# Patient Record
Sex: MALE | Race: WHITE | NOT HISPANIC OR LATINO | Employment: UNEMPLOYED | URBAN - NONMETROPOLITAN AREA
[De-identification: names, ages, dates, MRNs, and addresses within clinical notes are randomized per-mention and may not be internally consistent; named-entity substitution may affect disease eponyms.]

---

## 2021-11-17 ENCOUNTER — HOSPITAL ENCOUNTER (EMERGENCY)
Facility: HOSPITAL | Age: 26
Discharge: HOME | End: 2021-11-17
Attending: EMERGENCY MEDICINE | Admitting: EMERGENCY MEDICINE

## 2021-11-17 VITALS
HEART RATE: 101 BPM | WEIGHT: 200 LBS | BODY MASS INDEX: 34.15 KG/M2 | OXYGEN SATURATION: 96 % | TEMPERATURE: 98.2 F | DIASTOLIC BLOOD PRESSURE: 82 MMHG | RESPIRATION RATE: 18 BRPM | HEIGHT: 64 IN | SYSTOLIC BLOOD PRESSURE: 140 MMHG

## 2021-11-17 DIAGNOSIS — S00.06XA INSECT BITE OF SCALP, INITIAL ENCOUNTER: Primary | ICD-10-CM
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
## 2021-11-17 PROCEDURE — 96372 THER/PROPH/DIAG INJ SC/IM: CPT

## 2021-11-17 PROCEDURE — 99283 EMERGENCY DEPT VISIT LOW MDM: CPT

## 2021-11-17 PROCEDURE — 25010000002 CEFTRIAXONE PER 250 MG: Performed by: PHYSICIAN ASSISTANT

## 2021-11-17 RX ORDER — MUPIROCIN CALCIUM 20 MG/G
1 CREAM TOPICAL 3 TIMES DAILY
Qty: 30 G | Refills: 0 | Status: SHIPPED | OUTPATIENT
Start: 2021-11-17 | End: 2021-11-24

## 2021-11-17 RX ORDER — CEPHALEXIN 500 MG/1
500 CAPSULE ORAL 3 TIMES DAILY
Qty: 21 CAPSULE | Refills: 0 | Status: SHIPPED | OUTPATIENT
Start: 2021-11-17 | End: 2021-11-24

## 2021-11-17 RX ADMIN — MUPIROCIN 1 APPLICATION: 20 OINTMENT TOPICAL at 14:39

## 2021-11-17 RX ADMIN — LIDOCAINE HYDROCHLORIDE 1 G: 10 INJECTION, SOLUTION EPIDURAL; INFILTRATION; INTRACAUDAL; PERINEURAL at 14:20

## 2021-11-17 NOTE — DISCHARGE INSTRUCTIONS
Please utilize your topical and oral antibiotics. Please follow up with your PCP in 2 days or return to ER if symptoms worsen.

## 2022-01-23 ENCOUNTER — HOSPITAL ENCOUNTER (EMERGENCY)
Facility: HOSPITAL | Age: 27
Discharge: HOME | End: 2022-01-23
Attending: EMERGENCY MEDICINE | Admitting: EMERGENCY MEDICINE

## 2022-01-23 ENCOUNTER — APPOINTMENT (OUTPATIENT)
Dept: CT IMAGING | Facility: HOSPITAL | Age: 27
End: 2022-01-23

## 2022-01-23 VITALS
WEIGHT: 200 LBS | BODY MASS INDEX: 33.32 KG/M2 | OXYGEN SATURATION: 99 % | HEART RATE: 89 BPM | HEIGHT: 65 IN | DIASTOLIC BLOOD PRESSURE: 96 MMHG | TEMPERATURE: 98.2 F | SYSTOLIC BLOOD PRESSURE: 152 MMHG | RESPIRATION RATE: 16 BRPM

## 2022-01-23 DIAGNOSIS — E11.65 HYPERGLYCEMIA DUE TO DIABETES MELLITUS: Primary | ICD-10-CM

## 2022-01-23 DIAGNOSIS — L02.01 ABSCESS OF FACE: ICD-10-CM

## 2022-01-23 LAB
ALBUMIN SERPL-MCNC: 3.95 G/DL (ref 3.5–5.2)
ALBUMIN/GLOB SERPL: 1 G/DL
ALP SERPL-CCNC: 104 U/L (ref 39–117)
ALT SERPL W P-5'-P-CCNC: 10 U/L (ref 1–41)
ANION GAP SERPL CALCULATED.3IONS-SCNC: 12 MMOL/L (ref 5–15)
AST SERPL-CCNC: 12 U/L (ref 1–40)
BASOPHILS # BLD AUTO: 0.07 10*3/MM3 (ref 0–0.2)
BASOPHILS NFR BLD AUTO: 0.4 % (ref 0–1.5)
BILIRUB SERPL-MCNC: 0.6 MG/DL (ref 0–1.2)
BUN SERPL-MCNC: 13 MG/DL (ref 6–20)
BUN/CREAT SERPL: 12.3 (ref 7–25)
CALCIUM SPEC-SCNC: 9.4 MG/DL (ref 8.6–10.5)
CHLORIDE SERPL-SCNC: 96 MMOL/L (ref 98–107)
CO2 SERPL-SCNC: 24 MMOL/L (ref 22–29)
CREAT SERPL-MCNC: 1.06 MG/DL (ref 0.76–1.27)
CRP SERPL-MCNC: 8.62 MG/DL (ref 0–0.5)
D-LACTATE SERPL-SCNC: 1.1 MMOL/L (ref 0.5–2)
DEPRECATED RDW RBC AUTO: 36.3 FL (ref 37–54)
EOSINOPHIL # BLD AUTO: 0.56 10*3/MM3 (ref 0–0.4)
EOSINOPHIL NFR BLD AUTO: 3.6 % (ref 0.3–6.2)
ERYTHROCYTE [DISTWIDTH] IN BLOOD BY AUTOMATED COUNT: 12 % (ref 12.3–15.4)
GFR SERPL CREATININE-BSD FRML MDRD: 83 ML/MIN/1.73
GLOBULIN UR ELPH-MCNC: 4.1 GM/DL
GLUCOSE BLDC GLUCOMTR-MCNC: 331 MG/DL (ref 70–130)
GLUCOSE SERPL-MCNC: 342 MG/DL (ref 65–99)
HBA1C MFR BLD: 15 % (ref 4.8–5.6)
HCT VFR BLD AUTO: 46.2 % (ref 37.5–51)
HGB BLD-MCNC: 15.7 G/DL (ref 13–17.7)
HOLD SPECIMEN: NORMAL
HOLD SPECIMEN: NORMAL
IMM GRANULOCYTES # BLD AUTO: 0.05 10*3/MM3 (ref 0–0.05)
IMM GRANULOCYTES NFR BLD AUTO: 0.3 % (ref 0–0.5)
LYMPHOCYTES # BLD AUTO: 2.79 10*3/MM3 (ref 0.7–3.1)
LYMPHOCYTES NFR BLD AUTO: 17.9 % (ref 19.6–45.3)
MCH RBC QN AUTO: 28.6 PG (ref 26.6–33)
MCHC RBC AUTO-ENTMCNC: 34 G/DL (ref 31.5–35.7)
MCV RBC AUTO: 84.2 FL (ref 79–97)
MONOCYTES # BLD AUTO: 0.93 10*3/MM3 (ref 0.1–0.9)
MONOCYTES NFR BLD AUTO: 6 % (ref 5–12)
NEUTROPHILS NFR BLD AUTO: 11.21 10*3/MM3 (ref 1.7–7)
NEUTROPHILS NFR BLD AUTO: 71.8 % (ref 42.7–76)
NRBC BLD AUTO-RTO: 0 /100 WBC (ref 0–0.2)
PLATELET # BLD AUTO: 382 10*3/MM3 (ref 140–450)
PMV BLD AUTO: 9.4 FL (ref 6–12)
POTASSIUM SERPL-SCNC: 4.5 MMOL/L (ref 3.5–5.2)
PROT SERPL-MCNC: 8 G/DL (ref 6–8.5)
RBC # BLD AUTO: 5.49 10*6/MM3 (ref 4.14–5.8)
SODIUM SERPL-SCNC: 132 MMOL/L (ref 136–145)
WBC NRBC COR # BLD: 15.61 10*3/MM3 (ref 3.4–10.8)
WHOLE BLOOD HOLD SPECIMEN: NORMAL
WHOLE BLOOD HOLD SPECIMEN: NORMAL

## 2022-01-23 PROCEDURE — 96375 TX/PRO/DX INJ NEW DRUG ADDON: CPT

## 2022-01-23 PROCEDURE — 96361 HYDRATE IV INFUSION ADD-ON: CPT

## 2022-01-23 PROCEDURE — 86140 C-REACTIVE PROTEIN: CPT | Performed by: NURSE PRACTITIONER

## 2022-01-23 PROCEDURE — 25010000002 VANCOMYCIN 5 G RECONSTITUTED SOLUTION: Performed by: PHYSICIAN ASSISTANT

## 2022-01-23 PROCEDURE — 87040 BLOOD CULTURE FOR BACTERIA: CPT | Performed by: NURSE PRACTITIONER

## 2022-01-23 PROCEDURE — 70487 CT MAXILLOFACIAL W/DYE: CPT

## 2022-01-23 PROCEDURE — 83605 ASSAY OF LACTIC ACID: CPT | Performed by: NURSE PRACTITIONER

## 2022-01-23 PROCEDURE — 82962 GLUCOSE BLOOD TEST: CPT

## 2022-01-23 PROCEDURE — 96366 THER/PROPH/DIAG IV INF ADDON: CPT

## 2022-01-23 PROCEDURE — 85025 COMPLETE CBC W/AUTO DIFF WBC: CPT | Performed by: NURSE PRACTITIONER

## 2022-01-23 PROCEDURE — 80053 COMPREHEN METABOLIC PANEL: CPT | Performed by: NURSE PRACTITIONER

## 2022-01-23 PROCEDURE — 99284 EMERGENCY DEPT VISIT MOD MDM: CPT

## 2022-01-23 PROCEDURE — 96365 THER/PROPH/DIAG IV INF INIT: CPT

## 2022-01-23 PROCEDURE — 36415 COLL VENOUS BLD VENIPUNCTURE: CPT

## 2022-01-23 PROCEDURE — 83036 HEMOGLOBIN GLYCOSYLATED A1C: CPT | Performed by: NURSE PRACTITIONER

## 2022-01-23 PROCEDURE — 25010000002 IOPAMIDOL 61 % SOLUTION: Performed by: EMERGENCY MEDICINE

## 2022-01-23 RX ORDER — HYDROCODONE BITARTRATE AND ACETAMINOPHEN 5; 325 MG/1; MG/1
1 TABLET ORAL EVERY 8 HOURS PRN
Qty: 9 TABLET | Refills: 0 | Status: SHIPPED | OUTPATIENT
Start: 2022-01-23 | End: 2022-01-26

## 2022-01-23 RX ORDER — LIDOCAINE HYDROCHLORIDE 10 MG/ML
10 INJECTION, SOLUTION EPIDURAL; INFILTRATION; INTRACAUDAL; PERINEURAL ONCE
Status: COMPLETED | OUTPATIENT
Start: 2022-01-23 | End: 2022-01-23

## 2022-01-23 RX ORDER — HYDROCODONE BITARTRATE AND ACETAMINOPHEN 5; 325 MG/1; MG/1
1 TABLET ORAL EVERY 6 HOURS PRN
Status: DISCONTINUED | OUTPATIENT
Start: 2022-01-23 | End: 2022-01-24 | Stop reason: HOSPADM

## 2022-01-23 RX ORDER — SULFAMETHOXAZOLE AND TRIMETHOPRIM 800; 160 MG/1; MG/1
TABLET ORAL
Qty: 40 TABLET | Refills: 0 | Status: SHIPPED | OUTPATIENT
Start: 2022-01-23 | End: 2022-04-28

## 2022-01-23 RX ADMIN — SODIUM CHLORIDE 1000 ML: 9 INJECTION, SOLUTION INTRAVENOUS at 19:56

## 2022-01-23 RX ADMIN — LIDOCAINE HYDROCHLORIDE 10 ML: 10 INJECTION, SOLUTION EPIDURAL; INFILTRATION; INTRACAUDAL; PERINEURAL at 22:55

## 2022-01-23 RX ADMIN — SODIUM CHLORIDE 1000 ML: 9 INJECTION, SOLUTION INTRAVENOUS at 20:57

## 2022-01-23 RX ADMIN — HYDROCODONE BITARTRATE AND ACETAMINOPHEN 1 TABLET: 5; 325 TABLET ORAL at 23:44

## 2022-01-23 RX ADMIN — IOPAMIDOL 86 ML: 612 INJECTION, SOLUTION INTRAVENOUS at 20:32

## 2022-01-23 RX ADMIN — VANCOMYCIN HYDROCHLORIDE 1750 MG: 5 INJECTION, POWDER, LYOPHILIZED, FOR SOLUTION INTRAVENOUS at 20:56

## 2022-01-28 LAB
BACTERIA SPEC AEROBE CULT: NORMAL
BACTERIA SPEC AEROBE CULT: NORMAL

## 2022-04-28 ENCOUNTER — OFFICE VISIT (OUTPATIENT)
Dept: FAMILY MEDICINE CLINIC | Facility: CLINIC | Age: 27
End: 2022-04-28

## 2022-04-28 VITALS
OXYGEN SATURATION: 99 % | SYSTOLIC BLOOD PRESSURE: 110 MMHG | HEART RATE: 77 BPM | BODY MASS INDEX: 31.82 KG/M2 | WEIGHT: 198 LBS | HEIGHT: 66 IN | DIASTOLIC BLOOD PRESSURE: 82 MMHG | TEMPERATURE: 97.3 F

## 2022-04-28 DIAGNOSIS — E11.65 TYPE 2 DIABETES MELLITUS WITH HYPERGLYCEMIA, WITHOUT LONG-TERM CURRENT USE OF INSULIN: ICD-10-CM

## 2022-04-28 DIAGNOSIS — Z87.2 HISTORY OF CELLULITIS: ICD-10-CM

## 2022-04-28 DIAGNOSIS — R59.0 CERVICAL LYMPHADENOPATHY: Primary | ICD-10-CM

## 2022-04-28 PROCEDURE — 99204 OFFICE O/P NEW MOD 45 MIN: CPT | Performed by: FAMILY MEDICINE

## 2022-04-29 LAB
ALBUMIN SERPL-MCNC: 4.1 G/DL (ref 3.5–5.2)
ALBUMIN/GLOB SERPL: 1.3 G/DL
ALP SERPL-CCNC: 73 U/L (ref 39–117)
ALT SERPL-CCNC: 9 U/L (ref 1–41)
AST SERPL-CCNC: 7 U/L (ref 1–40)
BASOPHILS # BLD AUTO: 0.19 10*3/MM3 (ref 0–0.2)
BASOPHILS NFR BLD AUTO: 1.5 % (ref 0–1.5)
BILIRUB SERPL-MCNC: 0.3 MG/DL (ref 0–1.2)
BUN SERPL-MCNC: 18 MG/DL (ref 6–20)
BUN/CREAT SERPL: 15.1 (ref 7–25)
CALCIUM SERPL-MCNC: 10 MG/DL (ref 8.6–10.5)
CHLORIDE SERPL-SCNC: 101 MMOL/L (ref 98–107)
CHOLEST SERPL-MCNC: 254 MG/DL (ref 0–200)
CO2 SERPL-SCNC: 23.9 MMOL/L (ref 22–29)
CREAT SERPL-MCNC: 1.19 MG/DL (ref 0.76–1.27)
EGFRCR SERPLBLD CKD-EPI 2021: 84.8 ML/MIN/1.73
EOSINOPHIL # BLD AUTO: 1.99 10*3/MM3 (ref 0–0.4)
EOSINOPHIL NFR BLD AUTO: 15.4 % (ref 0.3–6.2)
ERYTHROCYTE [DISTWIDTH] IN BLOOD BY AUTOMATED COUNT: 13.2 % (ref 12.3–15.4)
GLOBULIN SER CALC-MCNC: 3.1 GM/DL
GLUCOSE SERPL-MCNC: 241 MG/DL (ref 65–99)
HBA1C MFR BLD: 11.3 % (ref 4.8–5.6)
HCT VFR BLD AUTO: 49.9 % (ref 37.5–51)
HDLC SERPL-MCNC: 31 MG/DL (ref 40–60)
HGB BLD-MCNC: 16.7 G/DL (ref 13–17.7)
IMM GRANULOCYTES # BLD AUTO: 0.04 10*3/MM3 (ref 0–0.05)
IMM GRANULOCYTES NFR BLD AUTO: 0.3 % (ref 0–0.5)
IMP & REVIEW OF LAB RESULTS: NORMAL
LDLC SERPL CALC-MCNC: 106 MG/DL (ref 0–100)
LYMPHOCYTES # BLD AUTO: 3.12 10*3/MM3 (ref 0.7–3.1)
LYMPHOCYTES NFR BLD AUTO: 24.1 % (ref 19.6–45.3)
MCH RBC QN AUTO: 28.6 PG (ref 26.6–33)
MCHC RBC AUTO-ENTMCNC: 33.5 G/DL (ref 31.5–35.7)
MCV RBC AUTO: 85.6 FL (ref 79–97)
MICROALBUMIN UR-MCNC: 1651.7 UG/ML
MONOCYTES # BLD AUTO: 0.7 10*3/MM3 (ref 0.1–0.9)
MONOCYTES NFR BLD AUTO: 5.4 % (ref 5–12)
NEUTROPHILS # BLD AUTO: 6.9 10*3/MM3 (ref 1.7–7)
NEUTROPHILS NFR BLD AUTO: 53.3 % (ref 42.7–76)
NRBC BLD AUTO-RTO: 0 /100 WBC (ref 0–0.2)
PLATELET # BLD AUTO: 394 10*3/MM3 (ref 140–450)
POTASSIUM SERPL-SCNC: 4.6 MMOL/L (ref 3.5–5.2)
PROT SERPL-MCNC: 7.2 G/DL (ref 6–8.5)
RBC # BLD AUTO: 5.83 10*6/MM3 (ref 4.14–5.8)
SODIUM SERPL-SCNC: 137 MMOL/L (ref 136–145)
TRIGL SERPL-MCNC: 671 MG/DL (ref 0–150)
TSH SERPL DL<=0.005 MIU/L-ACNC: 1.76 UIU/ML (ref 0.27–4.2)
VLDLC SERPL CALC-MCNC: 117 MG/DL (ref 5–40)
WBC # BLD AUTO: 12.94 10*3/MM3 (ref 3.4–10.8)

## 2022-05-19 ENCOUNTER — APPOINTMENT (OUTPATIENT)
Dept: CT IMAGING | Facility: HOSPITAL | Age: 27
End: 2022-05-19

## 2022-05-30 ENCOUNTER — HOSPITAL ENCOUNTER (OUTPATIENT)
Facility: HOSPITAL | Age: 27
Discharge: HOME | End: 2022-06-03
Attending: EMERGENCY MEDICINE | Admitting: SURGERY

## 2022-05-30 ENCOUNTER — APPOINTMENT (OUTPATIENT)
Dept: CT IMAGING | Facility: HOSPITAL | Age: 27
End: 2022-05-30

## 2022-05-30 DIAGNOSIS — E86.0 DEHYDRATION: ICD-10-CM

## 2022-05-30 DIAGNOSIS — R11.2 INTRACTABLE NAUSEA AND VOMITING: Primary | ICD-10-CM

## 2022-05-30 DIAGNOSIS — N17.9 AKI (ACUTE KIDNEY INJURY): ICD-10-CM

## 2022-05-30 DIAGNOSIS — R79.89 ELEVATED LACTIC ACID LEVEL: ICD-10-CM

## 2022-05-30 DIAGNOSIS — D72.829 LEUKOCYTOSIS, UNSPECIFIED TYPE: ICD-10-CM

## 2022-05-30 LAB
A-A DO2: 13.9 MMHG (ref 0–300)
ALBUMIN SERPL-MCNC: 4.32 G/DL (ref 3.5–5.2)
ALBUMIN/GLOB SERPL: 1.2 G/DL
ALP SERPL-CCNC: 68 U/L (ref 39–117)
ALT SERPL W P-5'-P-CCNC: 11 U/L (ref 1–41)
AMPHET+METHAMPHET UR QL: NEGATIVE
AMPHETAMINES UR QL: NEGATIVE
ANION GAP SERPL CALCULATED.3IONS-SCNC: 16.7 MMOL/L (ref 5–15)
ARTERIAL PATENCY WRIST A: POSITIVE
AST SERPL-CCNC: 11 U/L (ref 1–40)
ATMOSPHERIC PRESS: 728 MMHG
BACTERIA UR QL AUTO: ABNORMAL /HPF
BARBITURATES UR QL SCN: NEGATIVE
BASE EXCESS BLDA CALC-SCNC: -1.5 MMOL/L (ref 0–2)
BASOPHILS # BLD AUTO: 0.08 10*3/MM3 (ref 0–0.2)
BASOPHILS NFR BLD AUTO: 0.5 % (ref 0–1.5)
BDY SITE: ABNORMAL
BENZODIAZ UR QL SCN: NEGATIVE
BILIRUB SERPL-MCNC: 0.4 MG/DL (ref 0–1.2)
BILIRUB UR QL STRIP: NEGATIVE
BODY TEMPERATURE: 0 C
BUN SERPL-MCNC: 23 MG/DL (ref 6–20)
BUN/CREAT SERPL: 15.8 (ref 7–25)
BUPRENORPHINE SERPL-MCNC: NEGATIVE NG/ML
CALCIUM SPEC-SCNC: 10.1 MG/DL (ref 8.6–10.5)
CANNABINOIDS SERPL QL: POSITIVE
CHLORIDE SERPL-SCNC: 98 MMOL/L (ref 98–107)
CLARITY UR: CLEAR
CO2 BLDA-SCNC: 17.5 MMOL/L (ref 22–33)
CO2 SERPL-SCNC: 20.3 MMOL/L (ref 22–29)
COCAINE UR QL: NEGATIVE
COHGB MFR BLD: 1.8 % (ref 0–5)
COLOR UR: ABNORMAL
CREAT SERPL-MCNC: 1.46 MG/DL (ref 0.76–1.27)
CRP SERPL-MCNC: 0.5 MG/DL (ref 0–0.5)
D-LACTATE SERPL-SCNC: 2.3 MMOL/L (ref 0.5–2)
DEPRECATED RDW RBC AUTO: 37.4 FL (ref 37–54)
EGFRCR SERPLBLD CKD-EPI 2021: 66.3 ML/MIN/1.73
EOSINOPHIL # BLD AUTO: 0.52 10*3/MM3 (ref 0–0.4)
EOSINOPHIL NFR BLD AUTO: 3.3 % (ref 0.3–6.2)
ERYTHROCYTE [DISTWIDTH] IN BLOOD BY AUTOMATED COUNT: 12.6 % (ref 12.3–15.4)
ERYTHROCYTE [SEDIMENTATION RATE] IN BLOOD: 17 MM/HR (ref 0–15)
FLUAV RNA RESP QL NAA+PROBE: NOT DETECTED
FLUBV RNA RESP QL NAA+PROBE: NOT DETECTED
GLOBULIN UR ELPH-MCNC: 3.6 GM/DL
GLUCOSE BLDC GLUCOMTR-MCNC: 231 MG/DL (ref 70–130)
GLUCOSE SERPL-MCNC: 248 MG/DL (ref 65–99)
GLUCOSE UR STRIP-MCNC: ABNORMAL MG/DL
HBA1C MFR BLD: 9.9 % (ref 4.8–5.6)
HCO3 BLDA-SCNC: 17 MMOL/L (ref 20–26)
HCT VFR BLD AUTO: 46.4 % (ref 37.5–51)
HCT VFR BLD CALC: 49.3 % (ref 38–51)
HGB BLD-MCNC: 16.3 G/DL (ref 13–17.7)
HGB BLDA-MCNC: 16.1 G/DL (ref 14–18)
HGB UR QL STRIP.AUTO: ABNORMAL
HOLD SPECIMEN: NORMAL
HOLD SPECIMEN: NORMAL
HYALINE CASTS UR QL AUTO: ABNORMAL /LPF
IMM GRANULOCYTES # BLD AUTO: 0.05 10*3/MM3 (ref 0–0.05)
IMM GRANULOCYTES NFR BLD AUTO: 0.3 % (ref 0–0.5)
INHALED O2 CONCENTRATION: 21 %
KETONES UR QL STRIP: ABNORMAL
LEUKOCYTE ESTERASE UR QL STRIP.AUTO: NEGATIVE
LIPASE SERPL-CCNC: 35 U/L (ref 13–60)
LYMPHOCYTES # BLD AUTO: 2.22 10*3/MM3 (ref 0.7–3.1)
LYMPHOCYTES NFR BLD AUTO: 14.2 % (ref 19.6–45.3)
Lab: ABNORMAL
Lab: ABNORMAL
MAGNESIUM SERPL-MCNC: 1.6 MG/DL (ref 1.6–2.6)
MCH RBC QN AUTO: 28.6 PG (ref 26.6–33)
MCHC RBC AUTO-ENTMCNC: 35.1 G/DL (ref 31.5–35.7)
MCV RBC AUTO: 81.5 FL (ref 79–97)
METHADONE UR QL SCN: NEGATIVE
METHGB BLD QL: 0.3 % (ref 0–3)
MODALITY: ABNORMAL
MONOCYTES # BLD AUTO: 0.84 10*3/MM3 (ref 0.1–0.9)
MONOCYTES NFR BLD AUTO: 5.4 % (ref 5–12)
NEUTROPHILS NFR BLD AUTO: 11.91 10*3/MM3 (ref 1.7–7)
NEUTROPHILS NFR BLD AUTO: 76.3 % (ref 42.7–76)
NITRITE UR QL STRIP: NEGATIVE
NOTE: ABNORMAL
NOTIFIED BY: ABNORMAL
NOTIFIED WHO: ABNORMAL
NRBC BLD AUTO-RTO: 0 /100 WBC (ref 0–0.2)
OPIATES UR QL: NEGATIVE
OXYCODONE UR QL SCN: NEGATIVE
OXYHGB MFR BLDV: 97.2 % (ref 94–99)
PCO2 BLDA: 17.6 MM HG (ref 35–45)
PCO2 TEMP ADJ BLD: ABNORMAL MM[HG]
PCP UR QL SCN: NEGATIVE
PH BLDA: 7.59 PH UNITS (ref 7.35–7.45)
PH UR STRIP.AUTO: 6 [PH] (ref 5–8)
PH, TEMP CORRECTED: ABNORMAL
PLATELET # BLD AUTO: 424 10*3/MM3 (ref 140–450)
PMV BLD AUTO: 9.6 FL (ref 6–12)
PO2 BLDA: 109 MM HG (ref 83–108)
PO2 TEMP ADJ BLD: ABNORMAL MM[HG]
POTASSIUM SERPL-SCNC: 4.8 MMOL/L (ref 3.5–5.2)
PROPOXYPH UR QL: NEGATIVE
PROT SERPL-MCNC: 7.9 G/DL (ref 6–8.5)
PROT UR QL STRIP: ABNORMAL
RBC # BLD AUTO: 5.69 10*6/MM3 (ref 4.14–5.8)
RBC # UR STRIP: ABNORMAL /HPF
REF LAB TEST METHOD: ABNORMAL
SAO2 % BLDCOA: >99.2 % (ref 94–99)
SARS-COV-2 RNA RESP QL NAA+PROBE: NOT DETECTED
SODIUM SERPL-SCNC: 135 MMOL/L (ref 136–145)
SP GR UR STRIP: 1.03 (ref 1–1.03)
SQUAMOUS #/AREA URNS HPF: ABNORMAL /HPF
TRICYCLICS UR QL SCN: NEGATIVE
TROPONIN T SERPL-MCNC: <0.01 NG/ML (ref 0–0.03)
TSH SERPL DL<=0.05 MIU/L-ACNC: 2.85 UIU/ML (ref 0.27–4.2)
UROBILINOGEN UR QL STRIP: ABNORMAL
VENTILATOR MODE: ABNORMAL
WBC # UR STRIP: ABNORMAL /HPF
WBC NRBC COR # BLD: 15.62 10*3/MM3 (ref 3.4–10.8)
WHOLE BLOOD HOLD COAG: NORMAL
WHOLE BLOOD HOLD SPECIMEN: NORMAL

## 2022-05-30 PROCEDURE — 96374 THER/PROPH/DIAG INJ IV PUSH: CPT

## 2022-05-30 PROCEDURE — 83605 ASSAY OF LACTIC ACID: CPT | Performed by: EMERGENCY MEDICINE

## 2022-05-30 PROCEDURE — 96375 TX/PRO/DX INJ NEW DRUG ADDON: CPT

## 2022-05-30 PROCEDURE — 87040 BLOOD CULTURE FOR BACTERIA: CPT | Performed by: EMERGENCY MEDICINE

## 2022-05-30 PROCEDURE — 93005 ELECTROCARDIOGRAM TRACING: CPT | Performed by: EMERGENCY MEDICINE

## 2022-05-30 PROCEDURE — 93010 ELECTROCARDIOGRAM REPORT: CPT | Performed by: INTERNAL MEDICINE

## 2022-05-30 PROCEDURE — 87636 SARSCOV2 & INF A&B AMP PRB: CPT | Performed by: EMERGENCY MEDICINE

## 2022-05-30 PROCEDURE — 84443 ASSAY THYROID STIM HORMONE: CPT | Performed by: EMERGENCY MEDICINE

## 2022-05-30 PROCEDURE — 99284 EMERGENCY DEPT VISIT MOD MDM: CPT

## 2022-05-30 PROCEDURE — 87086 URINE CULTURE/COLONY COUNT: CPT | Performed by: EMERGENCY MEDICINE

## 2022-05-30 PROCEDURE — 84484 ASSAY OF TROPONIN QUANT: CPT | Performed by: EMERGENCY MEDICINE

## 2022-05-30 PROCEDURE — 82962 GLUCOSE BLOOD TEST: CPT

## 2022-05-30 PROCEDURE — G0378 HOSPITAL OBSERVATION PER HR: HCPCS

## 2022-05-30 PROCEDURE — 85025 COMPLETE CBC W/AUTO DIFF WBC: CPT | Performed by: EMERGENCY MEDICINE

## 2022-05-30 PROCEDURE — 82805 BLOOD GASES W/O2 SATURATION: CPT

## 2022-05-30 PROCEDURE — 83690 ASSAY OF LIPASE: CPT | Performed by: EMERGENCY MEDICINE

## 2022-05-30 PROCEDURE — 25010000002 ONDANSETRON PER 1 MG: Performed by: EMERGENCY MEDICINE

## 2022-05-30 PROCEDURE — 25010000002 IOPAMIDOL 61 % SOLUTION: Performed by: EMERGENCY MEDICINE

## 2022-05-30 PROCEDURE — 86140 C-REACTIVE PROTEIN: CPT | Performed by: EMERGENCY MEDICINE

## 2022-05-30 PROCEDURE — 81001 URINALYSIS AUTO W/SCOPE: CPT | Performed by: EMERGENCY MEDICINE

## 2022-05-30 PROCEDURE — 85652 RBC SED RATE AUTOMATED: CPT | Performed by: EMERGENCY MEDICINE

## 2022-05-30 PROCEDURE — 36600 WITHDRAWAL OF ARTERIAL BLOOD: CPT

## 2022-05-30 PROCEDURE — 74177 CT ABD & PELVIS W/CONTRAST: CPT

## 2022-05-30 PROCEDURE — 83036 HEMOGLOBIN GLYCOSYLATED A1C: CPT | Performed by: EMERGENCY MEDICINE

## 2022-05-30 PROCEDURE — 80053 COMPREHEN METABOLIC PANEL: CPT | Performed by: EMERGENCY MEDICINE

## 2022-05-30 PROCEDURE — 99283 EMERGENCY DEPT VISIT LOW MDM: CPT

## 2022-05-30 PROCEDURE — 80306 DRUG TEST PRSMV INSTRMNT: CPT | Performed by: EMERGENCY MEDICINE

## 2022-05-30 PROCEDURE — 83735 ASSAY OF MAGNESIUM: CPT | Performed by: EMERGENCY MEDICINE

## 2022-05-30 PROCEDURE — C9803 HOPD COVID-19 SPEC COLLECT: HCPCS

## 2022-05-30 PROCEDURE — 96376 TX/PRO/DX INJ SAME DRUG ADON: CPT

## 2022-05-30 PROCEDURE — 25010000002 MORPHINE PER 10 MG: Performed by: EMERGENCY MEDICINE

## 2022-05-30 PROCEDURE — 83050 HGB METHEMOGLOBIN QUAN: CPT

## 2022-05-30 PROCEDURE — 82375 ASSAY CARBOXYHB QUANT: CPT

## 2022-05-30 RX ORDER — KETOROLAC TROMETHAMINE 30 MG/ML
30 INJECTION, SOLUTION INTRAMUSCULAR; INTRAVENOUS EVERY 6 HOURS PRN
Status: DISCONTINUED | OUTPATIENT
Start: 2022-05-30 | End: 2022-05-30

## 2022-05-30 RX ORDER — SODIUM CHLORIDE 0.9 % (FLUSH) 0.9 %
10 SYRINGE (ML) INJECTION AS NEEDED
Status: DISCONTINUED | OUTPATIENT
Start: 2022-05-30 | End: 2022-06-03 | Stop reason: HOSPADM

## 2022-05-30 RX ORDER — ONDANSETRON 2 MG/ML
4 INJECTION INTRAMUSCULAR; INTRAVENOUS ONCE
Status: COMPLETED | OUTPATIENT
Start: 2022-05-30 | End: 2022-05-30

## 2022-05-30 RX ADMIN — ONDANSETRON 4 MG: 2 INJECTION INTRAMUSCULAR; INTRAVENOUS at 22:29

## 2022-05-30 RX ADMIN — MORPHINE SULFATE 4 MG: 4 INJECTION, SOLUTION INTRAMUSCULAR; INTRAVENOUS at 22:52

## 2022-05-30 RX ADMIN — SODIUM CHLORIDE 1000 ML: 9 INJECTION, SOLUTION INTRAVENOUS at 21:26

## 2022-05-30 RX ADMIN — SODIUM CHLORIDE 1000 ML: 9 INJECTION, SOLUTION INTRAVENOUS at 22:29

## 2022-05-30 RX ADMIN — ONDANSETRON 4 MG: 2 INJECTION INTRAMUSCULAR; INTRAVENOUS at 21:26

## 2022-05-30 RX ADMIN — IOPAMIDOL 89 ML: 612 INJECTION, SOLUTION INTRAVENOUS at 23:26

## 2022-05-31 ENCOUNTER — APPOINTMENT (OUTPATIENT)
Dept: ULTRASOUND IMAGING | Facility: HOSPITAL | Age: 27
End: 2022-05-31

## 2022-05-31 LAB
A-A DO2: ABNORMAL
ALBUMIN SERPL-MCNC: 3.67 G/DL (ref 3.5–5.2)
ALBUMIN/GLOB SERPL: 1.3 G/DL
ALP SERPL-CCNC: 53 U/L (ref 39–117)
ALT SERPL W P-5'-P-CCNC: 9 U/L (ref 1–41)
ANION GAP SERPL CALCULATED.3IONS-SCNC: 13.1 MMOL/L (ref 5–15)
ARTERIAL PATENCY WRIST A: ABNORMAL
AST SERPL-CCNC: 7 U/L (ref 1–40)
ATMOSPHERIC PRESS: 729 MMHG
BACTERIA SPEC AEROBE CULT: NO GROWTH
BASE EXCESS BLDA CALC-SCNC: -0.2 MMOL/L (ref 0–2)
BASOPHILS # BLD AUTO: 0.07 10*3/MM3 (ref 0–0.2)
BASOPHILS NFR BLD AUTO: 0.6 % (ref 0–1.5)
BDY SITE: ABNORMAL
BILIRUB SERPL-MCNC: 0.3 MG/DL (ref 0–1.2)
BODY TEMPERATURE: 0 C
BUN SERPL-MCNC: 20 MG/DL (ref 6–20)
BUN/CREAT SERPL: 17.2 (ref 7–25)
CALCIUM SPEC-SCNC: 8.7 MG/DL (ref 8.6–10.5)
CHLORIDE SERPL-SCNC: 101 MMOL/L (ref 98–107)
CO2 BLDA-SCNC: 23.8 MMOL/L (ref 22–33)
CO2 SERPL-SCNC: 20.9 MMOL/L (ref 22–29)
COHGB MFR BLD: 1.2 % (ref 0–5)
CREAT SERPL-MCNC: 1.16 MG/DL (ref 0.76–1.27)
CRP SERPL-MCNC: 0.38 MG/DL (ref 0–0.5)
D-LACTATE SERPL-SCNC: 1.4 MMOL/L (ref 0.5–2)
DEPRECATED RDW RBC AUTO: 38.1 FL (ref 37–54)
EGFRCR SERPLBLD CKD-EPI 2021: 87.4 ML/MIN/1.73
EOSINOPHIL # BLD AUTO: 0.09 10*3/MM3 (ref 0–0.4)
EOSINOPHIL NFR BLD AUTO: 0.8 % (ref 0.3–6.2)
ERYTHROCYTE [DISTWIDTH] IN BLOOD BY AUTOMATED COUNT: 12.6 % (ref 12.3–15.4)
GLOBULIN UR ELPH-MCNC: 2.8 GM/DL
GLUCOSE BLDC GLUCOMTR-MCNC: 169 MG/DL (ref 70–130)
GLUCOSE BLDC GLUCOMTR-MCNC: 191 MG/DL (ref 70–130)
GLUCOSE BLDC GLUCOMTR-MCNC: 211 MG/DL (ref 70–130)
GLUCOSE BLDC GLUCOMTR-MCNC: 248 MG/DL (ref 70–130)
GLUCOSE SERPL-MCNC: 189 MG/DL (ref 65–99)
HAV IGM SERPL QL IA: NORMAL
HBV CORE IGM SERPL QL IA: NORMAL
HBV SURFACE AG SERPL QL IA: NORMAL
HCO3 BLDA-SCNC: 22.8 MMOL/L (ref 20–26)
HCT VFR BLD AUTO: 42.1 % (ref 37.5–51)
HCT VFR BLD CALC: 45.2 % (ref 38–51)
HCV AB SER DONR QL: NORMAL
HGB BLD-MCNC: 14.5 G/DL (ref 13–17.7)
HGB BLDA-MCNC: 14.8 G/DL (ref 14–18)
IMM GRANULOCYTES # BLD AUTO: 0.03 10*3/MM3 (ref 0–0.05)
IMM GRANULOCYTES NFR BLD AUTO: 0.3 % (ref 0–0.5)
INHALED O2 CONCENTRATION: 21 %
LYMPHOCYTES # BLD AUTO: 2.77 10*3/MM3 (ref 0.7–3.1)
LYMPHOCYTES NFR BLD AUTO: 25.1 % (ref 19.6–45.3)
Lab: ABNORMAL
MCH RBC QN AUTO: 28.4 PG (ref 26.6–33)
MCHC RBC AUTO-ENTMCNC: 34.4 G/DL (ref 31.5–35.7)
MCV RBC AUTO: 82.5 FL (ref 79–97)
METHGB BLD QL: 0.3 % (ref 0–3)
MODALITY: ABNORMAL
MONOCYTES # BLD AUTO: 0.77 10*3/MM3 (ref 0.1–0.9)
MONOCYTES NFR BLD AUTO: 7 % (ref 5–12)
NEUTROPHILS NFR BLD AUTO: 66.2 % (ref 42.7–76)
NEUTROPHILS NFR BLD AUTO: 7.3 10*3/MM3 (ref 1.7–7)
NOTE: ABNORMAL
NRBC BLD AUTO-RTO: 0 /100 WBC (ref 0–0.2)
OXYHGB MFR BLDV: 97.3 % (ref 94–99)
PCO2 BLDA: 31.9 MM HG (ref 35–45)
PCO2 TEMP ADJ BLD: ABNORMAL MM[HG]
PH BLDA: 7.46 PH UNITS (ref 7.35–7.45)
PH, TEMP CORRECTED: ABNORMAL
PLATELET # BLD AUTO: 377 10*3/MM3 (ref 140–450)
PMV BLD AUTO: 9.3 FL (ref 6–12)
PO2 BLDA: 112 MM HG (ref 83–108)
PO2 TEMP ADJ BLD: ABNORMAL MM[HG]
POTASSIUM SERPL-SCNC: 4 MMOL/L (ref 3.5–5.2)
PROCALCITONIN SERPL-MCNC: 0.08 NG/ML (ref 0–0.25)
PROT SERPL-MCNC: 6.5 G/DL (ref 6–8.5)
QT INTERVAL: 380 MS
QTC INTERVAL: 412 MS
RBC # BLD AUTO: 5.1 10*6/MM3 (ref 4.14–5.8)
SAO2 % BLDCOA: 98.8 % (ref 94–99)
SODIUM SERPL-SCNC: 135 MMOL/L (ref 136–145)
VENTILATOR MODE: ABNORMAL
WBC NRBC COR # BLD: 11.03 10*3/MM3 (ref 3.4–10.8)

## 2022-05-31 PROCEDURE — 83050 HGB METHEMOGLOBIN QUAN: CPT

## 2022-05-31 PROCEDURE — 76705 ECHO EXAM OF ABDOMEN: CPT | Performed by: RADIOLOGY

## 2022-05-31 PROCEDURE — 63710000001 INSULIN ASPART PER 5 UNITS: Performed by: PHYSICIAN ASSISTANT

## 2022-05-31 PROCEDURE — 86140 C-REACTIVE PROTEIN: CPT | Performed by: PHYSICIAN ASSISTANT

## 2022-05-31 PROCEDURE — 96376 TX/PRO/DX INJ SAME DRUG ADON: CPT

## 2022-05-31 PROCEDURE — 85025 COMPLETE CBC W/AUTO DIFF WBC: CPT | Performed by: PHYSICIAN ASSISTANT

## 2022-05-31 PROCEDURE — 82962 GLUCOSE BLOOD TEST: CPT

## 2022-05-31 PROCEDURE — G0378 HOSPITAL OBSERVATION PER HR: HCPCS

## 2022-05-31 PROCEDURE — 83605 ASSAY OF LACTIC ACID: CPT | Performed by: EMERGENCY MEDICINE

## 2022-05-31 PROCEDURE — 84145 PROCALCITONIN (PCT): CPT | Performed by: PHYSICIAN ASSISTANT

## 2022-05-31 PROCEDURE — 25010000002 ONDANSETRON PER 1 MG: Performed by: PHYSICIAN ASSISTANT

## 2022-05-31 PROCEDURE — 80053 COMPREHEN METABOLIC PANEL: CPT | Performed by: PHYSICIAN ASSISTANT

## 2022-05-31 PROCEDURE — 76705 ECHO EXAM OF ABDOMEN: CPT

## 2022-05-31 PROCEDURE — 80074 ACUTE HEPATITIS PANEL: CPT | Performed by: PHYSICIAN ASSISTANT

## 2022-05-31 PROCEDURE — 82375 ASSAY CARBOXYHB QUANT: CPT

## 2022-05-31 PROCEDURE — 99219 PR INITIAL OBSERVATION CARE/DAY 50 MINUTES: CPT | Performed by: PHYSICIAN ASSISTANT

## 2022-05-31 PROCEDURE — 82805 BLOOD GASES W/O2 SATURATION: CPT

## 2022-05-31 PROCEDURE — 36600 WITHDRAWAL OF ARTERIAL BLOOD: CPT

## 2022-05-31 RX ORDER — SODIUM CHLORIDE 0.9 % (FLUSH) 0.9 %
3 SYRINGE (ML) INJECTION EVERY 12 HOURS SCHEDULED
Status: DISCONTINUED | OUTPATIENT
Start: 2022-05-31 | End: 2022-06-03 | Stop reason: HOSPADM

## 2022-05-31 RX ORDER — INSULIN ASPART 100 [IU]/ML
0-7 INJECTION, SOLUTION INTRAVENOUS; SUBCUTANEOUS
Status: DISCONTINUED | OUTPATIENT
Start: 2022-05-31 | End: 2022-06-03 | Stop reason: HOSPADM

## 2022-05-31 RX ORDER — SODIUM CHLORIDE 0.9 % (FLUSH) 0.9 %
3-10 SYRINGE (ML) INJECTION AS NEEDED
Status: DISCONTINUED | OUTPATIENT
Start: 2022-05-31 | End: 2022-06-03 | Stop reason: HOSPADM

## 2022-05-31 RX ORDER — NICOTINE POLACRILEX 4 MG
15 LOZENGE BUCCAL
Status: DISCONTINUED | OUTPATIENT
Start: 2022-05-31 | End: 2022-06-03 | Stop reason: HOSPADM

## 2022-05-31 RX ORDER — PANTOPRAZOLE SODIUM 40 MG/1
40 TABLET, DELAYED RELEASE ORAL ONCE
Status: COMPLETED | OUTPATIENT
Start: 2022-05-31 | End: 2022-05-31

## 2022-05-31 RX ORDER — DEXTROSE MONOHYDRATE 25 G/50ML
25 INJECTION, SOLUTION INTRAVENOUS
Status: DISCONTINUED | OUTPATIENT
Start: 2022-05-31 | End: 2022-06-03 | Stop reason: HOSPADM

## 2022-05-31 RX ORDER — NITROGLYCERIN 0.4 MG/1
0.4 TABLET SUBLINGUAL
Status: DISCONTINUED | OUTPATIENT
Start: 2022-05-31 | End: 2022-06-03 | Stop reason: HOSPADM

## 2022-05-31 RX ORDER — SODIUM CHLORIDE 9 MG/ML
75 INJECTION, SOLUTION INTRAVENOUS CONTINUOUS
Status: DISCONTINUED | OUTPATIENT
Start: 2022-05-31 | End: 2022-06-02

## 2022-05-31 RX ORDER — ONDANSETRON 2 MG/ML
4 INJECTION INTRAMUSCULAR; INTRAVENOUS EVERY 6 HOURS PRN
Status: DISCONTINUED | OUTPATIENT
Start: 2022-05-31 | End: 2022-06-03 | Stop reason: HOSPADM

## 2022-05-31 RX ORDER — PANTOPRAZOLE SODIUM 40 MG/1
40 TABLET, DELAYED RELEASE ORAL
Status: DISCONTINUED | OUTPATIENT
Start: 2022-05-31 | End: 2022-06-02

## 2022-05-31 RX ADMIN — INSULIN ASPART 2 UNITS: 100 INJECTION, SOLUTION INTRAVENOUS; SUBCUTANEOUS at 11:14

## 2022-05-31 RX ADMIN — Medication 3 ML: at 08:06

## 2022-05-31 RX ADMIN — INSULIN ASPART 3 UNITS: 100 INJECTION, SOLUTION INTRAVENOUS; SUBCUTANEOUS at 17:05

## 2022-05-31 RX ADMIN — PANTOPRAZOLE SODIUM 40 MG: 40 TABLET, DELAYED RELEASE ORAL at 12:34

## 2022-05-31 RX ADMIN — PANTOPRAZOLE SODIUM 40 MG: 40 TABLET, DELAYED RELEASE ORAL at 08:06

## 2022-05-31 RX ADMIN — ONDANSETRON 4 MG: 2 INJECTION INTRAMUSCULAR; INTRAVENOUS at 11:17

## 2022-05-31 RX ADMIN — ONDANSETRON 4 MG: 2 INJECTION INTRAMUSCULAR; INTRAVENOUS at 22:12

## 2022-05-31 RX ADMIN — Medication 3 ML: at 01:11

## 2022-05-31 RX ADMIN — INSULIN ASPART 2 UNITS: 100 INJECTION, SOLUTION INTRAVENOUS; SUBCUTANEOUS at 08:06

## 2022-05-31 RX ADMIN — SODIUM CHLORIDE 75 ML/HR: 9 INJECTION, SOLUTION INTRAVENOUS at 06:18

## 2022-06-01 ENCOUNTER — ANESTHESIA (OUTPATIENT)
Dept: PERIOP | Facility: HOSPITAL | Age: 27
End: 2022-06-01

## 2022-06-01 ENCOUNTER — ANESTHESIA EVENT (OUTPATIENT)
Dept: PERIOP | Facility: HOSPITAL | Age: 27
End: 2022-06-01

## 2022-06-01 LAB
ALBUMIN SERPL-MCNC: 3.45 G/DL (ref 3.5–5.2)
ALBUMIN/GLOB SERPL: 1.1 G/DL
ALP SERPL-CCNC: 49 U/L (ref 39–117)
ALT SERPL W P-5'-P-CCNC: 8 U/L (ref 1–41)
ANION GAP SERPL CALCULATED.3IONS-SCNC: 12.2 MMOL/L (ref 5–15)
AST SERPL-CCNC: 10 U/L (ref 1–40)
BASOPHILS # BLD AUTO: 0.07 10*3/MM3 (ref 0–0.2)
BASOPHILS NFR BLD AUTO: 0.7 % (ref 0–1.5)
BILIRUB SERPL-MCNC: 0.4 MG/DL (ref 0–1.2)
BUN SERPL-MCNC: 16 MG/DL (ref 6–20)
BUN/CREAT SERPL: 14.7 (ref 7–25)
CALCIUM SPEC-SCNC: 8.6 MG/DL (ref 8.6–10.5)
CHLORIDE SERPL-SCNC: 103 MMOL/L (ref 98–107)
CO2 SERPL-SCNC: 17.8 MMOL/L (ref 22–29)
CREAT SERPL-MCNC: 1.09 MG/DL (ref 0.76–1.27)
DEPRECATED RDW RBC AUTO: 38.1 FL (ref 37–54)
EGFRCR SERPLBLD CKD-EPI 2021: 94.2 ML/MIN/1.73
EOSINOPHIL # BLD AUTO: 0.39 10*3/MM3 (ref 0–0.4)
EOSINOPHIL NFR BLD AUTO: 4 % (ref 0.3–6.2)
ERYTHROCYTE [DISTWIDTH] IN BLOOD BY AUTOMATED COUNT: 12.6 % (ref 12.3–15.4)
GLOBULIN UR ELPH-MCNC: 3.1 GM/DL
GLUCOSE BLDC GLUCOMTR-MCNC: 156 MG/DL (ref 70–130)
GLUCOSE BLDC GLUCOMTR-MCNC: 185 MG/DL (ref 70–130)
GLUCOSE BLDC GLUCOMTR-MCNC: 193 MG/DL (ref 70–130)
GLUCOSE SERPL-MCNC: 165 MG/DL (ref 65–99)
HCT VFR BLD AUTO: 40.9 % (ref 37.5–51)
HGB BLD-MCNC: 14.1 G/DL (ref 13–17.7)
IMM GRANULOCYTES # BLD AUTO: 0.02 10*3/MM3 (ref 0–0.05)
IMM GRANULOCYTES NFR BLD AUTO: 0.2 % (ref 0–0.5)
LYMPHOCYTES # BLD AUTO: 3.06 10*3/MM3 (ref 0.7–3.1)
LYMPHOCYTES NFR BLD AUTO: 31.4 % (ref 19.6–45.3)
MCH RBC QN AUTO: 28.5 PG (ref 26.6–33)
MCHC RBC AUTO-ENTMCNC: 34.5 G/DL (ref 31.5–35.7)
MCV RBC AUTO: 82.8 FL (ref 79–97)
MONOCYTES # BLD AUTO: 0.81 10*3/MM3 (ref 0.1–0.9)
MONOCYTES NFR BLD AUTO: 8.3 % (ref 5–12)
NEUTROPHILS NFR BLD AUTO: 5.41 10*3/MM3 (ref 1.7–7)
NEUTROPHILS NFR BLD AUTO: 55.4 % (ref 42.7–76)
NRBC BLD AUTO-RTO: 0 /100 WBC (ref 0–0.2)
PLATELET # BLD AUTO: 348 10*3/MM3 (ref 140–450)
PMV BLD AUTO: 9.5 FL (ref 6–12)
POTASSIUM SERPL-SCNC: 3.8 MMOL/L (ref 3.5–5.2)
PROT SERPL-MCNC: 6.5 G/DL (ref 6–8.5)
RBC # BLD AUTO: 4.94 10*6/MM3 (ref 4.14–5.8)
SODIUM SERPL-SCNC: 133 MMOL/L (ref 136–145)
WBC NRBC COR # BLD: 9.76 10*3/MM3 (ref 3.4–10.8)

## 2022-06-01 PROCEDURE — G0378 HOSPITAL OBSERVATION PER HR: HCPCS

## 2022-06-01 PROCEDURE — 25010000002 ONDANSETRON PER 1 MG: Performed by: PHYSICIAN ASSISTANT

## 2022-06-01 PROCEDURE — 85025 COMPLETE CBC W/AUTO DIFF WBC: CPT | Performed by: INTERNAL MEDICINE

## 2022-06-01 PROCEDURE — 82962 GLUCOSE BLOOD TEST: CPT

## 2022-06-01 PROCEDURE — 63710000001 INSULIN ASPART PER 5 UNITS: Performed by: PHYSICIAN ASSISTANT

## 2022-06-01 PROCEDURE — 88305 TISSUE EXAM BY PATHOLOGIST: CPT

## 2022-06-01 PROCEDURE — 43239 EGD BIOPSY SINGLE/MULTIPLE: CPT | Performed by: SURGERY

## 2022-06-01 PROCEDURE — 25010000002 PROCHLORPERAZINE 10 MG/2ML SOLUTION: Performed by: INTERNAL MEDICINE

## 2022-06-01 PROCEDURE — 25010000002 MORPHINE PER 10 MG: Performed by: INTERNAL MEDICINE

## 2022-06-01 PROCEDURE — 96375 TX/PRO/DX INJ NEW DRUG ADDON: CPT

## 2022-06-01 PROCEDURE — 80053 COMPREHEN METABOLIC PANEL: CPT | Performed by: INTERNAL MEDICINE

## 2022-06-01 PROCEDURE — 25010000002 PROPOFOL 10 MG/ML EMULSION: Performed by: NURSE ANESTHETIST, CERTIFIED REGISTERED

## 2022-06-01 PROCEDURE — 63710000001 INSULIN ASPART PER 5 UNITS: Performed by: SURGERY

## 2022-06-01 PROCEDURE — 99224 PR SBSQ OBSERVATION CARE/DAY 15 MINUTES: CPT | Performed by: INTERNAL MEDICINE

## 2022-06-01 PROCEDURE — 99203 OFFICE O/P NEW LOW 30 MIN: CPT | Performed by: SURGERY

## 2022-06-01 PROCEDURE — 25010000002 ONDANSETRON PER 1 MG: Performed by: NURSE ANESTHETIST, CERTIFIED REGISTERED

## 2022-06-01 PROCEDURE — 25010000002 FENTANYL CITRATE (PF) 50 MCG/ML SOLUTION: Performed by: NURSE ANESTHETIST, CERTIFIED REGISTERED

## 2022-06-01 PROCEDURE — 96376 TX/PRO/DX INJ SAME DRUG ADON: CPT

## 2022-06-01 RX ORDER — LIDOCAINE HYDROCHLORIDE 20 MG/ML
INJECTION, SOLUTION INFILTRATION; PERINEURAL AS NEEDED
Status: DISCONTINUED | OUTPATIENT
Start: 2022-06-01 | End: 2022-06-01 | Stop reason: SURG

## 2022-06-01 RX ORDER — SODIUM CHLORIDE, SODIUM LACTATE, POTASSIUM CHLORIDE, CALCIUM CHLORIDE 600; 310; 30; 20 MG/100ML; MG/100ML; MG/100ML; MG/100ML
INJECTION, SOLUTION INTRAVENOUS CONTINUOUS PRN
Status: DISCONTINUED | OUTPATIENT
Start: 2022-06-01 | End: 2022-06-01 | Stop reason: SURG

## 2022-06-01 RX ORDER — PROPOFOL 10 MG/ML
VIAL (ML) INTRAVENOUS AS NEEDED
Status: DISCONTINUED | OUTPATIENT
Start: 2022-06-01 | End: 2022-06-01 | Stop reason: SURG

## 2022-06-01 RX ORDER — FENTANYL CITRATE 50 UG/ML
50 INJECTION, SOLUTION INTRAMUSCULAR; INTRAVENOUS
Status: DISCONTINUED | OUTPATIENT
Start: 2022-06-01 | End: 2022-06-01 | Stop reason: HOSPADM

## 2022-06-01 RX ORDER — MIDAZOLAM HYDROCHLORIDE 1 MG/ML
1 INJECTION INTRAMUSCULAR; INTRAVENOUS
Status: DISCONTINUED | OUTPATIENT
Start: 2022-06-01 | End: 2022-06-01 | Stop reason: HOSPADM

## 2022-06-01 RX ORDER — MEPERIDINE HYDROCHLORIDE 25 MG/ML
12.5 INJECTION INTRAMUSCULAR; INTRAVENOUS; SUBCUTANEOUS
Status: DISCONTINUED | OUTPATIENT
Start: 2022-06-01 | End: 2022-06-01 | Stop reason: HOSPADM

## 2022-06-01 RX ORDER — ONDANSETRON 2 MG/ML
4 INJECTION INTRAMUSCULAR; INTRAVENOUS AS NEEDED
Status: DISCONTINUED | OUTPATIENT
Start: 2022-06-01 | End: 2022-06-01 | Stop reason: HOSPADM

## 2022-06-01 RX ORDER — IPRATROPIUM BROMIDE AND ALBUTEROL SULFATE 2.5; .5 MG/3ML; MG/3ML
3 SOLUTION RESPIRATORY (INHALATION) ONCE AS NEEDED
Status: DISCONTINUED | OUTPATIENT
Start: 2022-06-01 | End: 2022-06-01 | Stop reason: HOSPADM

## 2022-06-01 RX ORDER — SODIUM CHLORIDE, SODIUM LACTATE, POTASSIUM CHLORIDE, CALCIUM CHLORIDE 600; 310; 30; 20 MG/100ML; MG/100ML; MG/100ML; MG/100ML
100 INJECTION, SOLUTION INTRAVENOUS ONCE AS NEEDED
Status: DISCONTINUED | OUTPATIENT
Start: 2022-06-01 | End: 2022-06-01 | Stop reason: HOSPADM

## 2022-06-01 RX ORDER — OXYCODONE HYDROCHLORIDE AND ACETAMINOPHEN 5; 325 MG/1; MG/1
1 TABLET ORAL ONCE AS NEEDED
Status: DISCONTINUED | OUTPATIENT
Start: 2022-06-01 | End: 2022-06-01 | Stop reason: HOSPADM

## 2022-06-01 RX ORDER — KETOROLAC TROMETHAMINE 30 MG/ML
30 INJECTION, SOLUTION INTRAMUSCULAR; INTRAVENOUS EVERY 6 HOURS PRN
Status: DISCONTINUED | OUTPATIENT
Start: 2022-06-01 | End: 2022-06-01 | Stop reason: HOSPADM

## 2022-06-01 RX ORDER — SODIUM CHLORIDE 0.9 % (FLUSH) 0.9 %
10 SYRINGE (ML) INJECTION EVERY 12 HOURS SCHEDULED
Status: DISCONTINUED | OUTPATIENT
Start: 2022-06-01 | End: 2022-06-01 | Stop reason: HOSPADM

## 2022-06-01 RX ORDER — PROCHLORPERAZINE EDISYLATE 5 MG/ML
5 INJECTION INTRAMUSCULAR; INTRAVENOUS ONCE
Status: COMPLETED | OUTPATIENT
Start: 2022-06-01 | End: 2022-06-01

## 2022-06-01 RX ORDER — SODIUM CHLORIDE 0.9 % (FLUSH) 0.9 %
10 SYRINGE (ML) INJECTION AS NEEDED
Status: DISCONTINUED | OUTPATIENT
Start: 2022-06-01 | End: 2022-06-01 | Stop reason: HOSPADM

## 2022-06-01 RX ORDER — SODIUM CHLORIDE, SODIUM LACTATE, POTASSIUM CHLORIDE, CALCIUM CHLORIDE 600; 310; 30; 20 MG/100ML; MG/100ML; MG/100ML; MG/100ML
125 INJECTION, SOLUTION INTRAVENOUS ONCE
Status: DISCONTINUED | OUTPATIENT
Start: 2022-06-01 | End: 2022-06-01 | Stop reason: HOSPADM

## 2022-06-01 RX ADMIN — PROCHLORPERAZINE EDISYLATE 5 MG: 5 INJECTION INTRAMUSCULAR; INTRAVENOUS at 08:45

## 2022-06-01 RX ADMIN — PANTOPRAZOLE SODIUM 40 MG: 40 TABLET, DELAYED RELEASE ORAL at 05:12

## 2022-06-01 RX ADMIN — LIDOCAINE HYDROCHLORIDE 60 MG: 20 INJECTION, SOLUTION INFILTRATION; PERINEURAL at 12:49

## 2022-06-01 RX ADMIN — ONDANSETRON 4 MG: 2 INJECTION INTRAMUSCULAR; INTRAVENOUS at 13:27

## 2022-06-01 RX ADMIN — FENTANYL CITRATE 50 MCG: 50 INJECTION INTRAMUSCULAR; INTRAVENOUS at 13:29

## 2022-06-01 RX ADMIN — INSULIN ASPART 2 UNITS: 100 INJECTION, SOLUTION INTRAVENOUS; SUBCUTANEOUS at 08:45

## 2022-06-01 RX ADMIN — PROPOFOL 140 MCG/KG/MIN: 10 INJECTION, EMULSION INTRAVENOUS at 12:49

## 2022-06-01 RX ADMIN — INSULIN ASPART 2 UNITS: 100 INJECTION, SOLUTION INTRAVENOUS; SUBCUTANEOUS at 17:48

## 2022-06-01 RX ADMIN — MORPHINE SULFATE 4 MG: 4 INJECTION, SOLUTION INTRAMUSCULAR; INTRAVENOUS at 08:46

## 2022-06-01 RX ADMIN — PROPOFOL 50 MG: 10 INJECTION, EMULSION INTRAVENOUS at 12:49

## 2022-06-01 RX ADMIN — ONDANSETRON 4 MG: 2 INJECTION INTRAMUSCULAR; INTRAVENOUS at 05:28

## 2022-06-01 RX ADMIN — SODIUM CHLORIDE, POTASSIUM CHLORIDE, SODIUM LACTATE AND CALCIUM CHLORIDE: 600; 310; 30; 20 INJECTION, SOLUTION INTRAVENOUS at 12:48

## 2022-06-01 NOTE — PLAN OF CARE
Goal Outcome Evaluation:  Plan of Care Reviewed With: patient           Outcome Evaluation: patient has been gagging and vomiting several times today MD consulted surgery for EGD procdure was done patient is now feeling nausous patient went off the floor while CNA was getting his VS will continiue to monitor patient advised not to leave floor

## 2022-06-01 NOTE — CONSULTS
[facility]   Consult Note    Patient Name: Oliver Osborn  : 1995  MRN: [id number]  Primary Care Physician:  Elsa De La Torre MD  Referring Physician: No ref. provider found  Date of admission: 2022    Consults  Subjective   Subjective     Reason for Consult/ Chief Complaint: Intractable nausea and vomiting    History of Present Illness  Oliver Osborn is a 27 y.o. male infrequently uses marijuana who presents with intractable nausea and vomiting up to 20 times per day.  He presented with some dehydration that has resolved.  He was nearing discharge home and with work-up otherwise negative he was ready for discharge when he developed severe abdominal pain and continued emesis.  I have been consulted for EGD.  No hematemesis.  Patient complains of neck soreness but has no lymphadenopathy or other abnormalities.  His abdomen is mildly tender to palpation.  Review of Systems   Constitutional: Negative for activity change, appetite change, chills and fever.   HENT: Negative for sore throat and trouble swallowing.    Eyes: Negative for visual disturbance.   Respiratory: Negative for cough and shortness of breath.    Cardiovascular: Negative for chest pain and palpitations.   Gastrointestinal: Positive for nausea and vomiting. Negative for abdominal distention, abdominal pain, blood in stool, constipation and diarrhea.   Endocrine: Negative for cold intolerance and heat intolerance.   Genitourinary: Negative for dysuria.   Musculoskeletal: Negative for joint swelling.   Skin: Negative for color change, rash and wound.   Allergic/Immunologic: Negative for immunocompromised state.   Neurological: Negative for dizziness, seizures, weakness and headaches.   Hematological: Negative for adenopathy. Does not bruise/bleed easily.   Psychiatric/Behavioral: Negative for agitation and confusion.        Personal History     Past Medical History:   Diagnosis Date   • Diabetes mellitus (HCC)    • Tobacco abuse         History reviewed. No pertinent surgical history.    Family History: family history includes Diabetes in his father, maternal grandfather, maternal grandmother, mother, paternal grandfather, and paternal grandmother; Heart disease in his father, maternal grandfather, maternal grandmother, mother, paternal grandfather, and paternal grandmother; Kidney disease in his mother. Otherwise pertinent FHx was reviewed and not pertinent to current issue.    Social History:  reports that he has been smoking. He has a 22.50 pack-year smoking history. He has never used smokeless tobacco. He reports current drug use. Frequency: 7.00 times per week. Drug: Marijuana. He reports that he does not drink alcohol.    Home Medications:        Allergies:  No Known Allergies    Objective    Objective     Vitals:  Temp:  [98.5 °F (36.9 °C)-98.8 °F (37.1 °C)] 98.8 °F (37.1 °C)  Heart Rate:  [59-90] 90  Resp:  [18] 18  BP: (121-132)/(78-92) 128/92    Physical Exam  Constitutional:       Appearance: He is well-developed.   HENT:      Head: Normocephalic and atraumatic.   Eyes:      Conjunctiva/sclera: Conjunctivae normal.      Pupils: Pupils are equal, round, and reactive to light.   Neck:      Thyroid: No thyromegaly.      Vascular: No JVD.      Trachea: No tracheal deviation.   Cardiovascular:      Rate and Rhythm: Normal rate and regular rhythm.      Heart sounds: No murmur heard.    No friction rub. No gallop.   Pulmonary:      Effort: Pulmonary effort is normal.      Breath sounds: Normal breath sounds.   Abdominal:      General: There is no distension.      Palpations: Abdomen is soft. There is no hepatomegaly or splenomegaly.      Tenderness: There is no abdominal tenderness.      Hernia: No hernia is present.   Musculoskeletal:         General: No deformity. Normal range of motion.      Cervical back: Neck supple.   Skin:     General: Skin is warm and dry.   Neurological:      Mental Status: He is alert and oriented to person,  place, and time.         Result Review    Result Review:  I have personally reviewed the results from the time of this admission to 6/1/2022 10:52 EDT and agree with these findings:  [x]  Laboratory  []  Microbiology  [x]  Radiology  []  EKG/Telemetry   []  Cardiology/Vascular   []  Pathology  []  Old records      Assessment & Plan   Assessment / Plan     Brief Patient Summary:  Oliver Osborn is a 27 y.o. male who has intractable nausea and vomiting likely representing THC related hyperemesis syndrome.  All other work-up negative at this time.    Active Hospital Problems:  Active Hospital Problems    Diagnosis    • **Intractable nausea and vomiting      Plan:   EGD today    Keon Quezada MD

## 2022-06-01 NOTE — ANESTHESIA PREPROCEDURE EVALUATION
Anesthesia Evaluation     Patient summary reviewed and Nursing notes reviewed   no history of anesthetic complications:               Airway   Mallampati: I  TM distance: >3 FB  Neck ROM: full  No difficulty expected  Dental - normal exam     Pulmonary - negative pulmonary ROS and normal exam   Cardiovascular - negative cardio ROS and normal exam  Exercise tolerance: good (4-7 METS)    NYHA Classification: II        Neuro/Psych- negative ROS  GI/Hepatic/Renal/Endo    (+)   diabetes mellitus,     Musculoskeletal (-) negative ROS    Abdominal  - normal exam    Bowel sounds: normal.   Substance History - negative use     OB/GYN negative ob/gyn ROS         Other - negative ROS                       Anesthesia Plan    ASA 2     general     intravenous induction     Anesthetic plan, all risks, benefits, and alternatives have been provided, discussed and informed consent has been obtained with: patient.        CODE STATUS:    Level Of Support Discussed With: Patient  Code Status (Patient has no pulse and is not breathing): CPR (Attempt to Resuscitate)  Medical Interventions (Patient has pulse or is breathing): Full Support

## 2022-06-01 NOTE — PROGRESS NOTES
[facility] HOSPITALIST PROGRESS NOTE     Patient Identification:  Name:  Oliver Osborn  Age:  27 y.o.  Sex:  male  :  1995  MRN:  [id number]  Visit Number:  [id number]  ROOM: [address] [place] [address]     Primary Care Provider:  Elsa De La Torre MD    Length of stay in inpatient status:  0    Subjective     Chief Compliant:    Chief Complaint   Patient presents with   • Abdominal Pain   • Vomiting       History of Presenting Illness:    Patient continues to report persistent N/V and severe abdominal pain. He was yelling in pain and had a bag full of emesis on my evaluation. Pain epigastric and not as evident on exam. Nursing staff reports he was doing well until he went outside this AM and symptoms started after that. Patient became calm during my evaluation saying someone needed to call his work.     ROS:  Otherwise 10 point ROS negative other than documented above in HPI.     Objective     Current Hospital Meds:Insulin Aspart, 0-7 Units, Subcutaneous, TID AC  nicotine, 1 patch, Transdermal, Q24H  pantoprazole, 40 mg, Oral, Q AM  sodium chloride, 3 mL, Intravenous, Q12H    sodium chloride, 75 mL/hr, Last Rate: 75 mL/hr (22 0287)        Current Antimicrobial Therapy:  Anti-Infectives (From admission, onward)    None        Current Diuretic Therapy:  Diuretics (From admission, onward)    None        ----------------------------------------------------------------------------------------------------------------------  Vital Signs:  Temp:  [98.5 °F (36.9 °C)-98.8 °F (37.1 °C)] 98.8 °F (37.1 °C)  Heart Rate:  [59-90] 90  Resp:  [18] 18  BP: (121-132)/(78-92) 128/92  SpO2:  [97 %-98 %] 97 %  on   ;   Device (Oxygen Therapy): room air  Body mass index is 30.7 kg/m².    Wt Readings from Last 3 Encounters:   22 86.3 kg (190 lb 3.2 oz)   22 89.8 kg (198 lb)   22 90.7 kg (200 lb)     Intake & Output (last 3 days)        07 07 07  0701 06/02 0700    P.O.  480 600     IV Piggyback  2000      Total Intake(mL/kg)  2480 (28.7) 600 (7)     Net  +2480 +600             Urine Unmeasured Occurrence   5 x     Stool Unmeasured Occurrence   0 x         NPO Diet NPO Type: Strict NPO  ----------------------------------------------------------------------------------------------------------------------  Physical exam:  Constitutional:  Well-developed and well-nourished.  No respiratory distress.      HENT:  Head:  Normocephalic and atraumatic.  Mouth:  Moist mucous membranes.    Eyes:  Conjunctivae and EOM are normal. No scleral icterus.    Neck:  Neck supple.  No JVD present.    Cardiovascular:  Normal rate, regular rhythm and normal heart sounds with no murmur.  Pulmonary/Chest:  No respiratory distress, no wheezes, no crackles, with normal breath sounds and good air movement.  Abdominal:  Soft.  No rebound or guarding. Tenderness reported in epigastric area.   Musculoskeletal:  No edema, no tenderness, and no deformity.  No red or swollen joints anywhere.    Neurological:  Alert and oriented to person, place, and time.  No cranial nerve deficit.  No tongue deviation.  No facial droop.  No slurred speech.   Skin:  Skin is warm and dry. No rash noted. No pallor.   Peripheral vascular:  Pulses in all 4 extremities with no clubbing, no cyanosis, no edema.  ----------------------------------------------------------------------------------------------------------------------  Tele:    ----------------------------------------------------------------------------------------------------------------------  Results from last 7 days   Lab Units 06/01/22  0247 05/31/22  0552 05/31/22  0133 05/30/22  0800   CRP mg/dL  --  0.38  --  0.50   LACTATE mmol/L  --   --  1.4 2.3*   WBC 10*3/mm3 9.76 11.03*  --  15.62*   HEMOGLOBIN g/dL 14.1 14.5  --  16.3   HEMATOCRIT % 40.9 42.1  --  46.4   MCV fL 82.8 82.5  --  81.5   MCHC g/dL 34.5 34.4  --  35.1   PLATELETS 10*3/mm3 348  377  --  424     Results from last 7 days   Lab Units 05/31/22  1420   PH, ARTERIAL pH units 7.463*   PO2 ART mm Hg 112.0*   PCO2, ARTERIAL mm Hg 31.9*   HCO3 ART mmol/L 22.8     Results from last 7 days   Lab Units 06/01/22  0247 05/31/22  0552 05/30/22 2124   SODIUM mmol/L 133* 135* 135*   POTASSIUM mmol/L 3.8 4.0 4.8   MAGNESIUM mg/dL  --   --  1.6   CHLORIDE mmol/L 103 101 98   CO2 mmol/L 17.8* 20.9* 20.3*   BUN mg/dL 16 20 23*   CREATININE mg/dL 1.09 1.16 1.46*   CALCIUM mg/dL 8.6 8.7 10.1   GLUCOSE mg/dL 165* 189* 248*   ALBUMIN g/dL 3.45* 3.67 4.32   BILIRUBIN mg/dL 0.4 0.3 0.4   ALK PHOS U/L 49 53 68   AST (SGOT) U/L 10 7 11   ALT (SGPT) U/L 8 9 11   Estimated Creatinine Clearance: 103 mL/min (by C-G formula based on SCr of 1.09 mg/dL).  No results found for: AMMONIA  Results from last 7 days   Lab Units 05/30/22 2124   TROPONIN T ng/mL <0.010             Hemoglobin A1C   Date/Time Value Ref Range Status   05/30/2022 2124 9.90 (H) 4.80 - 5.60 % Final     Glucose   Date/Time Value Ref Range Status   06/01/2022 0642 193 (H) 70 - 130 mg/dL Final     Comment:     Meter: [id number] : [id number] DONALD REYES   05/31/2022 1615 211 (H) 70 - 130 mg/dL Final     Comment:     Meter: [id number] : [id number] Collin Murcia   05/31/2022 1032 191 (H) 70 - 130 mg/dL Final     Comment:     Meter: [id number] : [id number] Collin Murcia   05/31/2022 0654 169 (H) 70 - 130 mg/dL Final     Comment:     Meter: [id number] : [id number] TORRECARLOS GUTIÉRREZA   05/31/2022 0123 248 (H) 70 - 130 mg/dL Final     Comment:     Meter: [id number] : [id number] Jessica Retana   05/30/2022 2105 231 (H) 70 - 130 mg/dL Final     Comment:     Meter: [id number] : [id number] richard fraire     Lab Results   Component Value Date    TSH 2.850 05/30/2022     No results found for: PREGTESTUR, PREGSERUM, HCG, HCGQUANT  Pain Management Panel     Pain Management Panel Latest Ref Rng & Units 5/30/2022    AMPHETAMINES SCREEN, URINE  Negative Negative    BARBITURATES SCREEN Negative Negative    BENZODIAZEPINE SCREEN, URINE Negative Negative    BUPRENORPHINEUR Negative Negative    COCAINE SCREEN, URINE Negative Negative    METHADONE SCREEN, URINE Negative Negative    METHAMPHETAMINEUR Negative Negative        Brief Urine Lab Results  (Last result in the past 365 days)      Color   Clarity   Blood   Leuk Est   Nitrite   Protein   CREAT   Urine HCG        05/30/22 2254 Dark Yellow   Clear   Moderate (2+)   Negative   Negative   >=300 mg/dL (3+)               Blood Culture   Date Value Ref Range Status   05/30/2022 No growth at 24 hours  Preliminary   05/30/2022 No growth at 24 hours  Preliminary     Urine Culture   Date Value Ref Range Status   05/30/2022 No growth  Final     No results found for: WOUNDCX  No results found for: STOOLCX  No results found for: RESPCX  No results found for: AFBCX  Results from last 7 days   Lab Units 05/31/22  0552 05/31/22  0133 05/30/22 2124   PROCALCITONIN ng/mL 0.08  --   --    LACTATE mmol/L  --  1.4 2.3*   SED RATE mm/hr  --   --  17*   CRP mg/dL 0.38  --  0.50       I have personally looked at the labs and they are summarized above.  ----------------------------------------------------------------------------------------------------------------------  Detailed radiology reports for the last 24 hours:    Imaging Results (Last 24 Hours)     Procedure Component Value Units Date/Time    US Gallbladder [[id number]] Collected: 05/31/22 1442     Updated: 05/31/22 1444    Narrative:      EXAM:    US Abdomen Limited, Gallbladder     EXAM DATE:    5/31/2022 1:57 PM     CLINICAL HISTORY:    us gallbladder; R11.2-Nausea with vomiting, unspecified; N17.9-Acute  kidney failure, unspecified; E86.0-Dehydration; R79.89-Other specified  abnormal findings of blood chemistry; D72.829-Elevated white blood cell  count, unspecified     TECHNIQUE:    Real-time ultrasound of the right upper quadrant with image  documentation.      COMPARISON:    No relevant prior studies available.     FINDINGS:    Gallbladder:  Unremarkable.  No gallstones.    Common bile duct:  The CBD measures 2.28 mm.  No stones.  No dilation.    Pancreas:  Unremarkable as visualized.       Impression:        No acute findings in the right upper quadrant.     This report was finalized on 5/31/2022 2:42 PM by Dr. Edward Montes MD.           Assessment & Plan      #Intractable N/V  #Abdominal pain   - Differential includes: Gallbladder dysfunction, cannaboid hyperemesis, gastroenteritis, PUD w/ possible nonbleeding ulcer, Conversion disorder,  - CT and US gallbladder unrevealing than asymptomatic cystitis   - PRN zofran, compazine  - Surgery consulted for EGD  - Will make NPO pending procedure and monitor   - Outpatient GI f/u     #Respiratory alkalosis   - Likely from hyperventilation. Improved.     #Uncontrolled DM  - Given A1C that has improved drastically since January (15 to 9.9%) without any antihyperglycemics the last few months and BG only in 200's on presentation, I doubt hyperglycemia caused symptoms.  - SSI.   - Will discharge on low dose of basal insulin     F: PRN IVF  E: Replace as needed   N: NPO    Code status: Full     Dispo: Pending clinical improvement     VTE Prophylaxis:   Mechanical Order History:     None      Pharmalogical Order History:     None            Harvinder Barraza MD  [facility] Hospitalist  06/01/22  10:51 EDT

## 2022-06-02 LAB
ANION GAP SERPL CALCULATED.3IONS-SCNC: 15.1 MMOL/L (ref 5–15)
BASOPHILS # BLD AUTO: 0.06 10*3/MM3 (ref 0–0.2)
BASOPHILS NFR BLD AUTO: 0.5 % (ref 0–1.5)
BUN SERPL-MCNC: 12 MG/DL (ref 6–20)
BUN/CREAT SERPL: 11.2 (ref 7–25)
CALCIUM SPEC-SCNC: 8.9 MG/DL (ref 8.6–10.5)
CHLORIDE SERPL-SCNC: 100 MMOL/L (ref 98–107)
CO2 SERPL-SCNC: 19.9 MMOL/L (ref 22–29)
CREAT SERPL-MCNC: 1.07 MG/DL (ref 0.76–1.27)
DEPRECATED RDW RBC AUTO: 37.3 FL (ref 37–54)
EGFRCR SERPLBLD CKD-EPI 2021: 96.3 ML/MIN/1.73
EOSINOPHIL # BLD AUTO: 0.09 10*3/MM3 (ref 0–0.4)
EOSINOPHIL NFR BLD AUTO: 0.8 % (ref 0.3–6.2)
ERYTHROCYTE [DISTWIDTH] IN BLOOD BY AUTOMATED COUNT: 12.4 % (ref 12.3–15.4)
GLUCOSE BLDC GLUCOMTR-MCNC: 167 MG/DL (ref 70–130)
GLUCOSE BLDC GLUCOMTR-MCNC: 167 MG/DL (ref 70–130)
GLUCOSE BLDC GLUCOMTR-MCNC: 169 MG/DL (ref 70–130)
GLUCOSE SERPL-MCNC: 178 MG/DL (ref 65–99)
HCT VFR BLD AUTO: 41.9 % (ref 37.5–51)
HGB BLD-MCNC: 14.6 G/DL (ref 13–17.7)
IMM GRANULOCYTES # BLD AUTO: 0.04 10*3/MM3 (ref 0–0.05)
IMM GRANULOCYTES NFR BLD AUTO: 0.3 % (ref 0–0.5)
LYMPHOCYTES # BLD AUTO: 2.51 10*3/MM3 (ref 0.7–3.1)
LYMPHOCYTES NFR BLD AUTO: 21.1 % (ref 19.6–45.3)
MCH RBC QN AUTO: 28.8 PG (ref 26.6–33)
MCHC RBC AUTO-ENTMCNC: 34.8 G/DL (ref 31.5–35.7)
MCV RBC AUTO: 82.6 FL (ref 79–97)
MONOCYTES # BLD AUTO: 0.8 10*3/MM3 (ref 0.1–0.9)
MONOCYTES NFR BLD AUTO: 6.7 % (ref 5–12)
NEUTROPHILS NFR BLD AUTO: 70.6 % (ref 42.7–76)
NEUTROPHILS NFR BLD AUTO: 8.37 10*3/MM3 (ref 1.7–7)
NRBC BLD AUTO-RTO: 0 /100 WBC (ref 0–0.2)
PLATELET # BLD AUTO: 356 10*3/MM3 (ref 140–450)
PMV BLD AUTO: 9.2 FL (ref 6–12)
POTASSIUM SERPL-SCNC: 3.8 MMOL/L (ref 3.5–5.2)
QT INTERVAL: 382 MS
QTC INTERVAL: 420 MS
RBC # BLD AUTO: 5.07 10*6/MM3 (ref 4.14–5.8)
REF LAB TEST METHOD: NORMAL
SODIUM SERPL-SCNC: 135 MMOL/L (ref 136–145)
TROPONIN T SERPL-MCNC: <0.01 NG/ML (ref 0–0.03)
WBC NRBC COR # BLD: 11.87 10*3/MM3 (ref 3.4–10.8)

## 2022-06-02 PROCEDURE — 25010000002 PROCHLORPERAZINE 10 MG/2ML SOLUTION: Performed by: PHYSICIAN ASSISTANT

## 2022-06-02 PROCEDURE — 25010000002 ONDANSETRON PER 1 MG: Performed by: SURGERY

## 2022-06-02 PROCEDURE — 82962 GLUCOSE BLOOD TEST: CPT

## 2022-06-02 PROCEDURE — 80048 BASIC METABOLIC PNL TOTAL CA: CPT | Performed by: INTERNAL MEDICINE

## 2022-06-02 PROCEDURE — 85025 COMPLETE CBC W/AUTO DIFF WBC: CPT | Performed by: INTERNAL MEDICINE

## 2022-06-02 PROCEDURE — 93005 ELECTROCARDIOGRAM TRACING: CPT | Performed by: INTERNAL MEDICINE

## 2022-06-02 PROCEDURE — G0378 HOSPITAL OBSERVATION PER HR: HCPCS

## 2022-06-02 PROCEDURE — 84484 ASSAY OF TROPONIN QUANT: CPT | Performed by: INTERNAL MEDICINE

## 2022-06-02 PROCEDURE — 99225 PR SBSQ OBSERVATION CARE/DAY 25 MINUTES: CPT | Performed by: INTERNAL MEDICINE

## 2022-06-02 PROCEDURE — 25010000002 KETOROLAC TROMETHAMINE PER 15 MG: Performed by: PHYSICIAN ASSISTANT

## 2022-06-02 PROCEDURE — 93010 ELECTROCARDIOGRAM REPORT: CPT | Performed by: INTERNAL MEDICINE

## 2022-06-02 RX ORDER — PROCHLORPERAZINE EDISYLATE 5 MG/ML
5 INJECTION INTRAMUSCULAR; INTRAVENOUS EVERY 6 HOURS PRN
Status: DISCONTINUED | OUTPATIENT
Start: 2022-06-02 | End: 2022-06-03 | Stop reason: HOSPADM

## 2022-06-02 RX ORDER — SUCRALFATE 1 G/1
1 TABLET ORAL
Status: DISCONTINUED | OUTPATIENT
Start: 2022-06-02 | End: 2022-06-03 | Stop reason: HOSPADM

## 2022-06-02 RX ORDER — KETOROLAC TROMETHAMINE 30 MG/ML
15 INJECTION, SOLUTION INTRAMUSCULAR; INTRAVENOUS ONCE
Status: COMPLETED | OUTPATIENT
Start: 2022-06-02 | End: 2022-06-02

## 2022-06-02 RX ORDER — PANTOPRAZOLE SODIUM 40 MG/1
40 TABLET, DELAYED RELEASE ORAL
Status: DISCONTINUED | OUTPATIENT
Start: 2022-06-02 | End: 2022-06-03 | Stop reason: HOSPADM

## 2022-06-02 RX ADMIN — SUCRALFATE 1 G: 1 TABLET ORAL at 16:52

## 2022-06-02 RX ADMIN — KETOROLAC TROMETHAMINE 15 MG: 30 INJECTION, SOLUTION INTRAMUSCULAR at 05:59

## 2022-06-02 RX ADMIN — PROCHLORPERAZINE EDISYLATE 5 MG: 5 INJECTION INTRAMUSCULAR; INTRAVENOUS at 05:12

## 2022-06-02 RX ADMIN — SUCRALFATE 1 G: 1 TABLET ORAL at 21:28

## 2022-06-02 RX ADMIN — PROCHLORPERAZINE EDISYLATE 5 MG: 5 INJECTION INTRAMUSCULAR; INTRAVENOUS at 23:11

## 2022-06-02 RX ADMIN — SUCRALFATE 1 G: 1 TABLET ORAL at 10:52

## 2022-06-02 RX ADMIN — Medication 3 ML: at 21:28

## 2022-06-02 RX ADMIN — PANTOPRAZOLE SODIUM 40 MG: 40 TABLET, DELAYED RELEASE ORAL at 05:16

## 2022-06-02 RX ADMIN — ONDANSETRON 4 MG: 2 INJECTION INTRAMUSCULAR; INTRAVENOUS at 04:24

## 2022-06-02 RX ADMIN — Medication 3 ML: at 09:44

## 2022-06-02 RX ADMIN — PANTOPRAZOLE SODIUM 40 MG: 40 TABLET, DELAYED RELEASE ORAL at 16:52

## 2022-06-02 NOTE — ANESTHESIA POSTPROCEDURE EVALUATION
Patient: Oliver Osborn    Procedure Summary     Date: 06/01/22 Room / Location: [facility] OR [address] [place] [address] COR OR    Anesthesia Start: 1248 Anesthesia Stop: 1254    Procedure: ESOPHAGOGASTRODUODENOSCOPY WITH ANESTHESIA (N/A Esophagus) Diagnosis:       Intractable nausea and vomiting      (Intractable nausea and vomiting [R11.2])    Surgeons: Keon Quezada MD Provider: Varinder Garcia DO    Anesthesia Type: general ASA Status: 2          Anesthesia Type: general    Vitals  Vitals Value Taken Time   /79 06/01/22 1359   Temp 98.2 °F (36.8 °C) 06/01/22 1255   Pulse 68 06/01/22 1359   Resp 18 06/01/22 1359   SpO2 98 % 06/01/22 1359           Post Anesthesia Care and Evaluation    Patient location during evaluation: PHASE II  Patient participation: complete - patient participated  Level of consciousness: awake and alert  Pain score: 1  Pain management: adequate  Airway patency: patent  Anesthetic complications: No anesthetic complications  PONV Status: controlled  Cardiovascular status: acceptable  Respiratory status: acceptable  Hydration status: acceptable

## 2022-06-02 NOTE — PROGRESS NOTES
[facility] HOSPITALIST PROGRESS NOTE     Patient Identification:  Name:  Oliver Osborn  Age:  27 y.o.  Sex:  male  :  1995  MRN:  [id number]  Visit Number:  [id number]  ROOM: [address] [place] [address]     Primary Care Provider:  Elsa De La Torre MD    Length of stay in inpatient status:  1    Subjective     Chief Compliant:    Chief Complaint   Patient presents with   • Abdominal Pain   • Vomiting       History of Presenting Illness:    Patient was initially talking on phone without complaint or distress. When talking to patient he started to moan and reported he has only been able to hold down a small amount of water and none of the rest of his clear liquid diet. He denies any THC use while in the hospital. Patient reported how good being sedated for the procedure yesterday felt regarding his symptoms. Continues to report N/V but abdominal pain improved.     ROS:  Otherwise 10 point ROS negative other than documented above in HPI.     Objective     Current Hospital Meds:Insulin Aspart, 0-7 Units, Subcutaneous, TID AC  nicotine, 1 patch, Transdermal, Q24H  pantoprazole, 40 mg, Oral, BID AC  sodium chloride, 3 mL, Intravenous, Q12H  sucralfate, 1 g, Oral, 4x Daily AC & at Bedtime         Current Antimicrobial Therapy:  Anti-Infectives (From admission, onward)    None        Current Diuretic Therapy:  Diuretics (From admission, onward)    None        ----------------------------------------------------------------------------------------------------------------------  Vital Signs:  Temp:  [97.4 °F (36.3 °C)-98.8 °F (37.1 °C)] 98.8 °F (37.1 °C)  Heart Rate:  [55-94] 60  Resp:  [18-20] 18  BP: ()/() 141/77  SpO2:  [96 %-100 %] 99 %  on  Flow (L/min):  [2] 2;   Device (Oxygen Therapy): room air  Body mass index is 30.7 kg/m².    Wt Readings from Last 3 Encounters:   22 86.3 kg (190 lb 3.2 oz)   22 89.8 kg (198 lb)   22 90.7 kg (200 lb)     Intake & Output (last 3 days)         0701 05/31 0700 05/31 0701 06/01 0700 06/01 0701 06/02 0700 06/02 0701 06/03 0700    P.O. 480 600 360     I.V. (mL/kg)   2799.7 (32.4)     IV Piggyback 2000       Total Intake(mL/kg) 2480 (28.7) 600 (7) 3159.7 (36.6)     Urine (mL/kg/hr)   700 (0.3)     Total Output   700     Net +2480 +600 +2459.7             Urine Unmeasured Occurrence  5 x      Stool Unmeasured Occurrence  0 x          Diet Clear Liquid  ----------------------------------------------------------------------------------------------------------------------  Physical exam:  Constitutional:  Well-developed and well-nourished.  No respiratory distress.      HENT:  Head:  Normocephalic and atraumatic.  Mouth:  Moist mucous membranes.    Eyes:  Conjunctivae and EOM are normal. No scleral icterus.    Neck:  Neck supple.  No JVD present.    Cardiovascular:  Normal rate, regular rhythm and normal heart sounds with no murmur.  Pulmonary/Chest:  No respiratory distress, no wheezes, no crackles, with normal breath sounds and good air movement.  Abdominal:  Soft.  Bowel sounds are normal.  No distension and no tenderness.   Musculoskeletal:  No edema, no tenderness, and no deformity.  No red or swollen joints anywhere.    Neurological:  Alert and oriented to person, place, and time.  No cranial nerve deficit.  No tongue deviation.  No facial droop.  No slurred speech.   Skin:  Skin is warm and dry. No rash noted. No pallor.   Peripheral vascular:  Pulses in all 4 extremities with no clubbing, no cyanosis, no edema.  ----------------------------------------------------------------------------------------------------------------------  Tele:    ----------------------------------------------------------------------------------------------------------------------  Results from last 7 days   Lab Units 06/02/22  0826 06/01/22  0247 05/31/22  0552 05/31/22  0133 05/30/22  7019   CRP mg/dL  --   --  0.38  --  0.50   LACTATE mmol/L  --   --   --  1.4 2.3*   WBC  10*3/mm3 11.87* 9.76 11.03*  --  15.62*   HEMOGLOBIN g/dL 14.6 14.1 14.5  --  16.3   HEMATOCRIT % 41.9 40.9 42.1  --  46.4   MCV fL 82.6 82.8 82.5  --  81.5   MCHC g/dL 34.8 34.5 34.4  --  35.1   PLATELETS 10*3/mm3 356 348 377  --  424     Results from last 7 days   Lab Units 05/31/22  1420   PH, ARTERIAL pH units 7.463*   PO2 ART mm Hg 112.0*   PCO2, ARTERIAL mm Hg 31.9*   HCO3 ART mmol/L 22.8     Results from last 7 days   Lab Units 06/02/22  0826 06/01/22  0247 05/31/22 0552 05/30/22 2124   SODIUM mmol/L 135* 133* 135* 135*   POTASSIUM mmol/L 3.8 3.8 4.0 4.8   MAGNESIUM mg/dL  --   --   --  1.6   CHLORIDE mmol/L 100 103 101 98   CO2 mmol/L 19.9* 17.8* 20.9* 20.3*   BUN mg/dL 12 16 20 23*   CREATININE mg/dL 1.07 1.09 1.16 1.46*   CALCIUM mg/dL 8.9 8.6 8.7 10.1   GLUCOSE mg/dL 178* 165* 189* 248*   ALBUMIN g/dL  --  3.45* 3.67 4.32   BILIRUBIN mg/dL  --  0.4 0.3 0.4   ALK PHOS U/L  --  49 53 68   AST (SGOT) U/L  --  10 7 11   ALT (SGPT) U/L  --  8 9 11   Estimated Creatinine Clearance: 104.9 mL/min (by C-G formula based on SCr of 1.07 mg/dL).  No results found for: AMMONIA  Results from last 7 days   Lab Units 06/02/22  0437 05/30/22 2124   TROPONIN T ng/mL <0.010 <0.010             Hemoglobin A1C   Date/Time Value Ref Range Status   05/30/2022 2124 9.90 (H) 4.80 - 5.60 % Final     Glucose   Date/Time Value Ref Range Status   06/02/2022 1040 167 (H) 70 - 130 mg/dL Final     Comment:     Meter: [id number] : [id number] RAEGAN GALVEZ   06/02/2022 0649 169 (H) 70 - 130 mg/dL Final     Comment:     Meter: [id number] : [id number] JOHNBENJAMIN SUERO   06/01/2022 1641 156 (H) 70 - 130 mg/dL Final     Comment:     Meter: [id number] : [id number] KINGSTON MOSESH   06/01/2022 1211 185 (H) 70 - 130 mg/dL Final     Comment:     Meter: [id number] : [id number] ONOFRE ABHI   06/01/2022 0642 193 (H) 70 - 130 mg/dL Final     Comment:     Meter: [id number] : [id number] DONALD REYES   05/31/2022 1615 211 (H)  70 - 130 mg/dL Final     Comment:     Meter: [id number] : [id number] Collin Murcia   05/31/2022 1032 191 (H) 70 - 130 mg/dL Final     Comment:     Meter: [id number] : [id number] Collin Murcia   05/31/2022 0654 169 (H) 70 - 130 mg/dL Final     Comment:     Meter: [id number] : [id number] YELITZA GAYTAN     Lab Results   Component Value Date    TSH 2.850 05/30/2022     No results found for: PREGTESTUR, PREGSERUM, HCG, HCGQUANT  Pain Management Panel     Pain Management Panel Latest Ref Rng & Units 5/30/2022    AMPHETAMINES SCREEN, URINE Negative Negative    BARBITURATES SCREEN Negative Negative    BENZODIAZEPINE SCREEN, URINE Negative Negative    BUPRENORPHINEUR Negative Negative    COCAINE SCREEN, URINE Negative Negative    METHADONE SCREEN, URINE Negative Negative    METHAMPHETAMINEUR Negative Negative        Brief Urine Lab Results  (Last result in the past 365 days)      Color   Clarity   Blood   Leuk Est   Nitrite   Protein   CREAT   Urine HCG        05/30/22 2254 Dark Yellow   Clear   Moderate (2+)   Negative   Negative   >=300 mg/dL (3+)               Blood Culture   Date Value Ref Range Status   05/30/2022 No growth at 2 days  Preliminary   05/30/2022 No growth at 2 days  Preliminary     Urine Culture   Date Value Ref Range Status   05/30/2022 No growth  Final     No results found for: WOUNDCX  No results found for: STOOLCX  No results found for: RESPCX  No results found for: AFBCX  Results from last 7 days   Lab Units 05/31/22  0552 05/31/22  0133 05/30/22 2124   PROCALCITONIN ng/mL 0.08  --   --    LACTATE mmol/L  --  1.4 2.3*   SED RATE mm/hr  --   --  17*   CRP mg/dL 0.38  --  0.50       I have personally looked at the labs and they are summarized above.  ----------------------------------------------------------------------------------------------------------------------  Detailed radiology reports for the last 24 hours:    Imaging Results (Last 24 Hours)     ** No results found for the last  24 hours. **        Assessment & Plan      #Intractable N/V  #Abdominal pain  #Gastritis    - Differential included: Gallbladder dysfunction, cannaboid hyperemesis, gastroenteritis, PUD w/ possible nonbleeding ulcer, Conversion disorder,  - CT and US gallbladder unrevealing than asymptomatic cystitis   - Surgery consulted for EGD that revealed gastritis. Awaiting biopsy results. Will hold empirically treating for H. Pylori.   - PRN zofran, compazine  - Will make PPI BID and start carafate given continued severity of symptoms  - Outpatient GI f/u      #Respiratory alkalosis   - Likely from hyperventilation. Improved.      #Uncontrolled DM  - Given A1C that has improved drastically since January (15 to 9.9%) without any antihyperglycemics the last few months and BG only in 200's on presentation, I doubt hyperglycemia caused symptoms.  - SSI.   - Will discharge on low dose of basal insulin      F: PRN IVF  E: Replace as needed   N: CLD     Code status: Full      Dispo: Pending clinical improvement     VTE Prophylaxis:   Mechanical Order History:     None      Pharmalogical Order History:     None          Harvinder Barraza MD  [facility] Hospitalist  06/02/22  11:50 EDT

## 2022-06-02 NOTE — PLAN OF CARE
Goal Outcome Evaluation:  Plan of Care Reviewed With: patient        Progress: no change  Outcome Evaluation: patient has c/o abdominal pain and had N/V tonight, gave prn zofran, will continue to monitor

## 2022-06-02 NOTE — PLAN OF CARE
Goal Outcome Evaluation:  Plan of Care Reviewed With: patient        Progress: improving  Outcome Evaluation: patient rested well. no pain or distress noted at thgis time. will continue to monitior

## 2022-06-03 ENCOUNTER — HOSPITAL ENCOUNTER (EMERGENCY)
Facility: HOSPITAL | Age: 27
Discharge: HOME | End: 2022-06-03
Attending: FAMILY MEDICINE | Admitting: FAMILY MEDICINE

## 2022-06-03 ENCOUNTER — READMISSION MANAGEMENT (OUTPATIENT)
Dept: CALL CENTER | Facility: HOSPITAL | Age: 27
End: 2022-06-03

## 2022-06-03 ENCOUNTER — APPOINTMENT (OUTPATIENT)
Dept: CT IMAGING | Facility: HOSPITAL | Age: 27
End: 2022-06-03

## 2022-06-03 VITALS
DIASTOLIC BLOOD PRESSURE: 86 MMHG | BODY MASS INDEX: 30.57 KG/M2 | TEMPERATURE: 99.4 F | HEART RATE: 79 BPM | HEIGHT: 66 IN | RESPIRATION RATE: 18 BRPM | SYSTOLIC BLOOD PRESSURE: 137 MMHG | OXYGEN SATURATION: 99 % | WEIGHT: 190.2 LBS

## 2022-06-03 VITALS
HEIGHT: 67 IN | HEART RATE: 83 BPM | DIASTOLIC BLOOD PRESSURE: 95 MMHG | WEIGHT: 190 LBS | TEMPERATURE: 97.8 F | SYSTOLIC BLOOD PRESSURE: 135 MMHG | BODY MASS INDEX: 29.82 KG/M2 | RESPIRATION RATE: 18 BRPM | OXYGEN SATURATION: 100 %

## 2022-06-03 DIAGNOSIS — K29.00 ACUTE GASTRITIS WITHOUT HEMORRHAGE, UNSPECIFIED GASTRITIS TYPE: ICD-10-CM

## 2022-06-03 DIAGNOSIS — R11.2 NAUSEA AND VOMITING, UNSPECIFIED VOMITING TYPE: Primary | ICD-10-CM

## 2022-06-03 LAB
ALBUMIN SERPL-MCNC: 4.14 G/DL (ref 3.5–5.2)
ALBUMIN/GLOB SERPL: 1.3 G/DL
ALP SERPL-CCNC: 56 U/L (ref 39–117)
ALT SERPL W P-5'-P-CCNC: 11 U/L (ref 1–41)
ANION GAP SERPL CALCULATED.3IONS-SCNC: 16.4 MMOL/L (ref 5–15)
ANION GAP SERPL CALCULATED.3IONS-SCNC: 17.7 MMOL/L (ref 5–15)
AST SERPL-CCNC: 11 U/L (ref 1–40)
BACTERIA UR QL AUTO: ABNORMAL /HPF
BASOPHILS # BLD AUTO: 0.06 10*3/MM3 (ref 0–0.2)
BASOPHILS # BLD AUTO: 0.07 10*3/MM3 (ref 0–0.2)
BASOPHILS NFR BLD AUTO: 0.5 % (ref 0–1.5)
BASOPHILS NFR BLD AUTO: 0.6 % (ref 0–1.5)
BILIRUB SERPL-MCNC: 0.6 MG/DL (ref 0–1.2)
BILIRUB UR QL STRIP: NEGATIVE
BUN SERPL-MCNC: 12 MG/DL (ref 6–20)
BUN SERPL-MCNC: 12 MG/DL (ref 6–20)
BUN/CREAT SERPL: 11.9 (ref 7–25)
BUN/CREAT SERPL: 12.6 (ref 7–25)
CALCIUM SPEC-SCNC: 8.7 MG/DL (ref 8.6–10.5)
CALCIUM SPEC-SCNC: 8.8 MG/DL (ref 8.6–10.5)
CHLORIDE SERPL-SCNC: 102 MMOL/L (ref 98–107)
CHLORIDE SERPL-SCNC: 97 MMOL/L (ref 98–107)
CLARITY UR: CLEAR
CO2 SERPL-SCNC: 16.3 MMOL/L (ref 22–29)
CO2 SERPL-SCNC: 18.6 MMOL/L (ref 22–29)
COLOR UR: YELLOW
CREAT SERPL-MCNC: 0.95 MG/DL (ref 0.76–1.27)
CREAT SERPL-MCNC: 1.01 MG/DL (ref 0.76–1.27)
CRP SERPL-MCNC: <0.3 MG/DL (ref 0–0.5)
D-LACTATE SERPL-SCNC: 1 MMOL/L (ref 0.5–2)
DEPRECATED RDW RBC AUTO: 37.4 FL (ref 37–54)
DEPRECATED RDW RBC AUTO: 37.5 FL (ref 37–54)
EGFRCR SERPLBLD CKD-EPI 2021: 103.2 ML/MIN/1.73
EGFRCR SERPLBLD CKD-EPI 2021: 111.1 ML/MIN/1.73
EOSINOPHIL # BLD AUTO: 0.11 10*3/MM3 (ref 0–0.4)
EOSINOPHIL # BLD AUTO: 0.13 10*3/MM3 (ref 0–0.4)
EOSINOPHIL NFR BLD AUTO: 0.8 % (ref 0.3–6.2)
EOSINOPHIL NFR BLD AUTO: 1.2 % (ref 0.3–6.2)
ERYTHROCYTE [DISTWIDTH] IN BLOOD BY AUTOMATED COUNT: 12.4 % (ref 12.3–15.4)
ERYTHROCYTE [DISTWIDTH] IN BLOOD BY AUTOMATED COUNT: 12.5 % (ref 12.3–15.4)
FLUAV RNA RESP QL NAA+PROBE: NOT DETECTED
FLUBV RNA RESP QL NAA+PROBE: NOT DETECTED
GLOBULIN UR ELPH-MCNC: 3.2 GM/DL
GLUCOSE BLDC GLUCOMTR-MCNC: 148 MG/DL (ref 70–130)
GLUCOSE BLDC GLUCOMTR-MCNC: 210 MG/DL (ref 70–130)
GLUCOSE SERPL-MCNC: 178 MG/DL (ref 65–99)
GLUCOSE SERPL-MCNC: 231 MG/DL (ref 65–99)
GLUCOSE UR STRIP-MCNC: ABNORMAL MG/DL
HCT VFR BLD AUTO: 39.5 % (ref 37.5–51)
HCT VFR BLD AUTO: 42.9 % (ref 37.5–51)
HGB BLD-MCNC: 13.6 G/DL (ref 13–17.7)
HGB BLD-MCNC: 14.9 G/DL (ref 13–17.7)
HGB UR QL STRIP.AUTO: ABNORMAL
HOLD SPECIMEN: NORMAL
HOLD SPECIMEN: NORMAL
HYALINE CASTS UR QL AUTO: ABNORMAL /LPF
IMM GRANULOCYTES # BLD AUTO: 0.03 10*3/MM3 (ref 0–0.05)
IMM GRANULOCYTES # BLD AUTO: 0.04 10*3/MM3 (ref 0–0.05)
IMM GRANULOCYTES NFR BLD AUTO: 0.2 % (ref 0–0.5)
IMM GRANULOCYTES NFR BLD AUTO: 0.4 % (ref 0–0.5)
KETONES UR QL STRIP: ABNORMAL
LEUKOCYTE ESTERASE UR QL STRIP.AUTO: NEGATIVE
LIPASE SERPL-CCNC: 31 U/L (ref 13–60)
LYMPHOCYTES # BLD AUTO: 1.37 10*3/MM3 (ref 0.7–3.1)
LYMPHOCYTES # BLD AUTO: 3.02 10*3/MM3 (ref 0.7–3.1)
LYMPHOCYTES NFR BLD AUTO: 27.7 % (ref 19.6–45.3)
LYMPHOCYTES NFR BLD AUTO: 9.8 % (ref 19.6–45.3)
MAGNESIUM SERPL-MCNC: 1.7 MG/DL (ref 1.6–2.6)
MCH RBC QN AUTO: 28.4 PG (ref 26.6–33)
MCH RBC QN AUTO: 28.4 PG (ref 26.6–33)
MCHC RBC AUTO-ENTMCNC: 34.4 G/DL (ref 31.5–35.7)
MCHC RBC AUTO-ENTMCNC: 34.7 G/DL (ref 31.5–35.7)
MCV RBC AUTO: 81.7 FL (ref 79–97)
MCV RBC AUTO: 82.5 FL (ref 79–97)
MONOCYTES # BLD AUTO: 0.68 10*3/MM3 (ref 0.1–0.9)
MONOCYTES # BLD AUTO: 0.89 10*3/MM3 (ref 0.1–0.9)
MONOCYTES NFR BLD AUTO: 6.2 % (ref 5–12)
MONOCYTES NFR BLD AUTO: 6.3 % (ref 5–12)
NEUTROPHILS NFR BLD AUTO: 11.56 10*3/MM3 (ref 1.7–7)
NEUTROPHILS NFR BLD AUTO: 6.97 10*3/MM3 (ref 1.7–7)
NEUTROPHILS NFR BLD AUTO: 63.9 % (ref 42.7–76)
NEUTROPHILS NFR BLD AUTO: 82.4 % (ref 42.7–76)
NITRITE UR QL STRIP: NEGATIVE
NRBC BLD AUTO-RTO: 0 /100 WBC (ref 0–0.2)
NRBC BLD AUTO-RTO: 0 /100 WBC (ref 0–0.2)
PH UR STRIP.AUTO: 5.5 [PH] (ref 5–8)
PLATELET # BLD AUTO: 344 10*3/MM3 (ref 140–450)
PLATELET # BLD AUTO: 371 10*3/MM3 (ref 140–450)
PMV BLD AUTO: 9.4 FL (ref 6–12)
PMV BLD AUTO: 9.5 FL (ref 6–12)
POTASSIUM SERPL-SCNC: 3.7 MMOL/L (ref 3.5–5.2)
POTASSIUM SERPL-SCNC: 4.2 MMOL/L (ref 3.5–5.2)
PROT SERPL-MCNC: 7.3 G/DL (ref 6–8.5)
PROT UR QL STRIP: ABNORMAL
QT INTERVAL: 404 MS
QTC INTERVAL: 413 MS
RBC # BLD AUTO: 4.79 10*6/MM3 (ref 4.14–5.8)
RBC # BLD AUTO: 5.25 10*6/MM3 (ref 4.14–5.8)
RBC # UR STRIP: ABNORMAL /HPF
REF LAB TEST METHOD: ABNORMAL
SARS-COV-2 RNA RESP QL NAA+PROBE: NOT DETECTED
SODIUM SERPL-SCNC: 131 MMOL/L (ref 136–145)
SODIUM SERPL-SCNC: 137 MMOL/L (ref 136–145)
SP GR UR STRIP: 1.03 (ref 1–1.03)
SQUAMOUS #/AREA URNS HPF: ABNORMAL /HPF
TROPONIN T SERPL-MCNC: <0.01 NG/ML (ref 0–0.03)
TROPONIN T SERPL-MCNC: <0.01 NG/ML (ref 0–0.03)
UROBILINOGEN UR QL STRIP: ABNORMAL
WBC # UR STRIP: ABNORMAL /HPF
WBC NRBC COR # BLD: 10.9 10*3/MM3 (ref 3.4–10.8)
WBC NRBC COR # BLD: 14.03 10*3/MM3 (ref 3.4–10.8)
WHOLE BLOOD HOLD COAG: NORMAL
WHOLE BLOOD HOLD SPECIMEN: NORMAL

## 2022-06-03 PROCEDURE — 99284 EMERGENCY DEPT VISIT MOD MDM: CPT

## 2022-06-03 PROCEDURE — 99217 PR OBSERVATION CARE DISCHARGE MANAGEMENT: CPT | Performed by: INTERNAL MEDICINE

## 2022-06-03 PROCEDURE — 85025 COMPLETE CBC W/AUTO DIFF WBC: CPT | Performed by: PHYSICIAN ASSISTANT

## 2022-06-03 PROCEDURE — 82962 GLUCOSE BLOOD TEST: CPT

## 2022-06-03 PROCEDURE — 87040 BLOOD CULTURE FOR BACTERIA: CPT | Performed by: FAMILY MEDICINE

## 2022-06-03 PROCEDURE — 86140 C-REACTIVE PROTEIN: CPT | Performed by: FAMILY MEDICINE

## 2022-06-03 PROCEDURE — 36415 COLL VENOUS BLD VENIPUNCTURE: CPT

## 2022-06-03 PROCEDURE — 93010 ELECTROCARDIOGRAM REPORT: CPT | Performed by: INTERNAL MEDICINE

## 2022-06-03 PROCEDURE — 80053 COMPREHEN METABOLIC PANEL: CPT | Performed by: INTERNAL MEDICINE

## 2022-06-03 PROCEDURE — 84484 ASSAY OF TROPONIN QUANT: CPT | Performed by: FAMILY MEDICINE

## 2022-06-03 PROCEDURE — 25010000002 IOPAMIDOL 61 % SOLUTION: Performed by: FAMILY MEDICINE

## 2022-06-03 PROCEDURE — 25010000002 PROCHLORPERAZINE 10 MG/2ML SOLUTION: Performed by: FAMILY MEDICINE

## 2022-06-03 PROCEDURE — 96374 THER/PROPH/DIAG INJ IV PUSH: CPT

## 2022-06-03 PROCEDURE — G0378 HOSPITAL OBSERVATION PER HR: HCPCS

## 2022-06-03 PROCEDURE — 81001 URINALYSIS AUTO W/SCOPE: CPT | Performed by: PHYSICIAN ASSISTANT

## 2022-06-03 PROCEDURE — 93005 ELECTROCARDIOGRAM TRACING: CPT | Performed by: FAMILY MEDICINE

## 2022-06-03 PROCEDURE — 83690 ASSAY OF LIPASE: CPT | Performed by: PHYSICIAN ASSISTANT

## 2022-06-03 PROCEDURE — 85025 COMPLETE CBC W/AUTO DIFF WBC: CPT | Performed by: INTERNAL MEDICINE

## 2022-06-03 PROCEDURE — 96375 TX/PRO/DX INJ NEW DRUG ADDON: CPT

## 2022-06-03 PROCEDURE — 83605 ASSAY OF LACTIC ACID: CPT | Performed by: FAMILY MEDICINE

## 2022-06-03 PROCEDURE — 25010000002 ONDANSETRON PER 1 MG: Performed by: FAMILY MEDICINE

## 2022-06-03 PROCEDURE — 74177 CT ABD & PELVIS W/CONTRAST: CPT

## 2022-06-03 PROCEDURE — 83735 ASSAY OF MAGNESIUM: CPT | Performed by: FAMILY MEDICINE

## 2022-06-03 PROCEDURE — 25010000002 PROMETHAZINE PER 50 MG: Performed by: FAMILY MEDICINE

## 2022-06-03 PROCEDURE — 87636 SARSCOV2 & INF A&B AMP PRB: CPT | Performed by: FAMILY MEDICINE

## 2022-06-03 RX ORDER — PEN NEEDLE, DIABETIC 30 GX3/16"
1 NEEDLE, DISPOSABLE MISCELLANEOUS DAILY
Qty: 30 EACH | Refills: 0 | Status: SHIPPED | OUTPATIENT
Start: 2022-06-03

## 2022-06-03 RX ORDER — PANTOPRAZOLE SODIUM 40 MG/10ML
40 INJECTION, POWDER, LYOPHILIZED, FOR SOLUTION INTRAVENOUS ONCE
Status: COMPLETED | OUTPATIENT
Start: 2022-06-03 | End: 2022-06-03

## 2022-06-03 RX ORDER — ALUMINA, MAGNESIA, AND SIMETHICONE 2400; 2400; 240 MG/30ML; MG/30ML; MG/30ML
15 SUSPENSION ORAL ONCE
Status: COMPLETED | OUTPATIENT
Start: 2022-06-03 | End: 2022-06-03

## 2022-06-03 RX ORDER — PANTOPRAZOLE SODIUM 40 MG/1
40 TABLET, DELAYED RELEASE ORAL
Qty: 60 TABLET | Refills: 0 | Status: SHIPPED | OUTPATIENT
Start: 2022-06-03 | End: 2022-06-03 | Stop reason: SDUPTHER

## 2022-06-03 RX ORDER — PROCHLORPERAZINE EDISYLATE 5 MG/ML
5 INJECTION INTRAMUSCULAR; INTRAVENOUS ONCE
Status: COMPLETED | OUTPATIENT
Start: 2022-06-03 | End: 2022-06-03

## 2022-06-03 RX ORDER — PROCHLORPERAZINE MALEATE 5 MG/1
5 TABLET ORAL EVERY 6 HOURS PRN
Qty: 20 TABLET | Refills: 0 | Status: SHIPPED | OUTPATIENT
Start: 2022-06-03 | End: 2022-06-08

## 2022-06-03 RX ORDER — ONDANSETRON 2 MG/ML
4 INJECTION INTRAMUSCULAR; INTRAVENOUS ONCE
Status: COMPLETED | OUTPATIENT
Start: 2022-06-03 | End: 2022-06-03

## 2022-06-03 RX ORDER — BLOOD-GLUCOSE METER
KIT MISCELLANEOUS
Qty: 1 EACH | Refills: 0 | Status: SHIPPED | OUTPATIENT
Start: 2022-06-03

## 2022-06-03 RX ORDER — SODIUM CHLORIDE 0.9 % (FLUSH) 0.9 %
10 SYRINGE (ML) INJECTION AS NEEDED
Status: DISCONTINUED | OUTPATIENT
Start: 2022-06-03 | End: 2022-06-04 | Stop reason: HOSPADM

## 2022-06-03 RX ORDER — PANTOPRAZOLE SODIUM 40 MG/1
40 TABLET, DELAYED RELEASE ORAL
Qty: 30 TABLET | Refills: 0 | Status: SHIPPED | OUTPATIENT
Start: 2022-06-03 | End: 2022-07-03

## 2022-06-03 RX ORDER — BLOOD SUGAR DIAGNOSTIC
STRIP MISCELLANEOUS
Qty: 100 EACH | Refills: 1 | Status: SHIPPED | OUTPATIENT
Start: 2022-06-03

## 2022-06-03 RX ORDER — LANCETS 30 GAUGE
EACH MISCELLANEOUS
Qty: 100 EACH | Refills: 1 | Status: SHIPPED | OUTPATIENT
Start: 2022-06-03

## 2022-06-03 RX ORDER — PROMETHAZINE HYDROCHLORIDE 25 MG/1
25 TABLET ORAL EVERY 6 HOURS PRN
Qty: 10 TABLET | Refills: 0 | OUTPATIENT
Start: 2022-06-03 | End: 2022-06-09

## 2022-06-03 RX ORDER — SUCRALFATE 1 G/1
1 TABLET ORAL
Qty: 56 TABLET | Refills: 0 | Status: SHIPPED | OUTPATIENT
Start: 2022-06-03 | End: 2022-06-17

## 2022-06-03 RX ADMIN — IOPAMIDOL 85 ML: 612 INJECTION, SOLUTION INTRAVENOUS at 18:48

## 2022-06-03 RX ADMIN — SODIUM CHLORIDE 1000 ML: 9 INJECTION, SOLUTION INTRAVENOUS at 21:42

## 2022-06-03 RX ADMIN — SUCRALFATE 1 G: 1 TABLET ORAL at 08:43

## 2022-06-03 RX ADMIN — PROCHLORPERAZINE EDISYLATE 5 MG: 5 INJECTION INTRAMUSCULAR; INTRAVENOUS at 20:32

## 2022-06-03 RX ADMIN — ONDANSETRON 4 MG: 2 INJECTION INTRAMUSCULAR; INTRAVENOUS at 18:07

## 2022-06-03 RX ADMIN — SODIUM CHLORIDE 1000 ML: 9 INJECTION, SOLUTION INTRAVENOUS at 17:39

## 2022-06-03 RX ADMIN — ALUMINUM HYDROXIDE, MAGNESIUM HYDROXIDE, AND DIMETHICONE 15 ML: 400; 400; 40 SUSPENSION ORAL at 21:37

## 2022-06-03 RX ADMIN — PROMETHAZINE HYDROCHLORIDE 12.5 MG: 25 INJECTION INTRAMUSCULAR; INTRAVENOUS at 22:28

## 2022-06-03 RX ADMIN — Medication 3 ML: at 08:44

## 2022-06-03 RX ADMIN — PANTOPRAZOLE SODIUM 40 MG: 40 INJECTION, POWDER, FOR SOLUTION INTRAVENOUS at 21:36

## 2022-06-03 RX ADMIN — PANTOPRAZOLE SODIUM 40 MG: 40 TABLET, DELAYED RELEASE ORAL at 08:44

## 2022-06-03 NOTE — DISCHARGE SUMMARY
[facility] HOSPITALISTS DISCHARGE SUMMARY    Patient Identification:  Name:  Oliver Osborn  Age:  27 y.o.  Sex:  male  :  1995  MRN:  [id number]  Visit Number:  [id number]    Date of Admission: 2022  Date of Discharge:  6/3/2022    PCP: Elsa De La Torre MD    DISCHARGE DIAGNOSIS  #Intractable N/V  #Abdominal pain  #Gastritis    #Respiratory alkalosis  #Uncontrolled DM    CONSULTS   General surgery   Diabetes education     PROCEDURES PERFORMED  EGD     HOSPITAL COURSE  Patient is a 27 y.o. male presented on  to [facility] complaining of vomiting and abdominal pain.  Please see the admitting history and physical for further details.      Mr. Osborn is our 26 yo M with hx of DM and tobacco abuse who presented with abdominal pain and persistent N/V. Patient tested positive for THC which he reports he was using for the N/V. Differential included: Gallbladder dysfunction, cannaboid hyperemesis, gastroenteritis, PUD w/ possible nonbleeding ulcer, Conversion disorder, malingering. CT and US gallbladder unrevealing than asymptomatic cystitis. Due to persistent symptoms while inpatient with inability to tolerate PO intake, surgery consulted for EGD that revealed gastritis. Biopsy showed mild inflammation but no evidence of H. Pylori. Increased PPI to BID and started Carafate with improvement in symptoms. Only 1 episode of vomiting since yesterday. Patient has not been taking medication for DM and suprisingly A1C has improved to 9.9%. Patient agreeable to start low dose basal insulin. PRN compazine. Continue PPI and Carafate. Will have patient f/u with GI in 2 weeks and PCI in 1 week. Will give insulin     VITAL SIGNS:  Temp:  [97.6 °F (36.4 °C)-99.2 °F (37.3 °C)] 99.2 °F (37.3 °C)  Heart Rate:  [59-94] 94  Resp:  [18] 18  BP: (123-153)/(71-79) 153/79  SpO2:  [97 %-99 %] 97 %  on   ;   Device (Oxygen Therapy): room air    Body mass index is 30.7 kg/m².  Wt Readings from Last 3  Encounters:   05/31/22 86.3 kg (190 lb 3.2 oz)   04/28/22 89.8 kg (198 lb)   01/23/22 90.7 kg (200 lb)       PHYSICAL EXAM:  Constitutional:  Well-developed and well-nourished.  No respiratory distress.      HENT:  Head:  Normocephalic and atraumatic.  Mouth:  Moist mucous membranes.    Eyes:  Conjunctivae and EOM are normal.  Pupils are equal, round, and reactive to light.  No scleral icterus.    Cardiovascular:  Normal rate, regular rhythm and normal heart sounds with no murmur.  Pulmonary/Chest:  No respiratory distress, no wheezes, no crackles, with normal breath sounds and good air movement.  Abdominal:  Soft.  Bowel sounds are normal.  No distension and no tenderness.   Musculoskeletal:  No edema, no tenderness, and no deformity.  No red or swollen joints anywhere.    Neurological:  Alert and oriented to person, place, and time.  No gross neurological deficit.   Skin:  Skin is warm and dry. No rash noted. No pallor.   Peripheral vascular:  Strong pulses in all 4 extremities with no clubbing, no cyanosis, no edema.    DISCHARGE DISPOSITION   Stable    DISCHARGE MEDICATIONS:     Discharge Medications      Patient Not Prescribed Medications Upon Discharge                TEST  RESULTS PENDING AT DISCHARGE  Pending Labs     Order Current Status    Blood Culture - Blood, Arm, Left Preliminary result    Blood Culture - Blood, Hand, Left Preliminary result           CODE STATUS  Code Status and Medical Interventions:   Ordered at: 05/30/22 2220     Level Of Support Discussed With:    Patient     Code Status (Patient has no pulse and is not breathing):    CPR (Attempt to Resuscitate)     Medical Interventions (Patient has pulse or is breathing):    Full Support       The ASCVD Risk score ([place] SAVANNAH Jr., et al., 2013) failed to calculate for the following reasons:    The 2013 ASCVD risk score is only valid for ages 40 to 79     Harvinder Barraza MD  [facility]  06/03/22  08:48 EDT    Please note that  this discharge summary required more than 30 minutes to complete.

## 2022-06-03 NOTE — PLAN OF CARE
"Goal Outcome Evaluation:           Progress: no change  Outcome Evaluation: Pt rested in bed overnight. VSS. Pt had one episode of vomitting that he described as \"unbearable\" and requested to be sedated for. Episode resolved with administration of PRN Compazine and once pt took hot shower. No acute changes overnight.  "

## 2022-06-03 NOTE — DISCHARGE INSTRUCTIONS
Please take medications as prescribed. Please go to follow-up appointments as recommended. Please seek medical attention if you have worsening abdominal pain, persistent N/V with inability to hold down fluids, worsening of any symptoms. Recommend THC cessation. Recommend tobacco cessation.     Please continue social distancing and isolation efforts as recommended by CDC and [facility] to help prevent spread of COVID-19.

## 2022-06-03 NOTE — ED PROVIDER NOTES
Subjective   27-year-old male with history of diabetes presents the emergency room with complaints of abdominal pain and vomiting.  Patient was recently admitted to the hospital for presumed gastritis he received interventions at that time he was discharged today by the hospitalist service.  Patient upon leaving started complaining abdominal pain as well as vomiting.  Patient reports feeling weak.  He states that he did not his medications filled for nausea vomiting that were given at discharge.  He states that he has had multiple episodes of vomiting since eating some broth when he got home.  He reports abdominal discomfort as well.  He reports feeling lightheaded.  He states he got about 12 with warm water that helped resolve his symptoms but then returned upon getting out of the tub.      Vomiting  The primary symptoms include abdominal pain, nausea and vomiting. Primary symptoms do not include fever, fatigue or diarrhea. The illness began 3 to 5 days ago.   The abdominal pain is generalized. The abdominal pain is relieved by nothing.       Review of Systems   Constitutional: Negative for fatigue and fever.   Respiratory: Negative for cough.    Gastrointestinal: Positive for abdominal pain, nausea and vomiting. Negative for diarrhea.   All other systems reviewed and are negative.      Past Medical History:   Diagnosis Date   • Diabetes mellitus (HCC)    • Tobacco abuse        No Known Allergies    Past Surgical History:   Procedure Laterality Date   • ENDOSCOPY N/A 6/1/2022    Procedure: ESOPHAGOGASTRODUODENOSCOPY WITH ANESTHESIA;  Surgeon: Keon Quezada MD;  Location: [facility];  Service: Gastroenterology;  Laterality: N/A;       Family History   Problem Relation Age of Onset   • Heart disease Mother    • Diabetes Mother    • Kidney disease Mother    • Heart disease Father    • Diabetes Father    • Heart disease Maternal Grandmother    • Diabetes Maternal Grandmother    • Heart disease Maternal  Grandfather    • Diabetes Maternal Grandfather    • Heart disease Paternal Grandmother    • Diabetes Paternal Grandmother    • Heart disease Paternal Grandfather    • Diabetes Paternal Grandfather        Social History     Socioeconomic History   • Marital status: Single   Tobacco Use   • Smoking status: Current Every Day Smoker     Packs/day: 1.50     Years: 15.00     Pack years: 22.50   • Smokeless tobacco: Never Used   Vaping Use   • Vaping Use: Former   Substance and Sexual Activity   • Alcohol use: Never   • Drug use: Yes     Frequency: 7.0 times per week     Types: Marijuana   • Sexual activity: Defer           Objective   Physical Exam  Vitals and nursing note reviewed.   Constitutional:       Appearance: He is obese.   HENT:      Head: Normocephalic and atraumatic.      Mouth/Throat:      Mouth: Mucous membranes are moist.   Eyes:      General: No scleral icterus.     Pupils: Pupils are equal, round, and reactive to light.   Cardiovascular:      Comments: Regular rate S1-S2.  No extrasystole.  2+ radial pulse bilaterally.  Pulmonary:      Comments: No rhonchi's rales or wheezes no accessory muscle use.  Abdominal:      General: Bowel sounds are normal.      Palpations: Abdomen is soft.      Tenderness: There is no abdominal tenderness. There is no rebound. Negative signs include Vu's sign and Rovsing's sign.      Comments: Normoactive bowel sounds no guarding or rigidity.  No distention.   Skin:     General: Skin is warm.   Neurological:      General: No focal deficit present.      Mental Status: He is alert and oriented to person, place, and time.      Cranial Nerves: No cranial nerve deficit.   Psychiatric:         Mood and Affect: Mood normal.         Procedures           ED Course  ED Course as of 06/03/22 2335   Fri Jun 03, 2022   1745 EKG normal sinus rhythm rate 63 parable 126 QRS 84  no ST elevation. [BB]   1745 Lipase unremarkable.  Patient started was 131 however glucose 231 corrected  value is noted to be 133. [BB]   1746 Patient received IV fluids at this time.  Will order CT imaging. [BB]   1818 Magnesium unremarkable lactic acid troponin unremarkable. [BB]   1850 Patient large ketones. [BB]   1913 CT Abdomen Pelvis With Contrast    Result Date: 6/3/2022  1. The appendix is radiographically normal and there is no evidence for bowel obstruction. 2. Asymmetric kidney size, left larger than right, but unchanged and likely chronic. No hydronephrosis. 3. Possible diffuse bladder wall thickening redemonstrated. 4. Redemonstration of subtle grade 1 anterolisthesis L5 on S1 secondary to chronic L5 pars defects Signer Name: Ольга Enriquez MD  Signed: 6/3/2022 7:04 PM  Workstation Name: [email or web address]  [facility]     [BB]   1948 Patient drinking oral intake tolerating oral intake at this time.  Will repeat troponin as well as EKG. [BB]   1955 KG normal sinus rhythm rate 63 parable 134 QRS 84 . [BB]   2021 Patient repeat troponin unremarkable. [BB]   2023 Patient subsequent was noted to have vomiting episodes.  Ordered IV Compazine.  Patient was again given Zofran prior to oral challenge however he has associated vomiting water. [BB]   2029 Have contacted hospitalist awaiting callback [BB]   2124 Patient has decreased vomiting at this time.  Patient resting this time.  Will give fluids. [BB]   2129 Give patient oral challenge. [BB]   2209 Patient noted to be spitting up.  Have ordered IV Phenergan. [BB]   2333 Patient reports he is feeling better after IV.  He is tolerating oral intake.  Patient states he feels better is ready to go home.  Discussed warning signs symptoms warrant emergency room patient verbalized understanding. [BB]      ED Course User Index  [BB] Yeyo Figueroa MD                                                 [facility]    Final diagnoses:   Nausea and vomiting, unspecified vomiting type   Acute gastritis without hemorrhage, unspecified gastritis type       ED  Disposition  ED Disposition     ED Disposition   Discharge    Condition   Stable    Comment   --             Elsa De La Torre MD  [address] [place] [address]  [phone]    In 2 days           Medication List      New Prescriptions    promethazine 25 MG tablet  Commonly known as: PHENERGAN  Take 1 tablet by mouth Every 6 (Six) Hours As Needed for Nausea or Vomiting.           Where to Get Your Medications      You can get these medications from any pharmacy    Bring a paper prescription for each of these medications  · promethazine 25 MG tablet          Yeyo Figueroa MD  06/03/22 [id number]

## 2022-06-03 NOTE — OUTREACH NOTE
Prep Survey    Flowsheet Row Responses   [facility] facility patient discharged from? [facility]   Is LACE score < 7 ? Yes   Emergency Room discharge w/ pulse ox? No   Eligibility Not Eligible   What are the reasons patient is not eligible? Readmitted   Does the patient have one of the following disease processes/diagnoses(primary or secondary)? Other   Prep survey completed? Yes          ROSALINDA HORTON - Registered Nurse

## 2022-06-03 NOTE — ED NOTES
MEDICAL SCREENING:    Reason for Visit: Nausea, vomiting, weakness/dizziness.    Patient initially seen in triage.  The patient was advised further evaluation and diagnostic testing will be needed, some of the treatment and testing will be initiated in the lobby in order to begin the process.  The patient will be returned to the waiting area for the time being and possibly be re-assessed by a subsequent ED provider.  The patient will be brought back to the treatment area in as timely manner as possible.         Oliver Johnson, PA  06/03/22 8977

## 2022-06-04 LAB
BACTERIA SPEC AEROBE CULT: NORMAL
BACTERIA SPEC AEROBE CULT: NORMAL
QT INTERVAL: 390 MS
QTC INTERVAL: 399 MS

## 2022-06-06 ENCOUNTER — OFFICE VISIT (OUTPATIENT)
Dept: FAMILY MEDICINE CLINIC | Facility: CLINIC | Age: 27
End: 2022-06-06

## 2022-06-06 VITALS
BODY MASS INDEX: 29.38 KG/M2 | HEART RATE: 88 BPM | WEIGHT: 187.2 LBS | OXYGEN SATURATION: 99 % | DIASTOLIC BLOOD PRESSURE: 93 MMHG | TEMPERATURE: 97.7 F | HEIGHT: 67 IN | SYSTOLIC BLOOD PRESSURE: 133 MMHG

## 2022-06-06 DIAGNOSIS — Z09 HOSPITAL DISCHARGE FOLLOW-UP: Primary | ICD-10-CM

## 2022-06-06 DIAGNOSIS — R11.2 NAUSEA AND VOMITING, UNSPECIFIED VOMITING TYPE: ICD-10-CM

## 2022-06-06 LAB
EXPIRATION DATE: NORMAL
FLUAV AG NPH QL: NEGATIVE
FLUBV AG NPH QL: NEGATIVE
INTERNAL CONTROL: NORMAL
Lab: NORMAL

## 2022-06-06 PROCEDURE — 87804 INFLUENZA ASSAY W/OPTIC: CPT | Performed by: FAMILY MEDICINE

## 2022-06-06 PROCEDURE — 99214 OFFICE O/P EST MOD 30 MIN: CPT | Performed by: FAMILY MEDICINE

## 2022-06-06 RX ORDER — ONDANSETRON 8 MG/1
8 TABLET, ORALLY DISINTEGRATING ORAL EVERY 8 HOURS PRN
Qty: 90 TABLET | Refills: 0 | OUTPATIENT
Start: 2022-06-06 | End: 2022-06-09

## 2022-06-06 NOTE — PROGRESS NOTES
"Transitional Care Follow Up Visit  Subjective     Oliver Osborn is a 27 y.o. male who presents for a transitional care management visit.    Within 48 business hours after discharge our office contacted him via telephone to coordinate his care and needs.      I reviewed and discussed the details of that call along with the discharge summary, hospital problems, inpatient lab results, inpatient diagnostic studies, and consultation reports with Oliver.     Current outpatient and discharge medications have been reconciled for the patient.  Reviewed by: Elsa D eLa Torre MD        No flowsheet data found.  Risk for Readmission (LACE) Score: 5 (6/3/2022  6:01 AM)      History of Present Illness   Course During Hospital Stay: Date of admission was 05/30/2022 and date of discharge was 06/03/2022. The patient presented to the emergency room secondary to abdominal pain, persistent nausea, and vomiting. Laboratory work-up was within normal limits. A computed tomography scan and ultrasound of the gallbladder was unrevealing. Due to persistent symptoms while inpatient, with the inability to tolerate per os, surgery was consulted for an esophagogastroduodenoscopy, which revealed gastritis. Biopsies showed mild inflammation, but no evidence of H. pylori. The proton pump inhibitor was increased to twice daily and he was started on Carafate with improvement of symptoms. He was supposed to follow up with gastroenterology for further follow-up. The patient was started on a low dose basal insulin secondary to a hemoglobin A1c of 9.9 percent. He had been discharged in stable condition, but he states that his nausea and vomiting is back again \"with a vengeance\".     The patient states that he has a chronic history of gastroenterological problems for the last 5 to 5.5 years that has recently worsened. He reports that he has consistently had trouble eating full meals, but for the last 2 months, it has worsened. He states that on " 05/30/2022, he felt ill and began vomiting at work that transitioned into stomach pain by the end of his shift at which time he went to the emergency room. He further adds that when discharged, 06/03/2022, his symptoms resolved. However, after eating chicken rice soup, his severe stomach pain and vomiting returned, for which lying in a tub of hot water was the only temporary relief of his symptoms. He states that he returned to the emergency room on Friday, 06/03/2022, at which point he lost consciousness twice in the hallway that he attributes to dehydration; he was sent home from the emergency room. He further states that he has not moved his bowels since Sunday, 06/05/2022. He states that on Saturday, 06/04/2022 he was able to eat soup without emesis, but has not been able to keep anything down including Pedialyte, medications, water and has lost a total of 10 pounds. He also adds that he was to return to work today, but this is the first time that he was able to be seen as a follow-up after his discharge from the hospital due to the weekend.     The patient inquires if he should remain on a liquid diet as he has popsicles, sugar freeze, broth, and water. He confirms that he has to return to work on Wednesday, 06/08/2022, and has concerns about having to leave work to go to the hospital again. He states that his anxiety is elevated now, and admits that he has had depression for the past 6 or 7 years but denies that his anxiety has escalated to panic. He reports that he does not think he has had a panic attack, but is not sure if what he is experiencing now is a panic attack. He confirms that he has Carafate and Compazine at home. He denies having any diarrhea prior to his admittance.      The following portions of the patient's history were reviewed and updated as appropriate: allergies, current medications, past family history, past medical history, past social history, past surgical history and problem  list.    Objective   Physical Exam   Gen: Patient in NAD. Pleasant and answers appropriately. A&Ox3.    Skin: Warm and dry with normal turgor. No purpura, rashes, or unusual pigmentation noted. Hair is normal in appearance and distribution.    HEENT: NC/AT. No lesions noted. Conjunctiva clear, sclera nonicteric. PERRL. EOMI without nystagmus or strabismus. Fundi appear benign. No hemorrhages or exudates of eyes. Auditory canals are patent bilaterally without lesions. TMs intact,  nonerythematous, nonbulging without lesions. Nasal mucosa pink, nonerythematous, and nonedematous. Frontal and maxillary sinuses are nontender. O/P nonerythematous and moist without exudate.    Neck: Supple without lymph nodes palpated. FROM.     Lungs: CTA B/L without rales, rhonchi, crackles, or wheezes.    Heart: RRR. S1 and S2 normal. No S3 or S4. No MRGT.    Abd: Soft, slightly tender diffusely,nondistended. (+)BSx4 quadrants.     Extrem: No CCE. Radial pulses 2+/4 and equal B/L. FROMx4. No bone, joint, or muscle tenderness noted.    Neuro: No focal motor/sensory deficits.      Assessment & Plan   Diagnoses and all orders for this visit:    1. Hospital discharge follow-up (Primary)    2. Nausea and vomiting, unspecified vomiting type  -     POCT Influenza A/B  -     NM HIDA SCAN WITH PHARMACOLOGICAL INTERVENTION; Future  -     Amylase; Future  -     Lipase; Future  -     Motility Study, Gastric; Future  -     Discontinue: ondansetron ODT (ZOFRAN-ODT) 8 MG disintegrating tablet; Place 1 tablet on the tongue Every 8 (Eight) Hours As Needed for Nausea or Vomiting.  Dispense: 90 tablet; Refill: 0          Transcribed from ambient dictation for Elsa De La Torre MD by Stefania Smith.  06/06/22   18:12 EDT    Patient verbalized consent to the visit recording.

## 2022-06-08 LAB
BACTERIA SPEC AEROBE CULT: NORMAL
BACTERIA SPEC AEROBE CULT: NORMAL

## 2022-06-09 ENCOUNTER — NURSE TRIAGE (OUTPATIENT)
Dept: CALL CENTER | Facility: HOSPITAL | Age: 27
End: 2022-06-09

## 2022-06-09 ENCOUNTER — HOSPITAL ENCOUNTER (EMERGENCY)
Facility: HOSPITAL | Age: 27
Discharge: HOME | End: 2022-06-09
Attending: EMERGENCY MEDICINE | Admitting: EMERGENCY MEDICINE

## 2022-06-09 ENCOUNTER — APPOINTMENT (OUTPATIENT)
Dept: CT IMAGING | Facility: HOSPITAL | Age: 27
End: 2022-06-09

## 2022-06-09 VITALS
HEIGHT: 67 IN | SYSTOLIC BLOOD PRESSURE: 166 MMHG | WEIGHT: 184 LBS | TEMPERATURE: 97.1 F | BODY MASS INDEX: 28.88 KG/M2 | DIASTOLIC BLOOD PRESSURE: 89 MMHG | OXYGEN SATURATION: 100 % | HEART RATE: 68 BPM | RESPIRATION RATE: 20 BRPM

## 2022-06-09 DIAGNOSIS — R11.2 NAUSEA AND VOMITING, UNSPECIFIED VOMITING TYPE: Primary | ICD-10-CM

## 2022-06-09 LAB
ALBUMIN SERPL-MCNC: 4.09 G/DL (ref 3.5–5.2)
ALBUMIN/GLOB SERPL: 1.4 G/DL
ALP SERPL-CCNC: 55 U/L (ref 39–117)
ALT SERPL W P-5'-P-CCNC: 10 U/L (ref 1–41)
ANION GAP SERPL CALCULATED.3IONS-SCNC: 17.7 MMOL/L (ref 5–15)
AST SERPL-CCNC: 9 U/L (ref 1–40)
BASOPHILS # BLD AUTO: 0.04 10*3/MM3 (ref 0–0.2)
BASOPHILS NFR BLD AUTO: 0.4 % (ref 0–1.5)
BILIRUB SERPL-MCNC: 0.4 MG/DL (ref 0–1.2)
BUN SERPL-MCNC: 14 MG/DL (ref 6–20)
BUN/CREAT SERPL: 11.6 (ref 7–25)
CALCIUM SPEC-SCNC: 9.5 MG/DL (ref 8.6–10.5)
CHLORIDE SERPL-SCNC: 105 MMOL/L (ref 98–107)
CO2 SERPL-SCNC: 21.3 MMOL/L (ref 22–29)
CREAT SERPL-MCNC: 1.21 MG/DL (ref 0.76–1.27)
CRP SERPL-MCNC: <0.3 MG/DL (ref 0–0.5)
DEPRECATED RDW RBC AUTO: 38.5 FL (ref 37–54)
EGFRCR SERPLBLD CKD-EPI 2021: 83.1 ML/MIN/1.73
EOSINOPHIL # BLD AUTO: 0.03 10*3/MM3 (ref 0–0.4)
EOSINOPHIL NFR BLD AUTO: 0.3 % (ref 0.3–6.2)
ERYTHROCYTE [DISTWIDTH] IN BLOOD BY AUTOMATED COUNT: 12.8 % (ref 12.3–15.4)
GLOBULIN UR ELPH-MCNC: 3 GM/DL
GLUCOSE SERPL-MCNC: 261 MG/DL (ref 65–99)
HCT VFR BLD AUTO: 44.4 % (ref 37.5–51)
HGB BLD-MCNC: 15.2 G/DL (ref 13–17.7)
IMM GRANULOCYTES # BLD AUTO: 0.03 10*3/MM3 (ref 0–0.05)
IMM GRANULOCYTES NFR BLD AUTO: 0.3 % (ref 0–0.5)
LIPASE SERPL-CCNC: 36 U/L (ref 13–60)
LYMPHOCYTES # BLD AUTO: 1.32 10*3/MM3 (ref 0.7–3.1)
LYMPHOCYTES NFR BLD AUTO: 12.4 % (ref 19.6–45.3)
MAGNESIUM SERPL-MCNC: 2.1 MG/DL (ref 1.6–2.6)
MCH RBC QN AUTO: 28.3 PG (ref 26.6–33)
MCHC RBC AUTO-ENTMCNC: 34.2 G/DL (ref 31.5–35.7)
MCV RBC AUTO: 82.7 FL (ref 79–97)
MONOCYTES # BLD AUTO: 0.44 10*3/MM3 (ref 0.1–0.9)
MONOCYTES NFR BLD AUTO: 4.1 % (ref 5–12)
NEUTROPHILS NFR BLD AUTO: 8.79 10*3/MM3 (ref 1.7–7)
NEUTROPHILS NFR BLD AUTO: 82.5 % (ref 42.7–76)
NRBC BLD AUTO-RTO: 0 /100 WBC (ref 0–0.2)
PLATELET # BLD AUTO: 373 10*3/MM3 (ref 140–450)
PMV BLD AUTO: 8.9 FL (ref 6–12)
POTASSIUM SERPL-SCNC: 4 MMOL/L (ref 3.5–5.2)
PROT SERPL-MCNC: 7.1 G/DL (ref 6–8.5)
QT INTERVAL: 396 MS
QTC INTERVAL: 411 MS
RBC # BLD AUTO: 5.37 10*6/MM3 (ref 4.14–5.8)
SODIUM SERPL-SCNC: 144 MMOL/L (ref 136–145)
TROPONIN T SERPL-MCNC: <0.01 NG/ML (ref 0–0.03)
WBC NRBC COR # BLD: 10.65 10*3/MM3 (ref 3.4–10.8)

## 2022-06-09 PROCEDURE — 86140 C-REACTIVE PROTEIN: CPT | Performed by: PHYSICIAN ASSISTANT

## 2022-06-09 PROCEDURE — 25010000002 PROCHLORPERAZINE 10 MG/2ML SOLUTION: Performed by: PHYSICIAN ASSISTANT

## 2022-06-09 PROCEDURE — 83735 ASSAY OF MAGNESIUM: CPT | Performed by: PHYSICIAN ASSISTANT

## 2022-06-09 PROCEDURE — 99283 EMERGENCY DEPT VISIT LOW MDM: CPT

## 2022-06-09 PROCEDURE — 85025 COMPLETE CBC W/AUTO DIFF WBC: CPT | Performed by: PHYSICIAN ASSISTANT

## 2022-06-09 PROCEDURE — 74176 CT ABD & PELVIS W/O CONTRAST: CPT

## 2022-06-09 PROCEDURE — 96374 THER/PROPH/DIAG INJ IV PUSH: CPT

## 2022-06-09 PROCEDURE — 84484 ASSAY OF TROPONIN QUANT: CPT | Performed by: PHYSICIAN ASSISTANT

## 2022-06-09 PROCEDURE — 93005 ELECTROCARDIOGRAM TRACING: CPT | Performed by: EMERGENCY MEDICINE

## 2022-06-09 PROCEDURE — 80053 COMPREHEN METABOLIC PANEL: CPT | Performed by: PHYSICIAN ASSISTANT

## 2022-06-09 PROCEDURE — 83690 ASSAY OF LIPASE: CPT | Performed by: PHYSICIAN ASSISTANT

## 2022-06-09 RX ORDER — PROCHLORPERAZINE EDISYLATE 5 MG/ML
10 INJECTION INTRAMUSCULAR; INTRAVENOUS ONCE
Status: COMPLETED | OUTPATIENT
Start: 2022-06-09 | End: 2022-06-09

## 2022-06-09 RX ORDER — PROCHLORPERAZINE MALEATE 10 MG
10 TABLET ORAL EVERY 6 HOURS PRN
Qty: 20 TABLET | Refills: 0 | Status: SHIPPED | OUTPATIENT
Start: 2022-06-09 | End: 2022-07-15 | Stop reason: SDUPTHER

## 2022-06-09 RX ORDER — SODIUM CHLORIDE 0.9 % (FLUSH) 0.9 %
10 SYRINGE (ML) INJECTION AS NEEDED
Status: DISCONTINUED | OUTPATIENT
Start: 2022-06-09 | End: 2022-06-09 | Stop reason: HOSPADM

## 2022-06-09 RX ADMIN — SODIUM CHLORIDE 1000 ML: 9 INJECTION, SOLUTION INTRAVENOUS at 02:56

## 2022-06-09 RX ADMIN — PROCHLORPERAZINE EDISYLATE 10 MG: 5 INJECTION INTRAMUSCULAR; INTRAVENOUS at 02:56

## 2022-06-09 NOTE — TELEPHONE ENCOUNTER
"    Reason for Disposition  • [1] Insulin-dependent diabetes (Type I) AND [2] glucose > 400 mg/dl (22 mmol/l)    Additional Information  • Negative: Shock suspected (e.g., cold/pale/clammy skin, too weak to stand, low BP, rapid pulse)  • Negative: Sounds like a life-threatening emergency to the triager  • Negative: [1] Nausea or vomiting AND [2] pregnancy < 20 weeks  • Negative: Menstrual Period - Missed or Late (i.e., pregnancy suspected)  • Negative: Heat exhaustion suspected (i.e., dehydration from heat exposure)  • Negative: Motion sickness suspected (i.e., nausea with car, plane, boat, or train travel)  • Negative: Anxiety or stress suspected (i.e., nausea with anxiety attacks or stressful situations)  • Negative: Traumatic Brain Injury (TBI) suspected  • Negative: Nausea (or Vomiting) in a cancer patient who is currently (or recently) receiving chemotherapy or radiation therapy, or cancer patient who has metastatic or end-stage cancer and is receiving palliative care  • Negative: Vomiting occurs  • Negative: Other symptom is present, see that guideline.  (e.g., chest pain, headache, dizziness, abdominal pain, colds, sore throat, etc.).  • Negative: Unable to walk, or can only walk with assistance (e.g., requires support)  • Negative: Difficulty breathing    Answer Assessment - Initial Assessment Questions  1. NAUSEA SEVERITY: \"How bad is the nausea?\" (e.g., mild, moderate, severe; dehydration, weight loss)    - MILD: loss of appetite without change in eating habits    - MODERATE: decreased oral intake without significant weight loss, dehydration, or malnutrition    - SEVERE: inadequate caloric or fluid intake, significant weight loss, symptoms of dehydration      severe  2. ONSET: \"When did the nausea begin?\"      2 weeks ago  3. VOMITING: \"Any vomiting?\" If Yes, ask: \"How many times today?\"      25-30  4. RECURRENT SYMPTOM: \"Have you had nausea before?\" If Yes, ask: \"When was the last time?\" \"What happened " "that time?\"      Yes   5. CAUSE: \"What do you think is causing the nausea?\"      Doesn't know  6. PREGNANCY: \"Is there any chance you are pregnant?\" (e.g., unprotected intercourse, missed birth control pill, broken condom)    no    Protocols used: NAUSEA-ADULT-AH      "

## 2022-06-09 NOTE — ED PROVIDER NOTES
Subjective   27-year-old male presents ER with complaints of nausea and vomiting.  Patient states that he was recently admitted to the hospital for nausea and vomiting.  Patient was discharged on Delfina 3 and actually came back to the ER the same day where he was diagnosed with gastritis.  Patient states he has tried the Phenergan but is not helping.          Review of Systems   Constitutional: Negative.  Negative for fever.   HENT: Negative.    Respiratory: Negative.    Cardiovascular: Negative.  Negative for chest pain.   Gastrointestinal: Positive for abdominal pain, nausea and vomiting.   Endocrine: Negative.    Genitourinary: Negative.  Negative for dysuria.   Skin: Negative.    Neurological: Negative.    Psychiatric/Behavioral: Negative.    All other systems reviewed and are negative.      Past Medical History:   Diagnosis Date   • Diabetes mellitus (HCC)    • Tobacco abuse        No Known Allergies    Past Surgical History:   Procedure Laterality Date   • ENDOSCOPY N/A 6/1/2022    Procedure: ESOPHAGOGASTRODUODENOSCOPY WITH ANESTHESIA;  Surgeon: Keon Quezada MD;  Location: [facility];  Service: Gastroenterology;  Laterality: N/A;       Family History   Problem Relation Age of Onset   • Heart disease Mother    • Diabetes Mother    • Kidney disease Mother    • Heart disease Father    • Diabetes Father    • Heart disease Maternal Grandmother    • Diabetes Maternal Grandmother    • Heart disease Maternal Grandfather    • Diabetes Maternal Grandfather    • Heart disease Paternal Grandmother    • Diabetes Paternal Grandmother    • Heart disease Paternal Grandfather    • Diabetes Paternal Grandfather        Social History     Socioeconomic History   • Marital status: Single   Tobacco Use   • Smoking status: Current Every Day Smoker     Packs/day: 1.50     Years: 15.00     Pack years: 22.50   • Smokeless tobacco: Never Used   Vaping Use   • Vaping Use: Former   Substance and Sexual Activity   • Alcohol use:  Never   • Drug use: Yes     Frequency: 7.0 times per week     Types: Marijuana   • Sexual activity: Defer           Objective   Physical Exam  Vitals and nursing note reviewed.   Constitutional:       General: He is not in acute distress.     Appearance: He is well-developed. He is not diaphoretic.   HENT:      Head: Normocephalic and atraumatic.      Right Ear: External ear normal.      Left Ear: External ear normal.      Nose: Nose normal.   Eyes:      Conjunctiva/sclera: Conjunctivae normal.   Neck:      Vascular: No JVD.      Trachea: No tracheal deviation.   Cardiovascular:      Rate and Rhythm: Normal rate.      Heart sounds: No murmur heard.  Pulmonary:      Effort: Pulmonary effort is normal. No respiratory distress.      Breath sounds: No wheezing.   Abdominal:      Palpations: Abdomen is soft.      Tenderness: There is abdominal tenderness (generalized).   Musculoskeletal:         General: No deformity. Normal range of motion.      Cervical back: Normal range of motion and neck supple.   Skin:     General: Skin is warm and dry.      Coloration: Skin is not pale.      Findings: No erythema or rash.   Neurological:      Mental Status: He is alert and oriented to person, place, and time.      Cranial Nerves: No cranial nerve deficit.   Psychiatric:         Behavior: Behavior normal.         Thought Content: Thought content normal.         Procedures           ED Course  ED Course as of 06/09/22 0411   Thu Jun 09, 2022   0220 EKG normal sinus rhythm 65 bpm, T wave inversion inferiorly and laterally no acute ST segment elevation or depression [JM]   0407 CT abd pelvis rad interpreted;  Negative CT of the abdomen and pelvis. [RB]      ED Course User Index  [JM] Farhan Ewing MD  [RB] Manpreet Jung II, PA                                                 MDM  Number of Diagnoses or Management Options  Nausea and vomiting, unspecified vomiting type: new and requires workup     Amount and/or Complexity of Data  Reviewed  Clinical lab tests: ordered and reviewed  Tests in the radiology section of CPT®: ordered and reviewed  Decide to obtain previous medical records or to obtain history from someone other than the patient: yes  Review and summarize past medical records: yes    Risk of Complications, Morbidity, and/or Mortality  Presenting problems: moderate  Diagnostic procedures: moderate  Management options: low    Patient Progress  Patient progress: stable      Final diagnoses:   Nausea and vomiting, unspecified vomiting type       ED Disposition  ED Disposition     ED Disposition   Discharge    Condition   Stable    Comment   --             Elsa De La Torre MD  [address] [place] [address]  [phone]    Schedule an appointment as soon as possible for a visit            Medication List      New Prescriptions    * prochlorperazine 10 MG tablet  Commonly known as: COMPAZINE  Take 1 tablet by mouth Every 6 (Six) Hours As Needed for Nausea or Vomiting.         * This list has 1 medication(s) that are the same as other medications prescribed for you. Read the directions carefully, and ask your doctor or other care provider to review them with you.            Stop    ondansetron ODT 8 MG disintegrating tablet  Commonly known as: ZOFRAN-ODT     promethazine 25 MG tablet  Commonly known as: PHENERGAN        ASK your doctor about these medications    * prochlorperazine 5 MG tablet  Commonly known as: COMPAZINE  Take 1 tablet by mouth Every 6 (Six) Hours As Needed for Nausea or Vomiting for up to 5 days.  Ask about: Should I take this medication?         * This list has 1 medication(s) that are the same as other medications prescribed for you. Read the directions carefully, and ask your doctor or other care provider to review them with you.               Where to Get Your Medications      You can get these medications from any pharmacy    Bring a paper prescription for each of these medications  · prochlorperazine 10 MG  Manpreet Anderson II, PA  06/09/22 0419

## 2022-06-17 ENCOUNTER — HOSPITAL ENCOUNTER (OUTPATIENT)
Dept: CT IMAGING | Facility: HOSPITAL | Age: 27
Discharge: HOME | End: 2022-06-17
Admitting: FAMILY MEDICINE

## 2022-06-17 DIAGNOSIS — R59.0 CERVICAL LYMPHADENOPATHY: ICD-10-CM

## 2022-06-17 DIAGNOSIS — Z87.2 HISTORY OF CELLULITIS: ICD-10-CM

## 2022-06-17 LAB — CREAT BLDA-MCNC: 1 MG/DL (ref 0.6–1.3)

## 2022-06-17 PROCEDURE — 70487 CT MAXILLOFACIAL W/DYE: CPT

## 2022-06-17 PROCEDURE — 70487 CT MAXILLOFACIAL W/DYE: CPT | Performed by: RADIOLOGY

## 2022-06-17 PROCEDURE — 82565 ASSAY OF CREATININE: CPT

## 2022-06-17 PROCEDURE — 25010000002 IOPAMIDOL 61 % SOLUTION: Performed by: FAMILY MEDICINE

## 2022-06-17 RX ADMIN — IOPAMIDOL 85 ML: 612 INJECTION, SOLUTION INTRAVENOUS at 13:27

## 2022-06-21 ENCOUNTER — TELEPHONE (OUTPATIENT)
Dept: FAMILY MEDICINE CLINIC | Facility: CLINIC | Age: 27
End: 2022-06-21

## 2022-06-27 ENCOUNTER — TELEPHONE (OUTPATIENT)
Dept: FAMILY MEDICINE CLINIC | Facility: CLINIC | Age: 27
End: 2022-06-27

## 2022-07-05 ENCOUNTER — TELEPHONE (OUTPATIENT)
Dept: FAMILY MEDICINE CLINIC | Facility: CLINIC | Age: 27
End: 2022-07-05

## 2022-07-05 NOTE — TELEPHONE ENCOUNTER
----- Message from Elsa De La Torre MD sent at 7/3/2022  7:21 PM EDT -----  Please let him know that his CT looks good. Let him know that most of the infection is gone, but he is still recovering. If he starts to feel poorly, he needs to call us ASAP. We will monitor as there is still some residual infection (healing) and evidence of the healing (the lymph node) is still there and happening.         Voice mail is not set up at this time.      Patient notified & verbalized understanding.

## 2022-07-14 ENCOUNTER — APPOINTMENT (OUTPATIENT)
Dept: CT IMAGING | Facility: HOSPITAL | Age: 27
End: 2022-07-14

## 2022-07-14 ENCOUNTER — APPOINTMENT (OUTPATIENT)
Dept: GENERAL RADIOLOGY | Facility: HOSPITAL | Age: 27
End: 2022-07-14

## 2022-07-14 ENCOUNTER — HOSPITAL ENCOUNTER (EMERGENCY)
Facility: HOSPITAL | Age: 27
Discharge: HOME | End: 2022-07-15
Attending: STUDENT IN AN ORGANIZED HEALTH CARE EDUCATION/TRAINING PROGRAM | Admitting: EMERGENCY MEDICINE

## 2022-07-14 DIAGNOSIS — R11.2 NAUSEA AND VOMITING, UNSPECIFIED VOMITING TYPE: Primary | ICD-10-CM

## 2022-07-14 DIAGNOSIS — R11.2 CANNABINOID HYPEREMESIS SYNDROME: ICD-10-CM

## 2022-07-14 DIAGNOSIS — F12.90 CANNABINOID HYPEREMESIS SYNDROME: ICD-10-CM

## 2022-07-14 LAB
A-A DO2: 42 MMHG (ref 0–300)
ACETONE BLD QL: NEGATIVE
ALBUMIN SERPL-MCNC: 4.43 G/DL (ref 3.5–5.2)
ALBUMIN/GLOB SERPL: 1.3 G/DL
ALP SERPL-CCNC: 63 U/L (ref 39–117)
ALT SERPL W P-5'-P-CCNC: 11 U/L (ref 1–41)
AMPHET+METHAMPHET UR QL: NEGATIVE
AMPHETAMINES UR QL: NEGATIVE
ANION GAP SERPL CALCULATED.3IONS-SCNC: 13.9 MMOL/L (ref 5–15)
ANION GAP SERPL CALCULATED.3IONS-SCNC: 15.4 MMOL/L (ref 5–15)
ARTERIAL PATENCY WRIST A: ABNORMAL
AST SERPL-CCNC: 10 U/L (ref 1–40)
ATMOSPHERIC PRESS: 729 MMHG
BACTERIA UR QL AUTO: ABNORMAL /HPF
BARBITURATES UR QL SCN: NEGATIVE
BASE EXCESS BLDA CALC-SCNC: -1.9 MMOL/L (ref 0–2)
BASOPHILS # BLD AUTO: 0.16 10*3/MM3 (ref 0–0.2)
BASOPHILS NFR BLD AUTO: 1.3 % (ref 0–1.5)
BDY SITE: ABNORMAL
BENZODIAZ UR QL SCN: NEGATIVE
BILIRUB SERPL-MCNC: 0.4 MG/DL (ref 0–1.2)
BILIRUB UR QL STRIP: NEGATIVE
BODY TEMPERATURE: 0 C
BUN SERPL-MCNC: 17 MG/DL (ref 6–20)
BUN SERPL-MCNC: 21 MG/DL (ref 6–20)
BUN/CREAT SERPL: 15.2 (ref 7–25)
BUN/CREAT SERPL: 16 (ref 7–25)
BUPRENORPHINE SERPL-MCNC: NEGATIVE NG/ML
CALCIUM SPEC-SCNC: 10.4 MG/DL (ref 8.6–10.5)
CALCIUM SPEC-SCNC: 9 MG/DL (ref 8.6–10.5)
CANNABINOIDS SERPL QL: POSITIVE
CHLORIDE SERPL-SCNC: 104 MMOL/L (ref 98–107)
CHLORIDE SERPL-SCNC: 105 MMOL/L (ref 98–107)
CLARITY UR: CLEAR
CO2 BLDA-SCNC: 22.4 MMOL/L (ref 22–33)
CO2 SERPL-SCNC: 18.1 MMOL/L (ref 22–29)
CO2 SERPL-SCNC: 19.6 MMOL/L (ref 22–29)
COCAINE UR QL: NEGATIVE
COHGB MFR BLD: 3.9 % (ref 0–5)
COLOR UR: YELLOW
CREAT SERPL-MCNC: 1.12 MG/DL (ref 0.76–1.27)
CREAT SERPL-MCNC: 1.31 MG/DL (ref 0.76–1.27)
D-LACTATE SERPL-SCNC: 1.4 MMOL/L (ref 0.5–2)
D-LACTATE SERPL-SCNC: 2.1 MMOL/L (ref 0.5–2)
DEPRECATED RDW RBC AUTO: 39.3 FL (ref 37–54)
EGFRCR SERPLBLD CKD-EPI 2021: 75.6 ML/MIN/1.73
EGFRCR SERPLBLD CKD-EPI 2021: 91.2 ML/MIN/1.73
EOSINOPHIL # BLD AUTO: 1.16 10*3/MM3 (ref 0–0.4)
EOSINOPHIL NFR BLD AUTO: 9.1 % (ref 0.3–6.2)
ERYTHROCYTE [DISTWIDTH] IN BLOOD BY AUTOMATED COUNT: 12.8 % (ref 12.3–15.4)
GLOBULIN UR ELPH-MCNC: 3.4 GM/DL
GLUCOSE BLDC GLUCOMTR-MCNC: 285 MG/DL (ref 70–130)
GLUCOSE BLDC GLUCOMTR-MCNC: 308 MG/DL (ref 70–130)
GLUCOSE SERPL-MCNC: 293 MG/DL (ref 65–99)
GLUCOSE SERPL-MCNC: 302 MG/DL (ref 65–99)
GLUCOSE UR STRIP-MCNC: ABNORMAL MG/DL
HCO3 BLDA-SCNC: 21.4 MMOL/L (ref 20–26)
HCT VFR BLD AUTO: 45.6 % (ref 37.5–51)
HCT VFR BLD CALC: 47 % (ref 38–51)
HGB BLD-MCNC: 15.4 G/DL (ref 13–17.7)
HGB BLDA-MCNC: 15.3 G/DL (ref 14–18)
HGB UR QL STRIP.AUTO: ABNORMAL
HOLD SPECIMEN: NORMAL
HOLD SPECIMEN: NORMAL
HYALINE CASTS UR QL AUTO: ABNORMAL /LPF
IMM GRANULOCYTES # BLD AUTO: 0.04 10*3/MM3 (ref 0–0.05)
IMM GRANULOCYTES NFR BLD AUTO: 0.3 % (ref 0–0.5)
INHALED O2 CONCENTRATION: 21 %
KETONES UR QL STRIP: ABNORMAL
LEUKOCYTE ESTERASE UR QL STRIP.AUTO: NEGATIVE
LIPASE SERPL-CCNC: 43 U/L (ref 13–60)
LYMPHOCYTES # BLD AUTO: 2.53 10*3/MM3 (ref 0.7–3.1)
LYMPHOCYTES NFR BLD AUTO: 19.8 % (ref 19.6–45.3)
Lab: ABNORMAL
MCH RBC QN AUTO: 28.7 PG (ref 26.6–33)
MCHC RBC AUTO-ENTMCNC: 33.8 G/DL (ref 31.5–35.7)
MCV RBC AUTO: 84.9 FL (ref 79–97)
METHADONE UR QL SCN: NEGATIVE
METHGB BLD QL: 0.1 % (ref 0–3)
MODALITY: ABNORMAL
MONOCYTES # BLD AUTO: 0.63 10*3/MM3 (ref 0.1–0.9)
MONOCYTES NFR BLD AUTO: 4.9 % (ref 5–12)
NEUTROPHILS NFR BLD AUTO: 64.6 % (ref 42.7–76)
NEUTROPHILS NFR BLD AUTO: 8.23 10*3/MM3 (ref 1.7–7)
NITRITE UR QL STRIP: NEGATIVE
NOTE: ABNORMAL
NRBC BLD AUTO-RTO: 0 /100 WBC (ref 0–0.2)
OPIATES UR QL: NEGATIVE
OXYCODONE UR QL SCN: NEGATIVE
OXYHGB MFR BLDV: 90.9 % (ref 94–99)
PCO2 BLDA: 31.9 MM HG (ref 35–45)
PCO2 TEMP ADJ BLD: ABNORMAL MM[HG]
PCP UR QL SCN: NEGATIVE
PH BLDA: 7.43 PH UNITS (ref 7.35–7.45)
PH UR STRIP.AUTO: 5.5 [PH] (ref 5–8)
PH, TEMP CORRECTED: ABNORMAL
PLATELET # BLD AUTO: 406 10*3/MM3 (ref 140–450)
PMV BLD AUTO: 8.8 FL (ref 6–12)
PO2 BLDA: 65.1 MM HG (ref 83–108)
PO2 TEMP ADJ BLD: ABNORMAL MM[HG]
POTASSIUM SERPL-SCNC: 4.5 MMOL/L (ref 3.5–5.2)
POTASSIUM SERPL-SCNC: 4.9 MMOL/L (ref 3.5–5.2)
PROPOXYPH UR QL: NEGATIVE
PROT SERPL-MCNC: 7.8 G/DL (ref 6–8.5)
PROT UR QL STRIP: ABNORMAL
QT INTERVAL: 392 MS
QTC INTERVAL: 410 MS
RBC # BLD AUTO: 5.37 10*6/MM3 (ref 4.14–5.8)
RBC # UR STRIP: ABNORMAL /HPF
REF LAB TEST METHOD: ABNORMAL
SAO2 % BLDCOA: 94.7 % (ref 94–99)
SODIUM SERPL-SCNC: 137 MMOL/L (ref 136–145)
SODIUM SERPL-SCNC: 139 MMOL/L (ref 136–145)
SP GR UR STRIP: 1.03 (ref 1–1.03)
SQUAMOUS #/AREA URNS HPF: ABNORMAL /HPF
TRICYCLICS UR QL SCN: NEGATIVE
TROPONIN T SERPL-MCNC: <0.01 NG/ML (ref 0–0.03)
UROBILINOGEN UR QL STRIP: ABNORMAL
VENTILATOR MODE: ABNORMAL
WBC # UR STRIP: ABNORMAL /HPF
WBC NRBC COR # BLD: 12.75 10*3/MM3 (ref 3.4–10.8)
WHOLE BLOOD HOLD COAG: NORMAL
WHOLE BLOOD HOLD SPECIMEN: NORMAL

## 2022-07-14 PROCEDURE — 99283 EMERGENCY DEPT VISIT LOW MDM: CPT

## 2022-07-14 PROCEDURE — 83050 HGB METHEMOGLOBIN QUAN: CPT

## 2022-07-14 PROCEDURE — 25010000002 PROCHLORPERAZINE 10 MG/2ML SOLUTION: Performed by: STUDENT IN AN ORGANIZED HEALTH CARE EDUCATION/TRAINING PROGRAM

## 2022-07-14 PROCEDURE — 96361 HYDRATE IV INFUSION ADD-ON: CPT

## 2022-07-14 PROCEDURE — 36600 WITHDRAWAL OF ARTERIAL BLOOD: CPT

## 2022-07-14 PROCEDURE — 96376 TX/PRO/DX INJ SAME DRUG ADON: CPT

## 2022-07-14 PROCEDURE — 25010000002 MORPHINE PER 10 MG: Performed by: EMERGENCY MEDICINE

## 2022-07-14 PROCEDURE — 74176 CT ABD & PELVIS W/O CONTRAST: CPT | Performed by: RADIOLOGY

## 2022-07-14 PROCEDURE — 74176 CT ABD & PELVIS W/O CONTRAST: CPT

## 2022-07-14 PROCEDURE — 83605 ASSAY OF LACTIC ACID: CPT | Performed by: STUDENT IN AN ORGANIZED HEALTH CARE EDUCATION/TRAINING PROGRAM

## 2022-07-14 PROCEDURE — 93005 ELECTROCARDIOGRAM TRACING: CPT | Performed by: STUDENT IN AN ORGANIZED HEALTH CARE EDUCATION/TRAINING PROGRAM

## 2022-07-14 PROCEDURE — 71045 X-RAY EXAM CHEST 1 VIEW: CPT

## 2022-07-14 PROCEDURE — 82805 BLOOD GASES W/O2 SATURATION: CPT

## 2022-07-14 PROCEDURE — 81001 URINALYSIS AUTO W/SCOPE: CPT | Performed by: STUDENT IN AN ORGANIZED HEALTH CARE EDUCATION/TRAINING PROGRAM

## 2022-07-14 PROCEDURE — 82375 ASSAY CARBOXYHB QUANT: CPT

## 2022-07-14 PROCEDURE — 71045 X-RAY EXAM CHEST 1 VIEW: CPT | Performed by: RADIOLOGY

## 2022-07-14 PROCEDURE — 82962 GLUCOSE BLOOD TEST: CPT

## 2022-07-14 PROCEDURE — 93010 ELECTROCARDIOGRAM REPORT: CPT | Performed by: INTERNAL MEDICINE

## 2022-07-14 PROCEDURE — 80306 DRUG TEST PRSMV INSTRMNT: CPT | Performed by: EMERGENCY MEDICINE

## 2022-07-14 PROCEDURE — 83690 ASSAY OF LIPASE: CPT | Performed by: STUDENT IN AN ORGANIZED HEALTH CARE EDUCATION/TRAINING PROGRAM

## 2022-07-14 PROCEDURE — 71250 CT THORAX DX C-: CPT

## 2022-07-14 PROCEDURE — 25010000002 ONDANSETRON PER 1 MG: Performed by: EMERGENCY MEDICINE

## 2022-07-14 PROCEDURE — 82009 KETONE BODYS QUAL: CPT | Performed by: STUDENT IN AN ORGANIZED HEALTH CARE EDUCATION/TRAINING PROGRAM

## 2022-07-14 PROCEDURE — 25010000002 ONDANSETRON PER 1 MG: Performed by: STUDENT IN AN ORGANIZED HEALTH CARE EDUCATION/TRAINING PROGRAM

## 2022-07-14 PROCEDURE — 96375 TX/PRO/DX INJ NEW DRUG ADDON: CPT

## 2022-07-14 PROCEDURE — 80053 COMPREHEN METABOLIC PANEL: CPT | Performed by: STUDENT IN AN ORGANIZED HEALTH CARE EDUCATION/TRAINING PROGRAM

## 2022-07-14 PROCEDURE — 25010000002 METOCLOPRAMIDE PER 10 MG: Performed by: STUDENT IN AN ORGANIZED HEALTH CARE EDUCATION/TRAINING PROGRAM

## 2022-07-14 PROCEDURE — 85025 COMPLETE CBC W/AUTO DIFF WBC: CPT | Performed by: STUDENT IN AN ORGANIZED HEALTH CARE EDUCATION/TRAINING PROGRAM

## 2022-07-14 PROCEDURE — 84484 ASSAY OF TROPONIN QUANT: CPT | Performed by: STUDENT IN AN ORGANIZED HEALTH CARE EDUCATION/TRAINING PROGRAM

## 2022-07-14 PROCEDURE — 96365 THER/PROPH/DIAG IV INF INIT: CPT

## 2022-07-14 PROCEDURE — 25010000002 DIPHENHYDRAMINE PER 50 MG: Performed by: STUDENT IN AN ORGANIZED HEALTH CARE EDUCATION/TRAINING PROGRAM

## 2022-07-14 RX ORDER — METOCLOPRAMIDE HYDROCHLORIDE 5 MG/ML
5 INJECTION INTRAMUSCULAR; INTRAVENOUS ONCE
Status: COMPLETED | OUTPATIENT
Start: 2022-07-14 | End: 2022-07-14

## 2022-07-14 RX ORDER — ONDANSETRON HCL IN 0.9 % NACL 8 MG/50 ML
8 INTRAVENOUS SOLUTION, PIGGYBACK (ML) INTRAVENOUS ONCE
Status: COMPLETED | OUTPATIENT
Start: 2022-07-14 | End: 2022-07-14

## 2022-07-14 RX ORDER — ONDANSETRON 2 MG/ML
4 INJECTION INTRAMUSCULAR; INTRAVENOUS ONCE
Status: COMPLETED | OUTPATIENT
Start: 2022-07-14 | End: 2022-07-14

## 2022-07-14 RX ORDER — SODIUM CHLORIDE 9 MG/ML
125 INJECTION, SOLUTION INTRAVENOUS CONTINUOUS
Status: DISCONTINUED | OUTPATIENT
Start: 2022-07-14 | End: 2022-07-15 | Stop reason: HOSPADM

## 2022-07-14 RX ORDER — DIPHENHYDRAMINE HYDROCHLORIDE 50 MG/ML
25 INJECTION INTRAMUSCULAR; INTRAVENOUS ONCE
Status: COMPLETED | OUTPATIENT
Start: 2022-07-14 | End: 2022-07-14

## 2022-07-14 RX ORDER — SODIUM CHLORIDE 0.9 % (FLUSH) 0.9 %
10 SYRINGE (ML) INJECTION AS NEEDED
Status: DISCONTINUED | OUTPATIENT
Start: 2022-07-14 | End: 2022-07-15 | Stop reason: HOSPADM

## 2022-07-14 RX ORDER — PANTOPRAZOLE SODIUM 40 MG/10ML
40 INJECTION, POWDER, LYOPHILIZED, FOR SOLUTION INTRAVENOUS ONCE
Status: COMPLETED | OUTPATIENT
Start: 2022-07-14 | End: 2022-07-14

## 2022-07-14 RX ORDER — PROCHLORPERAZINE EDISYLATE 5 MG/ML
2.5 INJECTION INTRAMUSCULAR; INTRAVENOUS ONCE
Status: COMPLETED | OUTPATIENT
Start: 2022-07-14 | End: 2022-07-14

## 2022-07-14 RX ORDER — CAPSAICIN 0.025 %
1 CREAM (GRAM) TOPICAL ONCE
Status: COMPLETED | OUTPATIENT
Start: 2022-07-14 | End: 2022-07-14

## 2022-07-14 RX ADMIN — SODIUM CHLORIDE 1000 ML: 9 INJECTION, SOLUTION INTRAVENOUS at 15:49

## 2022-07-14 RX ADMIN — ONDANSETRON 4 MG: 2 INJECTION INTRAMUSCULAR; INTRAVENOUS at 15:49

## 2022-07-14 RX ADMIN — MORPHINE SULFATE 4 MG: 4 INJECTION, SOLUTION INTRAMUSCULAR; INTRAVENOUS at 20:42

## 2022-07-14 RX ADMIN — DIPHENHYDRAMINE HYDROCHLORIDE 25 MG: 50 INJECTION, SOLUTION INTRAMUSCULAR; INTRAVENOUS at 18:50

## 2022-07-14 RX ADMIN — SODIUM CHLORIDE 125 ML/HR: 9 INJECTION, SOLUTION INTRAVENOUS at 21:38

## 2022-07-14 RX ADMIN — CAPSAICIN 1 APPLICATION: 0.25 CREAM TOPICAL at 21:38

## 2022-07-14 RX ADMIN — ONDANSETRON 8 MG: 2 INJECTION INTRAMUSCULAR; INTRAVENOUS at 20:51

## 2022-07-14 RX ADMIN — PROCHLORPERAZINE EDISYLATE 2.5 MG: 5 INJECTION INTRAMUSCULAR; INTRAVENOUS at 18:23

## 2022-07-14 RX ADMIN — SODIUM CHLORIDE 500 ML: 9 INJECTION, SOLUTION INTRAVENOUS at 20:40

## 2022-07-14 RX ADMIN — METOCLOPRAMIDE 5 MG: 5 INJECTION, SOLUTION INTRAMUSCULAR; INTRAVENOUS at 17:06

## 2022-07-14 RX ADMIN — PANTOPRAZOLE SODIUM 40 MG: 40 INJECTION, POWDER, FOR SOLUTION INTRAVENOUS at 20:42

## 2022-07-14 RX ADMIN — SODIUM CHLORIDE 1000 ML: 9 INJECTION, SOLUTION INTRAVENOUS at 18:49

## 2022-07-14 NOTE — ED PROVIDER NOTES
Subjective   27-year-old male with insulin-dependent diabetes presents to the ER due to concerns for epigastric abdominal pain, chest pain, and persistent nausea with vomiting.  Patient denied hematemesis or hematochezia.  Denied fever or chills.  Patient noted significant chest pain due to retching.  Denied obvious alleviating factors.  Patient noted elevated blood sugar readings at home.  Denied concerns for infectious exposure.  No changes in urinary habits.  No obvious alleviating factors.  Vitals stable          Review of Systems   Cardiovascular: Positive for chest pain.   Gastrointestinal: Positive for abdominal pain, nausea and vomiting.   All other systems reviewed and are negative.      Past Medical History:   Diagnosis Date   • Diabetes mellitus (HCC)    • Tobacco abuse        No Known Allergies    Past Surgical History:   Procedure Laterality Date   • ENDOSCOPY N/A 6/1/2022    Procedure: ESOPHAGOGASTRODUODENOSCOPY WITH ANESTHESIA;  Surgeon: Keon Quezada MD;  Location: [facility];  Service: Gastroenterology;  Laterality: N/A;       Family History   Problem Relation Age of Onset   • Heart disease Mother    • Diabetes Mother    • Kidney disease Mother    • Heart disease Father    • Diabetes Father    • Heart disease Maternal Grandmother    • Diabetes Maternal Grandmother    • Heart disease Maternal Grandfather    • Diabetes Maternal Grandfather    • Heart disease Paternal Grandmother    • Diabetes Paternal Grandmother    • Heart disease Paternal Grandfather    • Diabetes Paternal Grandfather        Social History     Socioeconomic History   • Marital status: Single   Tobacco Use   • Smoking status: Current Every Day Smoker     Packs/day: 1.50     Years: 15.00     Pack years: 22.50   • Smokeless tobacco: Never Used   Vaping Use   • Vaping Use: Former   Substance and Sexual Activity   • Alcohol use: Never   • Drug use: Yes     Frequency: 7.0 times per week     Types: Marijuana   • Sexual activity:  Defer           Objective   Physical Exam  Constitutional:       General: He is not in acute distress.     Appearance: He is well-developed. He is not ill-appearing.   HENT:      Head: Normocephalic and atraumatic.   Eyes:      Extraocular Movements: Extraocular movements intact.      Pupils: Pupils are equal, round, and reactive to light.   Neck:      Vascular: No JVD.   Cardiovascular:      Rate and Rhythm: Normal rate and regular rhythm.      Heart sounds: Normal heart sounds. No murmur heard.  Pulmonary:      Effort: No tachypnea, accessory muscle usage or respiratory distress.      Breath sounds: Normal breath sounds. No stridor. No decreased breath sounds, wheezing, rhonchi or rales.   Chest:      Chest wall: No deformity, tenderness or crepitus.   Abdominal:      General: Bowel sounds are normal.      Palpations: Abdomen is soft.      Tenderness: There is generalized abdominal tenderness. There is no guarding or rebound.   Musculoskeletal:         General: Normal range of motion.      Cervical back: Normal range of motion and neck supple.      Right lower leg: No tenderness. No edema.      Left lower leg: No tenderness. No edema.   Lymphadenopathy:      Cervical: No cervical adenopathy.   Skin:     General: Skin is warm and dry.      Coloration: Skin is not cyanotic.      Findings: No ecchymosis or erythema.   Neurological:      General: No focal deficit present.      Mental Status: He is alert and oriented to person, place, and time.      Cranial Nerves: No cranial nerve deficit.      Motor: No weakness.   Psychiatric:         Mood and Affect: Mood normal. Mood is not anxious.         Behavior: Behavior normal. Behavior is not agitated.         Procedures  CT Chest Without Contrast Diagnostic   Final Result      1.  No acute process identified.   2.  Bilateral gynecomastia.      Signer Name: Reg White MD    Signed: 7/14/2022 7:38 PM    Workstation Name: [id number]     [facility]       CT Abdomen Pelvis Without Contrast   Final Result     No acute findings in the abdomen or pelvis.       This report was finalized on 7/14/2022 4:33 PM by Dr. Edward Montes MD.          XR Chest 1 View   Final Result     Unremarkable exam. No acute cardiopulmonary findings identified.       This report was finalized on 7/14/2022 4:37 PM by Dr. Edward Montes MD.            Results for orders placed or performed during the hospital encounter of 07/14/22   Comprehensive Metabolic Panel    Specimen: Blood   Result Value Ref Range    Glucose 302 (H) 65 - 99 mg/dL    BUN 21 (H) 6 - 20 mg/dL    Creatinine 1.31 (H) 0.76 - 1.27 mg/dL    Sodium 139 136 - 145 mmol/L    Potassium 4.5 3.5 - 5.2 mmol/L    Chloride 104 98 - 107 mmol/L    CO2 19.6 (L) 22.0 - 29.0 mmol/L    Calcium 10.4 8.6 - 10.5 mg/dL    Total Protein 7.8 6.0 - 8.5 g/dL    Albumin 4.43 3.50 - 5.20 g/dL    ALT (SGPT) 11 1 - 41 U/L    AST (SGOT) 10 1 - 40 U/L    Alkaline Phosphatase 63 39 - 117 U/L    Total Bilirubin 0.4 0.0 - 1.2 mg/dL    Globulin 3.4 gm/dL    A/G Ratio 1.3 g/dL    BUN/Creatinine Ratio 16.0 7.0 - 25.0    Anion Gap 15.4 (H) 5.0 - 15.0 mmol/L    eGFR 75.6 >60.0 mL/min/1.73   Lipase    Specimen: Blood   Result Value Ref Range    Lipase 43 13 - 60 U/L   Urinalysis With Microscopic If Indicated (No Culture) - Urine, Clean Catch    Specimen: Urine, Clean Catch   Result Value Ref Range    Color, UA Yellow Yellow, Straw    Appearance, UA Clear Clear    pH, UA 5.5 5.0 - 8.0    Specific Gravity, UA 1.028 1.005 - 1.030    Glucose, UA >=1000 mg/dL (3+) (A) Negative    Ketones, UA 40 mg/dL (2+) (A) Negative    Bilirubin, UA Negative Negative    Blood, UA Moderate (2+) (A) Negative    Protein, UA >=300 mg/dL (3+) (A) Negative    Leuk Esterase, UA Negative Negative    Nitrite, UA Negative Negative    Urobilinogen, UA 0.2 E.U./dL 0.2 - 1.0 E.U./dL   Lactic Acid, Plasma    Specimen: Blood   Result Value Ref Range    Lactate 2.1 (C) 0.5 - 2.0 mmol/L   Acetone     Specimen: Blood   Result Value Ref Range    Acetone Negative Negative   CBC Auto Differential    Specimen: Blood   Result Value Ref Range    WBC 12.75 (H) 3.40 - 10.80 10*3/mm3    RBC 5.37 4.14 - 5.80 10*6/mm3    Hemoglobin 15.4 13.0 - 17.7 g/dL    Hematocrit 45.6 37.5 - 51.0 %    MCV 84.9 79.0 - 97.0 fL    MCH 28.7 26.6 - 33.0 pg    MCHC 33.8 31.5 - 35.7 g/dL    RDW 12.8 12.3 - 15.4 %    RDW-SD 39.3 37.0 - 54.0 fl    MPV 8.8 6.0 - 12.0 fL    Platelets 406 140 - 450 10*3/mm3    Neutrophil % 64.6 42.7 - 76.0 %    Lymphocyte % 19.8 19.6 - 45.3 %    Monocyte % 4.9 (L) 5.0 - 12.0 %    Eosinophil % 9.1 (H) 0.3 - 6.2 %    Basophil % 1.3 0.0 - 1.5 %    Immature Grans % 0.3 0.0 - 0.5 %    Neutrophils, Absolute 8.23 (H) 1.70 - 7.00 10*3/mm3    Lymphocytes, Absolute 2.53 0.70 - 3.10 10*3/mm3    Monocytes, Absolute 0.63 0.10 - 0.90 10*3/mm3    Eosinophils, Absolute 1.16 (H) 0.00 - 0.40 10*3/mm3    Basophils, Absolute 0.16 0.00 - 0.20 10*3/mm3    Immature Grans, Absolute 0.04 0.00 - 0.05 10*3/mm3    nRBC 0.0 0.0 - 0.2 /100 WBC   Blood Gas, Arterial With Co-Ox    Specimen: Arterial Blood   Result Value Ref Range    Site Right Brachial     Butch's Test N/A     pH, Arterial 7.434 7.350 - 7.450 pH units    pCO2, Arterial 31.9 (L) 35.0 - 45.0 mm Hg    pO2, Arterial 65.1 (L) 83.0 - 108.0 mm Hg    HCO3, Arterial 21.4 20.0 - 26.0 mmol/L    Base Excess, Arterial -1.9 (L) 0.0 - 2.0 mmol/L    O2 Saturation, Arterial 94.7 94.0 - 99.0 %    Hemoglobin, Blood Gas 15.3 14 - 18 g/dL    Hematocrit, Blood Gas 47.0 38.0 - 51.0 %    Oxyhemoglobin 90.9 (L) 94 - 99 %    Methemoglobin 0.10 0.00 - 3.00 %    Carboxyhemoglobin 3.9 0 - 5 %    A-a DO2 42.0 0.0 - 300.0 mmHg    CO2 Content 22.4 22 - 33 mmol/L    Temperature 0.0 C    Barometric Pressure for Blood Gas 729 mmHg    Modality Room Air     FIO2 21 %    Ventilator Mode NA     Note      Collected by [id number]     pH, Temp Corrected      pCO2, Temperature Corrected      pO2, Temperature Corrected      Troponin    Specimen: Blood   Result Value Ref Range    Troponin T <0.010 0.000 - 0.030 ng/mL   STAT Lactic Acid, Reflex    Specimen: Arm, Right; Blood   Result Value Ref Range    Lactate 1.4 0.5 - 2.0 mmol/L   Urinalysis, Microscopic Only - Urine, Clean Catch    Specimen: Urine, Clean Catch   Result Value Ref Range    RBC, UA 21-30 (A) None Seen, 0-2 /HPF    WBC, UA 0-2 None Seen, 0-2 /HPF    Bacteria, UA None Seen None Seen /HPF    Squamous Epithelial Cells, UA 0-2 None Seen, 0-2 /HPF    Hyaline Casts, UA 7-12 None Seen /LPF    Methodology Automated Microscopy    Urine Drug Screen - Urine, Clean Catch    Specimen: Urine, Clean Catch   Result Value Ref Range    THC, Screen, Urine Positive (A) Negative    Phencyclidine (PCP), Urine Negative Negative    Cocaine Screen, Urine Negative Negative    Methamphetamine, Ur Negative Negative    Opiate Screen Negative Negative    Amphetamine Screen, Urine Negative Negative    Benzodiazepine Screen, Urine Negative Negative    Tricyclic Antidepressants Screen Negative Negative    Methadone Screen, Urine Negative Negative    Barbiturates Screen, Urine Negative Negative    Oxycodone Screen, Urine Negative Negative    Propoxyphene Screen Negative Negative    Buprenorphine, Screen, Urine Negative Negative   Basic Metabolic Panel    Specimen: Arm, Right; Blood   Result Value Ref Range    Glucose 293 (H) 65 - 99 mg/dL    BUN 17 6 - 20 mg/dL    Creatinine 1.12 0.76 - 1.27 mg/dL    Sodium 137 136 - 145 mmol/L    Potassium 4.9 3.5 - 5.2 mmol/L    Chloride 105 98 - 107 mmol/L    CO2 18.1 (L) 22.0 - 29.0 mmol/L    Calcium 9.0 8.6 - 10.5 mg/dL    BUN/Creatinine Ratio 15.2 7.0 - 25.0    Anion Gap 13.9 5.0 - 15.0 mmol/L    eGFR 91.2 >60.0 mL/min/1.73   POC Glucose Once    Specimen: Blood   Result Value Ref Range    Glucose 285 (H) 70 - 130 mg/dL   POC Glucose Once    Specimen: Blood   Result Value Ref Range    Glucose 308 (H) 70 - 130 mg/dL   ECG 12 Lead   Result Value Ref Range    QT  Interval 392 ms    QTC Interval 410 ms   Green Top (Gel)   Result Value Ref Range    Extra Tube Hold for add-ons.    Lavender Top   Result Value Ref Range    Extra Tube hold for add-on    Gold Top - SST   Result Value Ref Range    Extra Tube Hold for add-ons.    Light Blue Top   Result Value Ref Range    Extra Tube Hold for add-ons.                 ED Course  ED Course as of 07/15/22 0147   u Jul 14, 2022   1609 EKG noted sinus rhythm.  6 6 bpm.  .  QRS 84.  QTc 410.  No acute ST elevation [SF]   Fri Jul 15, 2022   0032 Patient voices that he is feeling remarkably better now.  He looks very comfortable, he appears much improved.  He has been outside to smoke and back.  He feels much better and requests to go home.  We discussed all of his test results and his plan of care.  He voices understanding and agreement. [CM]   0145 Patient reports that his ride home is on the way to get him.  His IV has been removed.  He now complains of recurrent nausea and vomiting.  I am ordering some intramuscular medications. [CM]      ED Course User Index  [CM] Joao Woods MD  [SF] Rishi Light,                                            [facility]    Final diagnoses:   Nausea and vomiting, unspecified vomiting type   Cannabinoid hyperemesis syndrome       ED Disposition  ED Disposition     ED Disposition   Discharge    Condition   Stable    Comment   --             Elsa De La Torre MD  [address] [place] [address]  [phone]    Go to   1 to 2 days    [facility] Emergency Department  [address] [place] [id number] [phone]  Go to   If symptoms worsen         Medication List      New Prescriptions    capsaicin 0.025 % cream  Commonly known as: ZOSTRIX  1 application to the abdominal skin every 8 hours as needed nausea and vomiting           Where to Get Your Medications      These medications were sent to [id number] - [place] - [address] [facility] [address] [facility] - [phone]  - [phone] FX  [address] [facility] [address] [place] [address]    Phone: [phone]   · capsaicin 0.025 % cream  · prochlorperazine 10 MG tablet       Please note that portions of this note were completed with a voice recognition program.        Joao Woods MD  07/16/22 0203

## 2022-07-15 VITALS
HEART RATE: 62 BPM | TEMPERATURE: 98.1 F | DIASTOLIC BLOOD PRESSURE: 89 MMHG | OXYGEN SATURATION: 99 % | RESPIRATION RATE: 20 BRPM | HEIGHT: 68 IN | SYSTOLIC BLOOD PRESSURE: 159 MMHG | WEIGHT: 187 LBS | BODY MASS INDEX: 28.34 KG/M2

## 2022-07-15 PROCEDURE — 96361 HYDRATE IV INFUSION ADD-ON: CPT

## 2022-07-15 RX ORDER — CAPSAICIN 0.025 %
CREAM (GRAM) TOPICAL
Qty: 45 G | Refills: 0 | Status: SHIPPED | OUTPATIENT
Start: 2022-07-15

## 2022-07-15 RX ORDER — ONDANSETRON HCL IN 0.9 % NACL 8 MG/50 ML
8 INTRAVENOUS SOLUTION, PIGGYBACK (ML) INTRAVENOUS ONCE
Status: DISCONTINUED | OUTPATIENT
Start: 2022-07-15 | End: 2022-07-15 | Stop reason: HOSPADM

## 2022-07-15 RX ORDER — MORPHINE SULFATE 10 MG/ML
6 INJECTION INTRAMUSCULAR; INTRAVENOUS; SUBCUTANEOUS ONCE
Status: DISCONTINUED | OUTPATIENT
Start: 2022-07-15 | End: 2022-07-15 | Stop reason: HOSPADM

## 2022-07-15 RX ORDER — PROCHLORPERAZINE MALEATE 10 MG
10 TABLET ORAL EVERY 6 HOURS PRN
Qty: 20 TABLET | Refills: 0 | Status: SHIPPED | OUTPATIENT
Start: 2022-07-15 | End: 2022-10-13

## 2022-07-15 NOTE — DISCHARGE INSTRUCTIONS
Home to rest.  Drink plenty of fluids.  Use your medications as prescribed.  See Dr. De La Torre in the office in 1 to 2 days.  Return to the emergency department right away if symptoms worsen/any problems.

## 2022-07-16 ENCOUNTER — APPOINTMENT (OUTPATIENT)
Dept: CT IMAGING | Facility: HOSPITAL | Age: 27
End: 2022-07-16

## 2022-07-16 ENCOUNTER — HOSPITAL ENCOUNTER (EMERGENCY)
Facility: HOSPITAL | Age: 27
Discharge: HOME | End: 2022-07-16
Attending: EMERGENCY MEDICINE | Admitting: EMERGENCY MEDICINE

## 2022-07-16 VITALS
RESPIRATION RATE: 18 BRPM | SYSTOLIC BLOOD PRESSURE: 151 MMHG | WEIGHT: 185 LBS | HEART RATE: 75 BPM | TEMPERATURE: 98.7 F | DIASTOLIC BLOOD PRESSURE: 91 MMHG | OXYGEN SATURATION: 99 % | BODY MASS INDEX: 29.73 KG/M2 | HEIGHT: 66 IN

## 2022-07-16 DIAGNOSIS — R11.2 NAUSEA AND VOMITING IN ADULT: Primary | ICD-10-CM

## 2022-07-16 DIAGNOSIS — R10.9 ABDOMINAL PAIN, UNSPECIFIED ABDOMINAL LOCATION: ICD-10-CM

## 2022-07-16 LAB
A-A DO2: 22.9 MMHG (ref 0–300)
ACETONE BLD QL: ABNORMAL
ALBUMIN SERPL-MCNC: 4.34 G/DL (ref 3.5–5.2)
ALBUMIN/GLOB SERPL: 1.4 G/DL
ALP SERPL-CCNC: 60 U/L (ref 39–117)
ALT SERPL W P-5'-P-CCNC: 7 U/L (ref 1–41)
ANION GAP SERPL CALCULATED.3IONS-SCNC: 20 MMOL/L (ref 5–15)
ARTERIAL PATENCY WRIST A: ABNORMAL
AST SERPL-CCNC: 15 U/L (ref 1–40)
ATMOSPHERIC PRESS: 729 MMHG
BACTERIA UR QL AUTO: ABNORMAL /HPF
BASE EXCESS BLDA CALC-SCNC: -0.6 MMOL/L (ref 0–2)
BASOPHILS # BLD AUTO: 0.05 10*3/MM3 (ref 0–0.2)
BASOPHILS NFR BLD AUTO: 0.3 % (ref 0–1.5)
BDY SITE: ABNORMAL
BILIRUB SERPL-MCNC: 0.8 MG/DL (ref 0–1.2)
BILIRUB UR QL STRIP: NEGATIVE
BODY TEMPERATURE: 0 C
BUN SERPL-MCNC: 19 MG/DL (ref 6–20)
BUN/CREAT SERPL: 17.4 (ref 7–25)
CALCIUM SPEC-SCNC: 9.9 MG/DL (ref 8.6–10.5)
CHLORIDE SERPL-SCNC: 93 MMOL/L (ref 98–107)
CLARITY UR: CLEAR
CO2 BLDA-SCNC: 24.2 MMOL/L (ref 22–33)
CO2 SERPL-SCNC: 20 MMOL/L (ref 22–29)
COHGB MFR BLD: 1.4 % (ref 0–5)
COLOR UR: YELLOW
CREAT SERPL-MCNC: 1.09 MG/DL (ref 0.76–1.27)
DEPRECATED RDW RBC AUTO: 37.6 FL (ref 37–54)
EGFRCR SERPLBLD CKD-EPI 2021: 94.2 ML/MIN/1.73
EOSINOPHIL # BLD AUTO: 0.03 10*3/MM3 (ref 0–0.4)
EOSINOPHIL NFR BLD AUTO: 0.2 % (ref 0.3–6.2)
ERYTHROCYTE [DISTWIDTH] IN BLOOD BY AUTOMATED COUNT: 12.4 % (ref 12.3–15.4)
GLOBULIN UR ELPH-MCNC: 3.2 GM/DL
GLUCOSE SERPL-MCNC: 246 MG/DL (ref 65–99)
GLUCOSE UR STRIP-MCNC: ABNORMAL MG/DL
HCO3 BLDA-SCNC: 23.1 MMOL/L (ref 20–26)
HCT VFR BLD AUTO: 44.8 % (ref 37.5–51)
HCT VFR BLD CALC: 44.6 % (ref 38–51)
HGB BLD-MCNC: 15.1 G/DL (ref 13–17.7)
HGB BLDA-MCNC: 14.5 G/DL (ref 14–18)
HGB UR QL STRIP.AUTO: ABNORMAL
HYALINE CASTS UR QL AUTO: ABNORMAL /LPF
IMM GRANULOCYTES # BLD AUTO: 0.06 10*3/MM3 (ref 0–0.05)
IMM GRANULOCYTES NFR BLD AUTO: 0.4 % (ref 0–0.5)
INHALED O2 CONCENTRATION: 21 %
KETONES UR QL STRIP: ABNORMAL
LEUKOCYTE ESTERASE UR QL STRIP.AUTO: NEGATIVE
LIPASE SERPL-CCNC: 25 U/L (ref 13–60)
LYMPHOCYTES # BLD AUTO: 2.07 10*3/MM3 (ref 0.7–3.1)
LYMPHOCYTES NFR BLD AUTO: 14 % (ref 19.6–45.3)
Lab: ABNORMAL
MCH RBC QN AUTO: 28.4 PG (ref 26.6–33)
MCHC RBC AUTO-ENTMCNC: 33.7 G/DL (ref 31.5–35.7)
MCV RBC AUTO: 84.2 FL (ref 79–97)
METHGB BLD QL: 0.4 % (ref 0–3)
MODALITY: ABNORMAL
MONOCYTES # BLD AUTO: 0.75 10*3/MM3 (ref 0.1–0.9)
MONOCYTES NFR BLD AUTO: 5.1 % (ref 5–12)
NEUTROPHILS NFR BLD AUTO: 11.86 10*3/MM3 (ref 1.7–7)
NEUTROPHILS NFR BLD AUTO: 80 % (ref 42.7–76)
NITRITE UR QL STRIP: NEGATIVE
NOTE: ABNORMAL
NRBC BLD AUTO-RTO: 0 /100 WBC (ref 0–0.2)
OXYHGB MFR BLDV: 94.7 % (ref 94–99)
PCO2 BLDA: 34.6 MM HG (ref 35–45)
PCO2 TEMP ADJ BLD: ABNORMAL MM[HG]
PH BLDA: 7.43 PH UNITS (ref 7.35–7.45)
PH UR STRIP.AUTO: 6 [PH] (ref 5–8)
PH, TEMP CORRECTED: ABNORMAL
PLATELET # BLD AUTO: 441 10*3/MM3 (ref 140–450)
PMV BLD AUTO: 9.5 FL (ref 6–12)
PO2 BLDA: 81.3 MM HG (ref 83–108)
PO2 TEMP ADJ BLD: ABNORMAL MM[HG]
POTASSIUM SERPL-SCNC: 4 MMOL/L (ref 3.5–5.2)
PROT SERPL-MCNC: 7.5 G/DL (ref 6–8.5)
PROT UR QL STRIP: ABNORMAL
RBC # BLD AUTO: 5.32 10*6/MM3 (ref 4.14–5.8)
RBC # UR STRIP: ABNORMAL /HPF
REF LAB TEST METHOD: ABNORMAL
SAO2 % BLDCOA: 96.4 % (ref 94–99)
SODIUM SERPL-SCNC: 133 MMOL/L (ref 136–145)
SP GR UR STRIP: >1.03 (ref 1–1.03)
SQUAMOUS #/AREA URNS HPF: ABNORMAL /HPF
UROBILINOGEN UR QL STRIP: ABNORMAL
VENTILATOR MODE: ABNORMAL
WBC # UR STRIP: ABNORMAL /HPF
WBC NRBC COR # BLD: 14.82 10*3/MM3 (ref 3.4–10.8)

## 2022-07-16 PROCEDURE — 85025 COMPLETE CBC W/AUTO DIFF WBC: CPT | Performed by: PHYSICIAN ASSISTANT

## 2022-07-16 PROCEDURE — 82375 ASSAY CARBOXYHB QUANT: CPT

## 2022-07-16 PROCEDURE — 83050 HGB METHEMOGLOBIN QUAN: CPT

## 2022-07-16 PROCEDURE — 74177 CT ABD & PELVIS W/CONTRAST: CPT

## 2022-07-16 PROCEDURE — 93010 ELECTROCARDIOGRAM REPORT: CPT | Performed by: INTERNAL MEDICINE

## 2022-07-16 PROCEDURE — 80053 COMPREHEN METABOLIC PANEL: CPT | Performed by: PHYSICIAN ASSISTANT

## 2022-07-16 PROCEDURE — 96374 THER/PROPH/DIAG INJ IV PUSH: CPT

## 2022-07-16 PROCEDURE — 83690 ASSAY OF LIPASE: CPT | Performed by: PHYSICIAN ASSISTANT

## 2022-07-16 PROCEDURE — 82009 KETONE BODYS QUAL: CPT | Performed by: PHYSICIAN ASSISTANT

## 2022-07-16 PROCEDURE — 25010000002 ONDANSETRON PER 1 MG: Performed by: PHYSICIAN ASSISTANT

## 2022-07-16 PROCEDURE — 25010000002 IOPAMIDOL 61 % SOLUTION: Performed by: EMERGENCY MEDICINE

## 2022-07-16 PROCEDURE — 36600 WITHDRAWAL OF ARTERIAL BLOOD: CPT

## 2022-07-16 PROCEDURE — 81001 URINALYSIS AUTO W/SCOPE: CPT | Performed by: PHYSICIAN ASSISTANT

## 2022-07-16 PROCEDURE — 82805 BLOOD GASES W/O2 SATURATION: CPT

## 2022-07-16 PROCEDURE — 99283 EMERGENCY DEPT VISIT LOW MDM: CPT

## 2022-07-16 PROCEDURE — 93005 ELECTROCARDIOGRAM TRACING: CPT | Performed by: EMERGENCY MEDICINE

## 2022-07-16 RX ORDER — ONDANSETRON 2 MG/ML
4 INJECTION INTRAMUSCULAR; INTRAVENOUS ONCE
Status: COMPLETED | OUTPATIENT
Start: 2022-07-16 | End: 2022-07-16

## 2022-07-16 RX ORDER — ONDANSETRON 4 MG/1
4 TABLET, ORALLY DISINTEGRATING ORAL EVERY 6 HOURS PRN
Qty: 12 TABLET | Refills: 0 | Status: SHIPPED | OUTPATIENT
Start: 2022-07-16 | End: 2022-10-13

## 2022-07-16 RX ORDER — SODIUM CHLORIDE 0.9 % (FLUSH) 0.9 %
10 SYRINGE (ML) INJECTION AS NEEDED
Status: DISCONTINUED | OUTPATIENT
Start: 2022-07-16 | End: 2022-07-16 | Stop reason: HOSPADM

## 2022-07-16 RX ORDER — DICYCLOMINE HCL 20 MG
20 TABLET ORAL EVERY 6 HOURS PRN
Qty: 20 TABLET | Refills: 0 | Status: SHIPPED | OUTPATIENT
Start: 2022-07-16 | End: 2022-10-13

## 2022-07-16 RX ADMIN — ONDANSETRON 4 MG: 2 INJECTION INTRAMUSCULAR; INTRAVENOUS at 11:43

## 2022-07-16 RX ADMIN — IOPAMIDOL 85 ML: 612 INJECTION, SOLUTION INTRAVENOUS at 12:45

## 2022-07-16 RX ADMIN — SODIUM CHLORIDE 1000 ML: 9 INJECTION, SOLUTION INTRAVENOUS at 11:43

## 2022-07-16 NOTE — ED PROVIDER NOTES
Subjective   This is a 27-year-old male that presents to the emergency department chief complaint abdominal pain, nausea, vomiting.  Patient does have history of type 1 diabetes.  Patient states he was seen at this facility several days ago and diagnosed with gastritis.  Patient states he has continued to have an increased abdominal pain, nonbilious nonbloody emesis.  Denies any other associated complaints at this time.      History provided by:  Patient   used: No    Abdominal Pain  Pain location:  Generalized  Pain quality: aching and cramping    Pain radiates to:  Does not radiate  Pain severity:  Moderate  Onset quality:  Gradual  Duration:  2 days  Timing:  Intermittent  Progression:  Worsening  Chronicity:  New  Context: not awakening from sleep, not diet changes, not eating, not laxative use, not previous surgeries, not recent illness, not recent sexual activity, not recent travel, not retching, not sick contacts and not suspicious food intake    Relieved by:  Nothing  Worsened by:  Nothing  Ineffective treatments:  None tried  Associated symptoms: chills, fever and nausea    Associated symptoms: no chest pain, no cough, no hematuria, no shortness of breath, no sore throat, no vaginal bleeding, no vaginal discharge and no vomiting    Risk factors: no alcohol abuse, no aspirin use, has not had multiple surgeries, no NSAID use, not pregnant and no recent hospitalization        Review of Systems   Constitutional: Positive for chills and fever.   HENT: Negative for sore throat.    Eyes: Negative.  Negative for pain, redness, itching and visual disturbance.   Respiratory: Negative.  Negative for cough, choking, chest tightness, shortness of breath and stridor.    Cardiovascular: Negative.  Negative for chest pain and leg swelling.   Gastrointestinal: Positive for abdominal distention, abdominal pain and nausea. Negative for vomiting.   Endocrine: Negative.  Negative for cold intolerance, heat  intolerance and polydipsia.   Genitourinary: Negative.  Negative for enuresis, flank pain, frequency, genital sores, hematuria, penile discharge, penile pain, penile swelling, scrotal swelling, vaginal bleeding and vaginal discharge.   Musculoskeletal: Negative.  Negative for back pain, gait problem, joint swelling, myalgias and neck pain.   Skin: Negative.  Negative for color change, pallor and rash.   Neurological: Negative.  Negative for seizures, syncope, speech difficulty, light-headedness, numbness and headaches.   Hematological: Negative.  Negative for adenopathy. Does not bruise/bleed easily.   Psychiatric/Behavioral: Negative.  Negative for agitation, behavioral problems, confusion, decreased concentration, dysphoric mood and hallucinations. The patient is not hyperactive.    All other systems reviewed and are negative.      Past Medical History:   Diagnosis Date   • Diabetes mellitus (HCC)    • Tobacco abuse        No Known Allergies    Past Surgical History:   Procedure Laterality Date   • ENDOSCOPY N/A 6/1/2022    Procedure: ESOPHAGOGASTRODUODENOSCOPY WITH ANESTHESIA;  Surgeon: Keon Quezada MD;  Location: [facility];  Service: Gastroenterology;  Laterality: N/A;       Family History   Problem Relation Age of Onset   • Heart disease Mother    • Diabetes Mother    • Kidney disease Mother    • Heart disease Father    • Diabetes Father    • Heart disease Maternal Grandmother    • Diabetes Maternal Grandmother    • Heart disease Maternal Grandfather    • Diabetes Maternal Grandfather    • Heart disease Paternal Grandmother    • Diabetes Paternal Grandmother    • Heart disease Paternal Grandfather    • Diabetes Paternal Grandfather        Social History     Socioeconomic History   • Marital status: Single   Tobacco Use   • Smoking status: Current Every Day Smoker     Packs/day: 1.50     Years: 15.00     Pack years: 22.50   • Smokeless tobacco: Never Used   Vaping Use   • Vaping Use: Former   Substance  and Sexual Activity   • Alcohol use: Never   • Drug use: Yes     Frequency: 7.0 times per week     Types: Marijuana   • Sexual activity: Defer           Objective   Physical Exam  Vitals and nursing note reviewed.   Constitutional:       General: He is not in acute distress.     Appearance: Normal appearance. He is normal weight. He is not ill-appearing, toxic-appearing or diaphoretic.   HENT:      Head: Normocephalic and atraumatic.      Right Ear: Tympanic membrane, ear canal and external ear normal. There is no impacted cerumen.      Left Ear: Tympanic membrane, ear canal and external ear normal. There is no impacted cerumen.      Nose: Nose normal. No congestion or rhinorrhea.      Mouth/Throat:      Mouth: Mucous membranes are moist.      Pharynx: Oropharynx is clear. No oropharyngeal exudate or posterior oropharyngeal erythema.   Eyes:      General: No scleral icterus.        Right eye: No discharge.         Left eye: No discharge.      Extraocular Movements: Extraocular movements intact.      Conjunctiva/sclera: Conjunctivae normal.      Pupils: Pupils are equal, round, and reactive to light.   Neck:      Vascular: No carotid bruit.   Cardiovascular:      Rate and Rhythm: Normal rate and regular rhythm.      Pulses: Normal pulses.      Heart sounds: Normal heart sounds. No murmur heard.    No friction rub. No gallop.   Pulmonary:      Effort: Pulmonary effort is normal. No respiratory distress.      Breath sounds: Normal breath sounds. No stridor. No wheezing, rhonchi or rales.   Chest:      Chest wall: No tenderness.   Abdominal:      General: Abdomen is flat. Bowel sounds are normal. There is no distension.      Palpations: There is no mass.      Tenderness: There is abdominal tenderness. There is no right CVA tenderness, left CVA tenderness, guarding or rebound.      Hernia: No hernia is present.   Musculoskeletal:         General: No swelling, tenderness, deformity or signs of injury. Normal range of  motion.      Cervical back: Normal range of motion and neck supple. No rigidity or tenderness.      Right lower leg: No edema.      Left lower leg: No edema.   Lymphadenopathy:      Cervical: No cervical adenopathy.   Skin:     General: Skin is warm and dry.      Capillary Refill: Capillary refill takes less than 2 seconds.      Coloration: Skin is not jaundiced or pale.      Findings: No bruising, erythema, lesion or rash.   Neurological:      General: No focal deficit present.      Mental Status: He is alert and oriented to person, place, and time. Mental status is at baseline.      Cranial Nerves: No cranial nerve deficit.      Sensory: No sensory deficit.      Motor: No weakness.      Coordination: Coordination normal.      Gait: Gait normal.      Deep Tendon Reflexes: Reflexes normal.   Psychiatric:         Mood and Affect: Mood normal.         Behavior: Behavior normal.         Thought Content: Thought content normal.         Judgment: Judgment normal.         Procedures           ED Course  ED Course as of 07/17/22 0916   Sat Jul 16, 2022   1202 ECG 12 Lead [BC]   1216 ECG 12 Lead  Normal sinus rhythm.  Rate 68.  Normal axis.  Normal QT interval.  Voltage criteria for LVH.  No acute ischemic changes.  Abnormal EKG.  Interpreted by me.  Electronically signed by Lopez Santana MD, 07/16/22, 12:17 PM EDT.   [BC]   1358 CT Abdomen Pelvis With Contrast [BH]   1358   IMPRESSION:  1. No acute abnormality within the abdomen or pelvis.  2. Chronic right renal atrophy.  3. Normal appendix.    []   1404 This is a 27-year-old male that presents to the emergency department chief complaint abdominal pain, nausea, vomiting.  Patient had work-up negative for any acute findings.  Patient will be discharged home. []      ED Course User Index  [BC] Lopez Santana MD  [] Adams Glasgow PA-C                                           [facility]    Final diagnoses:   Nausea and vomiting in adult   Abdominal pain, unspecified  abdominal location       ED Disposition  ED Disposition     ED Disposition   Discharge    Condition   Stable    Comment   --             Elsa De La Torre MD  [address] [place] [address] [phone]    Call in 1 day           Medication List      New Prescriptions    dicyclomine 20 MG tablet  Commonly known as: BENTYL  Take 1 tablet by mouth Every 6 (Six) Hours As Needed (abdominal pain).     ondansetron ODT 4 MG disintegrating tablet  Commonly known as: ZOFRAN-ODT  Place 1 tablet on the tongue Every 6 (Six) Hours As Needed for Nausea or Vomiting.           Where to Get Your Medications      These medications were sent to [id number] - [place], [place] - [address] [facility] [address] [facility] [phone]  - [phone]   [address] [place], [place] [address] [address]    Phone: [phone]   · dicyclomine 20 MG tablet  · ondansetron ODT 4 MG disintegrating tablet          Adams Glasgow PA-C  07/17/22 0916

## 2022-07-17 ENCOUNTER — HOSPITAL ENCOUNTER (INPATIENT)
Dept: HOSPITAL 79 - ER1 | Age: 27
LOS: 3 days | Discharge: HOME | DRG: 74 | End: 2022-07-20
Attending: STUDENT IN AN ORGANIZED HEALTH CARE EDUCATION/TRAINING PROGRAM | Admitting: STUDENT IN AN ORGANIZED HEALTH CARE EDUCATION/TRAINING PROGRAM
Payer: COMMERCIAL

## 2022-07-17 VITALS — HEIGHT: 66 IN | BODY MASS INDEX: 30.05 KG/M2 | WEIGHT: 187 LBS

## 2022-07-17 DIAGNOSIS — Z83.3: ICD-10-CM

## 2022-07-17 DIAGNOSIS — E87.6: ICD-10-CM

## 2022-07-17 DIAGNOSIS — E10.43: Primary | ICD-10-CM

## 2022-07-17 DIAGNOSIS — E66.9: ICD-10-CM

## 2022-07-17 DIAGNOSIS — E86.0: ICD-10-CM

## 2022-07-17 DIAGNOSIS — F12.10: ICD-10-CM

## 2022-07-17 DIAGNOSIS — E87.1: ICD-10-CM

## 2022-07-17 DIAGNOSIS — Z20.822: ICD-10-CM

## 2022-07-17 DIAGNOSIS — F17.210: ICD-10-CM

## 2022-07-17 DIAGNOSIS — Z82.49: ICD-10-CM

## 2022-07-17 LAB
BUN/CREATININE RATIO: 20 (ref 0–10)
HGB BLD-MCNC: 15.7 GM/DL (ref 14–17.5)
QT INTERVAL: 408 MS
QTC INTERVAL: 433 MS
RED BLOOD COUNT: 5.35 M/UL (ref 4.2–5.5)
WHITE BLOOD COUNT: 15.3 K/UL (ref 4.5–11)

## 2022-07-17 PROCEDURE — A9541 TC99M SULFUR COLLOID: HCPCS

## 2022-07-17 PROCEDURE — C9113 INJ PANTOPRAZOLE SODIUM, VIA: HCPCS

## 2022-07-18 ENCOUNTER — APPOINTMENT (OUTPATIENT)
Dept: NUCLEAR MEDICINE | Facility: HOSPITAL | Age: 27
End: 2022-07-18

## 2022-07-18 LAB
BUN/CREATININE RATIO: 18 (ref 0–10)
HGB BLD-MCNC: 15.6 GM/DL (ref 14–17.5)
RED BLOOD COUNT: 5.31 M/UL (ref 4.2–5.5)
WHITE BLOOD COUNT: 10.7 K/UL (ref 4.5–11)

## 2022-07-19 LAB
BUN/CREATININE RATIO: 14 (ref 0–10)
HGB BLD-MCNC: 13.1 GM/DL (ref 14–17.5)
RED BLOOD COUNT: 4.54 M/UL (ref 4.2–5.5)
WHITE BLOOD COUNT: 8.9 K/UL (ref 4.5–11)

## 2022-07-20 LAB — BUN/CREATININE RATIO: 11 (ref 0–10)

## 2022-10-13 ENCOUNTER — APPOINTMENT (OUTPATIENT)
Dept: CT IMAGING | Facility: HOSPITAL | Age: 27
End: 2022-10-13

## 2022-10-13 ENCOUNTER — HOSPITAL ENCOUNTER (EMERGENCY)
Facility: HOSPITAL | Age: 27
Discharge: HOME | End: 2022-10-13
Attending: STUDENT IN AN ORGANIZED HEALTH CARE EDUCATION/TRAINING PROGRAM | Admitting: STUDENT IN AN ORGANIZED HEALTH CARE EDUCATION/TRAINING PROGRAM

## 2022-10-13 ENCOUNTER — APPOINTMENT (OUTPATIENT)
Dept: GENERAL RADIOLOGY | Facility: HOSPITAL | Age: 27
End: 2022-10-13

## 2022-10-13 VITALS
BODY MASS INDEX: 27.78 KG/M2 | OXYGEN SATURATION: 100 % | TEMPERATURE: 98.9 F | HEART RATE: 80 BPM | RESPIRATION RATE: 18 BRPM | HEIGHT: 67 IN | WEIGHT: 177 LBS | SYSTOLIC BLOOD PRESSURE: 150 MMHG | DIASTOLIC BLOOD PRESSURE: 94 MMHG

## 2022-10-13 DIAGNOSIS — R11.2 CANNABINOID HYPEREMESIS SYNDROME: Primary | ICD-10-CM

## 2022-10-13 DIAGNOSIS — F12.90 CANNABINOID HYPEREMESIS SYNDROME: Primary | ICD-10-CM

## 2022-10-13 LAB
A-A DO2: 32.5 MMHG (ref 0–300)
ACETONE BLD QL: NEGATIVE
ALBUMIN SERPL-MCNC: 4.38 G/DL (ref 3.5–5.2)
ALBUMIN/GLOB SERPL: 1.5 G/DL
ALP SERPL-CCNC: 61 U/L (ref 39–117)
ALT SERPL W P-5'-P-CCNC: 8 U/L (ref 1–41)
AMPHET+METHAMPHET UR QL: NEGATIVE
AMPHETAMINES UR QL: NEGATIVE
AMYLASE SERPL-CCNC: 79 U/L (ref 28–100)
ANION GAP SERPL CALCULATED.3IONS-SCNC: 14.6 MMOL/L (ref 5–15)
ARTERIAL PATENCY WRIST A: POSITIVE
AST SERPL-CCNC: 13 U/L (ref 1–40)
ATMOSPHERIC PRESS: 722 MMHG
BACTERIA UR QL AUTO: ABNORMAL /HPF
BARBITURATES UR QL SCN: NEGATIVE
BASE EXCESS BLDA CALC-SCNC: 7.9 MMOL/L (ref 0–2)
BASOPHILS # BLD AUTO: 0.04 10*3/MM3 (ref 0–0.2)
BASOPHILS NFR BLD AUTO: 0.2 % (ref 0–1.5)
BDY SITE: ABNORMAL
BENZODIAZ UR QL SCN: NEGATIVE
BILIRUB SERPL-MCNC: 0.7 MG/DL (ref 0–1.2)
BILIRUB UR QL STRIP: NEGATIVE
BODY TEMPERATURE: 0 C
BUN SERPL-MCNC: 26 MG/DL (ref 6–20)
BUN/CREAT SERPL: 22.4 (ref 7–25)
BUPRENORPHINE SERPL-MCNC: NEGATIVE NG/ML
CALCIUM SPEC-SCNC: 9.5 MG/DL (ref 8.6–10.5)
CANNABINOIDS SERPL QL: POSITIVE
CHLORIDE SERPL-SCNC: 91 MMOL/L (ref 98–107)
CLARITY UR: CLEAR
CO2 BLDA-SCNC: 33.4 MMOL/L (ref 22–33)
CO2 SERPL-SCNC: 29.4 MMOL/L (ref 22–29)
COCAINE UR QL: NEGATIVE
COHGB MFR BLD: 1.6 % (ref 0–5)
COLOR UR: YELLOW
CREAT SERPL-MCNC: 1.16 MG/DL (ref 0.76–1.27)
D-LACTATE SERPL-SCNC: 1.7 MMOL/L (ref 0.5–2)
DEPRECATED RDW RBC AUTO: 38.8 FL (ref 37–54)
EGFRCR SERPLBLD CKD-EPI 2021: 87.4 ML/MIN/1.73
EOSINOPHIL # BLD AUTO: 0.01 10*3/MM3 (ref 0–0.4)
EOSINOPHIL NFR BLD AUTO: 0.1 % (ref 0.3–6.2)
ERYTHROCYTE [DISTWIDTH] IN BLOOD BY AUTOMATED COUNT: 12.6 % (ref 12.3–15.4)
FLUAV RNA RESP QL NAA+PROBE: NOT DETECTED
FLUBV RNA RESP QL NAA+PROBE: NOT DETECTED
GLOBULIN UR ELPH-MCNC: 2.9 GM/DL
GLUCOSE SERPL-MCNC: 242 MG/DL (ref 65–99)
GLUCOSE UR STRIP-MCNC: ABNORMAL MG/DL
HCO3 BLDA-SCNC: 32.1 MMOL/L (ref 20–26)
HCT VFR BLD AUTO: 42.8 % (ref 37.5–51)
HCT VFR BLD CALC: 42.5 % (ref 38–51)
HGB BLD-MCNC: 14.8 G/DL (ref 13–17.7)
HGB BLDA-MCNC: 13.9 G/DL (ref 14–18)
HGB UR QL STRIP.AUTO: ABNORMAL
HOLD SPECIMEN: NORMAL
HOLD SPECIMEN: NORMAL
HYALINE CASTS UR QL AUTO: ABNORMAL /LPF
IMM GRANULOCYTES # BLD AUTO: 0.06 10*3/MM3 (ref 0–0.05)
IMM GRANULOCYTES NFR BLD AUTO: 0.3 % (ref 0–0.5)
INHALED O2 CONCENTRATION: 21 %
KETONES UR QL STRIP: ABNORMAL
LEUKOCYTE ESTERASE UR QL STRIP.AUTO: NEGATIVE
LIPASE SERPL-CCNC: 49 U/L (ref 13–60)
LYMPHOCYTES # BLD AUTO: 1.8 10*3/MM3 (ref 0.7–3.1)
LYMPHOCYTES NFR BLD AUTO: 9.3 % (ref 19.6–45.3)
Lab: ABNORMAL
MCH RBC QN AUTO: 29.1 PG (ref 26.6–33)
MCHC RBC AUTO-ENTMCNC: 34.6 G/DL (ref 31.5–35.7)
MCV RBC AUTO: 84.1 FL (ref 79–97)
METHADONE UR QL SCN: NEGATIVE
METHGB BLD QL: 0.2 % (ref 0–3)
MODALITY: ABNORMAL
MONOCYTES # BLD AUTO: 1.26 10*3/MM3 (ref 0.1–0.9)
MONOCYTES NFR BLD AUTO: 6.5 % (ref 5–12)
NEUTROPHILS NFR BLD AUTO: 16.14 10*3/MM3 (ref 1.7–7)
NEUTROPHILS NFR BLD AUTO: 83.6 % (ref 42.7–76)
NITRITE UR QL STRIP: NEGATIVE
NOTE: ABNORMAL
NRBC BLD AUTO-RTO: 0 /100 WBC (ref 0–0.2)
OPIATES UR QL: NEGATIVE
OXYCODONE UR QL SCN: NEGATIVE
OXYHGB MFR BLDV: 91.2 % (ref 94–99)
PCO2 BLDA: 42.3 MM HG (ref 35–45)
PCO2 TEMP ADJ BLD: ABNORMAL MM[HG]
PCP UR QL SCN: NEGATIVE
PH BLDA: 7.49 PH UNITS (ref 7.35–7.45)
PH UR STRIP.AUTO: 8.5 [PH] (ref 5–8)
PH, TEMP CORRECTED: ABNORMAL
PLATELET # BLD AUTO: 417 10*3/MM3 (ref 140–450)
PMV BLD AUTO: 9.5 FL (ref 6–12)
PO2 BLDA: 61.4 MM HG (ref 83–108)
PO2 TEMP ADJ BLD: ABNORMAL MM[HG]
POTASSIUM SERPL-SCNC: 4 MMOL/L (ref 3.5–5.2)
PROPOXYPH UR QL: NEGATIVE
PROT SERPL-MCNC: 7.3 G/DL (ref 6–8.5)
PROT UR QL STRIP: ABNORMAL
RBC # BLD AUTO: 5.09 10*6/MM3 (ref 4.14–5.8)
RBC # UR STRIP: ABNORMAL /HPF
REF LAB TEST METHOD: ABNORMAL
S PYO AG THROAT QL: NEGATIVE
SAO2 % BLDCOA: 92.9 % (ref 94–99)
SARS-COV-2 RNA RESP QL NAA+PROBE: NOT DETECTED
SODIUM SERPL-SCNC: 135 MMOL/L (ref 136–145)
SP GR UR STRIP: >=1.03 (ref 1–1.03)
SQUAMOUS #/AREA URNS HPF: ABNORMAL /HPF
TRICYCLICS UR QL SCN: NEGATIVE
TROPONIN T SERPL-MCNC: <0.01 NG/ML (ref 0–0.03)
TROPONIN T SERPL-MCNC: <0.01 NG/ML (ref 0–0.03)
UROBILINOGEN UR QL STRIP: ABNORMAL
VENTILATOR MODE: ABNORMAL
WBC # UR STRIP: ABNORMAL /HPF
WBC NRBC COR # BLD: 19.31 10*3/MM3 (ref 3.4–10.8)
WHOLE BLOOD HOLD COAG: NORMAL
WHOLE BLOOD HOLD SPECIMEN: NORMAL

## 2022-10-13 PROCEDURE — 74176 CT ABD & PELVIS W/O CONTRAST: CPT | Performed by: RADIOLOGY

## 2022-10-13 PROCEDURE — 25010000002 CHLORPROMAZINE PER 50 MG: Performed by: NURSE PRACTITIONER

## 2022-10-13 PROCEDURE — 96372 THER/PROPH/DIAG INJ SC/IM: CPT

## 2022-10-13 PROCEDURE — 83050 HGB METHEMOGLOBIN QUAN: CPT

## 2022-10-13 PROCEDURE — 93005 ELECTROCARDIOGRAM TRACING: CPT | Performed by: NURSE PRACTITIONER

## 2022-10-13 PROCEDURE — 80053 COMPREHEN METABOLIC PANEL: CPT | Performed by: NURSE PRACTITIONER

## 2022-10-13 PROCEDURE — 71045 X-RAY EXAM CHEST 1 VIEW: CPT

## 2022-10-13 PROCEDURE — 71045 X-RAY EXAM CHEST 1 VIEW: CPT | Performed by: RADIOLOGY

## 2022-10-13 PROCEDURE — 81001 URINALYSIS AUTO W/SCOPE: CPT | Performed by: NURSE PRACTITIONER

## 2022-10-13 PROCEDURE — 99284 EMERGENCY DEPT VISIT MOD MDM: CPT

## 2022-10-13 PROCEDURE — 84484 ASSAY OF TROPONIN QUANT: CPT | Performed by: NURSE PRACTITIONER

## 2022-10-13 PROCEDURE — 36415 COLL VENOUS BLD VENIPUNCTURE: CPT

## 2022-10-13 PROCEDURE — 85025 COMPLETE CBC W/AUTO DIFF WBC: CPT | Performed by: NURSE PRACTITIONER

## 2022-10-13 PROCEDURE — 87081 CULTURE SCREEN ONLY: CPT | Performed by: NURSE PRACTITIONER

## 2022-10-13 PROCEDURE — 83605 ASSAY OF LACTIC ACID: CPT | Performed by: NURSE PRACTITIONER

## 2022-10-13 PROCEDURE — 82375 ASSAY CARBOXYHB QUANT: CPT

## 2022-10-13 PROCEDURE — 74176 CT ABD & PELVIS W/O CONTRAST: CPT

## 2022-10-13 PROCEDURE — 96375 TX/PRO/DX INJ NEW DRUG ADDON: CPT

## 2022-10-13 PROCEDURE — 71250 CT THORAX DX C-: CPT | Performed by: RADIOLOGY

## 2022-10-13 PROCEDURE — 82150 ASSAY OF AMYLASE: CPT | Performed by: NURSE PRACTITIONER

## 2022-10-13 PROCEDURE — 82805 BLOOD GASES W/O2 SATURATION: CPT

## 2022-10-13 PROCEDURE — 93010 ELECTROCARDIOGRAM REPORT: CPT | Performed by: INTERNAL MEDICINE

## 2022-10-13 PROCEDURE — 96376 TX/PRO/DX INJ SAME DRUG ADON: CPT

## 2022-10-13 PROCEDURE — 80306 DRUG TEST PRSMV INSTRMNT: CPT | Performed by: NURSE PRACTITIONER

## 2022-10-13 PROCEDURE — 71250 CT THORAX DX C-: CPT

## 2022-10-13 PROCEDURE — 25010000002 ONDANSETRON PER 1 MG: Performed by: NURSE PRACTITIONER

## 2022-10-13 PROCEDURE — 96374 THER/PROPH/DIAG INJ IV PUSH: CPT

## 2022-10-13 PROCEDURE — 87040 BLOOD CULTURE FOR BACTERIA: CPT | Performed by: NURSE PRACTITIONER

## 2022-10-13 PROCEDURE — 82009 KETONE BODYS QUAL: CPT | Performed by: NURSE PRACTITIONER

## 2022-10-13 PROCEDURE — 87880 STREP A ASSAY W/OPTIC: CPT | Performed by: NURSE PRACTITIONER

## 2022-10-13 PROCEDURE — 96361 HYDRATE IV INFUSION ADD-ON: CPT

## 2022-10-13 PROCEDURE — 87636 SARSCOV2 & INF A&B AMP PRB: CPT | Performed by: NURSE PRACTITIONER

## 2022-10-13 PROCEDURE — 83690 ASSAY OF LIPASE: CPT | Performed by: NURSE PRACTITIONER

## 2022-10-13 PROCEDURE — 36600 WITHDRAWAL OF ARTERIAL BLOOD: CPT

## 2022-10-13 PROCEDURE — 25010000002 HYDROMORPHONE PER 4 MG: Performed by: NURSE PRACTITIONER

## 2022-10-13 RX ORDER — PROMETHAZINE HYDROCHLORIDE 25 MG/1
25 TABLET ORAL EVERY 6 HOURS PRN
Qty: 15 TABLET | Refills: 0 | Status: SHIPPED | OUTPATIENT
Start: 2022-10-13

## 2022-10-13 RX ORDER — CHLORPROMAZINE HYDROCHLORIDE 25 MG/ML
50 INJECTION INTRAMUSCULAR ONCE
Status: COMPLETED | OUTPATIENT
Start: 2022-10-13 | End: 2022-10-13

## 2022-10-13 RX ORDER — SODIUM CHLORIDE 0.9 % (FLUSH) 0.9 %
10 SYRINGE (ML) INJECTION AS NEEDED
Status: DISCONTINUED | OUTPATIENT
Start: 2022-10-13 | End: 2022-10-13 | Stop reason: HOSPADM

## 2022-10-13 RX ORDER — CHLORPROMAZINE HYDROCHLORIDE 25 MG/ML
25 INJECTION INTRAMUSCULAR ONCE
Status: COMPLETED | OUTPATIENT
Start: 2022-10-13 | End: 2022-10-13

## 2022-10-13 RX ORDER — HYDROMORPHONE HYDROCHLORIDE 1 MG/ML
0.5 INJECTION, SOLUTION INTRAMUSCULAR; INTRAVENOUS; SUBCUTANEOUS ONCE
Status: COMPLETED | OUTPATIENT
Start: 2022-10-13 | End: 2022-10-13

## 2022-10-13 RX ORDER — ONDANSETRON 2 MG/ML
4 INJECTION INTRAMUSCULAR; INTRAVENOUS ONCE
Status: COMPLETED | OUTPATIENT
Start: 2022-10-13 | End: 2022-10-13

## 2022-10-13 RX ORDER — PROCHLORPERAZINE EDISYLATE 5 MG/ML
5 INJECTION INTRAMUSCULAR; INTRAVENOUS ONCE
Status: DISCONTINUED | OUTPATIENT
Start: 2022-10-13 | End: 2022-10-13

## 2022-10-13 RX ADMIN — SODIUM CHLORIDE 1000 ML: 9 INJECTION, SOLUTION INTRAVENOUS at 12:19

## 2022-10-13 RX ADMIN — ONDANSETRON 4 MG: 2 INJECTION INTRAMUSCULAR; INTRAVENOUS at 12:19

## 2022-10-13 RX ADMIN — CHLORPROMAZINE HYDROCHLORIDE 50 MG: 25 INJECTION INTRAMUSCULAR at 18:10

## 2022-10-13 RX ADMIN — CHLORPROMAZINE HYDROCHLORIDE 25 MG: 25 INJECTION INTRAMUSCULAR at 13:26

## 2022-10-13 RX ADMIN — HYDROMORPHONE HYDROCHLORIDE 0.5 MG: 1 INJECTION, SOLUTION INTRAMUSCULAR; INTRAVENOUS; SUBCUTANEOUS at 13:46

## 2022-10-13 RX ADMIN — SODIUM CHLORIDE 1000 ML: 9 INJECTION, SOLUTION INTRAVENOUS at 13:16

## 2022-10-13 RX ADMIN — ONDANSETRON 4 MG: 2 INJECTION INTRAMUSCULAR; INTRAVENOUS at 13:46

## 2022-10-13 NOTE — ED PROVIDER NOTES
Subjective     Weakness - Generalized  Severity:  Moderate  Onset quality:  Sudden  Duration:  2 days  Timing:  Constant  Progression:  Worsening  Chronicity:  New  Context: not allergies and not stress    Relieved by:  Nothing  Worsened by:  Nothing  Ineffective treatments:  None tried  Associated symptoms: no abdominal pain, no aphasia, no arthralgias, no ataxia, no diarrhea, no drooling, no numbness in extremities, no falls, no foul-smelling urine, no hematochezia, no loss of consciousness, no myalgias, no near-syncope, no sensory-motor deficit, no syncope and no vision change    Risk factors: no coronary artery disease, no heart disease, no neurologic disease and no new medications        Review of Systems   Constitutional: Negative.    HENT: Negative.  Negative for drooling.    Eyes: Negative.    Respiratory: Negative.    Cardiovascular: Negative.  Negative for syncope and near-syncope.   Gastrointestinal: Negative.  Negative for abdominal pain, diarrhea and hematochezia.   Endocrine: Negative.    Genitourinary: Negative.    Musculoskeletal: Negative.  Negative for arthralgias, falls and myalgias.   Skin: Negative.    Allergic/Immunologic: Negative.    Neurological: Negative for loss of consciousness.   Hematological: Negative.    Psychiatric/Behavioral: Negative.        Past Medical History:   Diagnosis Date   • Diabetes mellitus (HCC)    • Tobacco abuse        No Known Allergies    Past Surgical History:   Procedure Laterality Date   • ENDOSCOPY N/A 6/1/2022    Procedure: ESOPHAGOGASTRODUODENOSCOPY WITH ANESTHESIA;  Surgeon: Keon Quezada MD;  Location: [facility];  Service: Gastroenterology;  Laterality: N/A;       Family History   Problem Relation Age of Onset   • Heart disease Mother    • Diabetes Mother    • Kidney disease Mother    • Heart disease Father    • Diabetes Father    • Heart disease Maternal Grandmother    • Diabetes Maternal Grandmother    • Heart disease Maternal Grandfather    •  Diabetes Maternal Grandfather    • Heart disease Paternal Grandmother    • Diabetes Paternal Grandmother    • Heart disease Paternal Grandfather    • Diabetes Paternal Grandfather        Social History     Socioeconomic History   • Marital status: Single   Tobacco Use   • Smoking status: Every Day     Packs/day: 1.50     Years: 15.00     Pack years: 22.50     Types: Cigarettes   • Smokeless tobacco: Never   Vaping Use   • Vaping Use: Former   Substance and Sexual Activity   • Alcohol use: Never   • Drug use: Yes     Frequency: 7.0 times per week     Types: Marijuana   • Sexual activity: Defer           Objective   Physical Exam  Vitals and nursing note reviewed.   Constitutional:       Appearance: He is well-developed.   HENT:      Head: Normocephalic.      Right Ear: External ear normal.      Left Ear: External ear normal.   Eyes:      Conjunctiva/sclera: Conjunctivae normal.      Pupils: Pupils are equal, round, and reactive to light.   Cardiovascular:      Rate and Rhythm: Normal rate and regular rhythm.      Heart sounds: Normal heart sounds.   Pulmonary:      Effort: Pulmonary effort is normal.      Breath sounds: Normal breath sounds.   Abdominal:      General: Bowel sounds are normal.      Palpations: Abdomen is soft.   Musculoskeletal:         General: Normal range of motion.      Cervical back: Normal range of motion and neck supple.   Skin:     General: Skin is warm and dry.      Capillary Refill: Capillary refill takes less than 2 seconds.   Neurological:      Mental Status: He is alert and oriented to person, place, and time.   Psychiatric:         Behavior: Behavior normal.         Thought Content: Thought content normal.         Procedures           ED Course                                           MDM    Final diagnoses:   Cannabinoid hyperemesis syndrome       ED Disposition  ED Disposition     ED Disposition   Discharge    Condition   Stable    Comment   --             Hensley, Edith Collett,  APRN  [address] [facility] [address] [place] [address]  [phone]    Schedule an appointment as soon as possible for a visit   For further evaluation         Medication List      New Prescriptions    promethazine 25 MG tablet  Commonly known as: PHENERGAN  Take 1 tablet by mouth Every 6 (Six) Hours As Needed for Nausea or Vomiting.           Where to Get Your Medications      You can get these medications from any pharmacy    Bring a paper prescription for each of these medications  · promethazine 25 MG tablet          Nas Jung, APRN  10/13/22 0309

## 2022-10-14 LAB
QT INTERVAL: 434 MS
QTC INTERVAL: 444 MS

## 2022-10-15 LAB — BACTERIA SPEC AEROBE CULT: NORMAL

## 2022-10-18 LAB
BACTERIA SPEC AEROBE CULT: NORMAL
BACTERIA SPEC AEROBE CULT: NORMAL

## 2022-11-06 ENCOUNTER — APPOINTMENT (OUTPATIENT)
Dept: CT IMAGING | Facility: HOSPITAL | Age: 27
End: 2022-11-06

## 2022-11-06 ENCOUNTER — HOSPITAL ENCOUNTER (EMERGENCY)
Facility: HOSPITAL | Age: 27
Discharge: HOME | End: 2022-11-06
Attending: EMERGENCY MEDICINE | Admitting: EMERGENCY MEDICINE

## 2022-11-06 ENCOUNTER — APPOINTMENT (OUTPATIENT)
Dept: GENERAL RADIOLOGY | Facility: HOSPITAL | Age: 27
End: 2022-11-06

## 2022-11-06 VITALS
BODY MASS INDEX: 27.78 KG/M2 | DIASTOLIC BLOOD PRESSURE: 70 MMHG | RESPIRATION RATE: 16 BRPM | HEART RATE: 94 BPM | SYSTOLIC BLOOD PRESSURE: 108 MMHG | OXYGEN SATURATION: 100 % | WEIGHT: 177 LBS | TEMPERATURE: 97.5 F | HEIGHT: 67 IN

## 2022-11-06 DIAGNOSIS — R55 SYNCOPE, UNSPECIFIED SYNCOPE TYPE: Primary | ICD-10-CM

## 2022-11-06 LAB
ALBUMIN SERPL-MCNC: 4.09 G/DL (ref 3.5–5.2)
ALBUMIN/GLOB SERPL: 1.4 G/DL
ALP SERPL-CCNC: 59 U/L (ref 39–117)
ALT SERPL W P-5'-P-CCNC: 10 U/L (ref 1–41)
AMPHET+METHAMPHET UR QL: NEGATIVE
AMPHETAMINES UR QL: NEGATIVE
ANION GAP SERPL CALCULATED.3IONS-SCNC: 11.6 MMOL/L (ref 5–15)
AST SERPL-CCNC: 11 U/L (ref 1–40)
BACTERIA UR QL AUTO: ABNORMAL /HPF
BARBITURATES UR QL SCN: NEGATIVE
BASOPHILS # BLD AUTO: 0.07 10*3/MM3 (ref 0–0.2)
BASOPHILS NFR BLD AUTO: 0.7 % (ref 0–1.5)
BENZODIAZ UR QL SCN: NEGATIVE
BILIRUB SERPL-MCNC: 0.4 MG/DL (ref 0–1.2)
BILIRUB UR QL STRIP: NEGATIVE
BUN SERPL-MCNC: 22 MG/DL (ref 6–20)
BUN/CREAT SERPL: 19.8 (ref 7–25)
BUPRENORPHINE SERPL-MCNC: NEGATIVE NG/ML
CALCIUM SPEC-SCNC: 9.6 MG/DL (ref 8.6–10.5)
CANNABINOIDS SERPL QL: POSITIVE
CHLORIDE SERPL-SCNC: 102 MMOL/L (ref 98–107)
CLARITY UR: CLEAR
CO2 SERPL-SCNC: 24.4 MMOL/L (ref 22–29)
COCAINE UR QL: NEGATIVE
COLOR UR: YELLOW
CREAT SERPL-MCNC: 1.11 MG/DL (ref 0.76–1.27)
DEPRECATED RDW RBC AUTO: 40.3 FL (ref 37–54)
EGFRCR SERPLBLD CKD-EPI 2021: 92.2 ML/MIN/1.73
EOSINOPHIL # BLD AUTO: 0.47 10*3/MM3 (ref 0–0.4)
EOSINOPHIL NFR BLD AUTO: 4.7 % (ref 0.3–6.2)
ERYTHROCYTE [DISTWIDTH] IN BLOOD BY AUTOMATED COUNT: 12.7 % (ref 12.3–15.4)
GLOBULIN UR ELPH-MCNC: 3 GM/DL
GLUCOSE SERPL-MCNC: 116 MG/DL (ref 65–99)
GLUCOSE UR STRIP-MCNC: NEGATIVE MG/DL
HBA1C MFR BLD: 7.8 % (ref 4.8–5.6)
HCT VFR BLD AUTO: 40.9 % (ref 37.5–51)
HGB BLD-MCNC: 14.1 G/DL (ref 13–17.7)
HGB UR QL STRIP.AUTO: ABNORMAL
HYALINE CASTS UR QL AUTO: ABNORMAL /LPF
IMM GRANULOCYTES # BLD AUTO: 0.02 10*3/MM3 (ref 0–0.05)
IMM GRANULOCYTES NFR BLD AUTO: 0.2 % (ref 0–0.5)
KETONES UR QL STRIP: ABNORMAL
LEUKOCYTE ESTERASE UR QL STRIP.AUTO: NEGATIVE
LYMPHOCYTES # BLD AUTO: 2.85 10*3/MM3 (ref 0.7–3.1)
LYMPHOCYTES NFR BLD AUTO: 28.4 % (ref 19.6–45.3)
MCH RBC QN AUTO: 30.1 PG (ref 26.6–33)
MCHC RBC AUTO-ENTMCNC: 34.5 G/DL (ref 31.5–35.7)
MCV RBC AUTO: 87.2 FL (ref 79–97)
METHADONE UR QL SCN: NEGATIVE
MONOCYTES # BLD AUTO: 0.6 10*3/MM3 (ref 0.1–0.9)
MONOCYTES NFR BLD AUTO: 6 % (ref 5–12)
NEUTROPHILS NFR BLD AUTO: 6.01 10*3/MM3 (ref 1.7–7)
NEUTROPHILS NFR BLD AUTO: 60 % (ref 42.7–76)
NITRITE UR QL STRIP: NEGATIVE
NRBC BLD AUTO-RTO: 0 /100 WBC (ref 0–0.2)
OPIATES UR QL: NEGATIVE
OXYCODONE UR QL SCN: NEGATIVE
PCP UR QL SCN: NEGATIVE
PH UR STRIP.AUTO: 5.5 [PH] (ref 5–8)
PLATELET # BLD AUTO: 374 10*3/MM3 (ref 140–450)
PMV BLD AUTO: 9.1 FL (ref 6–12)
POTASSIUM SERPL-SCNC: 4.4 MMOL/L (ref 3.5–5.2)
PROPOXYPH UR QL: NEGATIVE
PROT SERPL-MCNC: 7.1 G/DL (ref 6–8.5)
PROT UR QL STRIP: ABNORMAL
RBC # BLD AUTO: 4.69 10*6/MM3 (ref 4.14–5.8)
RBC # UR STRIP: ABNORMAL /HPF
REF LAB TEST METHOD: ABNORMAL
SODIUM SERPL-SCNC: 138 MMOL/L (ref 136–145)
SP GR UR STRIP: 1.03 (ref 1–1.03)
SQUAMOUS #/AREA URNS HPF: ABNORMAL /HPF
TRICYCLICS UR QL SCN: NEGATIVE
UROBILINOGEN UR QL STRIP: ABNORMAL
WBC # UR STRIP: ABNORMAL /HPF
WBC NRBC COR # BLD: 10.02 10*3/MM3 (ref 3.4–10.8)

## 2022-11-06 PROCEDURE — 80306 DRUG TEST PRSMV INSTRMNT: CPT | Performed by: PHYSICIAN ASSISTANT

## 2022-11-06 PROCEDURE — 81001 URINALYSIS AUTO W/SCOPE: CPT | Performed by: PHYSICIAN ASSISTANT

## 2022-11-06 PROCEDURE — 80053 COMPREHEN METABOLIC PANEL: CPT | Performed by: PHYSICIAN ASSISTANT

## 2022-11-06 PROCEDURE — 71045 X-RAY EXAM CHEST 1 VIEW: CPT

## 2022-11-06 PROCEDURE — 70450 CT HEAD/BRAIN W/O DYE: CPT

## 2022-11-06 PROCEDURE — 93010 ELECTROCARDIOGRAM REPORT: CPT | Performed by: INTERNAL MEDICINE

## 2022-11-06 PROCEDURE — 99284 EMERGENCY DEPT VISIT MOD MDM: CPT

## 2022-11-06 PROCEDURE — 83036 HEMOGLOBIN GLYCOSYLATED A1C: CPT | Performed by: EMERGENCY MEDICINE

## 2022-11-06 PROCEDURE — 85025 COMPLETE CBC W/AUTO DIFF WBC: CPT | Performed by: PHYSICIAN ASSISTANT

## 2022-11-06 PROCEDURE — 93005 ELECTROCARDIOGRAM TRACING: CPT | Performed by: EMERGENCY MEDICINE

## 2022-11-06 PROCEDURE — 36415 COLL VENOUS BLD VENIPUNCTURE: CPT

## 2022-11-06 NOTE — ED PROVIDER NOTES
Subjective   History of Present Illness  Pt reports hx of Type 1 DM, states he has only had the diagnosis for 1 year, pt states lately he has been passing out a lot. Pt reports blood sugars between 160-180 when he feels lightheaded like he might pass out     History provided by:  Patient   used: No    Syncope  Episode history:  Single  Most recent episode:  Today  Timing:  Constant  Progression:  Worsening  Chronicity:  New  Witnessed: no    Relieved by:  None tried  Worsened by:  Nothing  Ineffective treatments:  None tried  Associated symptoms: weakness    Associated symptoms: no chest pain, no fever, no headaches, no nausea, no shortness of breath and no vomiting        Review of Systems   Constitutional: Negative for chills and fever.   HENT: Negative.  Negative for congestion, ear pain and sore throat.    Respiratory: Negative.  Negative for cough, shortness of breath and wheezing.    Cardiovascular: Positive for syncope. Negative for chest pain.   Gastrointestinal: Negative.  Negative for abdominal pain, diarrhea, nausea and vomiting.   Endocrine: Negative.    Genitourinary: Negative.  Negative for dysuria and flank pain.   Skin: Negative.  Negative for rash.   Neurological: Positive for syncope and weakness. Negative for headaches.   Psychiatric/Behavioral: Negative.  The patient is not nervous/anxious.    All other systems reviewed and are negative.      Past Medical History:   Diagnosis Date   • Diabetes mellitus (HCC)    • Tobacco abuse        No Known Allergies    Past Surgical History:   Procedure Laterality Date   • ENDOSCOPY N/A 6/1/2022    Procedure: ESOPHAGOGASTRODUODENOSCOPY WITH ANESTHESIA;  Surgeon: Keon Quezada MD;  Location: [facility];  Service: Gastroenterology;  Laterality: N/A;       Family History   Problem Relation Age of Onset   • Heart disease Mother    • Diabetes Mother    • Kidney disease Mother    • Heart disease Father    • Diabetes Father    • Heart  disease Maternal Grandmother    • Diabetes Maternal Grandmother    • Heart disease Maternal Grandfather    • Diabetes Maternal Grandfather    • Heart disease Paternal Grandmother    • Diabetes Paternal Grandmother    • Heart disease Paternal Grandfather    • Diabetes Paternal Grandfather        Social History     Socioeconomic History   • Marital status: Single   Tobacco Use   • Smoking status: Every Day     Packs/day: 1.50     Years: 15.00     Pack years: 22.50     Types: Cigarettes   • Smokeless tobacco: Never   Vaping Use   • Vaping Use: Former   Substance and Sexual Activity   • Alcohol use: Never   • Drug use: Yes     Frequency: 7.0 times per week     Types: Marijuana   • Sexual activity: Defer           Objective   Physical Exam  Vitals and nursing note reviewed.   Constitutional:       Appearance: He is well-developed.   HENT:      Head: Normocephalic.   Cardiovascular:      Rate and Rhythm: Normal rate and regular rhythm.   Pulmonary:      Effort: Pulmonary effort is normal.      Breath sounds: Normal breath sounds.   Abdominal:      General: Bowel sounds are normal.      Palpations: Abdomen is soft.      Tenderness: There is no abdominal tenderness.   Musculoskeletal:         General: Normal range of motion.      Cervical back: Neck supple.   Skin:     General: Skin is warm and dry.   Neurological:      Mental Status: He is alert and oriented to person, place, and time.   Psychiatric:         Behavior: Behavior normal.         Thought Content: Thought content normal.         Judgment: Judgment normal.         Procedures           ED Course  ED Course as of 11/12/22 1031   Sun Nov 06, 2022   1650 eCG 16:23 NSR, rate 79. Normal ECG. qT/qTc 378/433 [BOB]      ED Course User Index  [BOB] Vasu Jay MD                                           [facility]    Final diagnoses:   Syncope, unspecified syncope type       ED Disposition  ED Disposition     ED Disposition   Discharge    Condition   Stable    Comment   --              Hensley, Edith Collett, APRN  [address] [place] [address]  [phone]    In 1 day           Medication List      No changes were made to your prescriptions during this visit.          Melany Jay PA  11/12/22 1035

## 2022-11-09 LAB
QT INTERVAL: 378 MS
QTC INTERVAL: 433 MS

## 2022-11-21 ENCOUNTER — OFFICE VISIT (OUTPATIENT)
Dept: CARDIOLOGY | Facility: CLINIC | Age: 27
End: 2022-11-21

## 2022-11-21 VITALS
DIASTOLIC BLOOD PRESSURE: 76 MMHG | HEART RATE: 85 BPM | WEIGHT: 168.2 LBS | RESPIRATION RATE: 16 BRPM | SYSTOLIC BLOOD PRESSURE: 119 MMHG | BODY MASS INDEX: 26.4 KG/M2 | HEIGHT: 67 IN

## 2022-11-21 DIAGNOSIS — Z79.4 TYPE 2 DIABETES MELLITUS WITHOUT COMPLICATION, WITH LONG-TERM CURRENT USE OF INSULIN: ICD-10-CM

## 2022-11-21 DIAGNOSIS — E11.9 TYPE 2 DIABETES MELLITUS WITHOUT COMPLICATION, WITH LONG-TERM CURRENT USE OF INSULIN: ICD-10-CM

## 2022-11-21 DIAGNOSIS — Z82.49 FAMILY HISTORY OF PREMATURE CORONARY ARTERY DISEASE: ICD-10-CM

## 2022-11-21 DIAGNOSIS — R07.2 PRECORDIAL PAIN: ICD-10-CM

## 2022-11-21 DIAGNOSIS — R55 RECURRENT SYNCOPE: Primary | ICD-10-CM

## 2022-11-21 PROCEDURE — 93000 ELECTROCARDIOGRAM COMPLETE: CPT | Performed by: INTERNAL MEDICINE

## 2022-11-21 PROCEDURE — 99204 OFFICE O/P NEW MOD 45 MIN: CPT | Performed by: INTERNAL MEDICINE

## 2022-11-21 PROCEDURE — 93270 REMOTE 30 DAY ECG REV/REPORT: CPT | Performed by: INTERNAL MEDICINE

## 2022-11-21 NOTE — PROGRESS NOTES
Hensley, Edith Collett, ALEXANDER  Oliver Osborn  1995 11/21/2022    Patient Active Problem List   Diagnosis   • Intractable nausea and vomiting       Dear Hensley, Edith Collett, ALEXANDER:    Subjective     Oliver Osborn is a 27 y.o. male with the problems as listed above, presents    Chief complaint: Recurrent syncopal spells over the last couple of months.    History of Present Illness: Mr. Osborn is a young 27-year-old  male with no history of known heart disease or cardiac arrhythmias, presents with complains of having recurrent episodes of syncope over the last couple of months.  He states he has the spells at random with no relation to exertion or posture.  He has some feeling of palpitations prior to the onset of episodes certain times.  These episodes usually last for 2 or 3 minutes and then resolve spontaneously.  He says he feels fine after he wakes up from the episode.  He denies any seizure-like activity, loss of control of the bladder or bowels.  Denies any associated headaches or strokelike symptoms.  He is a type I diabetic.  He has not checked his blood sugar around these episodes.  He has some chest discomfort and shortness of breath around his episodes as well.  He has no history of known heart disease or coronary artery disease.  He denies any family history of syncope or seizures.  States he has family history of heart disease of unknown type in both his parents and grandparents      No Known Allergies:      Current Outpatient Medications:   •  glucose monitor monitoring kit, Use to test blood glucose four times daily before meals and at bedtime. Formulary Compliance Approval. Diagnosis: Type 2 Diabetes - Insulin Dependent, Disp: 1 each, Rfl: 0  •  Alcohol Swabs (Alcohol Pads) 70 % pads, Apply one alcohol swab to injection site of skin immediately prior to insulin injection. Formulary Compliance Approval., Disp: 100 each, Rfl: 1  •  capsaicin (ZOSTRIX) 0.025 % cream, 1 application to the  abdominal skin every 8 hours as needed nausea and vomiting, Disp: 45 g, Rfl: 0  •  glucose blood test strip, Use to test blood glucose four times daily before meals and at bedtime. Formulary Compliance Approval. Diagnosis: Type 2 Diabetes - Insulin Dependent, Disp: 100 each, Rfl: 1  •  Insulin Glargine (LANTUS SOLOSTAR) 100 UNIT/ML injection pen, Inject 10 Units under the skin into the appropriate area as directed Daily for 30 days., Disp: 1 pen, Rfl: 0  •  Insulin Pen Needle (Pen Needles) 31G X 5 MM misc, Inject 1 each under the skin into the appropriate area as directed Daily. Formulary Compliance Approval, Disp: 30 each, Rfl: 0  •  Lancets misc, Use to test blood glucose four times daily before meals and at bedtime. Formulary Compliance Approval. Diagnosis: Type 2 Diabetes - Insulin Dependent, Disp: 100 each, Rfl: 1  •  promethazine (PHENERGAN) 25 MG tablet, Take 1 tablet by mouth Every 6 (Six) Hours As Needed for Nausea or Vomiting., Disp: 15 tablet, Rfl: 0    Past Medical History:   Diagnosis Date   • Diabetes mellitus (HCC)    • Tobacco abuse      Past Surgical History:   Procedure Laterality Date   • ENDOSCOPY N/A 6/1/2022    Procedure: ESOPHAGOGASTRODUODENOSCOPY WITH ANESTHESIA;  Surgeon: Keon Quezada MD;  Location: [facility];  Service: Gastroenterology;  Laterality: N/A;     Family History   Problem Relation Age of Onset   • Heart disease Mother    • Diabetes Mother    • Kidney disease Mother    • Heart disease Father    • Diabetes Father    • Heart disease Maternal Grandmother    • Diabetes Maternal Grandmother    • Heart disease Maternal Grandfather    • Diabetes Maternal Grandfather    • Heart disease Paternal Grandmother    • Diabetes Paternal Grandmother    • Heart disease Paternal Grandfather    • Diabetes Paternal Grandfather      Social History     Tobacco Use   • Smoking status: Every Day     Packs/day: 1.50     Years: 15.00     Pack years: 22.50     Types: Cigarettes   • Smokeless  "tobacco: Never   Vaping Use   • Vaping Use: Former   Substance Use Topics   • Alcohol use: Never   • Drug use: Yes     Frequency: 7.0 times per week     Types: Marijuana       Review of Systems   Constitutional: Negative for chills, fever, malaise/fatigue, weight gain and weight loss.   HENT: Negative for congestion and nosebleeds.    Eyes: Positive for blurred vision.   Cardiovascular: Positive for chest pain and syncope. Negative for irregular heartbeat, leg swelling, orthopnea and palpitations.   Respiratory: Negative for cough, hemoptysis and shortness of breath.    Endocrine: Negative.    Hematologic/Lymphatic: Negative.    Gastrointestinal: Positive for abdominal pain, heartburn, nausea and vomiting. Negative for change in bowel habit, hematemesis and melena.   Genitourinary: Negative for dysuria, frequency and hematuria.   Neurological: Negative for focal weakness, headaches, light-headedness and weakness.       Objective   Blood pressure 119/76, pulse 85, resp. rate 16, height 170.2 cm (67\"), weight 76.3 kg (168 lb 3.2 oz).  Body mass index is 26.34 kg/m².      Vitals reviewed.   Constitutional:       Appearance: Well-developed.   Eyes:      Conjunctiva/sclera: Conjunctivae normal.   HENT:      Head: Normocephalic.   Neck:      Thyroid: No thyromegaly.      Vascular: No JVD.      Trachea: No tracheal deviation.   Pulmonary:      Effort: No respiratory distress.      Breath sounds: Normal breath sounds. No wheezing. No rales.   Cardiovascular:      Normal rate. Regular rhythm.      No gallop.   Abdominal:      General: Bowel sounds are normal.      Palpations: Abdomen is soft. There is no abdominal mass.      Tenderness: There is no abdominal tenderness.   Musculoskeletal:      Cervical back: Normal range of motion and neck supple. Skin:     General: Skin is warm and dry.   Neurological:      Mental Status: Alert and oriented to person, place, and time.      Cranial Nerves: No cranial nerve deficit.       Lab " Results   Component Value Date     11/06/2022    K 4.4 11/06/2022     11/06/2022    CO2 24.4 11/06/2022    BUN 22 (H) 11/06/2022    CREATININE 1.11 11/06/2022    GLUCOSE 116 (H) 11/06/2022    CALCIUM 9.6 11/06/2022    AST 11 11/06/2022    ALT 10 11/06/2022    ALKPHOS 59 11/06/2022    LABIL2 1.3 04/28/2022     No results found for: CKTOTAL  Lab Results   Component Value Date    WBC 10.02 11/06/2022    HGB 14.1 11/06/2022    HCT 40.9 11/06/2022     11/06/2022     No results found for: INR  Lab Results   Component Value Date    MG 2.1 06/09/2022     Lab Results   Component Value Date    TSH 2.850 05/30/2022    CHLPL 254 (H) 04/28/2022    TRIG 671 (H) 04/28/2022    HDL 31 (L) 04/28/2022     (H) 04/28/2022           ECG 12 Lead    Date/Time: 11/21/2022 8:57 AM  Performed by: Eric Montes MD  Authorized by: Eric Montes MD   Comparison: compared with previous ECG from 11/21/2022  Similar to previous ECG  Rhythm: sinus rhythm  Conduction: conduction normal  Other findings: non-specific ST-T wave changes                  Assessment & Plan :   Diagnosis Plan   1. Recurrent syncope        2. Precordial pain        3. Type 2 diabetes mellitus without complication, with long-term current use of insulin (HCC)        4. Family history of premature coronary artery disease              Recommendations:   Orders Placed This Encounter   Procedures   • Cardiac Event Monitor   • Treadmill Stress Test   • ECG 12 Lead       1. We will arrange an event monitor for his recurrent syncope.  2. We will evaluate his chest pains further with a treadmill stress EKG test.    Return in about 5 weeks (around 12/26/2022).    As always, Hensley, Edith Collett, APRN  I appreciate very much the opportunity to participate in the cardiovascular care of your patients. Please do not hesitate to call me with any questions with regards to Oliver Osborn's evaluation and management.       With Best Regards,        Eric BAL  Simon SEO, FACC    Please note that portions of this note were completed with a voice recognition program.

## 2022-11-23 ENCOUNTER — PATIENT ROUNDING (BHMG ONLY) (OUTPATIENT)
Dept: CARDIOLOGY | Facility: CLINIC | Age: 27
End: 2022-11-23

## 2022-11-23 NOTE — PROGRESS NOTES
November 23, 2022    Hello, may I speak with Oliver Osborn?    My name is Dary     I am  with [facility]  [facility] C[facility]IOLOG[facility]  [address] [address] [phone].    Before we get started may I verify your date of birth? 1995    I am calling to officially welcome you to our practice and ask about your recent visit. Is this a good time ? Yes    Tell me about your visit with us. What things went well? Everything went good the Dr was very nice and explained everything well      We're always looking for ways to make our patients' experiences even better. Do you have recommendations on ways we may improve?  no     Overall were you satisfied with your first visit to our practice? Yes       I appreciate you taking the time to speak with me today. Is there anything else I can do for you? No not at this time       Thank you, and have a great day.

## 2022-12-21 ENCOUNTER — HOSPITAL ENCOUNTER (OUTPATIENT)
Dept: CARDIOLOGY | Facility: HOSPITAL | Age: 27
Discharge: HOME | End: 2022-12-21
Admitting: INTERNAL MEDICINE

## 2022-12-21 DIAGNOSIS — R07.2 PRECORDIAL PAIN: ICD-10-CM

## 2022-12-21 PROCEDURE — 93017 CV STRESS TEST TRACING ONLY: CPT

## 2022-12-21 PROCEDURE — 93018 CV STRESS TEST I&R ONLY: CPT | Performed by: INTERNAL MEDICINE

## 2022-12-22 ENCOUNTER — TREATMENT (OUTPATIENT)
Dept: CARDIOLOGY | Facility: CLINIC | Age: 27
End: 2022-12-22
Payer: MEDICAID

## 2022-12-22 DIAGNOSIS — R55 RECURRENT SYNCOPE: ICD-10-CM

## 2022-12-23 LAB
BH CV STRESS BP STAGE 1: NORMAL
BH CV STRESS BP STAGE 2: NORMAL
BH CV STRESS BP STAGE 3: NORMAL
BH CV STRESS DURATION MIN STAGE 1: 3
BH CV STRESS DURATION MIN STAGE 2: 3
BH CV STRESS DURATION MIN STAGE 3: 3
BH CV STRESS DURATION MIN STAGE 4: 0
BH CV STRESS DURATION SEC STAGE 1: 0
BH CV STRESS DURATION SEC STAGE 2: 0
BH CV STRESS DURATION SEC STAGE 3: 0
BH CV STRESS DURATION SEC STAGE 4: 12
BH CV STRESS GRADE STAGE 1: 10
BH CV STRESS GRADE STAGE 2: 12
BH CV STRESS GRADE STAGE 3: 14
BH CV STRESS GRADE STAGE 4: 16
BH CV STRESS HR STAGE 1: 121
BH CV STRESS HR STAGE 2: 134
BH CV STRESS HR STAGE 3: 155
BH CV STRESS HR STAGE 4: 160
BH CV STRESS METS STAGE 1: 5
BH CV STRESS METS STAGE 2: 7.5
BH CV STRESS METS STAGE 3: 10
BH CV STRESS METS STAGE 4: 13.5
BH CV STRESS PROTOCOL 1: NORMAL
BH CV STRESS RECOVERY BP: NORMAL MMHG
BH CV STRESS RECOVERY HR: 103 BPM
BH CV STRESS SPEED STAGE 1: 1.7
BH CV STRESS SPEED STAGE 2: 2.5
BH CV STRESS SPEED STAGE 3: 3.4
BH CV STRESS SPEED STAGE 4: 4.2
BH CV STRESS STAGE 1: 1
BH CV STRESS STAGE 2: 2
BH CV STRESS STAGE 3: 3
BH CV STRESS STAGE 4: 4
MAXIMAL PREDICTED HEART RATE: 191 BPM
PERCENT MAX PREDICTED HR: 83.77 %
STRESS BASELINE BP: NORMAL MMHG
STRESS BASELINE HR: 91 BPM
STRESS PERCENT HR: 99 %
STRESS POST ESTIMATED WORKLOAD: 10.7 METS
STRESS POST EXERCISE DUR MIN: 9 MIN
STRESS POST EXERCISE DUR SEC: 12 SEC
STRESS POST PEAK BP: NORMAL MMHG
STRESS POST PEAK HR: 160 BPM
STRESS TARGET HR: 162 BPM

## 2022-12-30 PROCEDURE — 93272 ECG/REVIEW INTERPRET ONLY: CPT | Performed by: INTERNAL MEDICINE

## 2023-01-11 ENCOUNTER — OFFICE VISIT (OUTPATIENT)
Dept: CARDIOLOGY | Facility: CLINIC | Age: 28
End: 2023-01-11
Payer: MEDICAID

## 2023-01-11 VITALS
HEIGHT: 67 IN | HEART RATE: 65 BPM | BODY MASS INDEX: 28.97 KG/M2 | SYSTOLIC BLOOD PRESSURE: 124 MMHG | OXYGEN SATURATION: 100 % | WEIGHT: 184.6 LBS | DIASTOLIC BLOOD PRESSURE: 78 MMHG

## 2023-01-11 DIAGNOSIS — R42 DIZZINESS: Primary | ICD-10-CM

## 2023-01-11 PROCEDURE — 99213 OFFICE O/P EST LOW 20 MIN: CPT | Performed by: NURSE PRACTITIONER

## 2023-01-11 NOTE — PROGRESS NOTES
[facility]             Zoyaity Douglas, APRN Hensley, Edith Collett, APRN  Oliver Osborn  1995 01/11/2023    Patient Active Problem List   Diagnosis   • Intractable nausea and vomiting   • Dizziness       Dear Hensley, Edith Collett, APRN:    Subjective     Chief Complaint   Patient presents with   • Follow-up       HPI:     This is a 27 y.o. male with a past medical history of diabetes mellitus type 2.    Oliver Osborn presents today for routine cardiology follow up.  Patient states his last visit has been doing much better.  Prior to his last visit he was having intractable nausea and vomiting and weight loss and was found to have diabetes mellitus type 2 with hemoglobin A1c of 15.  Patient states since making diet changes along with his insulin his hemoglobin A1c has came down to 7 and he is doing better.  Denies any further dizziness, syncope, chest pain or shortness of breath.  Recent CMP, CBC were all stable.  Troponin was negative.  Cardiac event monitor showed predominantly normal sinus rhythm with rare PACs and PVCs and no arrhythmias noted.  Treadmill stress test was consistent with a normal ECG stress test    • Diagnostic Testing  1. Treadmill stress test 12/2022: Findings consistent with a normal ECG stress test  2. Cardiac event monitor 11/2022: Normal sinus rhythm with rare PACs and PVCs.     All other systems were reviewed and were negative.    Patient Active Problem List   Diagnosis   • Intractable nausea and vomiting   • Dizziness       family history includes Diabetes in his father, maternal grandfather, maternal grandmother, mother, paternal grandfather, and paternal grandmother; Heart disease in his father, maternal grandfather, maternal grandmother, mother, paternal grandfather, and paternal grandmother; Kidney disease in his mother.     reports that he has been smoking cigarettes. He has a 22.50 pack-year smoking history. He has never used  smokeless tobacco. He reports current drug use. Frequency: 7.00 times per week. Drug: Marijuana. He reports that he does not drink alcohol.    No Known Allergies      Current Outpatient Medications:   •  Alcohol Swabs (Alcohol Pads) 70 % pads, Apply one alcohol swab to injection site of skin immediately prior to insulin injection. Formulary Compliance Approval., Disp: 100 each, Rfl: 1  •  capsaicin (ZOSTRIX) 0.025 % cream, 1 application to the abdominal skin every 8 hours as needed nausea and vomiting, Disp: 45 g, Rfl: 0  •  glucose blood test strip, Use to test blood glucose four times daily before meals and at bedtime. Formulary Compliance Approval. Diagnosis: Type 2 Diabetes - Insulin Dependent, Disp: 100 each, Rfl: 1  •  glucose monitor monitoring kit, Use to test blood glucose four times daily before meals and at bedtime. Formulary Compliance Approval. Diagnosis: Type 2 Diabetes - Insulin Dependent, Disp: 1 each, Rfl: 0  •  Insulin Glargine (LANTUS SOLOSTAR) 100 UNIT/ML injection pen, Inject 10 Units under the skin into the appropriate area as directed Daily for 30 days., Disp: 1 pen, Rfl: 0  •  Insulin Pen Needle (Pen Needles) 31G X 5 MM misc, Inject 1 each under the skin into the appropriate area as directed Daily. Formulary Compliance Approval, Disp: 30 each, Rfl: 0  •  Lancets misc, Use to test blood glucose four times daily before meals and at bedtime. Formulary Compliance Approval. Diagnosis: Type 2 Diabetes - Insulin Dependent, Disp: 100 each, Rfl: 1  •  promethazine (PHENERGAN) 25 MG tablet, Take 1 tablet by mouth Every 6 (Six) Hours As Needed for Nausea or Vomiting., Disp: 15 tablet, Rfl: 0      Physical Exam:  I have reviewed the patient's current vital signs as documented in the patient's EMR.   Vitals:    01/11/23 1258   BP: 124/78   Pulse: 65   SpO2: 100%     Body mass index is 28.91 kg/m².       01/11/23  1258   Weight: 83.7 kg (184 lb 9.6 oz)      Physical Exam  Constitutional:       General: He  is not in acute distress.     Appearance: Normal appearance. He is well-developed.   HENT:      Head: Normocephalic and atraumatic.      Mouth/Throat:      Mouth: Mucous membranes are moist.   Eyes:      Extraocular Movements: Extraocular movements intact.      Pupils: Pupils are equal, round, and reactive to light.   Neck:      Vascular: No JVD.   Cardiovascular:      Rate and Rhythm: Normal rate and regular rhythm.      Heart sounds: Normal heart sounds. No murmur heard.    No S3 or S4 sounds.   Pulmonary:      Effort: Pulmonary effort is normal. No respiratory distress.      Breath sounds: Normal breath sounds. No wheezing.   Abdominal:      General: Bowel sounds are normal. There is no distension.      Palpations: Abdomen is soft. There is no hepatomegaly.      Tenderness: There is no abdominal tenderness.   Musculoskeletal:         General: Normal range of motion.      Cervical back: Normal range of motion and neck supple.   Skin:     General: Skin is warm and dry.      Coloration: Skin is not jaundiced or pale.   Neurological:      General: No focal deficit present.      Mental Status: He is alert and oriented to person, place, and time. Mental status is at baseline.   Psychiatric:         Mood and Affect: Mood normal.         Behavior: Behavior normal.         Thought Content: Thought content normal.         Judgment: Judgment normal.            DATA REVIEWED:     TTE/MARILYNN:      Laboratory evaluations:    Lab Results   Component Value Date    GLUCOSE 116 (H) 11/06/2022    BUN 22 (H) 11/06/2022    CREATININE 1.11 11/06/2022    EGFRIFNONA 83 01/23/2022    BCR 19.8 11/06/2022    K 4.4 11/06/2022    CO2 24.4 11/06/2022    CALCIUM 9.6 11/06/2022    PROTENTOTREF 7.2 04/28/2022    ALBUMIN 4.09 11/06/2022    LABIL2 1.3 04/28/2022    AST 11 11/06/2022    ALT 10 11/06/2022     Lab Results   Component Value Date    WBC 10.02 11/06/2022    HGB 14.1 11/06/2022    HCT 40.9 11/06/2022    MCV 87.2 11/06/2022      11/06/2022     Lab Results   Component Value Date    CHLPL 254 (H) 04/28/2022    TRIG 671 (H) 04/28/2022    HDL 31 (L) 04/28/2022     (H) 04/28/2022     Lab Results   Component Value Date    TSH 2.850 05/30/2022     Lab Results   Component Value Date    HGBA1C 7.80 (H) 11/06/2022     Lab Results   Component Value Date    ALT 10 11/06/2022     Lab Results   Component Value Date    HGBA1C 7.80 (H) 11/06/2022    HGBA1C 9.90 (H) 05/30/2022    HGBA1C 11.30 (H) 04/28/2022     Lab Results   Component Value Date    MICROALBUR 1,651.7 04/28/2022    CREATININE 1.11 11/06/2022     No results found for: IRON, TIBC, FERRITIN  No results found for: INR, PROTIME        --------------------------------------------------------------------------------------------------------------------------    ASSESSMENT/PLAN:      Diagnosis Plan   1. Dizziness            1. Dizziness  • Patient states prior to his last visit he was having intractable nausea and vomiting and was found to have diabetes mellitus type 2 for which his hemoglobin A1c was 15.  Since starting insulin and making dietary changes he has had much improvement of his symptoms and his dizziness has resolved.  Hemoglobin A1c is now down to 7.  Cardiac event monitor showed no arrhythmias with occasional PACs and PVCs.  Treadmill stress test was negative for ischemia.  It was felt that patient's dizziness was likely secondary to his uncontrolled diabetes with nausea and vomiting and this has much improved since that time.  • No further work-up admitted at this time.  Patient has asked if he can be seen back in the office in 6 months.      This document has been @Electronically signed by ALEXANDER Amezcua, 01/11/23, 12:16 PM EST.       Dictated Utilizing Dragon Dictation: Part of this note may be an electronic transcription/translation of spoken language to printed text using the Dragon Dictation System.    Follow-up appointment and medication changes provided in hand  delivered After Visit Summary as well as reviewed in the room.

## 2023-04-17 ENCOUNTER — HOSPITAL ENCOUNTER (OUTPATIENT)
Facility: HOSPITAL | Age: 28
Setting detail: OBSERVATION
Discharge: HOME | End: 2023-04-18
Attending: STUDENT IN AN ORGANIZED HEALTH CARE EDUCATION/TRAINING PROGRAM | Admitting: INTERNAL MEDICINE
Payer: MEDICAID

## 2023-04-17 ENCOUNTER — APPOINTMENT (OUTPATIENT)
Dept: CT IMAGING | Facility: HOSPITAL | Age: 28
End: 2023-04-17
Payer: MEDICAID

## 2023-04-17 DIAGNOSIS — E11.9 TYPE 2 DIABETES MELLITUS WITHOUT COMPLICATION, WITHOUT LONG-TERM CURRENT USE OF INSULIN: ICD-10-CM

## 2023-04-17 DIAGNOSIS — K85.90 ACUTE PANCREATITIS, UNSPECIFIED COMPLICATION STATUS, UNSPECIFIED PANCREATITIS TYPE: Primary | ICD-10-CM

## 2023-04-17 LAB
A-A DO2: 32.1 MMHG (ref 0–300)
ALBUMIN SERPL-MCNC: 4.3 G/DL (ref 3.5–5.2)
ALBUMIN/GLOB SERPL: 1.4 G/DL
ALP SERPL-CCNC: 65 U/L (ref 39–117)
ALT SERPL W P-5'-P-CCNC: 15 U/L (ref 1–41)
AMPHET+METHAMPHET UR QL: NEGATIVE
AMPHETAMINES UR QL: NEGATIVE
ANION GAP SERPL CALCULATED.3IONS-SCNC: 11.9 MMOL/L (ref 5–15)
ARTERIAL PATENCY WRIST A: ABNORMAL
AST SERPL-CCNC: 15 U/L (ref 1–40)
ATMOSPHERIC PRESS: 722 MMHG
BACTERIA UR QL AUTO: ABNORMAL /HPF
BARBITURATES UR QL SCN: NEGATIVE
BASE EXCESS BLDA CALC-SCNC: -1.2 MMOL/L (ref 0–2)
BASOPHILS # BLD AUTO: 0.11 10*3/MM3 (ref 0–0.2)
BASOPHILS NFR BLD AUTO: 0.7 % (ref 0–1.5)
BDY SITE: ABNORMAL
BENZODIAZ UR QL SCN: NEGATIVE
BILIRUB SERPL-MCNC: <0.2 MG/DL (ref 0–1.2)
BILIRUB UR QL STRIP: NEGATIVE
BUN SERPL-MCNC: 27 MG/DL (ref 6–20)
BUN/CREAT SERPL: 20.3 (ref 7–25)
BUPRENORPHINE SERPL-MCNC: NEGATIVE NG/ML
CALCIUM SPEC-SCNC: 9.3 MG/DL (ref 8.6–10.5)
CANNABINOIDS SERPL QL: POSITIVE
CHLORIDE SERPL-SCNC: 105 MMOL/L (ref 98–107)
CLARITY UR: CLEAR
CO2 BLDA-SCNC: 23.2 MMOL/L (ref 22–33)
CO2 SERPL-SCNC: 23.1 MMOL/L (ref 22–29)
COCAINE UR QL: NEGATIVE
COHGB MFR BLD: 3.9 % (ref 0–5)
COLOR UR: YELLOW
CREAT SERPL-MCNC: 1.33 MG/DL (ref 0.76–1.27)
D-LACTATE SERPL-SCNC: 1.5 MMOL/L (ref 0.5–2)
DEPRECATED RDW RBC AUTO: 41.6 FL (ref 37–54)
EGFRCR SERPLBLD CKD-EPI 2021: 73.7 ML/MIN/1.73
EOSINOPHIL # BLD AUTO: 0.98 10*3/MM3 (ref 0–0.4)
EOSINOPHIL NFR BLD AUTO: 6 % (ref 0.3–6.2)
ERYTHROCYTE [DISTWIDTH] IN BLOOD BY AUTOMATED COUNT: 12.7 % (ref 12.3–15.4)
FLUAV RNA RESP QL NAA+PROBE: NOT DETECTED
FLUBV RNA RESP QL NAA+PROBE: NOT DETECTED
GLOBULIN UR ELPH-MCNC: 3.1 GM/DL
GLUCOSE SERPL-MCNC: 182 MG/DL (ref 65–99)
GLUCOSE UR STRIP-MCNC: ABNORMAL MG/DL
HCO3 BLDA-SCNC: 22.2 MMOL/L (ref 20–26)
HCT VFR BLD AUTO: 40.3 % (ref 37.5–51)
HCT VFR BLD CALC: 39.5 % (ref 38–51)
HGB BLD-MCNC: 13.2 G/DL (ref 13–17.7)
HGB BLDA-MCNC: 12.9 G/DL (ref 14–18)
HGB UR QL STRIP.AUTO: ABNORMAL
HOLD SPECIMEN: NORMAL
HOLD SPECIMEN: NORMAL
HYALINE CASTS UR QL AUTO: ABNORMAL /LPF
IMM GRANULOCYTES # BLD AUTO: 0.05 10*3/MM3 (ref 0–0.05)
IMM GRANULOCYTES NFR BLD AUTO: 0.3 % (ref 0–0.5)
INHALED O2 CONCENTRATION: 21 %
KETONES UR QL STRIP: NEGATIVE
LEUKOCYTE ESTERASE UR QL STRIP.AUTO: NEGATIVE
LIPASE SERPL-CCNC: 272 U/L (ref 13–60)
LYMPHOCYTES # BLD AUTO: 1.93 10*3/MM3 (ref 0.7–3.1)
LYMPHOCYTES NFR BLD AUTO: 11.9 % (ref 19.6–45.3)
Lab: ABNORMAL
MCH RBC QN AUTO: 29.4 PG (ref 26.6–33)
MCHC RBC AUTO-ENTMCNC: 32.8 G/DL (ref 31.5–35.7)
MCV RBC AUTO: 89.8 FL (ref 79–97)
METHADONE UR QL SCN: NEGATIVE
METHGB BLD QL: 0.1 % (ref 0–3)
MODALITY: ABNORMAL
MONOCYTES # BLD AUTO: 0.79 10*3/MM3 (ref 0.1–0.9)
MONOCYTES NFR BLD AUTO: 4.9 % (ref 5–12)
NEUTROPHILS NFR BLD AUTO: 12.38 10*3/MM3 (ref 1.7–7)
NEUTROPHILS NFR BLD AUTO: 76.2 % (ref 42.7–76)
NITRITE UR QL STRIP: NEGATIVE
NOTE: ABNORMAL
NRBC BLD AUTO-RTO: 0 /100 WBC (ref 0–0.2)
OPIATES UR QL: NEGATIVE
OXYCODONE UR QL SCN: NEGATIVE
OXYHGB MFR BLDV: 92.5 % (ref 94–99)
PCO2 BLDA: 32.6 MM HG (ref 35–45)
PCO2 TEMP ADJ BLD: ABNORMAL MM[HG]
PCP UR QL SCN: NEGATIVE
PH BLDA: 7.44 PH UNITS (ref 7.35–7.45)
PH UR STRIP.AUTO: 5.5 [PH] (ref 5–8)
PH, TEMP CORRECTED: ABNORMAL
PLATELET # BLD AUTO: 323 10*3/MM3 (ref 140–450)
PMV BLD AUTO: 8.8 FL (ref 6–12)
PO2 BLDA: 72.9 MM HG (ref 83–108)
PO2 TEMP ADJ BLD: ABNORMAL MM[HG]
POTASSIUM SERPL-SCNC: 4.5 MMOL/L (ref 3.5–5.2)
PROPOXYPH UR QL: NEGATIVE
PROT SERPL-MCNC: 7.4 G/DL (ref 6–8.5)
PROT UR QL STRIP: ABNORMAL
QT INTERVAL: 402 MS
QTC INTERVAL: 414 MS
RBC # BLD AUTO: 4.49 10*6/MM3 (ref 4.14–5.8)
RBC # UR STRIP: ABNORMAL /HPF
REF LAB TEST METHOD: ABNORMAL
SAO2 % BLDCOA: 96.4 % (ref 94–99)
SARS-COV-2 RNA RESP QL NAA+PROBE: NOT DETECTED
SODIUM SERPL-SCNC: 140 MMOL/L (ref 136–145)
SP GR UR STRIP: 1.02 (ref 1–1.03)
SQUAMOUS #/AREA URNS HPF: ABNORMAL /HPF
TRICYCLICS UR QL SCN: NEGATIVE
TRIGL SERPL-MCNC: 171 MG/DL (ref 0–150)
UROBILINOGEN UR QL STRIP: ABNORMAL
VENTILATOR MODE: ABNORMAL
WBC # UR STRIP: ABNORMAL /HPF
WBC NRBC COR # BLD: 16.24 10*3/MM3 (ref 3.4–10.8)
WHOLE BLOOD HOLD COAG: NORMAL
WHOLE BLOOD HOLD SPECIMEN: NORMAL

## 2023-04-17 PROCEDURE — 96372 THER/PROPH/DIAG INJ SC/IM: CPT

## 2023-04-17 PROCEDURE — G0378 HOSPITAL OBSERVATION PER HR: HCPCS

## 2023-04-17 PROCEDURE — 96374 THER/PROPH/DIAG INJ IV PUSH: CPT

## 2023-04-17 PROCEDURE — 25010000002 PROCHLORPERAZINE 10 MG/2ML SOLUTION: Performed by: STUDENT IN AN ORGANIZED HEALTH CARE EDUCATION/TRAINING PROGRAM

## 2023-04-17 PROCEDURE — 96375 TX/PRO/DX INJ NEW DRUG ADDON: CPT

## 2023-04-17 PROCEDURE — 81001 URINALYSIS AUTO W/SCOPE: CPT | Performed by: STUDENT IN AN ORGANIZED HEALTH CARE EDUCATION/TRAINING PROGRAM

## 2023-04-17 PROCEDURE — 83605 ASSAY OF LACTIC ACID: CPT | Performed by: STUDENT IN AN ORGANIZED HEALTH CARE EDUCATION/TRAINING PROGRAM

## 2023-04-17 PROCEDURE — 80053 COMPREHEN METABOLIC PANEL: CPT | Performed by: STUDENT IN AN ORGANIZED HEALTH CARE EDUCATION/TRAINING PROGRAM

## 2023-04-17 PROCEDURE — 25010000002 MORPHINE PER 10 MG: Performed by: INTERNAL MEDICINE

## 2023-04-17 PROCEDURE — 82375 ASSAY CARBOXYHB QUANT: CPT

## 2023-04-17 PROCEDURE — 25010000002 METOCLOPRAMIDE PER 10 MG: Performed by: STUDENT IN AN ORGANIZED HEALTH CARE EDUCATION/TRAINING PROGRAM

## 2023-04-17 PROCEDURE — 74176 CT ABD & PELVIS W/O CONTRAST: CPT

## 2023-04-17 PROCEDURE — 25010000002 PROCHLORPERAZINE 10 MG/2ML SOLUTION: Performed by: INTERNAL MEDICINE

## 2023-04-17 PROCEDURE — 25010000002 ENOXAPARIN PER 10 MG: Performed by: INTERNAL MEDICINE

## 2023-04-17 PROCEDURE — 93005 ELECTROCARDIOGRAM TRACING: CPT | Performed by: INTERNAL MEDICINE

## 2023-04-17 PROCEDURE — 80306 DRUG TEST PRSMV INSTRMNT: CPT | Performed by: STUDENT IN AN ORGANIZED HEALTH CARE EDUCATION/TRAINING PROGRAM

## 2023-04-17 PROCEDURE — 99223 1ST HOSP IP/OBS HIGH 75: CPT

## 2023-04-17 PROCEDURE — 83050 HGB METHEMOGLOBIN QUAN: CPT

## 2023-04-17 PROCEDURE — C9803 HOPD COVID-19 SPEC COLLECT: HCPCS

## 2023-04-17 PROCEDURE — 99285 EMERGENCY DEPT VISIT HI MDM: CPT

## 2023-04-17 PROCEDURE — 85025 COMPLETE CBC W/AUTO DIFF WBC: CPT | Performed by: STUDENT IN AN ORGANIZED HEALTH CARE EDUCATION/TRAINING PROGRAM

## 2023-04-17 PROCEDURE — 83690 ASSAY OF LIPASE: CPT | Performed by: STUDENT IN AN ORGANIZED HEALTH CARE EDUCATION/TRAINING PROGRAM

## 2023-04-17 PROCEDURE — 36600 WITHDRAWAL OF ARTERIAL BLOOD: CPT

## 2023-04-17 PROCEDURE — 25010000002 KETOROLAC TROMETHAMINE PER 15 MG: Performed by: STUDENT IN AN ORGANIZED HEALTH CARE EDUCATION/TRAINING PROGRAM

## 2023-04-17 PROCEDURE — 96361 HYDRATE IV INFUSION ADD-ON: CPT

## 2023-04-17 PROCEDURE — 84478 ASSAY OF TRIGLYCERIDES: CPT | Performed by: INTERNAL MEDICINE

## 2023-04-17 PROCEDURE — 74176 CT ABD & PELVIS W/O CONTRAST: CPT | Performed by: RADIOLOGY

## 2023-04-17 PROCEDURE — 87636 SARSCOV2 & INF A&B AMP PRB: CPT | Performed by: STUDENT IN AN ORGANIZED HEALTH CARE EDUCATION/TRAINING PROGRAM

## 2023-04-17 PROCEDURE — 96376 TX/PRO/DX INJ SAME DRUG ADON: CPT

## 2023-04-17 PROCEDURE — 82805 BLOOD GASES W/O2 SATURATION: CPT

## 2023-04-17 RX ORDER — KETOROLAC TROMETHAMINE 30 MG/ML
30 INJECTION, SOLUTION INTRAMUSCULAR; INTRAVENOUS ONCE
Status: COMPLETED | OUTPATIENT
Start: 2023-04-17 | End: 2023-04-17

## 2023-04-17 RX ORDER — MORPHINE SULFATE 2 MG/ML
2 INJECTION, SOLUTION INTRAMUSCULAR; INTRAVENOUS EVERY 4 HOURS PRN
Status: DISCONTINUED | OUTPATIENT
Start: 2023-04-17 | End: 2023-04-18 | Stop reason: HOSPADM

## 2023-04-17 RX ORDER — SODIUM CHLORIDE 9 MG/ML
40 INJECTION, SOLUTION INTRAVENOUS AS NEEDED
Status: DISCONTINUED | OUTPATIENT
Start: 2023-04-17 | End: 2023-04-18 | Stop reason: HOSPADM

## 2023-04-17 RX ORDER — ENOXAPARIN SODIUM 100 MG/ML
40 INJECTION SUBCUTANEOUS DAILY
Status: DISCONTINUED | OUTPATIENT
Start: 2023-04-17 | End: 2023-04-18 | Stop reason: HOSPADM

## 2023-04-17 RX ORDER — PANTOPRAZOLE SODIUM 40 MG/10ML
40 INJECTION, POWDER, LYOPHILIZED, FOR SOLUTION INTRAVENOUS
Status: DISCONTINUED | OUTPATIENT
Start: 2023-04-18 | End: 2023-04-18 | Stop reason: HOSPADM

## 2023-04-17 RX ORDER — SODIUM CHLORIDE 0.9 % (FLUSH) 0.9 %
10 SYRINGE (ML) INJECTION AS NEEDED
Status: DISCONTINUED | OUTPATIENT
Start: 2023-04-17 | End: 2023-04-18 | Stop reason: HOSPADM

## 2023-04-17 RX ORDER — PROCHLORPERAZINE EDISYLATE 5 MG/ML
5 INJECTION INTRAMUSCULAR; INTRAVENOUS EVERY 6 HOURS PRN
Status: DISCONTINUED | OUTPATIENT
Start: 2023-04-17 | End: 2023-04-18 | Stop reason: HOSPADM

## 2023-04-17 RX ORDER — METOCLOPRAMIDE HYDROCHLORIDE 5 MG/ML
5 INJECTION INTRAMUSCULAR; INTRAVENOUS EVERY 6 HOURS PRN
Status: DISCONTINUED | OUTPATIENT
Start: 2023-04-17 | End: 2023-04-18 | Stop reason: HOSPADM

## 2023-04-17 RX ORDER — SODIUM CHLORIDE 9 MG/ML
100 INJECTION, SOLUTION INTRAVENOUS CONTINUOUS
Status: DISCONTINUED | OUTPATIENT
Start: 2023-04-17 | End: 2023-04-18 | Stop reason: HOSPADM

## 2023-04-17 RX ORDER — METOCLOPRAMIDE HYDROCHLORIDE 5 MG/ML
10 INJECTION INTRAMUSCULAR; INTRAVENOUS ONCE
Status: COMPLETED | OUTPATIENT
Start: 2023-04-17 | End: 2023-04-17

## 2023-04-17 RX ORDER — SODIUM CHLORIDE 0.9 % (FLUSH) 0.9 %
10 SYRINGE (ML) INJECTION EVERY 12 HOURS SCHEDULED
Status: DISCONTINUED | OUTPATIENT
Start: 2023-04-17 | End: 2023-04-18 | Stop reason: HOSPADM

## 2023-04-17 RX ORDER — PROCHLORPERAZINE EDISYLATE 5 MG/ML
2.5 INJECTION INTRAMUSCULAR; INTRAVENOUS EVERY 6 HOURS PRN
Status: DISCONTINUED | OUTPATIENT
Start: 2023-04-17 | End: 2023-04-17

## 2023-04-17 RX ORDER — NITROGLYCERIN 0.4 MG/1
0.4 TABLET SUBLINGUAL
Status: DISCONTINUED | OUTPATIENT
Start: 2023-04-17 | End: 2023-04-18 | Stop reason: HOSPADM

## 2023-04-17 RX ADMIN — SODIUM CHLORIDE 1000 ML: 9 INJECTION, SOLUTION INTRAVENOUS at 08:35

## 2023-04-17 RX ADMIN — PROCHLORPERAZINE EDISYLATE 5 MG: 5 INJECTION, SOLUTION INTRAMUSCULAR; INTRAVENOUS at 16:12

## 2023-04-17 RX ADMIN — SODIUM CHLORIDE 100 ML/HR: 9 INJECTION, SOLUTION INTRAVENOUS at 23:35

## 2023-04-17 RX ADMIN — SODIUM CHLORIDE 100 ML/HR: 9 INJECTION, SOLUTION INTRAVENOUS at 11:04

## 2023-04-17 RX ADMIN — KETOROLAC TROMETHAMINE 30 MG: 30 INJECTION, SOLUTION INTRAMUSCULAR; INTRAVENOUS at 09:16

## 2023-04-17 RX ADMIN — Medication 10 ML: at 21:05

## 2023-04-17 RX ADMIN — METOCLOPRAMIDE HYDROCHLORIDE 10 MG: 5 INJECTION INTRAMUSCULAR; INTRAVENOUS at 09:16

## 2023-04-17 RX ADMIN — Medication 10 ML: at 11:04

## 2023-04-17 RX ADMIN — ENOXAPARIN SODIUM 40 MG: 40 INJECTION SUBCUTANEOUS at 11:04

## 2023-04-17 RX ADMIN — MORPHINE SULFATE 2 MG: 2 INJECTION, SOLUTION INTRAMUSCULAR; INTRAVENOUS at 16:12

## 2023-04-17 RX ADMIN — PROCHLORPERAZINE EDISYLATE 2.5 MG: 5 INJECTION INTRAMUSCULAR; INTRAVENOUS at 08:53

## 2023-04-17 NOTE — PLAN OF CARE
Goal Outcome Evaluation:  Pt transferred from ED this shift.  Pt resting in bed. Pt showered this shift. Will continue plan of care.

## 2023-04-17 NOTE — PLAN OF CARE
Goal Outcome Evaluation:  Pt resting in bed. Prn pain and nausea meds given per mar.

## 2023-04-17 NOTE — ED PROVIDER NOTES
Subjective   History of Present Illness  28-year-old male with a past medical history of diabetes presents to the ER due to concerns for increasing nausea vomiting and severe abdominal pain.  Sudden onset in nature earlier this morning.  No hematemesis.  No hematochezia.  No chest pain or shortness of breath.  No obvious aggravating or alleviating factors.  Patient did receive Zofran and IV fluids in transport with EMS.  Vital stable.  Afebrile        Review of Systems   Gastrointestinal: Positive for abdominal pain, nausea and vomiting.   All other systems reviewed and are negative.      Past Medical History:   Diagnosis Date   • Diabetes mellitus    • Tobacco abuse        No Known Allergies    Past Surgical History:   Procedure Laterality Date   • ENDOSCOPY N/A 6/1/2022    Procedure: ESOPHAGOGASTRODUODENOSCOPY WITH ANESTHESIA;  Surgeon: Keon Quezada MD;  Location: [facility];  Service: Gastroenterology;  Laterality: N/A;       Family History   Problem Relation Age of Onset   • Heart disease Mother    • Diabetes Mother    • Kidney disease Mother    • Heart disease Father    • Diabetes Father    • Heart disease Maternal Grandmother    • Diabetes Maternal Grandmother    • Heart disease Maternal Grandfather    • Diabetes Maternal Grandfather    • Heart disease Paternal Grandmother    • Diabetes Paternal Grandmother    • Heart disease Paternal Grandfather    • Diabetes Paternal Grandfather        Social History     Socioeconomic History   • Marital status: Single   Tobacco Use   • Smoking status: Every Day     Packs/day: 1.50     Years: 15.00     Pack years: 22.50     Types: Cigarettes   • Smokeless tobacco: Never   Vaping Use   • Vaping Use: Former   Substance and Sexual Activity   • Alcohol use: Never   • Drug use: Yes     Frequency: 7.0 times per week     Types: Marijuana   • Sexual activity: Defer           Objective   Physical Exam  Constitutional:       General: He is not in acute distress.      Appearance: He is well-developed. He is ill-appearing.   HENT:      Head: Normocephalic and atraumatic.   Eyes:      Extraocular Movements: Extraocular movements intact.      Pupils: Pupils are equal, round, and reactive to light.   Neck:      Vascular: No JVD.   Cardiovascular:      Rate and Rhythm: Normal rate and regular rhythm.      Heart sounds: Normal heart sounds. No murmur heard.  Pulmonary:      Effort: No tachypnea, accessory muscle usage or respiratory distress.      Breath sounds: Normal breath sounds. No stridor. No decreased breath sounds, wheezing, rhonchi or rales.   Chest:      Chest wall: No deformity, tenderness or crepitus.   Abdominal:      General: Bowel sounds are normal.      Palpations: Abdomen is soft.      Tenderness: There is generalized abdominal tenderness. There is no guarding or rebound.   Musculoskeletal:         General: Normal range of motion.      Cervical back: Normal range of motion and neck supple.      Right lower leg: No tenderness. No edema.      Left lower leg: No tenderness. No edema.   Lymphadenopathy:      Cervical: No cervical adenopathy.   Skin:     General: Skin is warm and dry.      Coloration: Skin is not cyanotic.      Findings: No ecchymosis or erythema.   Neurological:      General: No focal deficit present.      Mental Status: He is alert and oriented to person, place, and time.      Cranial Nerves: No cranial nerve deficit.      Motor: No weakness.   Psychiatric:         Mood and Affect: Mood normal. Mood is not anxious.         Behavior: Behavior normal. Behavior is not agitated.         Procedures           ED Course  ED Course as of 04/17/23 0936   Mon Apr 17, 2023   0908 CT Abdomen Pelvis Without Contrast [SF]      ED Course User Index  [SF] Rishi Light DO      CT Abdomen Pelvis Without Contrast    Result Date: 4/17/2023   1. No definitive source for the patient's symptoms identified on today's exam.   2.Other findings as above       This report was  finalized on 4/17/2023 8:59 AM by Dr. Vishnu Almazan MD.        Results for orders placed or performed during the hospital encounter of 04/17/23   Comprehensive Metabolic Panel    Specimen: Blood   Result Value Ref Range    Glucose 182 (H) 65 - 99 mg/dL    BUN 27 (H) 6 - 20 mg/dL    Creatinine 1.33 (H) 0.76 - 1.27 mg/dL    Sodium 140 136 - 145 mmol/L    Potassium 4.5 3.5 - 5.2 mmol/L    Chloride 105 98 - 107 mmol/L    CO2 23.1 22.0 - 29.0 mmol/L    Calcium 9.3 8.6 - 10.5 mg/dL    Total Protein 7.4 6.0 - 8.5 g/dL    Albumin 4.3 3.5 - 5.2 g/dL    ALT (SGPT) 15 1 - 41 U/L    AST (SGOT) 15 1 - 40 U/L    Alkaline Phosphatase 65 39 - 117 U/L    Total Bilirubin <0.2 0.0 - 1.2 mg/dL    Globulin 3.1 gm/dL    A/G Ratio 1.4 g/dL    BUN/Creatinine Ratio 20.3 7.0 - 25.0    Anion Gap 11.9 5.0 - 15.0 mmol/L    eGFR 73.7 >60.0 mL/min/1.73   Lipase    Specimen: Blood   Result Value Ref Range    Lipase 272 (H) 13 - 60 U/L   Lactic Acid, Plasma    Specimen: Blood   Result Value Ref Range    Lactate 1.5 0.5 - 2.0 mmol/L   CBC Auto Differential    Specimen: Blood   Result Value Ref Range    WBC 16.24 (H) 3.40 - 10.80 10*3/mm3    RBC 4.49 4.14 - 5.80 10*6/mm3    Hemoglobin 13.2 13.0 - 17.7 g/dL    Hematocrit 40.3 37.5 - 51.0 %    MCV 89.8 79.0 - 97.0 fL    MCH 29.4 26.6 - 33.0 pg    MCHC 32.8 31.5 - 35.7 g/dL    RDW 12.7 12.3 - 15.4 %    RDW-SD 41.6 37.0 - 54.0 fl    MPV 8.8 6.0 - 12.0 fL    Platelets 323 140 - 450 10*3/mm3    Neutrophil % 76.2 (H) 42.7 - 76.0 %    Lymphocyte % 11.9 (L) 19.6 - 45.3 %    Monocyte % 4.9 (L) 5.0 - 12.0 %    Eosinophil % 6.0 0.3 - 6.2 %    Basophil % 0.7 0.0 - 1.5 %    Immature Grans % 0.3 0.0 - 0.5 %    Neutrophils, Absolute 12.38 (H) 1.70 - 7.00 10*3/mm3    Lymphocytes, Absolute 1.93 0.70 - 3.10 10*3/mm3    Monocytes, Absolute 0.79 0.10 - 0.90 10*3/mm3    Eosinophils, Absolute 0.98 (H) 0.00 - 0.40 10*3/mm3    Basophils, Absolute 0.11 0.00 - 0.20 10*3/mm3    Immature Grans, Absolute 0.05 0.00 - 0.05 10*3/mm3     nRBC 0.0 0.0 - 0.2 /100 WBC   Blood Gas, Arterial With Co-Ox    Specimen: Arterial Blood   Result Value Ref Range    Site Left Brachial     Butch's Test N/A     pH, Arterial 7.442 7.350 - 7.450 pH units    pCO2, Arterial 32.6 (L) 35.0 - 45.0 mm Hg    pO2, Arterial 72.9 (L) 83.0 - 108.0 mm Hg    HCO3, Arterial 22.2 20.0 - 26.0 mmol/L    Base Excess, Arterial -1.2 (L) 0.0 - 2.0 mmol/L    O2 Saturation, Arterial 96.4 94.0 - 99.0 %    Hemoglobin, Blood Gas 12.9 (L) 14 - 18 g/dL    Hematocrit, Blood Gas 39.5 38.0 - 51.0 %    Oxyhemoglobin 92.5 (L) 94 - 99 %    Methemoglobin 0.10 0.00 - 3.00 %    Carboxyhemoglobin 3.9 0 - 5 %    A-a DO2 32.1 0.0 - 300.0 mmHg    CO2 Content 23.2 22 - 33 mmol/L    Barometric Pressure for Blood Gas 722 mmHg    Modality Room Air     FIO2 21 %    Ventilator Mode NA     Note      Collected by [id number]     pH, Temp Corrected      pCO2, Temperature Corrected      pO2, Temperature Corrected                                            Medical Decision Making  CBC notes slight leukocytosis.  CMP notes slightly elevated creatinine.  Elevated lipase noted.  Urinalysis and UDS pending.  CT of the abdomen pelvis noted no acute abnormality.  Persistent vomiting despite Zofran, Compazine, and Reglan.  IV fluids given.  Concerns for elevated lipase in the setting of acute pancreatitis with coinciding diabetes.  Recommend admission for further work up and treatment.  Hospitalist team consulted and made aware of the patient.  Consults and orders placed per hospitalist request.  Patient was agreeable to admission plan.  Vitals stable on admission.    Acute pancreatitis, unspecified complication status, unspecified pancreatitis type: complicated acute illness or injury  Amount and/or Complexity of Data Reviewed  Labs: ordered. Decision-making details documented in ED Course.  Radiology: ordered. Decision-making details documented in ED Course.      Risk  Prescription drug management.  Decision regarding  hospitalization.          Final diagnoses:   Acute pancreatitis, unspecified complication status, unspecified pancreatitis type       ED Disposition  ED Disposition     ED Disposition   Decision to Admit    Condition   --    Comment   --             No follow-up provider specified.       Medication List      No changes were made to your prescriptions during this visit.          Rishi Light DO  04/17/23 0936

## 2023-04-17 NOTE — H&P
[facility] Medicine Services  History & Physical    Patient Identification:  Name:  Oliver Osborn  Age:  28 y.o.  Sex:  male  :  1995  MRN:  [id number]   Visit Number:  [id number]  Admit Date: 2023   Primary Care Physician:  Geri Belcher APRN    Subjective     Chief complaint: Abdominal pain, N/V    History of presenting illness:      Oliver Osborn is a 28 y.o. male with past medical history significant for DM 2, and tobacco abuse    Upon arrival to the ED, vital signs were temp 98, heart rate 54, respirations 14, /96, SPO2 97% on room air.  Laboratory examination revealed glucose elevated at 182, creatinine elevated at 1.33 from baseline appearing to be 1-1.1.  Lipase elevated at 272.  CBC with WBC count elevated at 16.24 and neutrophil percentage increased at 76.2.  UA with trace glucose, moderate blood, 3+ protein.  CT abdomen and pelvis without definitive source for the patient's symptoms. Tox screen positive for THC.    On my exam patient is ill appearing and had several episodes of emesis while I was in the room. He reports onset of N/V with severe abdominal pain early this AM which prompted him to seek care in the ED. He denies preceding illness. He endorses chest pressure that is just above the epigastric region which he thinks is associated with forceful retching. Denies chills but endorses subjective fever at home though did not check temp. He denies any alcohol use. Has not taken any new medications. No new dietary changes or new foods. He reports he had similar symptoms 2-3 months prior and was subsequently hospitalized, diagnosed and treated for diabetic gastroparesis and discharged home with maintenance medication though he cannot remember the name of the drug. He states he never took this medication once he went home. Reports good compliance with metformin for DM control with sugars trending 150-180 at home. Home med list includes insulin but patient  states he does not take. He denies diarrhea or constipation. No dysuria or flank pain. Has felt somewhat light headed and dizzy following episodes of emesis. States he is unsure if he has experienced syncope but has history of syncope. No obvious injuries noted on exam.     Review of the chart reveals patient seen in this ED October 2022 with chief complaint of vomiting and found to be THC positive at that time as well, diagnosed with cannabinoid hyperemesis syndrome. Unable to confirm diagnosis of gastroparesis on chart review but patient was admitted to this facility in May of 2022 due to intractable nausea and vomiting. Discharge summary noted EGD that admission with Gastritis. Patient was THC positive at that time as well and reported was using to treat his nausea and vomiting.    Known Emergency Department medications received prior to my evaluation included Toradol, Reglan, normal saline bolus.   Emergency Department Room location at the time of my evaluation was 111.     ---------------------------------------------------------------------------------------------------------------------   Review of Systems   Constitutional: Negative for chills and fever.   HENT: Negative for congestion, rhinorrhea and sore throat.    Respiratory: Negative for cough and shortness of breath.    Cardiovascular: Positive for chest pain. Negative for leg swelling.   Gastrointestinal: Positive for abdominal pain, nausea and vomiting. Negative for constipation and diarrhea.   Genitourinary: Negative for difficulty urinating and dysuria.   Musculoskeletal: Negative for arthralgias and myalgias.   Skin: Negative for rash and wound.   Neurological: Positive for dizziness and light-headedness. Negative for syncope.        ---------------------------------------------------------------------------------------------------------------------   Past Medical History:   Diagnosis Date   • Diabetes mellitus    • Tobacco abuse      Past Surgical  History:   Procedure Laterality Date   • ENDOSCOPY N/A 6/1/2022    Procedure: ESOPHAGOGASTRODUODENOSCOPY WITH ANESTHESIA;  Surgeon: Keon Quezada MD;  Location: [facility];  Service: Gastroenterology;  Laterality: N/A;     Family History   Problem Relation Age of Onset   • Heart disease Mother    • Diabetes Mother    • Kidney disease Mother    • Heart disease Father    • Diabetes Father    • Heart disease Maternal Grandmother    • Diabetes Maternal Grandmother    • Heart disease Maternal Grandfather    • Diabetes Maternal Grandfather    • Heart disease Paternal Grandmother    • Diabetes Paternal Grandmother    • Heart disease Paternal Grandfather    • Diabetes Paternal Grandfather      Social History     Socioeconomic History   • Marital status: Single   Tobacco Use   • Smoking status: Every Day     Packs/day: 1.50     Years: 15.00     Pack years: 22.50     Types: Cigarettes   • Smokeless tobacco: Never   Vaping Use   • Vaping Use: Former   Substance and Sexual Activity   • Alcohol use: Never   • Drug use: Yes     Frequency: 7.0 times per week     Types: Marijuana   • Sexual activity: Defer     ---------------------------------------------------------------------------------------------------------------------   Allergies:  Patient has no known allergies.  ---------------------------------------------------------------------------------------------------------------------   Home medications:    Medications below are reported home medications pulling from within the system; at this time, these medications have not been reconciled unless otherwise specified and are in the verification process for further verifcation as current home medications.  (Not in a hospital admission)      Hospital Scheduled Meds:          Current listed hospital scheduled medications may not yet reflect those currently placed in orders that are signed and held awaiting patient's arrival to floor.    ---------------------------------------------------------------------------------------------------------------------     Objective     Vital Signs:  Temp:  [98 °F (36.7 °C)] 98 °F (36.7 °C)  Heart Rate:  [54] 54  Resp:  [14] 14  BP: (133-158)/(82-96) 133/82      04/17/23  0825   Weight: 90.7 kg (200 lb)     Body mass index is 31.32 kg/m².  ---------------------------------------------------------------------------------------------------------------------       Physical Exam  Vitals and nursing note reviewed.   Constitutional:       General: He is not in acute distress.  HENT:      Head: Normocephalic and atraumatic.   Eyes:      Extraocular Movements: Extraocular movements intact.      Conjunctiva/sclera: Conjunctivae normal.   Cardiovascular:      Rate and Rhythm: Normal rate and regular rhythm.   Pulmonary:      Effort: Pulmonary effort is normal.      Breath sounds: Normal breath sounds.   Abdominal:      Palpations: Abdomen is soft.      Tenderness: There is abdominal tenderness.      Comments: Diffuse but worse in epigastric region   Musculoskeletal:      Right lower leg: No edema.      Left lower leg: No edema.   Skin:     General: Skin is warm and dry.      Coloration: Skin is pale.   Neurological:      Mental Status: He is alert and oriented to person, place, and time.               ---------------------------------------------------------------------------------------------------------------------  EKG:      ---------------------------------------------------------------------------------------------------------------------   Results from last 7 days   Lab Units 04/17/23  0838   LACTATE mmol/L 1.5   WBC 10*3/mm3 16.24*   HEMOGLOBIN g/dL 13.2   HEMATOCRIT % 40.3   MCV fL 89.8   MCHC g/dL 32.8   PLATELETS 10*3/mm3 323     Results from last 7 days   Lab Units 04/17/23  0839   PH, ARTERIAL pH units 7.442   PO2 ART mm Hg 72.9*   PCO2, ARTERIAL mm Hg 32.6*   HCO3 ART mmol/L 22.2     Results from last 7 days    Lab Units 04/17/23  0838   SODIUM mmol/L 140   POTASSIUM mmol/L 4.5   CHLORIDE mmol/L 105   CO2 mmol/L 23.1   BUN mg/dL 27*   CREATININE mg/dL 1.33*   CALCIUM mg/dL 9.3   GLUCOSE mg/dL 182*   ALBUMIN g/dL 4.3   BILIRUBIN mg/dL <0.2   ALK PHOS U/L 65   AST (SGOT) U/L 15   ALT (SGPT) U/L 15   Estimated Creatinine Clearance: 87.2 mL/min (A) (by C-G formula based on SCr of 1.33 mg/dL (H)).  No results found for: AMMONIA          Lab Results   Component Value Date    HGBA1C 7.80 (H) 11/06/2022     Lab Results   Component Value Date    TSH 2.850 05/30/2022     No results found for: PREGTESTUR, PREGSERUM, HCG, HCGQUANT  Pain Management Panel         Latest Ref Rng & Units 11/6/2022 10/13/2022   Pain Management Panel   Amphetamine, Urine Qual Negative Negative   Negative     Barbiturates Screen, Urine Negative Negative   Negative     Benzodiazepine Screen, Urine Negative Negative   Negative     Buprenorphine, Screen, Urine Negative Negative   Negative     Cocaine Screen, Urine Negative Negative   Negative     Methadone Screen , Urine Negative Negative   Negative     Methamphetamine, Ur Negative Negative   Negative           Multiple values from one day are sorted in reverse-chronological order           No results found for: BLOODCX  No results found for: URINECX  No results found for: WOUNDCX  No results found for: STOOLCX      ---------------------------------------------------------------------------------------------------------------------  Imaging Results (Last 7 Days)     Procedure Component Value Units Date/Time    CT Abdomen Pelvis Without Contrast [[id number]] Collected: 04/17/23 0857     Updated: 04/17/23 0902    Narrative:      EXAM: CT ABDOMEN PELVIS WO CONTRAST-         TECHNIQUE: Multiple axial CT images were obtained from lung bases  through pubic symphysis WITHOUT administration of IV contrast.  Reformatted images in the coronal and/or sagittal plane(s) were  generated from the axial data set to facilitate  diagnostic accuracy  and/or surgical planning.  Oral Contrast:NONE.     Radiation dose reduction techniques were utilized per ALARA protocol.  Automated exposure control was initiated through either or [employer] or  [employer] software packages by  protocol.    DOSE:     Clinical information Abdominal pain, acute, nonlocalized      Comparison 10/13/2022     FINDINGS:     Lower thorax: Clear. No effusions.     Abdomen:     Liver: Homogeneous. No focal hepatic mass or ductal dilatation.     Gallbladder: No dilation or stone identified.     Pancreas: Unremarkable. No mass or ductal dilatation.     Spleen: Homogeneous. No splenomegaly.     Adrenals: No mass.     Kidneys/ureters: No mass. No obstructive uropathy.  No evidence of  urolithiasis.     GI tract: Non-dilated. No definite wall thickening.. There is no  evidence of appendicitis     MESENTERY: No free fluid, walled off fluid collections, mesenteric  stranding, or enlarged lymph nodes        Vasculature: No evidence of aneurysm.     Abdominal wall: No focal hernia or mass.        Bladder: No focal mass or significant wall thickening     Reproductive: Unremarkable as visualized     Bones: Grade 1 anterolisthesis of L5 on S1       Impression:         1. No definitive source for the patient's symptoms identified on today's  exam.        2.Other findings as above                    This report was finalized on 4/17/2023 8:59 AM by Dr. Vishnu Almazan MD.             Cultures:  No results found for: BLOODCX, URINECX, WOUNDCX, MRSACX, RESPCX, STOOLCX    Last echocardiogram:          I have personally reviewed the above radiology images and read the final radiology report on 04/17/23  ---------------------------------------------------------------------------------------------------------------------  Assessment / Plan     There are no active hospital problems to display for this patient.      ASSESSMENT/PLAN:    Intractable N/V likely 2/2 early Acute  Pancreatitis, POA  Ongoing Marijuana use, concern for contributing Cannabinoid Hyperemesis Syndrome  Patient presents following early AM onset on intractable N/V. Patient reports had similar symptoms 2-3 months prior and was diagnosed with diabetic gastroparesis, initiated on medication though states he does not take and unsure the name of the drug. Review of the chart unable to confirm hx gastroparesis, did reveal previous admission with dx of gastritis.   Patient meets criteria for acute pancreatitis given severe abdominal pain and lipase elevated at 242, >3x upper limit of normal. CT imaging was negative for acute findings.   Patient will be admitted to [facility] for further work up and management. Cardiac monitoring in place.  Continue supportive care w/ PRN compazine  Replace fluid at 100 mL/hr  NPO for now, plan to advance diet as tolerated.   Repeat labs in the AM   R/o hypertriglyceridemia   EKG ordered and pending.   Monitor electrolytes as needed    T2DM  Tobacco abuse  Restart home regimen pending med rec  Encourage cessation      ----------  -DVT prophylaxis: Lovenox   -Activity: Up with assistance   -Expected length of stay: OBSERVATION status; however, if further evaluation or treatment plans warrant, status may change.  Based upon current information, I predict patient's care encounter to be less than or equal to 2 midnights  -Disposition pending course, likely home following clinical improvement    High risk secondary to acute pancreatitis     There are no questions and answers to display.       Kerwin Loyd PA-C   04/17/23  09:37 EDT

## 2023-04-18 VITALS
DIASTOLIC BLOOD PRESSURE: 77 MMHG | HEART RATE: 59 BPM | BODY MASS INDEX: 31.39 KG/M2 | OXYGEN SATURATION: 99 % | WEIGHT: 200 LBS | TEMPERATURE: 98.3 F | RESPIRATION RATE: 16 BRPM | HEIGHT: 67 IN | SYSTOLIC BLOOD PRESSURE: 120 MMHG

## 2023-04-18 PROBLEM — R11.2 INTRACTABLE NAUSEA AND VOMITING: Status: RESOLVED | Noted: 2022-05-30 | Resolved: 2023-04-18

## 2023-04-18 LAB
ALBUMIN SERPL-MCNC: 3.7 G/DL (ref 3.5–5.2)
ALBUMIN/GLOB SERPL: 1.3 G/DL
ALP SERPL-CCNC: 51 U/L (ref 39–117)
ALT SERPL W P-5'-P-CCNC: 11 U/L (ref 1–41)
ANION GAP SERPL CALCULATED.3IONS-SCNC: 9.9 MMOL/L (ref 5–15)
AST SERPL-CCNC: 10 U/L (ref 1–40)
BASOPHILS # BLD AUTO: 0.01 10*3/MM3 (ref 0–0.2)
BASOPHILS NFR BLD AUTO: 0.1 % (ref 0–1.5)
BILIRUB SERPL-MCNC: 0.3 MG/DL (ref 0–1.2)
BUN SERPL-MCNC: 24 MG/DL (ref 6–20)
BUN/CREAT SERPL: 18.6 (ref 7–25)
CALCIUM SPEC-SCNC: 8.5 MG/DL (ref 8.6–10.5)
CHLORIDE SERPL-SCNC: 106 MMOL/L (ref 98–107)
CO2 SERPL-SCNC: 22.1 MMOL/L (ref 22–29)
CREAT SERPL-MCNC: 1.29 MG/DL (ref 0.76–1.27)
DEPRECATED RDW RBC AUTO: 41.2 FL (ref 37–54)
EGFRCR SERPLBLD CKD-EPI 2021: 76.5 ML/MIN/1.73
EOSINOPHIL # BLD AUTO: 0 10*3/MM3 (ref 0–0.4)
EOSINOPHIL NFR BLD AUTO: 0 % (ref 0.3–6.2)
ERYTHROCYTE [DISTWIDTH] IN BLOOD BY AUTOMATED COUNT: 12.8 % (ref 12.3–15.4)
GLOBULIN UR ELPH-MCNC: 2.9 GM/DL
GLUCOSE BLDC GLUCOMTR-MCNC: 129 MG/DL (ref 70–130)
GLUCOSE SERPL-MCNC: 160 MG/DL (ref 65–99)
HCT VFR BLD AUTO: 36.8 % (ref 37.5–51)
HGB BLD-MCNC: 12.1 G/DL (ref 13–17.7)
IMM GRANULOCYTES # BLD AUTO: 0.04 10*3/MM3 (ref 0–0.05)
IMM GRANULOCYTES NFR BLD AUTO: 0.4 % (ref 0–0.5)
LYMPHOCYTES # BLD AUTO: 1.02 10*3/MM3 (ref 0.7–3.1)
LYMPHOCYTES NFR BLD AUTO: 9.5 % (ref 19.6–45.3)
MCH RBC QN AUTO: 28.9 PG (ref 26.6–33)
MCHC RBC AUTO-ENTMCNC: 32.9 G/DL (ref 31.5–35.7)
MCV RBC AUTO: 88 FL (ref 79–97)
MONOCYTES # BLD AUTO: 0.48 10*3/MM3 (ref 0.1–0.9)
MONOCYTES NFR BLD AUTO: 4.5 % (ref 5–12)
NEUTROPHILS NFR BLD AUTO: 85.5 % (ref 42.7–76)
NEUTROPHILS NFR BLD AUTO: 9.14 10*3/MM3 (ref 1.7–7)
NRBC BLD AUTO-RTO: 0 /100 WBC (ref 0–0.2)
PLATELET # BLD AUTO: 283 10*3/MM3 (ref 140–450)
PMV BLD AUTO: 9.3 FL (ref 6–12)
POTASSIUM SERPL-SCNC: 4.3 MMOL/L (ref 3.5–5.2)
PROT SERPL-MCNC: 6.6 G/DL (ref 6–8.5)
RBC # BLD AUTO: 4.18 10*6/MM3 (ref 4.14–5.8)
SODIUM SERPL-SCNC: 138 MMOL/L (ref 136–145)
WBC NRBC COR # BLD: 10.69 10*3/MM3 (ref 3.4–10.8)

## 2023-04-18 PROCEDURE — G0378 HOSPITAL OBSERVATION PER HR: HCPCS

## 2023-04-18 PROCEDURE — 96372 THER/PROPH/DIAG INJ SC/IM: CPT

## 2023-04-18 PROCEDURE — 85025 COMPLETE CBC W/AUTO DIFF WBC: CPT | Performed by: INTERNAL MEDICINE

## 2023-04-18 PROCEDURE — 82962 GLUCOSE BLOOD TEST: CPT

## 2023-04-18 PROCEDURE — 25010000002 ENOXAPARIN PER 10 MG: Performed by: INTERNAL MEDICINE

## 2023-04-18 PROCEDURE — 96361 HYDRATE IV INFUSION ADD-ON: CPT

## 2023-04-18 PROCEDURE — 96375 TX/PRO/DX INJ NEW DRUG ADDON: CPT

## 2023-04-18 PROCEDURE — 80053 COMPREHEN METABOLIC PANEL: CPT | Performed by: INTERNAL MEDICINE

## 2023-04-18 PROCEDURE — 99238 HOSP IP/OBS DSCHRG MGMT 30/<: CPT | Performed by: INTERNAL MEDICINE

## 2023-04-18 RX ADMIN — SODIUM CHLORIDE 100 ML/HR: 9 INJECTION, SOLUTION INTRAVENOUS at 10:57

## 2023-04-18 RX ADMIN — Medication 10 ML: at 08:05

## 2023-04-18 RX ADMIN — PANTOPRAZOLE SODIUM 40 MG: 40 INJECTION, POWDER, FOR SOLUTION INTRAVENOUS at 05:57

## 2023-04-18 RX ADMIN — ENOXAPARIN SODIUM 40 MG: 40 INJECTION SUBCUTANEOUS at 08:05

## 2023-04-18 NOTE — DISCHARGE SUMMARY
Discharge Summary:    Date of Admission: 4/17/2023  Date of Discharge:  4/18/2023    PCP: Geri Belcher APRN    DISCHARGE DIAGNOSIS  -Intractable nausea and vomiting, resolved  -Mild/Early pancreatitis  -Suspect cyclic vomiting syndrome 2/2 to cannabinoid hyperemesis syndrome  -Ongoing marijuana use  -Mild renal insufficiency, improved  -Leukocytosis, suspect reactive in nature and resolved  -DM, reportedly type II on metformin therapy  -Obesity with BMI 31.32    SECONDARY DIAGNOSES  Past Medical History:   Diagnosis Date   • Diabetes mellitus    • Tobacco abuse        CONSULTS   None    PROCEDURES PERFORMED  None    HOSPITAL COURSE  Patient is a 28 y.o. male [facility] of the acute onset of nausea, vomiting and abdominal pain.  Please see the admitting history and physical for further details.      Patient was admitted to the medical surgical floor with telemetry.  Work-up in the emergency department revealed mild acute renal insufficiency, leukocytosis and an elevated lipase suggestive of probable early pancreatitis as CT imaging was unremarkable.    Patient was made nothing by mouth and received IV fluid hydration.  He received as needed IV antiemetics and also received a dose of IV morphine for abdominal pain.  Shortly after arriving to the medical floor, the patient's nausea and vomiting resolved he did not require any further as needed antiemetics or analgesics.    Per chart review and discussion with the patient, it was felt that patient had cannabinoid induced hyperemesis syndrome as a form of cyclic vomiting syndrome.  The patient was counseled and educated regarding complete marijuana abstinence.    Given patient's reported history of diabetes mellitus type 2, a triglyceride level was obtained to rule out triglyceride induced pancreatitis and his triglyceride level was mildly elevated at 171. CT scan was also unremarkable for acute cholecystitis and his clinical  "presentation did not appear consistent with biliary dyskinesia.     Today, patient was feeling much improved and was anxious for discharge home. Patient's leukocytosis was thought to be reactive from repeated vomiting and mild volume depletion/dehydration and completely normalized with IV fluid hydration alone. Patient was able to tolerate liquids and some soup on the day of discharge. Patient was felt stable for discharge home and was discharged in a hemodynamically stable condition.    CONDITION ON DISCHARGE  Stable.      VITAL SIGNS  /77 (BP Location: Right arm, Patient Position: Lying)   Pulse 59   Temp 98.3 °F (36.8 °C) (Oral)   Resp 16   Ht 170.2 cm (67\")   Wt 90.7 kg (200 lb)   SpO2 99%   BMI 31.32 kg/m²   Objective:  General Appearance:  Comfortable, well-appearing and in no acute distress.    Vital signs: (most recent): Blood pressure 120/77, pulse 59, temperature 98.3 °F (36.8 °C), temperature source Oral, resp. rate 16, height 170.2 cm (67\"), weight 90.7 kg (200 lb), SpO2 99 %.  Vital signs are normal.    HEENT: Normal HEENT exam.    Lungs:  Normal effort.  Breath sounds clear to auscultation.  No rales, wheezes or rhonchi.    Heart: Normal rate.  S1 normal and S2 normal.  No murmur, gallop or friction rub.   Chest: Symmetric chest wall expansion.   Abdomen: Abdomen is soft and non-distended.  Hypoactive bowel sounds.   There is no abdominal tenderness.     Extremities: There is no deformity or dependent edema.    Pulses: Distal pulses are intact.    Neurological: Patient is alert and oriented to person, place and time.    Skin:  Warm and dry.  No rash or ulceration.             DISCHARGE DISPOSITION   Home or Self Care    DISCHARGE MEDICATIONS     Discharge Medications      Continue These Medications      Instructions Start Date   metFORMIN 1000 MG tablet  Commonly known as: GLUCOPHAGE   1,000 mg, Oral, 2 Times Daily With Meals             DISCHARGE DIET  diabetic diet  Dietary Orders (From " admission, onward)     Start     Ordered    04/18/23 0901  Diet: Liquid Diets, Diabetic Diets; Full Liquid; Consistent Carbohydrate; Texture: Regular Texture (IDDSI 7); Fluid Consistency: Thin (IDDSI 0)  Diet Effective Now        References:    Diet Order [place]   Question Answer Comment   Diets: Liquid Diets    Diets: Diabetic Diets    Liquid Diet: Full Liquid    Diabetic Diet: Consistent Carbohydrate    Texture: Regular Texture (IDDSI 7)    Fluid Consistency: Thin (IDDSI 0)        04/18/23 0900                ACTIVITY AT DISCHARGE   activity as tolerated, monitor blood glucose levels    Future Appointments   Date Time Provider Department Center   7/11/2023  1:00 PM Zoya Avilez APRN [facility]       Additional Instructions for the Follow-ups that You Need to Schedule     Discharge Follow-up with PCP   As directed       Currently Documented PCP:    Geri Belcher APRN    PCP Phone Number:    [phone]     Follow Up Details: 7-10 days; hospital follow up intractable nausea/vomiting               TEST  RESULTS PENDING AT DISCHARGE      Coleen Pearson DO  04/18/23  11:46 EDT      Time: less than 30 minutes.

## 2023-04-18 NOTE — DISCHARGE INSTR - ACTIVITY
Monitor your blood sugar at home before meals and at bedtime.  Keep a record of your blood sugar results and take this record with you to your follow up appointment with ALEXANDER Rojas.   Supportive social network or family

## 2023-04-18 NOTE — DISCHARGE INSTR - APPOINTMENTS
You have a follow-up appointment with ALEXANDER Du on 4-24-23 at 10:15, office can be reached at [phone]

## 2023-04-18 NOTE — PLAN OF CARE
Goal Outcome Evaluation:              Outcome Evaluation: patient has rested well during shift. patient has refused all prn medication. Denies nausea.

## 2023-04-19 ENCOUNTER — APPOINTMENT (OUTPATIENT)
Dept: GENERAL RADIOLOGY | Facility: HOSPITAL | Age: 28
End: 2023-04-19
Payer: MEDICAID

## 2023-04-19 ENCOUNTER — APPOINTMENT (OUTPATIENT)
Dept: ULTRASOUND IMAGING | Facility: HOSPITAL | Age: 28
End: 2023-04-19
Payer: MEDICAID

## 2023-04-19 ENCOUNTER — HOSPITAL ENCOUNTER (OUTPATIENT)
Facility: HOSPITAL | Age: 28
Setting detail: OBSERVATION
Discharge: HOME | End: 2023-04-20
Attending: EMERGENCY MEDICINE | Admitting: FAMILY MEDICINE
Payer: MEDICAID

## 2023-04-19 DIAGNOSIS — R77.8 ELEVATED TROPONIN: Primary | ICD-10-CM

## 2023-04-19 DIAGNOSIS — R11.2 NAUSEA AND VOMITING, UNSPECIFIED VOMITING TYPE: ICD-10-CM

## 2023-04-19 PROBLEM — R79.89 ELEVATED TROPONIN: Status: ACTIVE | Noted: 2023-04-19

## 2023-04-19 LAB
ACETONE BLD QL: NEGATIVE
ALBUMIN SERPL-MCNC: 4.3 G/DL (ref 3.5–5.2)
ALBUMIN/GLOB SERPL: 1.2 G/DL
ALP SERPL-CCNC: 62 U/L (ref 39–117)
ALT SERPL W P-5'-P-CCNC: 15 U/L (ref 1–41)
AMYLASE SERPL-CCNC: 58 U/L (ref 28–100)
ANION GAP SERPL CALCULATED.3IONS-SCNC: 11.8 MMOL/L (ref 5–15)
AST SERPL-CCNC: 17 U/L (ref 1–40)
BACTERIA UR QL AUTO: ABNORMAL /HPF
BASOPHILS # BLD AUTO: 0.09 10*3/MM3 (ref 0–0.2)
BASOPHILS NFR BLD AUTO: 0.8 % (ref 0–1.5)
BILIRUB SERPL-MCNC: 0.6 MG/DL (ref 0–1.2)
BILIRUB UR QL STRIP: NEGATIVE
BUN SERPL-MCNC: 17 MG/DL (ref 6–20)
BUN/CREAT SERPL: 13.5 (ref 7–25)
CALCIUM SPEC-SCNC: 9.5 MG/DL (ref 8.6–10.5)
CHLORIDE SERPL-SCNC: 103 MMOL/L (ref 98–107)
CLARITY UR: CLEAR
CO2 SERPL-SCNC: 25.2 MMOL/L (ref 22–29)
COLOR UR: YELLOW
CREAT SERPL-MCNC: 1.26 MG/DL (ref 0.76–1.27)
DEPRECATED RDW RBC AUTO: 41.4 FL (ref 37–54)
EGFRCR SERPLBLD CKD-EPI 2021: 78.7 ML/MIN/1.73
EOSINOPHIL # BLD AUTO: 0.32 10*3/MM3 (ref 0–0.4)
EOSINOPHIL NFR BLD AUTO: 2.8 % (ref 0.3–6.2)
ERYTHROCYTE [DISTWIDTH] IN BLOOD BY AUTOMATED COUNT: 12.7 % (ref 12.3–15.4)
FLUAV RNA RESP QL NAA+PROBE: NOT DETECTED
FLUBV RNA RESP QL NAA+PROBE: NOT DETECTED
GEN 5 2HR TROPONIN T REFLEX: 12 NG/L
GLOBULIN UR ELPH-MCNC: 3.5 GM/DL
GLUCOSE BLDC GLUCOMTR-MCNC: 112 MG/DL (ref 70–130)
GLUCOSE BLDC GLUCOMTR-MCNC: 97 MG/DL (ref 70–130)
GLUCOSE SERPL-MCNC: 154 MG/DL (ref 65–99)
GLUCOSE UR STRIP-MCNC: ABNORMAL MG/DL
HCT VFR BLD AUTO: 39.7 % (ref 37.5–51)
HGB BLD-MCNC: 13.7 G/DL (ref 13–17.7)
HGB UR QL STRIP.AUTO: ABNORMAL
HYALINE CASTS UR QL AUTO: ABNORMAL /LPF
IMM GRANULOCYTES # BLD AUTO: 0.03 10*3/MM3 (ref 0–0.05)
IMM GRANULOCYTES NFR BLD AUTO: 0.3 % (ref 0–0.5)
KETONES UR QL STRIP: ABNORMAL
LEUKOCYTE ESTERASE UR QL STRIP.AUTO: NEGATIVE
LIPASE SERPL-CCNC: 43 U/L (ref 13–60)
LYMPHOCYTES # BLD AUTO: 3.55 10*3/MM3 (ref 0.7–3.1)
LYMPHOCYTES NFR BLD AUTO: 30.6 % (ref 19.6–45.3)
MCH RBC QN AUTO: 30.5 PG (ref 26.6–33)
MCHC RBC AUTO-ENTMCNC: 34.5 G/DL (ref 31.5–35.7)
MCV RBC AUTO: 88.4 FL (ref 79–97)
MONOCYTES # BLD AUTO: 0.77 10*3/MM3 (ref 0.1–0.9)
MONOCYTES NFR BLD AUTO: 6.6 % (ref 5–12)
NEUTROPHILS NFR BLD AUTO: 58.9 % (ref 42.7–76)
NEUTROPHILS NFR BLD AUTO: 6.84 10*3/MM3 (ref 1.7–7)
NITRITE UR QL STRIP: NEGATIVE
NRBC BLD AUTO-RTO: 0 /100 WBC (ref 0–0.2)
PH UR STRIP.AUTO: 6 [PH] (ref 5–8)
PLATELET # BLD AUTO: 337 10*3/MM3 (ref 140–450)
PMV BLD AUTO: 9.3 FL (ref 6–12)
POTASSIUM SERPL-SCNC: 4 MMOL/L (ref 3.5–5.2)
PROT SERPL-MCNC: 7.8 G/DL (ref 6–8.5)
PROT UR QL STRIP: ABNORMAL
QT INTERVAL: 424 MS
QT INTERVAL: 428 MS
QTC INTERVAL: 394 MS
QTC INTERVAL: 402 MS
RBC # BLD AUTO: 4.49 10*6/MM3 (ref 4.14–5.8)
RBC # UR STRIP: ABNORMAL /HPF
REF LAB TEST METHOD: ABNORMAL
SARS-COV-2 RNA RESP QL NAA+PROBE: NOT DETECTED
SODIUM SERPL-SCNC: 140 MMOL/L (ref 136–145)
SP GR UR STRIP: 1.02 (ref 1–1.03)
SQUAMOUS #/AREA URNS HPF: ABNORMAL /HPF
TROPONIN T DELTA: -5 NG/L
TROPONIN T SERPL HS-MCNC: 17 NG/L
UROBILINOGEN UR QL STRIP: ABNORMAL
WBC # UR STRIP: ABNORMAL /HPF
WBC NRBC COR # BLD: 11.6 10*3/MM3 (ref 3.4–10.8)

## 2023-04-19 PROCEDURE — 81001 URINALYSIS AUTO W/SCOPE: CPT | Performed by: EMERGENCY MEDICINE

## 2023-04-19 PROCEDURE — 36415 COLL VENOUS BLD VENIPUNCTURE: CPT

## 2023-04-19 PROCEDURE — 25010000002 ONDANSETRON PER 1 MG

## 2023-04-19 PROCEDURE — 84484 ASSAY OF TROPONIN QUANT: CPT | Performed by: EMERGENCY MEDICINE

## 2023-04-19 PROCEDURE — 99222 1ST HOSP IP/OBS MODERATE 55: CPT | Performed by: FAMILY MEDICINE

## 2023-04-19 PROCEDURE — 25010000002 DROPERIDOL PER 5 MG: Performed by: EMERGENCY MEDICINE

## 2023-04-19 PROCEDURE — 94799 UNLISTED PULMONARY SVC/PX: CPT

## 2023-04-19 PROCEDURE — 25010000002 ONDANSETRON PER 1 MG: Performed by: EMERGENCY MEDICINE

## 2023-04-19 PROCEDURE — 96374 THER/PROPH/DIAG INJ IV PUSH: CPT

## 2023-04-19 PROCEDURE — 71045 X-RAY EXAM CHEST 1 VIEW: CPT | Performed by: RADIOLOGY

## 2023-04-19 PROCEDURE — 76705 ECHO EXAM OF ABDOMEN: CPT

## 2023-04-19 PROCEDURE — 96372 THER/PROPH/DIAG INJ SC/IM: CPT

## 2023-04-19 PROCEDURE — 25010000002 MORPHINE PER 10 MG: Performed by: EMERGENCY MEDICINE

## 2023-04-19 PROCEDURE — 85025 COMPLETE CBC W/AUTO DIFF WBC: CPT | Performed by: EMERGENCY MEDICINE

## 2023-04-19 PROCEDURE — 71045 X-RAY EXAM CHEST 1 VIEW: CPT

## 2023-04-19 PROCEDURE — 25010000002 HEPARIN (PORCINE) PER 1000 UNITS: Performed by: FAMILY MEDICINE

## 2023-04-19 PROCEDURE — 82009 KETONE BODYS QUAL: CPT | Performed by: EMERGENCY MEDICINE

## 2023-04-19 PROCEDURE — G0378 HOSPITAL OBSERVATION PER HR: HCPCS

## 2023-04-19 PROCEDURE — 87636 SARSCOV2 & INF A&B AMP PRB: CPT | Performed by: EMERGENCY MEDICINE

## 2023-04-19 PROCEDURE — 93005 ELECTROCARDIOGRAM TRACING: CPT | Performed by: EMERGENCY MEDICINE

## 2023-04-19 PROCEDURE — 94761 N-INVAS EAR/PLS OXIMETRY MLT: CPT

## 2023-04-19 PROCEDURE — 83690 ASSAY OF LIPASE: CPT | Performed by: EMERGENCY MEDICINE

## 2023-04-19 PROCEDURE — 82150 ASSAY OF AMYLASE: CPT | Performed by: EMERGENCY MEDICINE

## 2023-04-19 PROCEDURE — 80053 COMPREHEN METABOLIC PANEL: CPT | Performed by: EMERGENCY MEDICINE

## 2023-04-19 PROCEDURE — 93005 ELECTROCARDIOGRAM TRACING: CPT | Performed by: FAMILY MEDICINE

## 2023-04-19 PROCEDURE — 99285 EMERGENCY DEPT VISIT HI MDM: CPT

## 2023-04-19 PROCEDURE — 82962 GLUCOSE BLOOD TEST: CPT

## 2023-04-19 PROCEDURE — C9803 HOPD COVID-19 SPEC COLLECT: HCPCS

## 2023-04-19 PROCEDURE — 76705 ECHO EXAM OF ABDOMEN: CPT | Performed by: RADIOLOGY

## 2023-04-19 PROCEDURE — 96375 TX/PRO/DX INJ NEW DRUG ADDON: CPT

## 2023-04-19 RX ORDER — ASPIRIN 81 MG/1
81 TABLET ORAL DAILY
Status: DISCONTINUED | OUTPATIENT
Start: 2023-04-20 | End: 2023-04-20 | Stop reason: HOSPADM

## 2023-04-19 RX ORDER — GLUCAGON 1 MG/ML
1 KIT INJECTION
Status: DISCONTINUED | OUTPATIENT
Start: 2023-04-19 | End: 2023-04-20 | Stop reason: HOSPADM

## 2023-04-19 RX ORDER — ONDANSETRON 2 MG/ML
4 INJECTION INTRAMUSCULAR; INTRAVENOUS ONCE
Status: COMPLETED | OUTPATIENT
Start: 2023-04-19 | End: 2023-04-19

## 2023-04-19 RX ORDER — SODIUM CHLORIDE 0.9 % (FLUSH) 0.9 %
10 SYRINGE (ML) INJECTION AS NEEDED
Status: DISCONTINUED | OUTPATIENT
Start: 2023-04-19 | End: 2023-04-20 | Stop reason: HOSPADM

## 2023-04-19 RX ORDER — DROPERIDOL 2.5 MG/ML
1.25 INJECTION, SOLUTION INTRAMUSCULAR; INTRAVENOUS ONCE
Status: COMPLETED | OUTPATIENT
Start: 2023-04-19 | End: 2023-04-19

## 2023-04-19 RX ORDER — ASPIRIN 81 MG/1
324 TABLET, CHEWABLE ORAL ONCE
Status: COMPLETED | OUTPATIENT
Start: 2023-04-19 | End: 2023-04-19

## 2023-04-19 RX ORDER — INSULIN LISPRO 100 [IU]/ML
2-9 INJECTION, SOLUTION INTRAVENOUS; SUBCUTANEOUS
Status: DISCONTINUED | OUTPATIENT
Start: 2023-04-19 | End: 2023-04-20 | Stop reason: HOSPADM

## 2023-04-19 RX ORDER — ONDANSETRON 4 MG/1
4 TABLET, FILM COATED ORAL EVERY 6 HOURS PRN
Status: DISCONTINUED | OUTPATIENT
Start: 2023-04-19 | End: 2023-04-20 | Stop reason: HOSPADM

## 2023-04-19 RX ORDER — NICOTINE POLACRILEX 4 MG
15 LOZENGE BUCCAL
Status: DISCONTINUED | OUTPATIENT
Start: 2023-04-19 | End: 2023-04-20 | Stop reason: HOSPADM

## 2023-04-19 RX ORDER — ONDANSETRON 2 MG/ML
4 INJECTION INTRAMUSCULAR; INTRAVENOUS EVERY 6 HOURS PRN
Status: DISCONTINUED | OUTPATIENT
Start: 2023-04-19 | End: 2023-04-20 | Stop reason: HOSPADM

## 2023-04-19 RX ORDER — SODIUM CHLORIDE 0.9 % (FLUSH) 0.9 %
10 SYRINGE (ML) INJECTION EVERY 12 HOURS SCHEDULED
Status: DISCONTINUED | OUTPATIENT
Start: 2023-04-19 | End: 2023-04-20 | Stop reason: HOSPADM

## 2023-04-19 RX ORDER — DEXTROSE MONOHYDRATE 25 G/50ML
25 INJECTION, SOLUTION INTRAVENOUS
Status: DISCONTINUED | OUTPATIENT
Start: 2023-04-19 | End: 2023-04-20 | Stop reason: HOSPADM

## 2023-04-19 RX ORDER — ONDANSETRON 2 MG/ML
INJECTION INTRAMUSCULAR; INTRAVENOUS
Status: COMPLETED
Start: 2023-04-19 | End: 2023-04-19

## 2023-04-19 RX ORDER — ROSUVASTATIN CALCIUM 20 MG/1
20 TABLET, COATED ORAL NIGHTLY
Status: DISCONTINUED | OUTPATIENT
Start: 2023-04-19 | End: 2023-04-20 | Stop reason: HOSPADM

## 2023-04-19 RX ORDER — ONDANSETRON 2 MG/ML
4 INJECTION INTRAMUSCULAR; INTRAVENOUS ONCE
Status: DISCONTINUED | OUTPATIENT
Start: 2023-04-19 | End: 2023-04-19

## 2023-04-19 RX ORDER — LISINOPRIL 2.5 MG/1
5 TABLET ORAL DAILY
Status: DISCONTINUED | OUTPATIENT
Start: 2023-04-19 | End: 2023-04-20 | Stop reason: HOSPADM

## 2023-04-19 RX ORDER — SODIUM CHLORIDE 9 MG/ML
40 INJECTION, SOLUTION INTRAVENOUS AS NEEDED
Status: DISCONTINUED | OUTPATIENT
Start: 2023-04-19 | End: 2023-04-20 | Stop reason: HOSPADM

## 2023-04-19 RX ORDER — HEPARIN SODIUM 5000 [USP'U]/ML
5000 INJECTION, SOLUTION INTRAVENOUS; SUBCUTANEOUS EVERY 12 HOURS SCHEDULED
Status: DISCONTINUED | OUTPATIENT
Start: 2023-04-19 | End: 2023-04-20 | Stop reason: HOSPADM

## 2023-04-19 RX ADMIN — LISINOPRIL 5 MG: 2.5 TABLET ORAL at 15:13

## 2023-04-19 RX ADMIN — HEPARIN SODIUM 5000 UNITS: 5000 INJECTION INTRAVENOUS; SUBCUTANEOUS at 15:13

## 2023-04-19 RX ADMIN — SODIUM CHLORIDE 2000 ML: 9 INJECTION, SOLUTION INTRAVENOUS at 07:15

## 2023-04-19 RX ADMIN — MORPHINE SULFATE 4 MG: 4 INJECTION, SOLUTION INTRAMUSCULAR; INTRAVENOUS at 11:09

## 2023-04-19 RX ADMIN — Medication 10 ML: at 15:13

## 2023-04-19 RX ADMIN — ROSUVASTATIN CALCIUM 20 MG: 20 TABLET, FILM COATED ORAL at 21:14

## 2023-04-19 RX ADMIN — ONDANSETRON 4 MG: 2 INJECTION INTRAMUSCULAR; INTRAVENOUS at 07:15

## 2023-04-19 RX ADMIN — ASPIRIN 324 MG: 81 TABLET, CHEWABLE ORAL at 15:13

## 2023-04-19 RX ADMIN — METOPROLOL TARTRATE 25 MG: 25 TABLET, FILM COATED ORAL at 21:14

## 2023-04-19 RX ADMIN — Medication 10 ML: at 21:15

## 2023-04-19 RX ADMIN — METOPROLOL TARTRATE 25 MG: 25 TABLET, FILM COATED ORAL at 15:13

## 2023-04-19 RX ADMIN — HEPARIN SODIUM 5000 UNITS: 5000 INJECTION INTRAVENOUS; SUBCUTANEOUS at 21:14

## 2023-04-19 RX ADMIN — DROPERIDOL 1.25 MG: 2.5 INJECTION, SOLUTION INTRAMUSCULAR; INTRAVENOUS at 08:07

## 2023-04-19 RX ADMIN — ONDANSETRON 4 MG: 2 INJECTION INTRAMUSCULAR; INTRAVENOUS at 07:43

## 2023-04-19 NOTE — ED NOTES
Patient back to room via ultrasound tech at this. Patient continues to retch, no emesis noted at this time. Patient complains of continuous nausea and abdominal pain. Dr Santana made aware. VSS. GCS 15. All safety measures in place. Call light in reach.

## 2023-04-19 NOTE — ED NOTES
Pt continues to retch. Small amount of green emesis noted. Dr. Santana made aware. All safety measures in place. Call light in reach.

## 2023-04-19 NOTE — ED PROVIDER NOTES
"Subjective   History of Present Illness  28-year-old white male complains of nausea and vomiting.  Patient was admitted 2 days ago for pancreatitis and vomiting.  He was discharged the following day.  Later that day, which was last night, he said he began to feel \"hot\".  This morning when he awoke he was having nausea and vomiting.  He has had multiple episodes of vomiting since awakening.  He denied any diarrhea.  He complains of diffuse abdominal pain.  He has not had any hematemesis, fever, chills.  His previous hospital records show that the admitting team felt he may have cannabinoid hyperemesis, but the patient states he has not smoked marijuana in 2 to 3 days.  He has a history of diabetes, and states that his medicines were changed from insulin to metformin about 2 weeks ago.        Review of Systems   All other systems reviewed and are negative.      Past Medical History:   Diagnosis Date   • Diabetes mellitus    • Tobacco abuse        No Known Allergies    Past Surgical History:   Procedure Laterality Date   • ENDOSCOPY N/A 6/1/2022    Procedure: ESOPHAGOGASTRODUODENOSCOPY WITH ANESTHESIA;  Surgeon: Keon Quezada MD;  Location: [facility];  Service: Gastroenterology;  Laterality: N/A;       Family History   Problem Relation Age of Onset   • Heart disease Mother    • Diabetes Mother    • Kidney disease Mother    • Heart disease Father    • Diabetes Father    • Heart disease Maternal Grandmother    • Diabetes Maternal Grandmother    • Heart disease Maternal Grandfather    • Diabetes Maternal Grandfather    • Heart disease Paternal Grandmother    • Diabetes Paternal Grandmother    • Heart disease Paternal Grandfather    • Diabetes Paternal Grandfather        Social History     Socioeconomic History   • Marital status: Single   Tobacco Use   • Smoking status: Every Day     Packs/day: 1.50     Years: 15.00     Pack years: 22.50     Types: Cigarettes   • Smokeless tobacco: Never   Vaping Use   • Vaping " Use: Some days   • Substances: Nicotine, Flavoring   • Devices: Disposable   • Passive vaping exposure: Yes   Substance and Sexual Activity   • Alcohol use: Never   • Drug use: Yes     Frequency: 7.0 times per week     Types: Marijuana   • Sexual activity: Defer           Objective   Physical Exam  Vitals and nursing note reviewed. Exam conducted with a chaperone present.   Constitutional:       Appearance: Normal appearance. He is normal weight.   HENT:      Head: Normocephalic and atraumatic.      Mouth/Throat:      Mouth: Mucous membranes are moist.      Pharynx: Oropharynx is clear.   Eyes:      Conjunctiva/sclera: Conjunctivae normal.   Cardiovascular:      Rate and Rhythm: Normal rate and regular rhythm.      Heart sounds: Normal heart sounds. No murmur heard.    No friction rub. No gallop.   Pulmonary:      Effort: Pulmonary effort is normal. No respiratory distress.      Breath sounds: Normal breath sounds. No wheezing, rhonchi or rales.   Abdominal:      General: Abdomen is flat. Bowel sounds are normal. There is no distension.      Palpations: Abdomen is soft.      Tenderness: There is generalized abdominal tenderness.   Musculoskeletal:         General: Normal range of motion.      Cervical back: Normal range of motion and neck supple.      Right lower leg: No edema.      Left lower leg: No edema.   Skin:     General: Skin is warm and dry.   Neurological:      General: No focal deficit present.      Mental Status: He is alert and oriented to person, place, and time.   Psychiatric:         Mood and Affect: Mood normal.         Behavior: Behavior normal.         Procedures  Results for orders placed or performed during the hospital encounter of 04/19/23   COVID-19 and FLU A/B PCR - Swab, Nasopharynx    Specimen: Nasopharynx; Swab   Result Value Ref Range    COVID19 Not Detected Not Detected - Ref. Range    Influenza A PCR Not Detected Not Detected    Influenza B PCR Not Detected Not Detected   Comprehensive  Metabolic Panel    Specimen: Blood   Result Value Ref Range    Glucose 154 (H) 65 - 99 mg/dL    BUN 17 6 - 20 mg/dL    Creatinine 1.26 0.76 - 1.27 mg/dL    Sodium 140 136 - 145 mmol/L    Potassium 4.0 3.5 - 5.2 mmol/L    Chloride 103 98 - 107 mmol/L    CO2 25.2 22.0 - 29.0 mmol/L    Calcium 9.5 8.6 - 10.5 mg/dL    Total Protein 7.8 6.0 - 8.5 g/dL    Albumin 4.3 3.5 - 5.2 g/dL    ALT (SGPT) 15 1 - 41 U/L    AST (SGOT) 17 1 - 40 U/L    Alkaline Phosphatase 62 39 - 117 U/L    Total Bilirubin 0.6 0.0 - 1.2 mg/dL    Globulin 3.5 gm/dL    A/G Ratio 1.2 g/dL    BUN/Creatinine Ratio 13.5 7.0 - 25.0    Anion Gap 11.8 5.0 - 15.0 mmol/L    eGFR 78.7 >60.0 mL/min/1.73   Lipase    Specimen: Blood   Result Value Ref Range    Lipase 43 13 - 60 U/L   Amylase    Specimen: Blood   Result Value Ref Range    Amylase 58 28 - 100 U/L   Urinalysis With Culture If Indicated - Urine, Clean Catch    Specimen: Urine, Clean Catch   Result Value Ref Range    Color, UA Yellow Yellow, Straw    Appearance, UA Clear Clear    pH, UA 6.0 5.0 - 8.0    Specific Gravity, UA 1.016 1.005 - 1.030    Glucose,  mg/dL (1+) (A) Negative    Ketones, UA 15 mg/dL (1+) (A) Negative    Bilirubin, UA Negative Negative    Blood, UA Moderate (2+) (A) Negative    Protein, UA >=300 mg/dL (3+) (A) Negative    Leuk Esterase, UA Negative Negative    Nitrite, UA Negative Negative    Urobilinogen, UA 0.2 E.U./dL 0.2 - 1.0 E.U./dL   High Sensitivity Troponin T    Specimen: Blood   Result Value Ref Range    HS Troponin T 17 (H) <15 ng/L   CBC Auto Differential    Specimen: Blood   Result Value Ref Range    WBC 11.60 (H) 3.40 - 10.80 10*3/mm3    RBC 4.49 4.14 - 5.80 10*6/mm3    Hemoglobin 13.7 13.0 - 17.7 g/dL    Hematocrit 39.7 37.5 - 51.0 %    MCV 88.4 79.0 - 97.0 fL    MCH 30.5 26.6 - 33.0 pg    MCHC 34.5 31.5 - 35.7 g/dL    RDW 12.7 12.3 - 15.4 %    RDW-SD 41.4 37.0 - 54.0 fl    MPV 9.3 6.0 - 12.0 fL    Platelets 337 140 - 450 10*3/mm3    Neutrophil % 58.9 42.7 - 76.0  %    Lymphocyte % 30.6 19.6 - 45.3 %    Monocyte % 6.6 5.0 - 12.0 %    Eosinophil % 2.8 0.3 - 6.2 %    Basophil % 0.8 0.0 - 1.5 %    Immature Grans % 0.3 0.0 - 0.5 %    Neutrophils, Absolute 6.84 1.70 - 7.00 10*3/mm3    Lymphocytes, Absolute 3.55 (H) 0.70 - 3.10 10*3/mm3    Monocytes, Absolute 0.77 0.10 - 0.90 10*3/mm3    Eosinophils, Absolute 0.32 0.00 - 0.40 10*3/mm3    Basophils, Absolute 0.09 0.00 - 0.20 10*3/mm3    Immature Grans, Absolute 0.03 0.00 - 0.05 10*3/mm3    nRBC 0.0 0.0 - 0.2 /100 WBC   Acetone    Specimen: Blood   Result Value Ref Range    Acetone Negative Negative   High Sensitivity Troponin T 2Hr    Specimen: Blood   Result Value Ref Range    HS Troponin T 12 <15 ng/L    Troponin T Delta -5 (L) >=-4 - <+4 ng/L   Urinalysis, Microscopic Only - Urine, Clean Catch    Specimen: Urine, Clean Catch   Result Value Ref Range    RBC, UA 21-30 (A) None Seen, 0-2 /HPF    WBC, UA 0-2 None Seen, 0-2 /HPF    Bacteria, UA None Seen None Seen /HPF    Squamous Epithelial Cells, UA None Seen None Seen, 0-2 /HPF    Hyaline Casts, UA None Seen None Seen /LPF    Methodology Automated Microscopy    ECG 12 Lead Other; n/v   Result Value Ref Range    QT Interval 428 ms    QTC Interval 394 ms     CT Abdomen Pelvis Without Contrast    Result Date: 4/17/2023  Narrative: EXAM: CT ABDOMEN PELVIS WO CONTRAST-   TECHNIQUE: Multiple axial CT images were obtained from lung bases through pubic symphysis WITHOUT administration of IV contrast. Reformatted images in the coronal and/or sagittal plane(s) were generated from the axial data set to facilitate diagnostic accuracy and/or surgical planning. Oral Contrast:NONE.  Radiation dose reduction techniques were utilized per ALARA protocol. Automated exposure control was initiated through either or [employer] or [employer] software packages by  protocol.  DOSE:  Clinical information Abdominal pain, acute, nonlocalized  Comparison 10/13/2022  FINDINGS:  Lower thorax: Clear. No  effusions.  Abdomen:  Liver: Homogeneous. No focal hepatic mass or ductal dilatation.  Gallbladder: No dilation or stone identified.  Pancreas: Unremarkable. No mass or ductal dilatation.  Spleen: Homogeneous. No splenomegaly.  Adrenals: No mass.  Kidneys/ureters: No mass. No obstructive uropathy.  No evidence of urolithiasis.  GI tract: Non-dilated. No definite wall thickening.. There is no evidence of appendicitis  MESENTERY: No free fluid, walled off fluid collections, mesenteric stranding, or enlarged lymph nodes   Vasculature: No evidence of aneurysm.  Abdominal wall: No focal hernia or mass.   Bladder: No focal mass or significant wall thickening  Reproductive: Unremarkable as visualized  Bones: Grade 1 anterolisthesis of L5 on S1      Impression:  1. No definitive source for the patient's symptoms identified on today's exam.   2.Other findings as above       This report was finalized on 4/17/2023 8:59 AM by Dr. Vishnu Almazan MD.      US Gallbladder    Result Date: 4/19/2023  Narrative: US GALLBLADDER-  CLINICAL INDICATION: Pancreatitis, nausea vomiting   COMPARISON: [facility] 01/20/2022   PROCEDURE AND FINDINGS:  Sonographic imaging of the gallbladder reveals no evidence of cholelithiasis. There is no pericholecystic fluid. The wall of the gallbladder measures 2.30 mm. The common bile duct measures 3.76 mm.        Impression: Impression: No evidence of cholelithiasis.  This report was finalized on 4/19/2023 8:08 AM by Dr. Vishnu Almazan MD.      XR Chest 1 View    Result Date: 4/19/2023  Narrative: XR CHEST 1 VW-  CLINICAL INDICATION: n/v   COMPARISON: 11/06/2022  TECHNIQUE: Single frontal view of the chest.  FINDINGS:  LUNGS: Lungs are adequately aerated.  HEART AND MEDIASTINUM: Heart and mediastinal contours are unremarkable   SKELETON: Bony and soft tissue structures are unremarkable.        Impression: No radiographic evidence of acute cardiac or pulmonary disease.  This report was finalized on 4/19/2023 8:45  AM by Dr. Vishnu Almazan MD.               ED Course  ED Course as of 04/19/23 1216   Wed Apr 19, 2023   0738 ECG 12 Lead Other; n/v  Sinus bradycardia.  Rate 51.  Normal axis.  Normal intervals.  No hypertrophic changes.  No acute ischemic changes.  Normal EKG with exception of rate.  Interpreted by me. [BC]   1112 Patient doing better without vomiting at this time.  Reviewed results of his work-up.  Pancreatic enzymes are normal as well as his gallbladder ultrasound and chest x-ray.  Other labs largely unremarkable except for some mild hematuria glucose controlled.  With his history of diabetes and his recurrent nausea and vomiting with abnormal troponin further work-up indicated.  Discussed with Dr. Moncada.  Patient admitted, see orders. [BC]      ED Course User Index  [BC] Lopez Santana MD                      HEART Score: 2                      MDM    Final diagnoses:   Elevated troponin   Nausea and vomiting, unspecified vomiting type       ED Disposition  ED Disposition     ED Disposition   Decision to Admit    Condition   --    Comment   Level of Care: Telemetry [5]   Diagnosis: Elevated troponin [202311]   Admitting Physician: ROBINSON MONCADA [[id number]]               No follow-up provider specified.       Medication List      No changes were made to your prescriptions during this visit.          Lopez Santana MD  04/19/23 1216

## 2023-04-19 NOTE — H&P
[facility]   HISTORY AND PHYSICAL    Patient Name: Oliver Osborn  : 1995  MRN: [id number]  Primary Care Physician:  Geri Belcher APRN  Date of admission: 2023    Subjective   Subjective     Chief Complaint: Nausea and vomiting    History of Present Illness  Patient is a 28-year-old male with past medical history of diabetes mellitus well controlled and tobacco abuse.  Patient was just discharged yesterday from the hospital after being treated for hyperemesis possibly secondary to THC intake.  Patient says that he felt much better yesterday but woke up this morning once again with nausea and vomiting.  He denies any THC use overnight.  Patient says that his nausea and uncontrolled vomiting was so bad he had to come into the emergency department for further evaluation and treatment.  The ER doctor stated that for the first 2 hours the patient did nothing but retch.  During the work-up they found that the patient's initial troponin was just slightly elevated however it did decrease by more than 5 points which the ER doctor says is a positive finding for possible CAD.  As such she asked that we admit for further evaluation and treatment.  Patient denies chest pain or shortness of breath as well as no diaphoresis is noted.  Work-up in the emergency department has also demonstrated a negative ultrasound of the liver and gallbladder.  Review of Systems   Gastrointestinal: Positive for nausea and vomiting.   All other systems reviewed and are negative.       Personal History     Past Medical History:   Diagnosis Date   • Diabetes mellitus    • Tobacco abuse        Past Surgical History:   Procedure Laterality Date   • ENDOSCOPY N/A 2022    Procedure: ESOPHAGOGASTRODUODENOSCOPY WITH ANESTHESIA;  Surgeon: Keon Quezada MD;  Location: [facility];  Service: Gastroenterology;  Laterality: N/A;       Family History: family history includes Diabetes in his father, maternal grandfather, maternal  grandmother, mother, paternal grandfather, and paternal grandmother; Heart disease in his father, maternal grandfather, maternal grandmother, mother, paternal grandfather, and paternal grandmother; Kidney disease in his mother. Otherwise pertinent FHx was reviewed and not pertinent to current issue.    Social History:  reports that he has been smoking cigarettes. He has a 22.50 pack-year smoking history. He has never used smokeless tobacco. He reports current drug use. Frequency: 7.00 times per week. Drug: Marijuana. He reports that he does not drink alcohol.    Home Medications:  metFORMIN    Allergies:  No Known Allergies    Objective    Objective     Vitals:   Temp:  [98.1 °F (36.7 °C)-98.2 °F (36.8 °C)] 98.2 °F (36.8 °C)  Heart Rate:  [] 62  Resp:  [16-20] 16  BP: (137-169)/(78-99) 137/78    Physical Exam  Constitutional:  Well-developed and well-nourished.  No acute distress.      HENT:  Head:  Normocephalic and atraumatic.  Mouth:  Moist mucous membranes.    Eyes:  Conjunctivae and EOM are normal. No scleral icterus.    Neck:  Neck supple.  No JVD present.    Cardiovascular:  Normal rate, regular rhythm and normal heart sounds with no murmur.  Pulmonary/Chest:  No respiratory distress, no wheezes, no crackles, with normal breath sounds and good air movement.  Abdominal:  Soft. No distension and no tenderness.   Musculoskeletal:  No tenderness, and no deformity.  No red or swollen joints anywhere.    Neurological:  Alert and oriented to person, place, and time.  No cranial nerve deficit.    Skin:  Skin is warm and dry. No rash noted. No pallor.   Peripheral vascular:  No clubbing, no cyanosis, no edema.  Psychiatric: Appropriate mood and affect  :   Result Review    Result Review:  I have personally reviewed the results from the time of this admission to 4/19/2023 16:41 EDT and agree with these findings:  []  Laboratory list / accordion  []  Microbiology  []  Radiology  []  EKG/Telemetry   []   Cardiology/Vascular   []  Pathology  []  Old records  []  Other:  Most notable findings include: Elevated troponin      Assessment & Plan   Assessment / Plan     Brief Patient Summary:  Oliver Osborn is a 28 y.o. male who presented to the emergency department with intractable nausea and vomiting.  He was found to have a positive troponin that dropped 5 points for a -5 of delta.  This is considered positive.  Patient will be admitted for further evaluation with stress testing    Active Hospital Problems:  1.  Elevated troponin rule out NSTEMI  2.  Intractable nausea and vomiting possible THC hyperemesis  -improved  3.  Diabetes mellitus blood sugar well controlled    Plan:   Admit overnight.  Continue the patient on the NSTEMI pathway.  Treat the patient's nausea and vomiting with Zofran  Schedule a Lexiscan stress test.  Once performed if positive consult cardiology for their evaluation on how to proceed with definitive testing.  If negative discharged home with a prescription of Zofran  Keep the patient n.p.o. in the meantime  DVT prophylaxis:  Medical DVT prophylaxis orders are present.    CODE STATUS:    Level Of Support Discussed With: Patient  Code Status (Patient has no pulse and is not breathing): CPR (Attempt to Resuscitate)  Medical Interventions (Patient has pulse or is breathing): Full Support    Admission Status:  I believe this patient meets *observation status.    Farhan Moncada, DO

## 2023-04-19 NOTE — ED NOTES
Patient notified of bed assignment and availability. Attempted to contact floor for care handoff and to give report and was places on hold for six minutes. Will attempt to contact.

## 2023-04-19 NOTE — ED NOTES
" While in room to administer medication patient noted to be breathing very shallow and very rapid. Patient educated on importance of deep breaths, and it was demonstrated to him, in the nose and out of the mouth. Patient verbalized understanding, however he continues to breath shallow and rapid, stating, \"I cant, my stomach.\" Patient was asked if it was pain and/or nausea to which he replied, \"both.\" Dr Santana was made aware. Patient also noted to close his his and not open when spoke to for approx 30-45 seconds at a time. Dr Santana aware of this as well. No seizure activity noted, vital signs remained the same throughout, patient would immediately open eyes and GCS was 15, patient did not lose bowel or bladder, able to tolerate all secretions without difficulty. Vitals signs continue to be stable throughout entire interaction and at this time. All safety measures in place. Call light in reach.   "

## 2023-04-19 NOTE — PLAN OF CARE
Goal Outcome Evaluation:      Patient admitted from ED this shift. Denies CP and SOA. Lying in bed with no S&S of distress. No complaints at this time. Will continue to follow plan of care.

## 2023-04-20 ENCOUNTER — APPOINTMENT (OUTPATIENT)
Dept: NUCLEAR MEDICINE | Facility: HOSPITAL | Age: 28
End: 2023-04-20
Payer: MEDICAID

## 2023-04-20 ENCOUNTER — APPOINTMENT (OUTPATIENT)
Dept: CARDIOLOGY | Facility: HOSPITAL | Age: 28
End: 2023-04-20
Payer: MEDICAID

## 2023-04-20 VITALS
TEMPERATURE: 98.7 F | WEIGHT: 197.9 LBS | OXYGEN SATURATION: 97 % | BODY MASS INDEX: 31.06 KG/M2 | HEART RATE: 69 BPM | RESPIRATION RATE: 18 BRPM | HEIGHT: 67 IN | DIASTOLIC BLOOD PRESSURE: 92 MMHG | SYSTOLIC BLOOD PRESSURE: 163 MMHG

## 2023-04-20 LAB
BH CV ECHO MEAS - ACS: 2.2 CM
BH CV ECHO MEAS - AO MAX PG: 6.6 MMHG
BH CV ECHO MEAS - AO MEAN PG: 3 MMHG
BH CV ECHO MEAS - AO ROOT DIAM: 3 CM
BH CV ECHO MEAS - AO V2 MAX: 128 CM/SEC
BH CV ECHO MEAS - AO V2 VTI: 25.6 CM
BH CV ECHO MEAS - EDV(CUBED): 148.9 ML
BH CV ECHO MEAS - EDV(MOD-SP2): 64.1 ML
BH CV ECHO MEAS - EDV(MOD-SP4): 56.4 ML
BH CV ECHO MEAS - EF(MOD-BP): 67 %
BH CV ECHO MEAS - EF(MOD-SP2): 57.1 %
BH CV ECHO MEAS - EF(MOD-SP4): 74.6 %
BH CV ECHO MEAS - ESV(CUBED): 50.7 ML
BH CV ECHO MEAS - ESV(MOD-SP2): 27.5 ML
BH CV ECHO MEAS - ESV(MOD-SP4): 14.3 ML
BH CV ECHO MEAS - FS: 30.2 %
BH CV ECHO MEAS - IVS/LVPW: 1 CM
BH CV ECHO MEAS - IVSD: 0.6 CM
BH CV ECHO MEAS - LA DIMENSION: 3.3 CM
BH CV ECHO MEAS - LAT PEAK E' VEL: 14 CM/SEC
BH CV ECHO MEAS - LV DIASTOLIC VOL/BSA (35-75): 28.1 CM2
BH CV ECHO MEAS - LV MASS(C)D: 105.2 GRAMS
BH CV ECHO MEAS - LV SYSTOLIC VOL/BSA (12-30): 7.1 CM2
BH CV ECHO MEAS - LVIDD: 5.3 CM
BH CV ECHO MEAS - LVIDS: 3.7 CM
BH CV ECHO MEAS - LVOT AREA: 3.1 CM2
BH CV ECHO MEAS - LVOT DIAM: 2 CM
BH CV ECHO MEAS - LVPWD: 0.6 CM
BH CV ECHO MEAS - MED PEAK E' VEL: 10 CM/SEC
BH CV ECHO MEAS - MV A MAX VEL: 62.5 CM/SEC
BH CV ECHO MEAS - MV DEC SLOPE: 392 CM/SEC2
BH CV ECHO MEAS - MV E MAX VEL: 100 CM/SEC
BH CV ECHO MEAS - MV E/A: 1.6
BH CV ECHO MEAS - PA ACC TIME: 0.11 SEC
BH CV ECHO MEAS - PA PR(ACCEL): 29.1 MMHG
BH CV ECHO MEAS - RAP SYSTOLE: 10 MMHG
BH CV ECHO MEAS - RVSP: 33 MMHG
BH CV ECHO MEAS - SI(MOD-SP2): 18.2 ML/M2
BH CV ECHO MEAS - SI(MOD-SP4): 21 ML/M2
BH CV ECHO MEAS - SV(MOD-SP2): 36.6 ML
BH CV ECHO MEAS - SV(MOD-SP4): 42.1 ML
BH CV ECHO MEAS - TAPSE (>1.6): 2.5 CM
BH CV ECHO MEAS - TR MAX PG: 23 MMHG
BH CV ECHO MEAS - TR MAX VEL: 240 CM/SEC
BH CV ECHO MEASUREMENTS AVERAGE E/E' RATIO: 8.33
BH CV REST NUCLEAR ISOTOPE DOSE: 10.3 MCI
BH CV STRESS BP STAGE 1: NORMAL
BH CV STRESS BP STAGE 2: NORMAL
BH CV STRESS COMMENTS STAGE 1: NORMAL
BH CV STRESS DOSE REGADENOSON STAGE 1: 0.4
BH CV STRESS DURATION MIN STAGE 1: 3
BH CV STRESS DURATION MIN STAGE 2: 3
BH CV STRESS DURATION MIN STAGE 3: 3
BH CV STRESS DURATION MIN STAGE 4: 0
BH CV STRESS DURATION SEC STAGE 1: 0
BH CV STRESS DURATION SEC STAGE 2: 0
BH CV STRESS DURATION SEC STAGE 3: 0
BH CV STRESS DURATION SEC STAGE 4: 35
BH CV STRESS GRADE STAGE 1: 10
BH CV STRESS GRADE STAGE 2: 12
BH CV STRESS GRADE STAGE 3: 14
BH CV STRESS GRADE STAGE 4: 16
BH CV STRESS HR STAGE 1: 96
BH CV STRESS HR STAGE 2: 111
BH CV STRESS HR STAGE 3: 131
BH CV STRESS HR STAGE 4: 131
BH CV STRESS METS STAGE 1: 5
BH CV STRESS METS STAGE 2: 7.5
BH CV STRESS METS STAGE 3: 10
BH CV STRESS METS STAGE 4: 13.5
BH CV STRESS NUCLEAR ISOTOPE DOSE: 31.9 MCI
BH CV STRESS PROTOCOL 1: NORMAL
BH CV STRESS RECOVERY BP: NORMAL MMHG
BH CV STRESS RECOVERY HR: 76 BPM
BH CV STRESS SPEED STAGE 1: 1.7
BH CV STRESS SPEED STAGE 2: 2.5
BH CV STRESS SPEED STAGE 3: 3.4
BH CV STRESS SPEED STAGE 4: 4.2
BH CV STRESS STAGE 1: 1
BH CV STRESS STAGE 2: 2
BH CV STRESS STAGE 3: 3
BH CV STRESS STAGE 4: 4
GLUCOSE BLDC GLUCOMTR-MCNC: 107 MG/DL (ref 70–130)
GLUCOSE BLDC GLUCOMTR-MCNC: 132 MG/DL (ref 70–130)
GLUCOSE BLDC GLUCOMTR-MCNC: 138 MG/DL (ref 70–130)
GLUCOSE BLDC GLUCOMTR-MCNC: 99 MG/DL (ref 70–130)
LEFT ATRIUM VOLUME INDEX: 16.6 ML/M2
LV EF 2D ECHO EST: 65 %
LV EF NUC BP: 63 %
MAXIMAL PREDICTED HEART RATE: 190 BPM
MAXIMAL PREDICTED HEART RATE: 190 BPM
PERCENT MAX PREDICTED HR: 68.95 %
QT INTERVAL: 408 MS
QTC INTERVAL: 424 MS
STRESS BASELINE BP: NORMAL MMHG
STRESS BASELINE HR: 62 BPM
STRESS PERCENT HR: 81 %
STRESS POST ESTIMATED WORKLOAD: 10.1 METS
STRESS POST EXERCISE DUR MIN: 9 MIN
STRESS POST EXERCISE DUR SEC: 35 SEC
STRESS POST PEAK BP: NORMAL MMHG
STRESS POST PEAK HR: 131 BPM
STRESS TARGET HR: 162 BPM
STRESS TARGET HR: 162 BPM

## 2023-04-20 PROCEDURE — G0378 HOSPITAL OBSERVATION PER HR: HCPCS

## 2023-04-20 PROCEDURE — 25010000002 REGADENOSON 0.4 MG/5ML SOLUTION: Performed by: FAMILY MEDICINE

## 2023-04-20 PROCEDURE — 82962 GLUCOSE BLOOD TEST: CPT

## 2023-04-20 PROCEDURE — 93306 TTE W/DOPPLER COMPLETE: CPT

## 2023-04-20 PROCEDURE — 96372 THER/PROPH/DIAG INJ SC/IM: CPT

## 2023-04-20 PROCEDURE — 99238 HOSP IP/OBS DSCHRG MGMT 30/<: CPT | Performed by: FAMILY MEDICINE

## 2023-04-20 PROCEDURE — 25010000002 HEPARIN (PORCINE) PER 1000 UNITS: Performed by: FAMILY MEDICINE

## 2023-04-20 PROCEDURE — 0 TECHNETIUM SESTAMIBI: Performed by: FAMILY MEDICINE

## 2023-04-20 PROCEDURE — 93017 CV STRESS TEST TRACING ONLY: CPT

## 2023-04-20 PROCEDURE — A9500 TC99M SESTAMIBI: HCPCS | Performed by: FAMILY MEDICINE

## 2023-04-20 PROCEDURE — 78452 HT MUSCLE IMAGE SPECT MULT: CPT

## 2023-04-20 RX ORDER — LISINOPRIL 5 MG/1
5 TABLET ORAL DAILY
Qty: 30 TABLET | Refills: 0 | Status: SHIPPED | OUTPATIENT
Start: 2023-04-21

## 2023-04-20 RX ORDER — ASPIRIN 81 MG/1
81 TABLET ORAL DAILY
Qty: 30 TABLET | Refills: 0 | Status: SHIPPED | OUTPATIENT
Start: 2023-04-21

## 2023-04-20 RX ORDER — ONDANSETRON 4 MG/1
4 TABLET, FILM COATED ORAL EVERY 6 HOURS PRN
Qty: 30 TABLET | Refills: 1 | Status: SHIPPED | OUTPATIENT
Start: 2023-04-20

## 2023-04-20 RX ORDER — REGADENOSON 0.08 MG/ML
0.4 INJECTION, SOLUTION INTRAVENOUS
Status: COMPLETED | OUTPATIENT
Start: 2023-04-20 | End: 2023-04-20

## 2023-04-20 RX ADMIN — TECHNETIUM TC 99M SESTAMIBI 1 DOSE: 1 INJECTION INTRAVENOUS at 13:48

## 2023-04-20 RX ADMIN — HEPARIN SODIUM 5000 UNITS: 5000 INJECTION INTRAVENOUS; SUBCUTANEOUS at 08:37

## 2023-04-20 RX ADMIN — Medication 10 ML: at 10:10

## 2023-04-20 RX ADMIN — TECHNETIUM TC 99M SESTAMIBI 1 DOSE: 1 INJECTION INTRAVENOUS at 11:15

## 2023-04-20 RX ADMIN — ASPIRIN 81 MG: 81 TABLET, COATED ORAL at 08:37

## 2023-04-20 RX ADMIN — REGADENOSON 0.4 MG: 0.08 INJECTION, SOLUTION INTRAVENOUS at 13:48

## 2023-04-20 NOTE — PLAN OF CARE
Goal Outcome Evaluation: Patient has been pleasant. Compliant with care. Patient remains on room air, saturations remaining above 90%. Patient in no acute distress at this time. Patient currently resting in bed. Will continue with plan of care.

## 2023-04-20 NOTE — NURSING NOTE
Patient discharged at this time. IV and cardiac monitor removed at this time. Patients education reviewed with the patient by charge nurse. Patient states an understanding of the education provided at this time. Patient walked himself down at this time. Patient refused to have staff walk him off the floor.

## 2023-04-20 NOTE — PLAN OF CARE
Goal Outcome Evaluation:      Patient is resting in bed throughout shift. Patient is A&Ox4, VSS, RA, and is NSR on the telemetry. Patient has remained NPO at this time. Patient denies any discomfort and distress at this time. Will continue to follow plan of care throughout shift.

## 2023-04-20 NOTE — DISCHARGE SUMMARY
[facility] DISCHARGE SUMMARY    Patient Identification:  Name:  Oliver Osborn  Age:  28 y.o.  Sex:  male  :  1995  MRN:  [id number]  Visit Number:  [id number]    Date of Admission: 2023  Date of Discharge:  2023     PCP: Geri Belcher, APRN    DISCHARGE DIAGNOSIS  1.  Elevated troponin rule out NSTEMI  2.  Intractable nausea and vomiting possible THC hyperemesis  -improved  3.  Diabetes mellitus blood sugar well controlled    CONSULTS   Consults     No orders found for last 30 day(s).          PROCEDURES PERFORMED  Lexiscan stress test, 2D echo    HOSPITAL COURSE  Patient is a 28 y.o. male presented to [facility] complaining of nausea and vomiting.  Please see the admitting history and physical for further details.    After admission the patient was placed on IV fluids and given Zofran as needed for nausea.  With the troponin being bumped up placed him on the NSTEMI unstable chest pain pathway.  We obtained a Lexiscan stress test today as well as a 2D echocardiogram.  Both of those were within normal limits.  Patient has had no further vomiting once he arrived to the floor.  I decided that he could be discharged home.      VITAL SIGNS:  Temp:  [98.2 °F (36.8 °C)-99 °F (37.2 °C)] 98.7 °F (37.1 °C)  Heart Rate:  [55-69] 69  Resp:  [16-18] 18  BP: (110-163)/(68-92) 163/92  SpO2:  [97 %-99 %] 97 %  on   ;   Device (Oxygen Therapy): room air    Body mass index is 31 kg/m².  Wt Readings from Last 3 Encounters:   23 89.8 kg (197 lb 14.4 oz)   23 90.7 kg (200 lb)   23 83.7 kg (184 lb 9.6 oz)       PHYSICAL EXAM:   Constitutional:  Well-developed and well-nourished.  No acute distress.      HENT:  Head:  Normocephalic and atraumatic.  Mouth:  Moist mucous membranes.    Eyes:  Conjunctivae and EOM are normal. No scleral icterus.    Neck:  Neck supple.  No JVD present.    Cardiovascular:  Normal rate, regular rhythm and normal heart sounds with no  murmur.  Pulmonary/Chest:  No respiratory distress, no wheezes, no crackles, with normal breath sounds and good air movement.  Abdominal:  Soft. No distension and no tenderness.   Musculoskeletal:  No tenderness, and no deformity.  No red or swollen joints anywhere.    Neurological:  Alert and oriented to person, place, and time.  No cranial nerve deficit.    Skin:  Skin is warm and dry. No rash noted. No pallor.   Peripheral vascular:  No clubbing, no cyanosis, no edema.  Psychiatric: Appropriate mood and affect      DISCHARGE DISPOSITION   Stable    DISCHARGE MEDICATIONS:     Discharge Medications      New Medications      Instructions Start Date   aspirin 81 MG EC tablet   81 mg, Oral, Daily   Start Date: April 21, 2023     lisinopril 5 MG tablet  Commonly known as: PRINIVIL,ZESTRIL   5 mg, Oral, Daily   Start Date: April 21, 2023     ondansetron 4 MG tablet  Commonly known as: ZOFRAN   4 mg, Oral, Every 6 Hours PRN         Continue These Medications      Instructions Start Date   metFORMIN 1000 MG tablet  Commonly known as: GLUCOPHAGE   1,000 mg, Oral, 2 Times Daily With Meals             Diet Instructions     Advance Diet As Tolerated -Target Diet: Diabetic diet      Target Diet: Diabetic diet        Additional Instructions for the Follow-ups that You Need to Schedule     Discharge Follow-up with PCP   As directed       Currently Documented PCP:    Geri Belcher APRN    PCP Phone Number:    [phone]     Follow Up Details: Follow-up in 1 to 2 weeks            Follow-up Information     Geri Belcher APRN .    Specialty: Family Medicine  Why: Follow-up in 1 to 2 weeks  Contact information:  [address] [facility] [address]  [phone]                          TEST  RESULTS PENDING AT DISCHARGE    None  CODE STATUS  Code Status and Medical Interventions:   Ordered at: 04/19/23 1329     Level Of Support Discussed With:    Patient     Code Status (Patient has no pulse and is not breathing):     CPR (Attempt to Resuscitate)     Medical Interventions (Patient has pulse or is breathing):    Full Support       Farhan Moncada DO  [facility]  04/20/23  18:38 EDT    Please note that this discharge summary required more than 30 minutes to complete.

## 2023-08-17 ENCOUNTER — OFFICE VISIT (OUTPATIENT)
Dept: CARDIOLOGY | Facility: CLINIC | Age: 28
End: 2023-08-17
Payer: MEDICAID

## 2023-08-17 VITALS
BODY MASS INDEX: 32.43 KG/M2 | HEART RATE: 64 BPM | DIASTOLIC BLOOD PRESSURE: 78 MMHG | SYSTOLIC BLOOD PRESSURE: 120 MMHG | HEIGHT: 67 IN | WEIGHT: 206.6 LBS | OXYGEN SATURATION: 96 %

## 2023-08-17 DIAGNOSIS — R77.8 ELEVATED TROPONIN: Primary | ICD-10-CM

## 2023-08-17 PROCEDURE — 1160F RVW MEDS BY RX/DR IN RCRD: CPT | Performed by: NURSE PRACTITIONER

## 2023-08-17 PROCEDURE — 1159F MED LIST DOCD IN RCRD: CPT | Performed by: NURSE PRACTITIONER

## 2023-08-17 PROCEDURE — 99214 OFFICE O/P EST MOD 30 MIN: CPT | Performed by: NURSE PRACTITIONER

## 2023-08-17 RX ORDER — INSULIN GLARGINE 100 [IU]/ML
INJECTION, SOLUTION SUBCUTANEOUS
COMMUNITY
Start: 2022-08-29

## 2023-08-17 NOTE — PROGRESS NOTES
[facility]             Zoya ALEXANDER Avilez Edith L, APRN  Oliver Osborn  1995 08/17/2023    Patient Active Problem List   Diagnosis    Dizziness    Elevated troponin       Dear Geri Belcher APRN:    Subjective     Chief Complaint   Patient presents with    Follow-up       HPI:     This is a 28 y.o. male with known past medical history of diabetes mellitus type 2.     Oliver Osborn presents today for routine cardiology follow up. Patient was admitted to [facility] in April 2021 with nausea and vomiting suspected to be cyclic vomiting syndrome secondary to cannabinoid hyperemesis syndrome due to marijuana use. He did have elevated troponin therefore echocardiogram was obtained which showed normal LV function. Nuclear stress test showed no evidence of ischemia.  Patient states that he has been doing overall well since being discharge.  Denies any chest pain, shortness of breath or palpitations.  Blood pressure well controlled.  Reports he follows with PCP for his diabetes.  Does report some fatigue and difficulty sleeping.    Diagnostic Testing  Treadmill stress test 12/2022: Findings consistent with a normal ECG stress test  Cardiac event monitor 11/2022: Normal sinus rhythm with rare PACs and PVCs.     All other systems were reviewed and were negative.    Patient Active Problem List   Diagnosis    Dizziness    Elevated troponin       family history includes Diabetes in his father, maternal grandfather, maternal grandmother, mother, paternal grandfather, and paternal grandmother; Heart disease in his father, maternal grandfather, maternal grandmother, mother, paternal grandfather, and paternal grandmother; Kidney disease in his mother.     reports that he has been smoking cigarettes. He has a 30.00 pack-year smoking history. He has never used smokeless tobacco. He reports that he does not currently use drugs after having used the following  drugs: Marijuana. Frequency: 7.00 times per week. He reports that he does not drink alcohol.    No Known Allergies      Current Outpatient Medications:     aspirin 81 MG EC tablet, Take 1 tablet by mouth Daily., Disp: 30 tablet, Rfl: 0    Insulin Glargine (Lantus SoloStar) 100 UNIT/ML injection pen, ADMINISTER 10 UNITS UNDER THE SKIN DAILY AS DIRECTED, Disp: , Rfl:     lisinopril (PRINIVIL,ZESTRIL) 5 MG tablet, Take 1 tablet by mouth Daily. (Patient not taking: Reported on 8/17/2023), Disp: 30 tablet, Rfl: 0    metFORMIN (GLUCOPHAGE) 1000 MG tablet, Take 1 tablet by mouth 2 (Two) Times a Day With Meals. (Patient not taking: Reported on 8/17/2023), Disp: , Rfl:     ondansetron (ZOFRAN) 4 MG tablet, Take 1 tablet by mouth Every 6 (Six) Hours As Needed for Nausea or Vomiting. (Patient not taking: Reported on 8/17/2023), Disp: 30 tablet, Rfl: 1      Physical Exam:  I have reviewed the patient's current vital signs as documented in the patient's EMR.   Vitals:    08/17/23 1336   BP: 120/78   Pulse: 64   SpO2: 96%     Body mass index is 32.36 kg/mý.       08/17/23  1336   Weight: 93.7 kg (206 lb 9.6 oz)      Physical Exam  Constitutional:       General: He is not in acute distress.     Appearance: Normal appearance. He is well-developed.   HENT:      Head: Normocephalic and atraumatic.      Mouth/Throat:      Mouth: Mucous membranes are moist.   Eyes:      Extraocular Movements: Extraocular movements intact.      Pupils: Pupils are equal, round, and reactive to light.   Neck:      Vascular: No JVD.   Cardiovascular:      Rate and Rhythm: Normal rate and regular rhythm.      Heart sounds: Normal heart sounds. No murmur heard.    No S3 or S4 sounds.   Pulmonary:      Effort: Pulmonary effort is normal. No respiratory distress.      Breath sounds: Normal breath sounds. No wheezing.   Abdominal:      General: Bowel sounds are normal. There is no distension.      Palpations: Abdomen is soft. There is no hepatomegaly.       Tenderness: There is no abdominal tenderness.   Musculoskeletal:         General: Normal range of motion.      Cervical back: Normal range of motion and neck supple.   Skin:     General: Skin is warm and dry.      Coloration: Skin is not jaundiced or pale.   Neurological:      General: No focal deficit present.      Mental Status: He is alert and oriented to person, place, and time. Mental status is at baseline.   Psychiatric:         Mood and Affect: Mood normal.         Behavior: Behavior normal.         Thought Content: Thought content normal.         Judgment: Judgment normal.          DATA REVIEWED:     TTE/MARILYNN:  Results for orders placed during the hospital encounter of 04/19/23    Adult Transthoracic Echo Complete W/ Cont if Necessary Per Protocol    Interpretation Summary    Left ventricular systolic function is normal. Estimated left ventricular EF = 65%    All left ventricular wall segments contract normally.    Left ventricular diastolic function was normal.    The cardiac chamber dimensions are normal.    All valves appear normal in structure and showed no stenosis or significant regurgitations.    No evidence of pulmonary hypertension is present.    There is no evidence of pericardial effusion.      Laboratory evaluations:    Lab Results   Component Value Date    GLUCOSE 154 (H) 04/19/2023    BUN 17 04/19/2023    CREATININE 1.26 04/19/2023    EGFRIFNONA 83 01/23/2022    BCR 13.5 04/19/2023    K 4.0 04/19/2023    CO2 25.2 04/19/2023    CALCIUM 9.5 04/19/2023    PROTENTOTREF 7.2 04/28/2022    ALBUMIN 4.3 04/19/2023    LABIL2 1.3 04/28/2022    AST 17 04/19/2023    ALT 15 04/19/2023     Lab Results   Component Value Date    WBC 11.60 (H) 04/19/2023    HGB 13.7 04/19/2023    HCT 39.7 04/19/2023    MCV 88.4 04/19/2023     04/19/2023     Lab Results   Component Value Date    CHLPL 254 (H) 04/28/2022    TRIG 171 (H) 04/17/2023    HDL 31 (L) 04/28/2022     (H) 04/28/2022     Lab Results   Component  Value Date    TSH 2.850 05/30/2022     Lab Results   Component Value Date    HGBA1C 7.80 (H) 11/06/2022     Lab Results   Component Value Date    ALT 15 04/19/2023     Lab Results   Component Value Date    HGBA1C 7.80 (H) 11/06/2022    HGBA1C 9.90 (H) 05/30/2022    HGBA1C 11.30 (H) 04/28/2022     Lab Results   Component Value Date    MICROALBUR 1,651.7 04/28/2022    CREATININE 1.26 04/19/2023     No results found for: IRON, TIBC, FERRITIN  No results found for: INR, PROTIME        --------------------------------------------------------------------------------------------------------------------------    ASSESSMENT/PLAN:      Diagnosis Plan   1. Elevated troponin            Elevated troponin  Patient with recent hospitalization for nausea and vomiting suspected to be cyclic vomiting syndrome secondary to cannabinoid hyperemesis.  Noted to have mildly elevated troponin with a negative delta.  He did undergo nuclear stress test which was negative for ischemia.  Echocardiogram showed normal LV function.  Denies any symptoms. No further work up at this time.       This document has been @Electronically signed by ALEXANDER Amezcua, 08/17/23, 1:43 PM EDT.       Dictated Utilizing Dragon Dictation: Part of this note may be an electronic transcription/translation of spoken language to printed text using the Dragon Dictation System.    Follow-up appointment and medication changes provided in hand delivered After Visit Summary as well as reviewed in the room.

## 2023-08-19 ENCOUNTER — APPOINTMENT (OUTPATIENT)
Dept: CT IMAGING | Facility: HOSPITAL | Age: 28
End: 2023-08-19
Payer: MEDICAID

## 2023-08-19 ENCOUNTER — APPOINTMENT (OUTPATIENT)
Dept: GENERAL RADIOLOGY | Facility: HOSPITAL | Age: 28
End: 2023-08-19
Payer: MEDICAID

## 2023-08-19 ENCOUNTER — HOSPITAL ENCOUNTER (EMERGENCY)
Facility: HOSPITAL | Age: 28
Discharge: HOME | End: 2023-08-20
Attending: EMERGENCY MEDICINE
Payer: MEDICAID

## 2023-08-19 DIAGNOSIS — F12.188 CANNABIS HYPEREMESIS SYNDROME CONCURRENT WITH AND DUE TO CANNABIS ABUSE: Primary | ICD-10-CM

## 2023-08-19 DIAGNOSIS — R11.2 NAUSEA AND VOMITING, UNSPECIFIED VOMITING TYPE: ICD-10-CM

## 2023-08-19 LAB
A-A DO2: 25 MMHG (ref 0–300)
ACETONE BLD QL: NEGATIVE
ALBUMIN SERPL-MCNC: 4.8 G/DL (ref 3.5–5.2)
ALBUMIN/GLOB SERPL: 1.5 G/DL
ALP SERPL-CCNC: 62 U/L (ref 39–117)
ALT SERPL W P-5'-P-CCNC: 16 U/L (ref 1–41)
ANION GAP SERPL CALCULATED.3IONS-SCNC: 15.6 MMOL/L (ref 5–15)
ANION GAP SERPL CALCULATED.3IONS-SCNC: 16.9 MMOL/L (ref 5–15)
APTT PPP: 29.9 SECONDS (ref 26.5–34.5)
ARTERIAL PATENCY WRIST A: ABNORMAL
AST SERPL-CCNC: 16 U/L (ref 1–40)
ATMOSPHERIC PRESS: 729 MMHG
BASE EXCESS BLDA CALC-SCNC: 8.5 MMOL/L (ref 0–2)
BASOPHILS # BLD AUTO: 0.05 10*3/MM3 (ref 0–0.2)
BASOPHILS NFR BLD AUTO: 0.3 % (ref 0–1.5)
BDY SITE: ABNORMAL
BILIRUB SERPL-MCNC: 0.9 MG/DL (ref 0–1.2)
BUN SERPL-MCNC: 43 MG/DL (ref 6–20)
BUN SERPL-MCNC: 47 MG/DL (ref 6–20)
BUN/CREAT SERPL: 20.9 (ref 7–25)
BUN/CREAT SERPL: 23.9 (ref 7–25)
CALCIUM SPEC-SCNC: 10 MG/DL (ref 8.6–10.5)
CALCIUM SPEC-SCNC: 8.7 MG/DL (ref 8.6–10.5)
CHLORIDE SERPL-SCNC: 90 MMOL/L (ref 98–107)
CHLORIDE SERPL-SCNC: 98 MMOL/L (ref 98–107)
CO2 BLDA-SCNC: 33.5 MMOL/L (ref 22–33)
CO2 SERPL-SCNC: 24.4 MMOL/L (ref 22–29)
CO2 SERPL-SCNC: 29.1 MMOL/L (ref 22–29)
COHGB MFR BLD: 1.4 % (ref 0–5)
CREAT SERPL-MCNC: 1.8 MG/DL (ref 0.76–1.27)
CREAT SERPL-MCNC: 2.25 MG/DL (ref 0.76–1.27)
D-LACTATE SERPL-SCNC: 2 MMOL/L (ref 0.5–2)
D-LACTATE SERPL-SCNC: 2.3 MMOL/L (ref 0.5–2)
DEPRECATED RDW RBC AUTO: 41.3 FL (ref 37–54)
EGFRCR SERPLBLD CKD-EPI 2021: 39.2 ML/MIN/1.73
EGFRCR SERPLBLD CKD-EPI 2021: 51.3 ML/MIN/1.73
EOSINOPHIL # BLD AUTO: 0.01 10*3/MM3 (ref 0–0.4)
EOSINOPHIL NFR BLD AUTO: 0.1 % (ref 0.3–6.2)
ERYTHROCYTE [DISTWIDTH] IN BLOOD BY AUTOMATED COUNT: 13.3 % (ref 12.3–15.4)
GEN 5 2HR TROPONIN T REFLEX: 21 NG/L
GLOBULIN UR ELPH-MCNC: 3.2 GM/DL
GLUCOSE SERPL-MCNC: 169 MG/DL (ref 65–99)
GLUCOSE SERPL-MCNC: 222 MG/DL (ref 65–99)
HCO3 BLDA-SCNC: 32.2 MMOL/L (ref 20–26)
HCT VFR BLD AUTO: 41.7 % (ref 37.5–51)
HCT VFR BLD CALC: 44.9 % (ref 38–51)
HGB BLD-MCNC: 14 G/DL (ref 13–17.7)
HGB BLDA-MCNC: 14.6 G/DL (ref 14–18)
IMM GRANULOCYTES # BLD AUTO: 0.1 10*3/MM3 (ref 0–0.05)
IMM GRANULOCYTES NFR BLD AUTO: 0.5 % (ref 0–0.5)
INHALED O2 CONCENTRATION: 21 %
INR PPP: 1.05 (ref 0.9–1.1)
LYMPHOCYTES # BLD AUTO: 1.28 10*3/MM3 (ref 0.7–3.1)
LYMPHOCYTES NFR BLD AUTO: 6.6 % (ref 19.6–45.3)
Lab: ABNORMAL
MCH RBC QN AUTO: 29 PG (ref 26.6–33)
MCHC RBC AUTO-ENTMCNC: 33.6 G/DL (ref 31.5–35.7)
MCV RBC AUTO: 86.5 FL (ref 79–97)
METHGB BLD QL: 0.1 % (ref 0–3)
MODALITY: ABNORMAL
MONOCYTES # BLD AUTO: 1.46 10*3/MM3 (ref 0.1–0.9)
MONOCYTES NFR BLD AUTO: 7.5 % (ref 5–12)
NEUTROPHILS NFR BLD AUTO: 16.64 10*3/MM3 (ref 1.7–7)
NEUTROPHILS NFR BLD AUTO: 85 % (ref 42.7–76)
NRBC BLD AUTO-RTO: 0 /100 WBC (ref 0–0.2)
OXYHGB MFR BLDV: 94.7 % (ref 94–99)
PCO2 BLDA: 40.2 MM HG (ref 35–45)
PCO2 TEMP ADJ BLD: ABNORMAL MM[HG]
PH BLDA: 7.51 PH UNITS (ref 7.35–7.45)
PH, TEMP CORRECTED: ABNORMAL
PLATELET # BLD AUTO: 344 10*3/MM3 (ref 140–450)
PMV BLD AUTO: 9.1 FL (ref 6–12)
PO2 BLDA: 72.8 MM HG (ref 83–108)
PO2 TEMP ADJ BLD: ABNORMAL MM[HG]
POTASSIUM SERPL-SCNC: 4 MMOL/L (ref 3.5–5.2)
POTASSIUM SERPL-SCNC: 4.1 MMOL/L (ref 3.5–5.2)
PROT SERPL-MCNC: 8 G/DL (ref 6–8.5)
PROTHROMBIN TIME: 14.2 SECONDS (ref 12.1–14.7)
RBC # BLD AUTO: 4.82 10*6/MM3 (ref 4.14–5.8)
SAO2 % BLDCOA: 96.1 % (ref 94–99)
SODIUM SERPL-SCNC: 136 MMOL/L (ref 136–145)
SODIUM SERPL-SCNC: 138 MMOL/L (ref 136–145)
TROPONIN T DELTA: -10 NG/L
TROPONIN T SERPL HS-MCNC: 31 NG/L
VENTILATOR MODE: ABNORMAL
WBC NRBC COR # BLD: 19.54 10*3/MM3 (ref 3.4–10.8)

## 2023-08-19 PROCEDURE — 70450 CT HEAD/BRAIN W/O DYE: CPT | Performed by: RADIOLOGY

## 2023-08-19 PROCEDURE — 36600 WITHDRAWAL OF ARTERIAL BLOOD: CPT

## 2023-08-19 PROCEDURE — 96366 THER/PROPH/DIAG IV INF ADDON: CPT

## 2023-08-19 PROCEDURE — 96367 TX/PROPH/DG ADDL SEQ IV INF: CPT

## 2023-08-19 PROCEDURE — 84484 ASSAY OF TROPONIN QUANT: CPT | Performed by: NURSE PRACTITIONER

## 2023-08-19 PROCEDURE — 71250 CT THORAX DX C-: CPT | Performed by: STUDENT IN AN ORGANIZED HEALTH CARE EDUCATION/TRAINING PROGRAM

## 2023-08-19 PROCEDURE — 93005 ELECTROCARDIOGRAM TRACING: CPT | Performed by: NURSE PRACTITIONER

## 2023-08-19 PROCEDURE — 25010000002 PROCHLORPERAZINE 10 MG/2ML SOLUTION: Performed by: NURSE PRACTITIONER

## 2023-08-19 PROCEDURE — 71045 X-RAY EXAM CHEST 1 VIEW: CPT

## 2023-08-19 PROCEDURE — 85025 COMPLETE CBC W/AUTO DIFF WBC: CPT | Performed by: NURSE PRACTITIONER

## 2023-08-19 PROCEDURE — 93010 ELECTROCARDIOGRAM REPORT: CPT | Performed by: INTERNAL MEDICINE

## 2023-08-19 PROCEDURE — 82375 ASSAY CARBOXYHB QUANT: CPT

## 2023-08-19 PROCEDURE — 83050 HGB METHEMOGLOBIN QUAN: CPT

## 2023-08-19 PROCEDURE — 70450 CT HEAD/BRAIN W/O DYE: CPT

## 2023-08-19 PROCEDURE — 96361 HYDRATE IV INFUSION ADD-ON: CPT

## 2023-08-19 PROCEDURE — 25010000002 PIPERACILLIN SOD-TAZOBACTAM PER 1 G: Performed by: NURSE PRACTITIONER

## 2023-08-19 PROCEDURE — 25010000002 ONDANSETRON PER 1 MG: Performed by: NURSE PRACTITIONER

## 2023-08-19 PROCEDURE — 71250 CT THORAX DX C-: CPT

## 2023-08-19 PROCEDURE — 83605 ASSAY OF LACTIC ACID: CPT | Performed by: NURSE PRACTITIONER

## 2023-08-19 PROCEDURE — 80053 COMPREHEN METABOLIC PANEL: CPT | Performed by: NURSE PRACTITIONER

## 2023-08-19 PROCEDURE — 36415 COLL VENOUS BLD VENIPUNCTURE: CPT

## 2023-08-19 PROCEDURE — 99284 EMERGENCY DEPT VISIT MOD MDM: CPT

## 2023-08-19 PROCEDURE — 74176 CT ABD & PELVIS W/O CONTRAST: CPT

## 2023-08-19 PROCEDURE — 25010000002 MORPHINE PER 10 MG: Performed by: NURSE PRACTITIONER

## 2023-08-19 PROCEDURE — 82805 BLOOD GASES W/O2 SATURATION: CPT

## 2023-08-19 PROCEDURE — 96376 TX/PRO/DX INJ SAME DRUG ADON: CPT

## 2023-08-19 PROCEDURE — 96375 TX/PRO/DX INJ NEW DRUG ADDON: CPT

## 2023-08-19 PROCEDURE — 85610 PROTHROMBIN TIME: CPT | Performed by: NURSE PRACTITIONER

## 2023-08-19 PROCEDURE — 85730 THROMBOPLASTIN TIME PARTIAL: CPT | Performed by: NURSE PRACTITIONER

## 2023-08-19 PROCEDURE — 87040 BLOOD CULTURE FOR BACTERIA: CPT | Performed by: NURSE PRACTITIONER

## 2023-08-19 PROCEDURE — 82009 KETONE BODYS QUAL: CPT | Performed by: NURSE PRACTITIONER

## 2023-08-19 PROCEDURE — 25010000002 VANCOMYCIN 5 G RECONSTITUTED SOLUTION: Performed by: NURSE PRACTITIONER

## 2023-08-19 PROCEDURE — 71045 X-RAY EXAM CHEST 1 VIEW: CPT | Performed by: RADIOLOGY

## 2023-08-19 PROCEDURE — 96365 THER/PROPH/DIAG IV INF INIT: CPT

## 2023-08-19 RX ORDER — PROCHLORPERAZINE EDISYLATE 5 MG/ML
10 INJECTION INTRAMUSCULAR; INTRAVENOUS ONCE
Status: COMPLETED | OUTPATIENT
Start: 2023-08-19 | End: 2023-08-19

## 2023-08-19 RX ORDER — PROCHLORPERAZINE MALEATE 10 MG
10 TABLET ORAL EVERY 6 HOURS PRN
Qty: 15 TABLET | Refills: 0 | Status: SHIPPED | OUTPATIENT
Start: 2023-08-19

## 2023-08-19 RX ORDER — ONDANSETRON 2 MG/ML
4 INJECTION INTRAMUSCULAR; INTRAVENOUS ONCE
Status: COMPLETED | OUTPATIENT
Start: 2023-08-19 | End: 2023-08-19

## 2023-08-19 RX ORDER — SODIUM CHLORIDE 0.9 % (FLUSH) 0.9 %
10 SYRINGE (ML) INJECTION AS NEEDED
Status: DISCONTINUED | OUTPATIENT
Start: 2023-08-19 | End: 2023-08-20 | Stop reason: HOSPADM

## 2023-08-19 RX ORDER — ONDANSETRON 4 MG/1
4 TABLET, ORALLY DISINTEGRATING ORAL EVERY 6 HOURS PRN
Qty: 12 TABLET | Refills: 0 | Status: SHIPPED | OUTPATIENT
Start: 2023-08-19

## 2023-08-19 RX ADMIN — VANCOMYCIN HYDROCHLORIDE 1750 MG: 5 INJECTION, POWDER, LYOPHILIZED, FOR SOLUTION INTRAVENOUS at 17:34

## 2023-08-19 RX ADMIN — ONDANSETRON 4 MG: 2 INJECTION INTRAMUSCULAR; INTRAVENOUS at 19:47

## 2023-08-19 RX ADMIN — MORPHINE SULFATE 4 MG: 4 INJECTION, SOLUTION INTRAMUSCULAR; INTRAVENOUS at 16:04

## 2023-08-19 RX ADMIN — SODIUM CHLORIDE 1000 ML: 9 INJECTION, SOLUTION INTRAVENOUS at 14:46

## 2023-08-19 RX ADMIN — PROCHLORPERAZINE EDISYLATE 10 MG: 5 INJECTION INTRAMUSCULAR; INTRAVENOUS at 21:09

## 2023-08-19 RX ADMIN — ONDANSETRON 4 MG: 2 INJECTION INTRAMUSCULAR; INTRAVENOUS at 14:48

## 2023-08-19 RX ADMIN — SODIUM CHLORIDE 1000 ML: 9 INJECTION, SOLUTION INTRAVENOUS at 20:45

## 2023-08-19 RX ADMIN — SODIUM CHLORIDE 1000 ML: 9 INJECTION, SOLUTION INTRAVENOUS at 16:04

## 2023-08-19 RX ADMIN — SODIUM CHLORIDE 1000 ML: 9 INJECTION, SOLUTION INTRAVENOUS at 21:46

## 2023-08-19 RX ADMIN — PIPERACILLIN SODIUM AND TAZOBACTAM SODIUM 3.38 G: 3; .375 INJECTION, POWDER, LYOPHILIZED, FOR SOLUTION INTRAVENOUS at 16:59

## 2023-08-19 NOTE — ED PROVIDER NOTES
Subjective   History of Present Illness  Patient is a 28-year-old male presents emerged today with complaint of chest pain and vomiting.  Patient also reports he had 2 episodes of syncope last couple days.  Patient reports he has had this in the past and states that he does have a history of insulin-dependent diabetes.      Review of Systems   Constitutional: Negative.    HENT: Negative.     Eyes: Negative.    Respiratory: Negative.     Cardiovascular: Negative.    Gastrointestinal:  Positive for nausea and vomiting.   Endocrine: Negative.    Genitourinary: Negative.    Musculoskeletal: Negative.    Skin: Negative.    Allergic/Immunologic: Negative.    Neurological: Negative.    Hematological: Negative.    Psychiatric/Behavioral: Negative.       Past Medical History:   Diagnosis Date    Diabetes mellitus     Tobacco abuse        No Known Allergies    Past Surgical History:   Procedure Laterality Date    ENDOSCOPY N/A 6/1/2022    Procedure: ESOPHAGOGASTRODUODENOSCOPY WITH ANESTHESIA;  Surgeon: Keon Quezada MD;  [facility]: [facility];  Service: Gastroenterology;  Laterality: N/A;       Family History   Problem Relation Age of Onset    Heart disease Mother     Diabetes Mother     Kidney disease Mother     Heart disease Father     Diabetes Father     Heart disease Maternal Grandmother     Diabetes Maternal Grandmother     Heart disease Maternal Grandfather     Diabetes Maternal Grandfather     Heart disease Paternal Grandmother     Diabetes Paternal Grandmother     Heart disease Paternal Grandfather     Diabetes Paternal Grandfather        Social History     Socioeconomic History    Marital status: Single   Tobacco Use    Smoking status: Every Day     Packs/day: 2.00     Years: 15.00     Pack years: 30.00     Types: Cigarettes    Smokeless tobacco: Never   Vaping Use    Vaping Use: Former    Substances: Nicotine, Flavoring    Devices: Disposable    Passive vaping exposure: Yes   Substance and Sexual Activity     Alcohol use: Never    Drug use: Not Currently     Frequency: 7.0 times per week     Types: Marijuana    Sexual activity: Defer           Objective   Physical Exam  Vitals and nursing note reviewed.   Constitutional:       Appearance: He is well-developed.   HENT:      Head: Normocephalic.      Right Ear: External ear normal.      Left Ear: External ear normal.   Eyes:      Conjunctiva/sclera: Conjunctivae normal.      Pupils: Pupils are equal, round, and reactive to light.   Cardiovascular:      Rate and Rhythm: Normal rate and regular rhythm.      Heart sounds: Normal heart sounds.   Pulmonary:      Effort: Pulmonary effort is normal.      Breath sounds: Normal breath sounds.   Abdominal:      General: Bowel sounds are normal.      Palpations: Abdomen is soft.   Musculoskeletal:         General: Normal range of motion.      Cervical back: Normal range of motion and neck supple.   Skin:     General: Skin is warm and dry.      Capillary Refill: Capillary refill takes less than 2 seconds.   Neurological:      Mental Status: He is alert and oriented to person, place, and time.   Psychiatric:         Behavior: Behavior normal.         Thought Content: Thought content normal.       Procedures           ED Course  ED Course as of 08/19/23 2135   Sat Aug 19, 2023   1422 ECG 12 Lead Syncope  Normal sinus rhythm.  Rate 65.  Normal axis.  Normal QTc.  No acute ischemic changes.  Normal EKG.  Interpreted by me.  Electronically signed by Lopez Santana MD, 08/19/23, 2:22 PM EDT.   [BC]      ED Course User Index  [BC] Lopez Santana MD                                           Medical Decision Making  Problems Addressed:  Cannabis hyperemesis syndrome concurrent with and due to cannabis abuse: complicated acute illness or injury  Nausea and vomiting, unspecified vomiting type: complicated acute illness or injury    Amount and/or Complexity of Data Reviewed  Labs: ordered.  Radiology: ordered.  ECG/medicine tests: ordered.  Decision-making details documented in ED Course.    Risk  Prescription drug management.        Final diagnoses:   Cannabis hyperemesis syndrome concurrent with and due to cannabis abuse   Nausea and vomiting, unspecified vomiting type       ED Disposition  ED Disposition       ED Disposition   Discharge    Condition   Stable    Comment   --               Geri Belcher, APRN  [address] [facility] [address] [place] [address]  [phone]    Schedule an appointment as soon as possible for a visit   For further evaluation         Medication List        New Prescriptions      ondansetron ODT 4 MG disintegrating tablet  Commonly known as: ZOFRAN-ODT  Place 1 tablet on the tongue Every 6 (Six) Hours As Needed for Nausea or Vomiting.     prochlorperazine 10 MG tablet  Commonly known as: COMPAZINE  Take 1 tablet by mouth Every 6 (Six) Hours As Needed for Nausea or Vomiting.               Where to Get Your Medications        You can get these medications from any pharmacy    Bring a paper prescription for each of these medications  ondansetron ODT 4 MG disintegrating tablet  prochlorperazine 10 MG tablet            Nas Jung, APRN  08/19/23 2339

## 2023-08-20 VITALS
RESPIRATION RATE: 18 BRPM | BODY MASS INDEX: 33.11 KG/M2 | TEMPERATURE: 98.4 F | OXYGEN SATURATION: 100 % | DIASTOLIC BLOOD PRESSURE: 75 MMHG | HEIGHT: 66 IN | WEIGHT: 206 LBS | HEART RATE: 76 BPM | SYSTOLIC BLOOD PRESSURE: 158 MMHG

## 2023-08-20 LAB
QT INTERVAL: 432 MS
QTC INTERVAL: 449 MS

## 2023-08-24 LAB
BACTERIA SPEC AEROBE CULT: NORMAL
BACTERIA SPEC AEROBE CULT: NORMAL

## 2023-08-28 ENCOUNTER — APPOINTMENT (OUTPATIENT)
Dept: GENERAL RADIOLOGY | Facility: HOSPITAL | Age: 28
End: 2023-08-28
Payer: MEDICAID

## 2023-08-28 ENCOUNTER — HOSPITAL ENCOUNTER (EMERGENCY)
Facility: HOSPITAL | Age: 28
Discharge: HOME | End: 2023-08-28
Attending: STUDENT IN AN ORGANIZED HEALTH CARE EDUCATION/TRAINING PROGRAM | Admitting: STUDENT IN AN ORGANIZED HEALTH CARE EDUCATION/TRAINING PROGRAM
Payer: MEDICAID

## 2023-08-28 VITALS
HEIGHT: 66 IN | RESPIRATION RATE: 20 BRPM | WEIGHT: 206 LBS | OXYGEN SATURATION: 100 % | SYSTOLIC BLOOD PRESSURE: 140 MMHG | BODY MASS INDEX: 33.11 KG/M2 | DIASTOLIC BLOOD PRESSURE: 82 MMHG | TEMPERATURE: 97.3 F | HEART RATE: 67 BPM

## 2023-08-28 DIAGNOSIS — R07.9 CHEST PAIN, UNSPECIFIED TYPE: Primary | ICD-10-CM

## 2023-08-28 LAB
ALBUMIN SERPL-MCNC: 3.7 G/DL (ref 3.5–5.2)
ALBUMIN/GLOB SERPL: 1.3 G/DL
ALP SERPL-CCNC: 63 U/L (ref 39–117)
ALT SERPL W P-5'-P-CCNC: 13 U/L (ref 1–41)
ANION GAP SERPL CALCULATED.3IONS-SCNC: 11.7 MMOL/L (ref 5–15)
AST SERPL-CCNC: 11 U/L (ref 1–40)
BASOPHILS # BLD AUTO: 0.12 10*3/MM3 (ref 0–0.2)
BASOPHILS NFR BLD AUTO: 1 % (ref 0–1.5)
BILIRUB SERPL-MCNC: 0.2 MG/DL (ref 0–1.2)
BUN SERPL-MCNC: 26 MG/DL (ref 6–20)
BUN/CREAT SERPL: 17.2 (ref 7–25)
CALCIUM SPEC-SCNC: 9.1 MG/DL (ref 8.6–10.5)
CHLORIDE SERPL-SCNC: 103 MMOL/L (ref 98–107)
CO2 SERPL-SCNC: 20.3 MMOL/L (ref 22–29)
CREAT SERPL-MCNC: 1.51 MG/DL (ref 0.76–1.27)
D DIMER PPP FEU-MCNC: 0.3 MCGFEU/ML (ref 0–0.5)
DEPRECATED RDW RBC AUTO: 42 FL (ref 37–54)
EGFRCR SERPLBLD CKD-EPI 2021: 63.3 ML/MIN/1.73
EOSINOPHIL # BLD AUTO: 1.55 10*3/MM3 (ref 0–0.4)
EOSINOPHIL NFR BLD AUTO: 12.5 % (ref 0.3–6.2)
ERYTHROCYTE [DISTWIDTH] IN BLOOD BY AUTOMATED COUNT: 12.7 % (ref 12.3–15.4)
FLUAV RNA RESP QL NAA+PROBE: NOT DETECTED
FLUBV RNA RESP QL NAA+PROBE: NOT DETECTED
GEN 5 2HR TROPONIN T REFLEX: 18 NG/L
GLOBULIN UR ELPH-MCNC: 2.9 GM/DL
GLUCOSE SERPL-MCNC: 190 MG/DL (ref 65–99)
HCT VFR BLD AUTO: 37.8 % (ref 37.5–51)
HGB BLD-MCNC: 12.4 G/DL (ref 13–17.7)
HOLD SPECIMEN: NORMAL
HOLD SPECIMEN: NORMAL
IMM GRANULOCYTES # BLD AUTO: 0.06 10*3/MM3 (ref 0–0.05)
IMM GRANULOCYTES NFR BLD AUTO: 0.5 % (ref 0–0.5)
LYMPHOCYTES # BLD AUTO: 2.03 10*3/MM3 (ref 0.7–3.1)
LYMPHOCYTES NFR BLD AUTO: 16.4 % (ref 19.6–45.3)
MCH RBC QN AUTO: 29.7 PG (ref 26.6–33)
MCHC RBC AUTO-ENTMCNC: 32.8 G/DL (ref 31.5–35.7)
MCV RBC AUTO: 90.4 FL (ref 79–97)
MONOCYTES # BLD AUTO: 0.91 10*3/MM3 (ref 0.1–0.9)
MONOCYTES NFR BLD AUTO: 7.3 % (ref 5–12)
NEUTROPHILS NFR BLD AUTO: 62.3 % (ref 42.7–76)
NEUTROPHILS NFR BLD AUTO: 7.74 10*3/MM3 (ref 1.7–7)
NRBC BLD AUTO-RTO: 0 /100 WBC (ref 0–0.2)
PLATELET # BLD AUTO: 316 10*3/MM3 (ref 140–450)
PMV BLD AUTO: 9 FL (ref 6–12)
POTASSIUM SERPL-SCNC: 4.5 MMOL/L (ref 3.5–5.2)
PROT SERPL-MCNC: 6.6 G/DL (ref 6–8.5)
QT INTERVAL: 384 MS
QTC INTERVAL: 426 MS
RBC # BLD AUTO: 4.18 10*6/MM3 (ref 4.14–5.8)
SARS-COV-2 RNA RESP QL NAA+PROBE: NOT DETECTED
SODIUM SERPL-SCNC: 135 MMOL/L (ref 136–145)
TROPONIN T DELTA: 1 NG/L
TROPONIN T SERPL HS-MCNC: 17 NG/L
WBC NRBC COR # BLD: 12.41 10*3/MM3 (ref 3.4–10.8)
WHOLE BLOOD HOLD COAG: NORMAL
WHOLE BLOOD HOLD SPECIMEN: NORMAL

## 2023-08-28 PROCEDURE — 85025 COMPLETE CBC W/AUTO DIFF WBC: CPT | Performed by: PHYSICIAN ASSISTANT

## 2023-08-28 PROCEDURE — 87636 SARSCOV2 & INF A&B AMP PRB: CPT | Performed by: PHYSICIAN ASSISTANT

## 2023-08-28 PROCEDURE — 80053 COMPREHEN METABOLIC PANEL: CPT | Performed by: PHYSICIAN ASSISTANT

## 2023-08-28 PROCEDURE — 99284 EMERGENCY DEPT VISIT MOD MDM: CPT

## 2023-08-28 PROCEDURE — 93005 ELECTROCARDIOGRAM TRACING: CPT | Performed by: STUDENT IN AN ORGANIZED HEALTH CARE EDUCATION/TRAINING PROGRAM

## 2023-08-28 PROCEDURE — 93010 ELECTROCARDIOGRAM REPORT: CPT | Performed by: INTERNAL MEDICINE

## 2023-08-28 PROCEDURE — 71045 X-RAY EXAM CHEST 1 VIEW: CPT | Performed by: RADIOLOGY

## 2023-08-28 PROCEDURE — 36415 COLL VENOUS BLD VENIPUNCTURE: CPT

## 2023-08-28 PROCEDURE — 85379 FIBRIN DEGRADATION QUANT: CPT | Performed by: PHYSICIAN ASSISTANT

## 2023-08-28 PROCEDURE — 84484 ASSAY OF TROPONIN QUANT: CPT | Performed by: PHYSICIAN ASSISTANT

## 2023-08-28 PROCEDURE — 71045 X-RAY EXAM CHEST 1 VIEW: CPT

## 2023-08-28 RX ORDER — SODIUM CHLORIDE 0.9 % (FLUSH) 0.9 %
10 SYRINGE (ML) INJECTION AS NEEDED
Status: DISCONTINUED | OUTPATIENT
Start: 2023-08-28 | End: 2023-08-28 | Stop reason: HOSPADM

## 2023-08-28 RX ORDER — ONDANSETRON 4 MG/1
4 TABLET, FILM COATED ORAL EVERY 6 HOURS PRN
Qty: 15 TABLET | Refills: 0 | Status: SHIPPED | OUTPATIENT
Start: 2023-08-28

## 2023-08-28 RX ORDER — ASPIRIN 81 MG/1
324 TABLET, CHEWABLE ORAL ONCE
Status: DISCONTINUED | OUTPATIENT
Start: 2023-08-28 | End: 2023-08-28 | Stop reason: HOSPADM

## 2023-08-28 NOTE — ED PROVIDER NOTES
Subjective   History of Present Illness  28-year-old male presents secondary to chest pain states he was at work and he had some shooting pains throughout chest.  Patient states that the symptoms have now resolved.  He states he feels fine at this point.  Patient states that he is diabetic and has been out of his insulin over the past couple days.  He denies any abdominal pain.  No nausea vomiting or diarrhea.  He voices no other plaints this time.    Review of Systems   Constitutional: Negative.  Negative for fever.   HENT: Negative.     Respiratory: Negative.     Cardiovascular:  Positive for chest pain.   Gastrointestinal: Negative.  Negative for abdominal pain.   Endocrine: Negative.    Genitourinary: Negative.  Negative for dysuria.   Skin: Negative.    Neurological: Negative.    Psychiatric/Behavioral: Negative.     All other systems reviewed and are negative.    Past Medical History:   Diagnosis Date    Diabetes mellitus     Tobacco abuse        No Known Allergies    Past Surgical History:   Procedure Laterality Date    ENDOSCOPY N/A 6/1/2022    Procedure: ESOPHAGOGASTRODUODENOSCOPY WITH ANESTHESIA;  Surgeon: Keon Quezada MD;  Location: [facility];  Service: Gastroenterology;  Laterality: N/A;       Family History   Problem Relation Age of Onset    Heart disease Mother     Diabetes Mother     Kidney disease Mother     Heart disease Father     Diabetes Father     Heart disease Maternal Grandmother     Diabetes Maternal Grandmother     Heart disease Maternal Grandfather     Diabetes Maternal Grandfather     Heart disease Paternal Grandmother     Diabetes Paternal Grandmother     Heart disease Paternal Grandfather     Diabetes Paternal Grandfather        Social History     Socioeconomic History    Marital status: Single   Tobacco Use    Smoking status: Every Day     Packs/day: 2.00     Years: 15.00     Pack years: 30.00     Types: Cigarettes    Smokeless tobacco: Never   Vaping Use    Vaping Use: Former     Substances: Nicotine, Flavoring    Devices: Disposable    Passive vaping exposure: Yes   Substance and Sexual Activity    Alcohol use: Never    Drug use: Not Currently     Frequency: 7.0 times per week     Types: Marijuana    Sexual activity: Defer           Objective   Physical Exam  Vitals and nursing note reviewed.   Constitutional:       General: He is not in acute distress.     Appearance: He is well-developed. He is not diaphoretic.   HENT:      Head: Normocephalic and atraumatic.      Right Ear: External ear normal.      Left Ear: External ear normal.      Nose: Nose normal.   Eyes:      Conjunctiva/sclera: Conjunctivae normal.      Pupils: Pupils are equal, round, and reactive to light.   Neck:      Vascular: No JVD.      Trachea: No tracheal deviation.   Cardiovascular:      Rate and Rhythm: Normal rate and regular rhythm.      Heart sounds: Normal heart sounds. No murmur heard.  Pulmonary:      Effort: Pulmonary effort is normal. No respiratory distress.      Breath sounds: Normal breath sounds. No wheezing.   Abdominal:      General: Bowel sounds are normal.      Palpations: Abdomen is soft.      Tenderness: There is no abdominal tenderness.   Musculoskeletal:         General: No deformity. Normal range of motion.      Cervical back: Normal range of motion and neck supple.   Skin:     General: Skin is warm and dry.      Coloration: Skin is not pale.      Findings: No erythema or rash.   Neurological:      Mental Status: He is alert and oriented to person, place, and time.      Cranial Nerves: No cranial nerve deficit.   Psychiatric:         Behavior: Behavior normal.         Thought Content: Thought content normal.       Procedures           ED Course  ED Course as of 08/28/23 1814   Mon Aug 28, 2023   1128 Renal function improved from before. [JI]   1311 Patient had normal stress test in April 2023 [JI]   1802   EKG ED Interpretation by: Cheryl Glasgow MD on 08/28/23 at 09:44  EKG: HR 74, normal EKG,  normal sinus rhythm.       Electronically signed by Cheryl Glasgow MD, 08/28/23, 6:03 PM EDT. [KH]      ED Course User Index  [JI] Oliver Johnson PA  [KH] Cheryl Glasgow MD           Results for orders placed or performed during the hospital encounter of 08/28/23   COVID-19 and FLU A/B PCR - Swab, Nasopharynx    Specimen: Nasopharynx; Swab   Result Value Ref Range    COVID19 Not Detected Not Detected - Ref. Range    Influenza A PCR Not Detected Not Detected    Influenza B PCR Not Detected Not Detected   Comprehensive Metabolic Panel    Specimen: Blood   Result Value Ref Range    Glucose 190 (H) 65 - 99 mg/dL    BUN 26 (H) 6 - 20 mg/dL    Creatinine 1.51 (H) 0.76 - 1.27 mg/dL    Sodium 135 (L) 136 - 145 mmol/L    Potassium 4.5 3.5 - 5.2 mmol/L    Chloride 103 98 - 107 mmol/L    CO2 20.3 (L) 22.0 - 29.0 mmol/L    Calcium 9.1 8.6 - 10.5 mg/dL    Total Protein 6.6 6.0 - 8.5 g/dL    Albumin 3.7 3.5 - 5.2 g/dL    ALT (SGPT) 13 1 - 41 U/L    AST (SGOT) 11 1 - 40 U/L    Alkaline Phosphatase 63 39 - 117 U/L    Total Bilirubin 0.2 0.0 - 1.2 mg/dL    Globulin 2.9 gm/dL    A/G Ratio 1.3 g/dL    BUN/Creatinine Ratio 17.2 7.0 - 25.0    Anion Gap 11.7 5.0 - 15.0 mmol/L    eGFR 63.3 >60.0 mL/min/1.73   High Sensitivity Troponin T    Specimen: Blood   Result Value Ref Range    HS Troponin T 17 (H) <15 ng/L   CBC Auto Differential    Specimen: Blood   Result Value Ref Range    WBC 12.41 (H) 3.40 - 10.80 10*3/mm3    RBC 4.18 4.14 - 5.80 10*6/mm3    Hemoglobin 12.4 (L) 13.0 - 17.7 g/dL    Hematocrit 37.8 37.5 - 51.0 %    MCV 90.4 79.0 - 97.0 fL    MCH 29.7 26.6 - 33.0 pg    MCHC 32.8 31.5 - 35.7 g/dL    RDW 12.7 12.3 - 15.4 %    RDW-SD 42.0 37.0 - 54.0 fl    MPV 9.0 6.0 - 12.0 fL    Platelets 316 140 - 450 10*3/mm3    Neutrophil % 62.3 42.7 - 76.0 %    Lymphocyte % 16.4 (L) 19.6 - 45.3 %    Monocyte % 7.3 5.0 - 12.0 %    Eosinophil % 12.5 (H) 0.3 - 6.2 %    Basophil % 1.0 0.0 - 1.5 %    Immature Grans % 0.5 0.0 - 0.5 %    Neutrophils,  Absolute 7.74 (H) 1.70 - 7.00 10*3/mm3    Lymphocytes, Absolute 2.03 0.70 - 3.10 10*3/mm3    Monocytes, Absolute 0.91 (H) 0.10 - 0.90 10*3/mm3    Eosinophils, Absolute 1.55 (H) 0.00 - 0.40 10*3/mm3    Basophils, Absolute 0.12 0.00 - 0.20 10*3/mm3    Immature Grans, Absolute 0.06 (H) 0.00 - 0.05 10*3/mm3    nRBC 0.0 0.0 - 0.2 /100 WBC   D-dimer, Quantitative    Specimen: Blood   Result Value Ref Range    D-Dimer, Quantitative 0.30 0.00 - 0.50 MCGFEU/mL   High Sensitivity Troponin T 2Hr    Specimen: Arm, Left; Blood   Result Value Ref Range    HS Troponin T 18 (H) <15 ng/L    Troponin T Delta 1 >=-4 - <+4 ng/L   ECG 12 Lead Chest Pain   Result Value Ref Range    QT Interval 384 ms    QTC Interval 426 ms   Green Top (Gel)   Result Value Ref Range    Extra Tube Hold for add-ons.    Lavender Top   Result Value Ref Range    Extra Tube hold for add-on    Gold Top - SST   Result Value Ref Range    Extra Tube Hold for add-ons.    Light Blue Top   Result Value Ref Range    Extra Tube Hold for add-ons.      XR Chest 1 View    Result Date: 8/28/2023    Unremarkable exam. No acute cardiopulmonary findings identified.  This report was finalized on 8/28/2023 10:41 AM by Dr. Prasanth Jones MD.                  HEART Score: 2                      Medical Decision Making  28-year-old male presents secondary to chest pain states he was at work and he had some shooting pains throughout chest.  Patient states that the symptoms have now resolved.  He states he feels fine at this point.  Patient states that he is diabetic and has been out of his insulin over the past couple days.  He denies any abdominal pain.  No nausea vomiting or diarrhea.  He voices no other plaints this time.    Problems Addressed:  Chest pain, unspecified type: complicated acute illness or injury    Amount and/or Complexity of Data Reviewed  Labs: ordered. Decision-making details documented in ED Course.  Radiology: ordered. Decision-making details documented in ED  Course.  ECG/medicine tests: ordered.    Risk  Prescription drug management.  Risk Details: Patient was counseled about diagnostic work-up and labs.  He is counseled on signs and symptoms worsening on appropriate follow-up.  He voices understanding.  Patient had a stress test in April which was normal.        Final diagnoses:   Chest pain, unspecified type       ED Disposition  ED Disposition       ED Disposition   Discharge    Condition   Stable    Comment   --               Geri Belcher ZAKIA, APRN  [address]  [facility] [id number] [phone]    Schedule an appointment as soon as possible for a visit       Eric Montes MD  [address]  [facility] [id number] [phone]    Schedule an appointment as soon as possible for a visit       Ashley Simon MD  [address] [place] [id number] [phone]    Schedule an appointment as soon as possible for a visit            Medication List        Changed      * ondansetron 4 MG tablet  Commonly known as: ZOFRAN  Take 1 tablet by mouth Every 6 (Six) Hours As Needed for Nausea or Vomiting.  What changed: Another medication with the same name was added. Make sure you understand how and when to take each.     * ondansetron 4 MG tablet  Commonly known as: ZOFRAN  Take 1 tablet by mouth Every 6 (Six) Hours As Needed for Nausea or Vomiting.  What changed: You were already taking a medication with the same name, and this prescription was added. Make sure you understand how and when to take each.           * This list has 2 medication(s) that are the same as other medications prescribed for you. Read the directions carefully, and ask your doctor or other care provider to review them with you.                   Where to Get Your Medications        You can get these medications from any pharmacy    Bring a paper prescription for each of these medications  ondansetron 4 MG tablet            Oliver Johnson PA  08/28/23 1716       Oliver Johnson PA  08/28/23  1814

## 2023-09-05 ENCOUNTER — APPOINTMENT (OUTPATIENT)
Dept: CT IMAGING | Facility: HOSPITAL | Age: 28
End: 2023-09-05
Payer: MEDICAID

## 2023-09-05 ENCOUNTER — APPOINTMENT (OUTPATIENT)
Dept: GENERAL RADIOLOGY | Facility: HOSPITAL | Age: 28
End: 2023-09-05
Payer: MEDICAID

## 2023-09-05 ENCOUNTER — APPOINTMENT (OUTPATIENT)
Dept: ULTRASOUND IMAGING | Facility: HOSPITAL | Age: 28
End: 2023-09-05
Payer: MEDICAID

## 2023-09-05 ENCOUNTER — HOSPITAL ENCOUNTER (EMERGENCY)
Facility: HOSPITAL | Age: 28
Discharge: HOME | End: 2023-09-06
Attending: STUDENT IN AN ORGANIZED HEALTH CARE EDUCATION/TRAINING PROGRAM
Payer: MEDICAID

## 2023-09-05 VITALS
HEART RATE: 64 BPM | HEIGHT: 66 IN | TEMPERATURE: 98.5 F | BODY MASS INDEX: 33.11 KG/M2 | RESPIRATION RATE: 14 BRPM | SYSTOLIC BLOOD PRESSURE: 125 MMHG | DIASTOLIC BLOOD PRESSURE: 80 MMHG | OXYGEN SATURATION: 98 % | WEIGHT: 206 LBS

## 2023-09-05 DIAGNOSIS — K92.0 HEMATEMESIS, UNSPECIFIED WHETHER NAUSEA PRESENT: Primary | ICD-10-CM

## 2023-09-05 LAB
ALBUMIN SERPL-MCNC: 4.6 G/DL (ref 3.5–5.2)
ALBUMIN/GLOB SERPL: 1.3 G/DL
ALP SERPL-CCNC: 60 U/L (ref 39–117)
ALT SERPL W P-5'-P-CCNC: 15 U/L (ref 1–41)
ANION GAP SERPL CALCULATED.3IONS-SCNC: 13.8 MMOL/L (ref 5–15)
APTT PPP: 26.5 SECONDS (ref 26.5–34.5)
AST SERPL-CCNC: 12 U/L (ref 1–40)
BACTERIA UR QL AUTO: ABNORMAL /HPF
BASOPHILS # BLD AUTO: 0.05 10*3/MM3 (ref 0–0.2)
BASOPHILS NFR BLD AUTO: 0.4 % (ref 0–1.5)
BILIRUB SERPL-MCNC: 0.6 MG/DL (ref 0–1.2)
BILIRUB UR QL STRIP: ABNORMAL
BUN SERPL-MCNC: 33 MG/DL (ref 6–20)
BUN/CREAT SERPL: 16.8 (ref 7–25)
CALCIUM SPEC-SCNC: 10.4 MG/DL (ref 8.6–10.5)
CHLORIDE SERPL-SCNC: 93 MMOL/L (ref 98–107)
CLARITY UR: CLEAR
CO2 SERPL-SCNC: 27.2 MMOL/L (ref 22–29)
COLOR UR: ABNORMAL
CREAT SERPL-MCNC: 1.96 MG/DL (ref 0.76–1.27)
D-LACTATE SERPL-SCNC: 1.5 MMOL/L (ref 0.5–2)
DEPRECATED RDW RBC AUTO: 40.6 FL (ref 37–54)
EGFRCR SERPLBLD CKD-EPI 2021: 46.3 ML/MIN/1.73
EOSINOPHIL # BLD AUTO: 0.04 10*3/MM3 (ref 0–0.4)
EOSINOPHIL NFR BLD AUTO: 0.3 % (ref 0.3–6.2)
ERYTHROCYTE [DISTWIDTH] IN BLOOD BY AUTOMATED COUNT: 12.5 % (ref 12.3–15.4)
GEN 5 2HR TROPONIN T REFLEX: 26 NG/L
GLOBULIN UR ELPH-MCNC: 3.5 GM/DL
GLUCOSE BLDC GLUCOMTR-MCNC: 189 MG/DL (ref 70–130)
GLUCOSE SERPL-MCNC: 146 MG/DL (ref 65–99)
GLUCOSE UR STRIP-MCNC: NEGATIVE MG/DL
GRAN CASTS URNS QL MICRO: ABNORMAL /LPF
HCT VFR BLD AUTO: 43.5 % (ref 37.5–51)
HGB BLD-MCNC: 14.5 G/DL (ref 13–17.7)
HGB UR QL STRIP.AUTO: ABNORMAL
HOLD SPECIMEN: NORMAL
HOLD SPECIMEN: NORMAL
HYALINE CASTS UR QL AUTO: ABNORMAL /LPF
IMM GRANULOCYTES # BLD AUTO: 0.03 10*3/MM3 (ref 0–0.05)
IMM GRANULOCYTES NFR BLD AUTO: 0.3 % (ref 0–0.5)
INR PPP: 1.01 (ref 0.9–1.1)
KETONES UR QL STRIP: NEGATIVE
LEUKOCYTE ESTERASE UR QL STRIP.AUTO: NEGATIVE
LIPASE SERPL-CCNC: 25 U/L (ref 13–60)
LYMPHOCYTES # BLD AUTO: 2.46 10*3/MM3 (ref 0.7–3.1)
LYMPHOCYTES NFR BLD AUTO: 21 % (ref 19.6–45.3)
MCH RBC QN AUTO: 29.2 PG (ref 26.6–33)
MCHC RBC AUTO-ENTMCNC: 33.3 G/DL (ref 31.5–35.7)
MCV RBC AUTO: 87.7 FL (ref 79–97)
MONOCYTES # BLD AUTO: 0.76 10*3/MM3 (ref 0.1–0.9)
MONOCYTES NFR BLD AUTO: 6.5 % (ref 5–12)
NEUTROPHILS NFR BLD AUTO: 71.5 % (ref 42.7–76)
NEUTROPHILS NFR BLD AUTO: 8.4 10*3/MM3 (ref 1.7–7)
NITRITE UR QL STRIP: NEGATIVE
NRBC BLD AUTO-RTO: 0 /100 WBC (ref 0–0.2)
PH UR STRIP.AUTO: 5.5 [PH] (ref 5–8)
PLATELET # BLD AUTO: 412 10*3/MM3 (ref 140–450)
PMV BLD AUTO: 8.8 FL (ref 6–12)
POTASSIUM SERPL-SCNC: 4.4 MMOL/L (ref 3.5–5.2)
PROT SERPL-MCNC: 8.1 G/DL (ref 6–8.5)
PROT UR QL STRIP: ABNORMAL
PROTHROMBIN TIME: 13.8 SECONDS (ref 12.1–14.7)
RBC # BLD AUTO: 4.96 10*6/MM3 (ref 4.14–5.8)
RBC # UR STRIP: ABNORMAL /HPF
REF LAB TEST METHOD: ABNORMAL
SODIUM SERPL-SCNC: 134 MMOL/L (ref 136–145)
SP GR UR STRIP: 1.02 (ref 1–1.03)
SQUAMOUS #/AREA URNS HPF: ABNORMAL /HPF
TROPONIN T DELTA: -2 NG/L
TROPONIN T SERPL HS-MCNC: 28 NG/L
UROBILINOGEN UR QL STRIP: ABNORMAL
WBC # UR STRIP: ABNORMAL /HPF
WBC NRBC COR # BLD: 11.74 10*3/MM3 (ref 3.4–10.8)
WHOLE BLOOD HOLD COAG: NORMAL
WHOLE BLOOD HOLD SPECIMEN: NORMAL

## 2023-09-05 PROCEDURE — 36415 COLL VENOUS BLD VENIPUNCTURE: CPT

## 2023-09-05 PROCEDURE — 71045 X-RAY EXAM CHEST 1 VIEW: CPT

## 2023-09-05 PROCEDURE — 74176 CT ABD & PELVIS W/O CONTRAST: CPT | Performed by: RADIOLOGY

## 2023-09-05 PROCEDURE — 93005 ELECTROCARDIOGRAM TRACING: CPT | Performed by: STUDENT IN AN ORGANIZED HEALTH CARE EDUCATION/TRAINING PROGRAM

## 2023-09-05 PROCEDURE — 76705 ECHO EXAM OF ABDOMEN: CPT

## 2023-09-05 PROCEDURE — 80053 COMPREHEN METABOLIC PANEL: CPT | Performed by: STUDENT IN AN ORGANIZED HEALTH CARE EDUCATION/TRAINING PROGRAM

## 2023-09-05 PROCEDURE — 83605 ASSAY OF LACTIC ACID: CPT | Performed by: STUDENT IN AN ORGANIZED HEALTH CARE EDUCATION/TRAINING PROGRAM

## 2023-09-05 PROCEDURE — 83690 ASSAY OF LIPASE: CPT | Performed by: STUDENT IN AN ORGANIZED HEALTH CARE EDUCATION/TRAINING PROGRAM

## 2023-09-05 PROCEDURE — 81001 URINALYSIS AUTO W/SCOPE: CPT | Performed by: STUDENT IN AN ORGANIZED HEALTH CARE EDUCATION/TRAINING PROGRAM

## 2023-09-05 PROCEDURE — 80307 DRUG TEST PRSMV CHEM ANLYZR: CPT | Performed by: PHYSICIAN ASSISTANT

## 2023-09-05 PROCEDURE — 85025 COMPLETE CBC W/AUTO DIFF WBC: CPT | Performed by: STUDENT IN AN ORGANIZED HEALTH CARE EDUCATION/TRAINING PROGRAM

## 2023-09-05 PROCEDURE — 96361 HYDRATE IV INFUSION ADD-ON: CPT

## 2023-09-05 PROCEDURE — 74176 CT ABD & PELVIS W/O CONTRAST: CPT

## 2023-09-05 PROCEDURE — 84484 ASSAY OF TROPONIN QUANT: CPT | Performed by: STUDENT IN AN ORGANIZED HEALTH CARE EDUCATION/TRAINING PROGRAM

## 2023-09-05 PROCEDURE — 96374 THER/PROPH/DIAG INJ IV PUSH: CPT

## 2023-09-05 PROCEDURE — 25010000002 ONDANSETRON PER 1 MG: Performed by: NURSE PRACTITIONER

## 2023-09-05 PROCEDURE — 70450 CT HEAD/BRAIN W/O DYE: CPT | Performed by: RADIOLOGY

## 2023-09-05 PROCEDURE — 82948 REAGENT STRIP/BLOOD GLUCOSE: CPT

## 2023-09-05 PROCEDURE — 85610 PROTHROMBIN TIME: CPT | Performed by: NURSE PRACTITIONER

## 2023-09-05 PROCEDURE — 99284 EMERGENCY DEPT VISIT MOD MDM: CPT

## 2023-09-05 PROCEDURE — 85730 THROMBOPLASTIN TIME PARTIAL: CPT | Performed by: NURSE PRACTITIONER

## 2023-09-05 PROCEDURE — 76705 ECHO EXAM OF ABDOMEN: CPT | Performed by: RADIOLOGY

## 2023-09-05 PROCEDURE — 71045 X-RAY EXAM CHEST 1 VIEW: CPT | Performed by: RADIOLOGY

## 2023-09-05 PROCEDURE — 70450 CT HEAD/BRAIN W/O DYE: CPT

## 2023-09-05 RX ORDER — ONDANSETRON 2 MG/ML
4 INJECTION INTRAMUSCULAR; INTRAVENOUS ONCE
Status: COMPLETED | OUTPATIENT
Start: 2023-09-05 | End: 2023-09-05

## 2023-09-05 RX ORDER — SODIUM CHLORIDE 0.9 % (FLUSH) 0.9 %
10 SYRINGE (ML) INJECTION AS NEEDED
Status: DISCONTINUED | OUTPATIENT
Start: 2023-09-05 | End: 2023-09-06 | Stop reason: HOSPADM

## 2023-09-05 RX ADMIN — ONDANSETRON 4 MG: 2 INJECTION INTRAMUSCULAR; INTRAVENOUS at 20:18

## 2023-09-05 RX ADMIN — SODIUM CHLORIDE 1000 ML: 9 INJECTION, SOLUTION INTRAVENOUS at 20:18

## 2023-09-05 NOTE — Clinical Note
[facility] EMERGENCY DEPARTMENT  [address] [address] [address]  Phone: [phone]    Oliver Osborn was seen and treated in our emergency department on 9/5/2023.  He may return to work on 09/08/2023.         Thank you for choosing [facility].    Manpreet Jung II, PA

## 2023-09-05 NOTE — ED NOTES
MEDICAL SCREENING:    Reason for Visit: Syncope, vomiting, possible blood in vomit.    Patient initially seen in triage.  The patient was advised further evaluation and diagnostic testing will be needed, some of the treatment and testing will be initiated in the lobby in order to begin the process.  The patient will be returned to the waiting area for the time being and possibly be re-assessed by a subsequent ED provider.  The patient will be brought back to the treatment area in as timely manner as possible.       Rishi Light, DO  09/05/23 1926

## 2023-09-06 LAB
AMPHET+METHAMPHET UR QL: NEGATIVE
AMPHETAMINES UR QL: NEGATIVE
BARBITURATES UR QL SCN: NEGATIVE
BENZODIAZ UR QL SCN: NEGATIVE
BUPRENORPHINE SERPL-MCNC: NEGATIVE NG/ML
CANNABINOIDS SERPL QL: POSITIVE
COCAINE UR QL: NEGATIVE
FENTANYL UR-MCNC: NEGATIVE NG/ML
METHADONE UR QL SCN: NEGATIVE
OPIATES UR QL: NEGATIVE
OXYCODONE UR QL SCN: NEGATIVE
PCP UR QL SCN: NEGATIVE
PROPOXYPH UR QL: NEGATIVE
TRICYCLICS UR QL SCN: NEGATIVE

## 2023-09-06 RX ORDER — PROCHLORPERAZINE MALEATE 10 MG
10 TABLET ORAL EVERY 6 HOURS PRN
Qty: 15 TABLET | Refills: 0 | Status: SHIPPED | OUTPATIENT
Start: 2023-09-06 | End: 2023-09-18

## 2023-09-06 NOTE — ED PROVIDER NOTES
Subjective   History of Present Illness  Gene is a 28-year-old white male presents emerged today complaint of vomiting.  Patient reports he is also had some abdominal pain off and on.  Patient reported that problems like this for a long time states that it often occurs when he uses THC products.  Patient reports he still use CH[employer] products and states that he does get them out of his system quicker.  Patient denies any leaving worsening factors.  Patient reports he has been able to keep using now for couple days.    Abdominal Pain    Review of Systems   Constitutional: Negative.    HENT: Negative.     Eyes: Negative.    Respiratory: Negative.     Cardiovascular: Negative.    Gastrointestinal:  Positive for abdominal pain.   Endocrine: Negative.    Genitourinary: Negative.    Musculoskeletal: Negative.    Skin: Negative.    Allergic/Immunologic: Negative.    Neurological: Negative.    Hematological: Negative.    Psychiatric/Behavioral: Negative.       Past Medical History:   Diagnosis Date    Diabetes mellitus     Tobacco abuse        No Known Allergies    Past Surgical History:   Procedure Laterality Date    ENDOSCOPY N/A 6/1/2022    Procedure: ESOPHAGOGASTRODUODENOSCOPY WITH ANESTHESIA;  Surgeon: Keon Quezada MD;  Location: [facility];  Service: Gastroenterology;  Laterality: N/A;       Family History   Problem Relation Age of Onset    Heart disease Mother     Diabetes Mother     Kidney disease Mother     Heart disease Father     Diabetes Father     Heart disease Maternal Grandmother     Diabetes Maternal Grandmother     Heart disease Maternal Grandfather     Diabetes Maternal Grandfather     Heart disease Paternal Grandmother     Diabetes Paternal Grandmother     Heart disease Paternal Grandfather     Diabetes Paternal Grandfather        Social History     Socioeconomic History    Marital status: Single   Tobacco Use    Smoking status: Every Day     Packs/day: 2.00     Years: 15.00     Pack years: 30.00      Types: Cigarettes    Smokeless tobacco: Never   Vaping Use    Vaping Use: Former    Substances: Nicotine, Flavoring    Devices: Disposable    Passive vaping exposure: Yes   Substance and Sexual Activity    Alcohol use: Never    Drug use: Not Currently     Frequency: 7.0 times per week     Types: Marijuana    Sexual activity: Defer           Objective   Physical Exam  Vitals and nursing note reviewed.   Constitutional:       Appearance: He is well-developed.   HENT:      Head: Normocephalic.      Right Ear: External ear normal.      Left Ear: External ear normal.   Eyes:      Conjunctiva/sclera: Conjunctivae normal.      Pupils: Pupils are equal, round, and reactive to light.   Cardiovascular:      Rate and Rhythm: Normal rate and regular rhythm.      Heart sounds: Normal heart sounds.   Pulmonary:      Effort: Pulmonary effort is normal.      Breath sounds: Normal breath sounds.   Abdominal:      General: Bowel sounds are normal.      Palpations: Abdomen is soft.   Musculoskeletal:         General: Normal range of motion.      Cervical back: Normal range of motion and neck supple.   Skin:     General: Skin is warm and dry.      Capillary Refill: Capillary refill takes less than 2 seconds.   Neurological:      Mental Status: He is alert and oriented to person, place, and time.   Psychiatric:         Behavior: Behavior normal.         Thought Content: Thought content normal.       Procedures       Results for orders placed or performed during the hospital encounter of 09/05/23   Comprehensive Metabolic Panel    Specimen: Arm, Right; Blood   Result Value Ref Range    Glucose 146 (H) 65 - 99 mg/dL    BUN 33 (H) 6 - 20 mg/dL    Creatinine 1.96 (H) 0.76 - 1.27 mg/dL    Sodium 134 (L) 136 - 145 mmol/L    Potassium 4.4 3.5 - 5.2 mmol/L    Chloride 93 (L) 98 - 107 mmol/L    CO2 27.2 22.0 - 29.0 mmol/L    Calcium 10.4 8.6 - 10.5 mg/dL    Total Protein 8.1 6.0 - 8.5 g/dL    Albumin 4.6 3.5 - 5.2 g/dL    ALT (SGPT) 15 1 - 41  U/L    AST (SGOT) 12 1 - 40 U/L    Alkaline Phosphatase 60 39 - 117 U/L    Total Bilirubin 0.6 0.0 - 1.2 mg/dL    Globulin 3.5 gm/dL    A/G Ratio 1.3 g/dL    BUN/Creatinine Ratio 16.8 7.0 - 25.0    Anion Gap 13.8 5.0 - 15.0 mmol/L    eGFR 46.3 (L) >60.0 mL/min/1.73   Lipase    Specimen: Arm, Right; Blood   Result Value Ref Range    Lipase 25 13 - 60 U/L   Urinalysis With Microscopic If Indicated (No Culture) - Urine, Clean Catch    Specimen: Urine, Clean Catch   Result Value Ref Range    Color, UA Dark Yellow (A) Yellow, Straw    Appearance, UA Clear Clear    pH, UA 5.5 5.0 - 8.0    Specific Gravity, UA 1.024 1.005 - 1.030    Glucose, UA Negative Negative    Ketones, UA Negative Negative    Bilirubin, UA Small (1+) (A) Negative    Blood, UA Small (1+) (A) Negative    Protein, UA >=300 mg/dL (3+) (A) Negative    Leuk Esterase, UA Negative Negative    Nitrite, UA Negative Negative    Urobilinogen, UA 1.0 E.U./dL 0.2 - 1.0 E.U./dL   Lactic Acid, Plasma    Specimen: Arm, Right; Blood   Result Value Ref Range    Lactate 1.5 0.5 - 2.0 mmol/L   High Sensitivity Troponin T    Specimen: Arm, Right; Blood   Result Value Ref Range    HS Troponin T 28 (H) <15 ng/L   CBC Auto Differential    Specimen: Arm, Right; Blood   Result Value Ref Range    WBC 11.74 (H) 3.40 - 10.80 10*3/mm3    RBC 4.96 4.14 - 5.80 10*6/mm3    Hemoglobin 14.5 13.0 - 17.7 g/dL    Hematocrit 43.5 37.5 - 51.0 %    MCV 87.7 79.0 - 97.0 fL    MCH 29.2 26.6 - 33.0 pg    MCHC 33.3 31.5 - 35.7 g/dL    RDW 12.5 12.3 - 15.4 %    RDW-SD 40.6 37.0 - 54.0 fl    MPV 8.8 6.0 - 12.0 fL    Platelets 412 140 - 450 10*3/mm3    Neutrophil % 71.5 42.7 - 76.0 %    Lymphocyte % 21.0 19.6 - 45.3 %    Monocyte % 6.5 5.0 - 12.0 %    Eosinophil % 0.3 0.3 - 6.2 %    Basophil % 0.4 0.0 - 1.5 %    Immature Grans % 0.3 0.0 - 0.5 %    Neutrophils, Absolute 8.40 (H) 1.70 - 7.00 10*3/mm3    Lymphocytes, Absolute 2.46 0.70 - 3.10 10*3/mm3    Monocytes, Absolute 0.76 0.10 - 0.90 10*3/mm3     Eosinophils, Absolute 0.04 0.00 - 0.40 10*3/mm3    Basophils, Absolute 0.05 0.00 - 0.20 10*3/mm3    Immature Grans, Absolute 0.03 0.00 - 0.05 10*3/mm3    nRBC 0.0 0.0 - 0.2 /100 WBC   Protime-INR    Specimen: Arm, Right; Blood   Result Value Ref Range    Protime 13.8 12.1 - 14.7 Seconds    INR 1.01 0.90 - 1.10   aPTT    Specimen: Arm, Right; Blood   Result Value Ref Range    PTT 26.5 26.5 - 34.5 seconds   High Sensitivity Troponin T 2Hr    Specimen: Arm, Right; Blood   Result Value Ref Range    HS Troponin T 26 (H) <15 ng/L    Troponin T Delta -2 >=-4 - <+4 ng/L   Urinalysis, Microscopic Only - Urine, Clean Catch    Specimen: Urine, Clean Catch   Result Value Ref Range    RBC, UA 6-12 (A) None Seen, 0-2 /HPF    WBC, UA 3-5 (A) None Seen, 0-2 /HPF    Bacteria, UA Trace (A) None Seen /HPF    Squamous Epithelial Cells, UA 3-6 (A) None Seen, 0-2 /HPF    Hyaline Casts, UA 7-12 None Seen /LPF    Granular Casts, UA 3-6 None Seen /LPF    Methodology Manual Light Microscopy    Urine Drug Screen - Urine, Clean Catch    Specimen: Urine, Clean Catch   Result Value Ref Range    THC, Screen, Urine Positive (A) Negative    Phencyclidine (PCP), Urine Negative Negative    Cocaine Screen, Urine Negative Negative    Methamphetamine, Ur Negative Negative    Opiate Screen Negative Negative    Amphetamine Screen, Urine Negative Negative    Benzodiazepine Screen, Urine Negative Negative    Tricyclic Antidepressants Screen Negative Negative    Methadone Screen, Urine Negative Negative    Barbiturates Screen, Urine Negative Negative    Oxycodone Screen, Urine Negative Negative    Propoxyphene Screen Negative Negative    Buprenorphine, Screen, Urine Negative Negative   Fentanyl, Urine - Urine, Clean Catch    Specimen: Urine, Clean Catch   Result Value Ref Range    Fentanyl, Urine Negative Negative   POC Glucose Once    Specimen: Blood   Result Value Ref Range    Glucose 189 (H) 70 - 130 mg/dL   ECG 12 Lead Syncope   Result Value Ref Range     QT Interval 378 ms    QTC Interval 401 ms   Green Top (Gel)   Result Value Ref Range    Extra Tube Hold for add-ons.    Lavender Top   Result Value Ref Range    Extra Tube hold for add-on    Gold Top - SST   Result Value Ref Range    Extra Tube Hold for add-ons.    Light Blue Top   Result Value Ref Range    Extra Tube Hold for add-ons.          ED Course  ED Course as of 09/06/23 0018   Tue Sep 05, 2023   2338 XR chest rad interpreted:  1.  No acute process seen in the chest.      [RB]   2339 CT abd pelvis rad interpreted:  1.  Nonspecific distention of the gallbladder.  While this may be within  normal limits, if there is concern for cholelithiasis or cholecystitis,  evaluation with ultrasound should be considered.   2.  Otherwise, no acute abnormality of the abdomen or pelvis, noting the  noncontrast technique.   [RB]   2339 CT head rad interpreted:  Negative noncontrast head CT.      [RB]   Wed Sep 06, 2023   0014 US gallbladder rad interpreted:  1. Unremarkable gallbladder ultrasound. [RB]      ED Course User Index  [RB] Manpreet Jung II, PA                                           Medical Decision Making  History of Present Illness  Mills is a 28-year-old white male presents emerged today complaint of vomiting.  Patient reports he is also had some abdominal pain off and on.  Patient reported that problems like this for a long time states that it often occurs when he uses THC products.  Patient reports he still use [employer] products and states that he does get them out of his system quicker.  Patient denies any leaving worsening factors.  Patient reports he has been able to keep using now for couple days.    Abdominal Pain    Problems Addressed:  Hematemesis, unspecified whether nausea present: acute illness or injury     Details: Patient's work-up is unremarkable for any type of GI bleeding.  Patient has not vomited blood since his ER arrival.  Patient swallowed probably treatment received in the ED.  Outpatient  follow-up is recommended.    Amount and/or Complexity of Data Reviewed  Labs: ordered.  Radiology: ordered. Decision-making details documented in ED Course.  ECG/medicine tests: ordered.    Risk  Prescription drug management.        Final diagnoses:   Hematemesis, unspecified whether nausea present       ED Disposition  ED Disposition       ED Disposition   Discharge    Condition   Stable    Comment   --               Geri Belcher, APRN  [address] [facility] [place] [address]  [phone]    Schedule an appointment as soon as possible for a visit            Where to Get Your Medications        These medications were sent to [id number] - [place], [place] - [address] AT [facility] [address] - [phone] PH - [phone] FX  [address] [place] [address]      Phone: [phone]   prochlorperazine 10 MG tablet          Medication List      No changes were made to your prescriptions during this visit.            Manpreet Jung II, PA  09/06/23 0018

## 2023-09-07 LAB
QT INTERVAL: 378 MS
QTC INTERVAL: 401 MS

## 2023-09-18 ENCOUNTER — APPOINTMENT (OUTPATIENT)
Dept: CT IMAGING | Facility: HOSPITAL | Age: 28
End: 2023-09-18
Payer: MEDICAID

## 2023-09-18 ENCOUNTER — HOSPITAL ENCOUNTER (OUTPATIENT)
Facility: HOSPITAL | Age: 28
Setting detail: OBSERVATION
Discharge: HOME | End: 2023-09-19
Attending: EMERGENCY MEDICINE | Admitting: INTERNAL MEDICINE
Payer: MEDICAID

## 2023-09-18 DIAGNOSIS — F12.188 CANNABIS HYPEREMESIS SYNDROME CONCURRENT WITH AND DUE TO CANNABIS ABUSE: Primary | ICD-10-CM

## 2023-09-18 PROBLEM — R11.15 INTRACTABLE CYCLICAL VOMITING WITH NAUSEA: Status: ACTIVE | Noted: 2023-09-18

## 2023-09-18 LAB
ALBUMIN SERPL-MCNC: 4.9 G/DL (ref 3.5–5.2)
ALBUMIN/GLOB SERPL: 1.2 G/DL
ALP SERPL-CCNC: 72 U/L (ref 39–117)
ALT SERPL W P-5'-P-CCNC: 17 U/L (ref 1–41)
AMPHET+METHAMPHET UR QL: NEGATIVE
AMPHETAMINES UR QL: NEGATIVE
ANION GAP SERPL CALCULATED.3IONS-SCNC: 14 MMOL/L (ref 5–15)
AST SERPL-CCNC: 14 U/L (ref 1–40)
BACTERIA UR QL AUTO: ABNORMAL /HPF
BARBITURATES UR QL SCN: NEGATIVE
BASOPHILS # BLD AUTO: 0.06 10*3/MM3 (ref 0–0.2)
BASOPHILS NFR BLD AUTO: 0.4 % (ref 0–1.5)
BENZODIAZ UR QL SCN: NEGATIVE
BILIRUB SERPL-MCNC: 0.4 MG/DL (ref 0–1.2)
BILIRUB UR QL STRIP: ABNORMAL
BUN SERPL-MCNC: 21 MG/DL (ref 6–20)
BUN/CREAT SERPL: 11 (ref 7–25)
BUPRENORPHINE SERPL-MCNC: NEGATIVE NG/ML
CALCIUM SPEC-SCNC: 10.8 MG/DL (ref 8.6–10.5)
CANNABINOIDS SERPL QL: POSITIVE
CHLORIDE SERPL-SCNC: 97 MMOL/L (ref 98–107)
CLARITY UR: ABNORMAL
CO2 SERPL-SCNC: 26 MMOL/L (ref 22–29)
COCAINE UR QL: NEGATIVE
COD CRY URNS QL: ABNORMAL /HPF
COLOR UR: ABNORMAL
CREAT SERPL-MCNC: 1.91 MG/DL (ref 0.76–1.27)
CRP SERPL-MCNC: <0.3 MG/DL (ref 0–0.5)
DEPRECATED RDW RBC AUTO: 38.6 FL (ref 37–54)
EGFRCR SERPLBLD CKD-EPI 2021: 47.8 ML/MIN/1.73
EOSINOPHIL # BLD AUTO: 0.03 10*3/MM3 (ref 0–0.4)
EOSINOPHIL NFR BLD AUTO: 0.2 % (ref 0.3–6.2)
ERYTHROCYTE [DISTWIDTH] IN BLOOD BY AUTOMATED COUNT: 12.3 % (ref 12.3–15.4)
FENTANYL UR-MCNC: NEGATIVE NG/ML
GLOBULIN UR ELPH-MCNC: 4.1 GM/DL
GLUCOSE BLDC GLUCOMTR-MCNC: 201 MG/DL (ref 70–130)
GLUCOSE SERPL-MCNC: 237 MG/DL (ref 65–99)
GLUCOSE UR STRIP-MCNC: ABNORMAL MG/DL
HCT VFR BLD AUTO: 47.1 % (ref 37.5–51)
HGB BLD-MCNC: 16.1 G/DL (ref 13–17.7)
HGB UR QL STRIP.AUTO: ABNORMAL
HOLD SPECIMEN: NORMAL
HOLD SPECIMEN: NORMAL
HYALINE CASTS UR QL AUTO: ABNORMAL /LPF
IMM GRANULOCYTES # BLD AUTO: 0.07 10*3/MM3 (ref 0–0.05)
IMM GRANULOCYTES NFR BLD AUTO: 0.4 % (ref 0–0.5)
KETONES UR QL STRIP: ABNORMAL
LEUKOCYTE ESTERASE UR QL STRIP.AUTO: ABNORMAL
LYMPHOCYTES # BLD AUTO: 1.28 10*3/MM3 (ref 0.7–3.1)
LYMPHOCYTES NFR BLD AUTO: 7.7 % (ref 19.6–45.3)
MCH RBC QN AUTO: 29.5 PG (ref 26.6–33)
MCHC RBC AUTO-ENTMCNC: 34.2 G/DL (ref 31.5–35.7)
MCV RBC AUTO: 86.4 FL (ref 79–97)
METHADONE UR QL SCN: NEGATIVE
MONOCYTES # BLD AUTO: 0.57 10*3/MM3 (ref 0.1–0.9)
MONOCYTES NFR BLD AUTO: 3.4 % (ref 5–12)
NEUTROPHILS NFR BLD AUTO: 14.72 10*3/MM3 (ref 1.7–7)
NEUTROPHILS NFR BLD AUTO: 87.9 % (ref 42.7–76)
NITRITE UR QL STRIP: NEGATIVE
NRBC BLD AUTO-RTO: 0 /100 WBC (ref 0–0.2)
OPIATES UR QL: NEGATIVE
OXYCODONE UR QL SCN: NEGATIVE
PCP UR QL SCN: NEGATIVE
PH UR STRIP.AUTO: 6 [PH] (ref 5–8)
PLATELET # BLD AUTO: 371 10*3/MM3 (ref 140–450)
PMV BLD AUTO: 8.9 FL (ref 6–12)
POTASSIUM SERPL-SCNC: 5 MMOL/L (ref 3.5–5.2)
PROPOXYPH UR QL: NEGATIVE
PROT SERPL-MCNC: 9 G/DL (ref 6–8.5)
PROT UR QL STRIP: ABNORMAL
RBC # BLD AUTO: 5.45 10*6/MM3 (ref 4.14–5.8)
RBC # UR STRIP: ABNORMAL /HPF
REF LAB TEST METHOD: ABNORMAL
SODIUM SERPL-SCNC: 137 MMOL/L (ref 136–145)
SP GR UR STRIP: 1.03 (ref 1–1.03)
SPERM URNS QL MICRO: ABNORMAL /HPF
SQUAMOUS #/AREA URNS HPF: ABNORMAL /HPF
TRICYCLICS UR QL SCN: NEGATIVE
UROBILINOGEN UR QL STRIP: ABNORMAL
WBC # UR STRIP: ABNORMAL /HPF
WBC NRBC COR # BLD: 16.73 10*3/MM3 (ref 3.4–10.8)
WHOLE BLOOD HOLD COAG: NORMAL
WHOLE BLOOD HOLD SPECIMEN: NORMAL
YEAST URNS QL MICRO: ABNORMAL /HPF

## 2023-09-18 PROCEDURE — 84156 ASSAY OF PROTEIN URINE: CPT | Performed by: INTERNAL MEDICINE

## 2023-09-18 PROCEDURE — 82948 REAGENT STRIP/BLOOD GLUCOSE: CPT

## 2023-09-18 PROCEDURE — 96376 TX/PRO/DX INJ SAME DRUG ADON: CPT

## 2023-09-18 PROCEDURE — 36415 COLL VENOUS BLD VENIPUNCTURE: CPT

## 2023-09-18 PROCEDURE — 99284 EMERGENCY DEPT VISIT MOD MDM: CPT

## 2023-09-18 PROCEDURE — 80053 COMPREHEN METABOLIC PANEL: CPT | Performed by: NURSE PRACTITIONER

## 2023-09-18 PROCEDURE — 80307 DRUG TEST PRSMV CHEM ANLYZR: CPT | Performed by: NURSE PRACTITIONER

## 2023-09-18 PROCEDURE — 81001 URINALYSIS AUTO W/SCOPE: CPT | Performed by: NURSE PRACTITIONER

## 2023-09-18 PROCEDURE — 86140 C-REACTIVE PROTEIN: CPT | Performed by: NURSE PRACTITIONER

## 2023-09-18 PROCEDURE — G0378 HOSPITAL OBSERVATION PER HR: HCPCS

## 2023-09-18 PROCEDURE — 74176 CT ABD & PELVIS W/O CONTRAST: CPT

## 2023-09-18 PROCEDURE — 82570 ASSAY OF URINE CREATININE: CPT | Performed by: INTERNAL MEDICINE

## 2023-09-18 PROCEDURE — 25010000002 HALOPERIDOL LACTATE PER 5 MG: Performed by: NURSE PRACTITIONER

## 2023-09-18 PROCEDURE — 85025 COMPLETE CBC W/AUTO DIFF WBC: CPT | Performed by: NURSE PRACTITIONER

## 2023-09-18 PROCEDURE — 25010000002 PROCHLORPERAZINE 10 MG/2ML SOLUTION: Performed by: NURSE PRACTITIONER

## 2023-09-18 PROCEDURE — 93005 ELECTROCARDIOGRAM TRACING: CPT | Performed by: NURSE PRACTITIONER

## 2023-09-18 PROCEDURE — 84300 ASSAY OF URINE SODIUM: CPT | Performed by: INTERNAL MEDICINE

## 2023-09-18 PROCEDURE — 96375 TX/PRO/DX INJ NEW DRUG ADDON: CPT

## 2023-09-18 PROCEDURE — 83690 ASSAY OF LIPASE: CPT | Performed by: INTERNAL MEDICINE

## 2023-09-18 PROCEDURE — 96372 THER/PROPH/DIAG INJ SC/IM: CPT

## 2023-09-18 RX ORDER — PROCHLORPERAZINE MALEATE 10 MG
10 TABLET ORAL EVERY 6 HOURS PRN
Status: ON HOLD | COMMUNITY
End: 2023-09-19

## 2023-09-18 RX ORDER — PROCHLORPERAZINE EDISYLATE 5 MG/ML
5 INJECTION INTRAMUSCULAR; INTRAVENOUS ONCE
Status: COMPLETED | OUTPATIENT
Start: 2023-09-18 | End: 2023-09-18

## 2023-09-18 RX ORDER — HALOPERIDOL 5 MG/ML
10 INJECTION INTRAMUSCULAR ONCE
Status: COMPLETED | OUTPATIENT
Start: 2023-09-18 | End: 2023-09-18

## 2023-09-18 RX ORDER — SODIUM CHLORIDE 0.9 % (FLUSH) 0.9 %
10 SYRINGE (ML) INJECTION AS NEEDED
Status: DISCONTINUED | OUTPATIENT
Start: 2023-09-18 | End: 2023-09-19 | Stop reason: HOSPADM

## 2023-09-18 RX ORDER — PROCHLORPERAZINE MALEATE 10 MG
10 TABLET ORAL EVERY 6 HOURS PRN
Qty: 20 TABLET | Refills: 0 | Status: SHIPPED | OUTPATIENT
Start: 2023-09-18 | End: 2023-09-23

## 2023-09-18 RX ORDER — PROCHLORPERAZINE MALEATE 10 MG
10 TABLET ORAL ONCE
Status: DISCONTINUED | OUTPATIENT
Start: 2023-09-18 | End: 2023-09-18

## 2023-09-18 RX ADMIN — SODIUM CHLORIDE, POTASSIUM CHLORIDE, SODIUM LACTATE AND CALCIUM CHLORIDE 500 ML: 600; 310; 30; 20 INJECTION, SOLUTION INTRAVENOUS at 19:24

## 2023-09-18 RX ADMIN — HALOPERIDOL LACTATE 10 MG: 5 INJECTION, SOLUTION INTRAMUSCULAR at 22:17

## 2023-09-18 RX ADMIN — SODIUM CHLORIDE, POTASSIUM CHLORIDE, SODIUM LACTATE AND CALCIUM CHLORIDE 1000 ML: 600; 310; 30; 20 INJECTION, SOLUTION INTRAVENOUS at 19:24

## 2023-09-18 RX ADMIN — PROCHLORPERAZINE EDISYLATE 5 MG: 5 INJECTION INTRAMUSCULAR; INTRAVENOUS at 21:37

## 2023-09-18 RX ADMIN — PROCHLORPERAZINE EDISYLATE 5 MG: 5 INJECTION INTRAMUSCULAR; INTRAVENOUS at 19:22

## 2023-09-18 NOTE — ED NOTES
Pt states he feels weak; placed in wheel chair and fall risk band applied to patient; instructed to stay in wheelchair due to falls risk assessment; placed close to .

## 2023-09-18 NOTE — ED NOTES
MEDICAL SCREENING:    Reason for Visit: Vomiting    Patient initially seen in triage.  The patient was advised further evaluation and diagnostic testing will be needed, some of the treatment and testing will be initiated in the lobby in order to begin the process.  The patient will be returned to the waiting area for the time being and possibly be re-assessed by a subsequent ED provider.  The patient will be brought back to the treatment area in as timely manner as possible.      Denisse Gasca, APRN  09/18/23 [id number]

## 2023-09-19 VITALS
TEMPERATURE: 98.1 F | HEART RATE: 64 BPM | BODY MASS INDEX: 29.41 KG/M2 | RESPIRATION RATE: 18 BRPM | HEIGHT: 66 IN | WEIGHT: 183 LBS | SYSTOLIC BLOOD PRESSURE: 122 MMHG | OXYGEN SATURATION: 97 % | DIASTOLIC BLOOD PRESSURE: 80 MMHG

## 2023-09-19 PROBLEM — R11.15 INTRACTABLE CYCLICAL VOMITING WITH NAUSEA: Status: RESOLVED | Noted: 2023-09-18 | Resolved: 2023-09-19

## 2023-09-19 LAB
ALBUMIN SERPL-MCNC: 3.8 G/DL (ref 3.5–5.2)
ALBUMIN/GLOB SERPL: 1.4 G/DL
ALP SERPL-CCNC: 59 U/L (ref 39–117)
ALT SERPL W P-5'-P-CCNC: 11 U/L (ref 1–41)
ANION GAP SERPL CALCULATED.3IONS-SCNC: 11 MMOL/L (ref 5–15)
AST SERPL-CCNC: 10 U/L (ref 1–40)
BASOPHILS # BLD AUTO: 0.02 10*3/MM3 (ref 0–0.2)
BASOPHILS NFR BLD AUTO: 0.2 % (ref 0–1.5)
BILIRUB SERPL-MCNC: 0.4 MG/DL (ref 0–1.2)
BUN SERPL-MCNC: 25 MG/DL (ref 6–20)
BUN/CREAT SERPL: 15.1 (ref 7–25)
CALCIUM SPEC-SCNC: 9.6 MG/DL (ref 8.6–10.5)
CHLORIDE SERPL-SCNC: 100 MMOL/L (ref 98–107)
CO2 SERPL-SCNC: 26 MMOL/L (ref 22–29)
CREAT SERPL-MCNC: 1.66 MG/DL (ref 0.76–1.27)
CREAT UR-MCNC: 361.9 MG/DL
CREAT UR-MCNC: 361.9 MG/DL
DEPRECATED RDW RBC AUTO: 40.3 FL (ref 37–54)
EGFRCR SERPLBLD CKD-EPI 2021: 56.5 ML/MIN/1.73
EOSINOPHIL # BLD AUTO: 0.01 10*3/MM3 (ref 0–0.4)
EOSINOPHIL NFR BLD AUTO: 0.1 % (ref 0.3–6.2)
ERYTHROCYTE [DISTWIDTH] IN BLOOD BY AUTOMATED COUNT: 12.3 % (ref 12.3–15.4)
GLOBULIN UR ELPH-MCNC: 2.8 GM/DL
GLUCOSE BLDC GLUCOMTR-MCNC: 122 MG/DL (ref 70–130)
GLUCOSE BLDC GLUCOMTR-MCNC: 156 MG/DL (ref 70–130)
GLUCOSE SERPL-MCNC: 157 MG/DL (ref 65–99)
HBA1C MFR BLD: 7.1 % (ref 4.8–5.6)
HCT VFR BLD AUTO: 38.8 % (ref 37.5–51)
HGB BLD-MCNC: 12.9 G/DL (ref 13–17.7)
IMM GRANULOCYTES # BLD AUTO: 0.04 10*3/MM3 (ref 0–0.05)
IMM GRANULOCYTES NFR BLD AUTO: 0.3 % (ref 0–0.5)
LIPASE SERPL-CCNC: 37 U/L (ref 13–60)
LYMPHOCYTES # BLD AUTO: 1.81 10*3/MM3 (ref 0.7–3.1)
LYMPHOCYTES NFR BLD AUTO: 14.9 % (ref 19.6–45.3)
MCH RBC QN AUTO: 29.6 PG (ref 26.6–33)
MCHC RBC AUTO-ENTMCNC: 33.2 G/DL (ref 31.5–35.7)
MCV RBC AUTO: 89 FL (ref 79–97)
MONOCYTES # BLD AUTO: 0.85 10*3/MM3 (ref 0.1–0.9)
MONOCYTES NFR BLD AUTO: 7 % (ref 5–12)
NEUTROPHILS NFR BLD AUTO: 77.5 % (ref 42.7–76)
NEUTROPHILS NFR BLD AUTO: 9.39 10*3/MM3 (ref 1.7–7)
NRBC BLD AUTO-RTO: 0 /100 WBC (ref 0–0.2)
PLATELET # BLD AUTO: 291 10*3/MM3 (ref 140–450)
PMV BLD AUTO: 8.9 FL (ref 6–12)
POTASSIUM SERPL-SCNC: 4.3 MMOL/L (ref 3.5–5.2)
PROT ?TM UR-MCNC: 910 MG/DL
PROT SERPL-MCNC: 6.6 G/DL (ref 6–8.5)
PROT/CREAT UR: 2514.5 MG/G CREA (ref 0–200)
QT INTERVAL: 370 MS
QTC INTERVAL: 410 MS
RBC # BLD AUTO: 4.36 10*6/MM3 (ref 4.14–5.8)
SODIUM SERPL-SCNC: 137 MMOL/L (ref 136–145)
SODIUM UR-SCNC: 67 MMOL/L
WBC NRBC COR # BLD: 12.12 10*3/MM3 (ref 3.4–10.8)

## 2023-09-19 PROCEDURE — 63710000001 INSULIN LISPRO (HUMAN) PER 5 UNITS: Performed by: INTERNAL MEDICINE

## 2023-09-19 PROCEDURE — 85025 COMPLETE CBC W/AUTO DIFF WBC: CPT | Performed by: INTERNAL MEDICINE

## 2023-09-19 PROCEDURE — 25010000002 ONDANSETRON PER 1 MG: Performed by: INTERNAL MEDICINE

## 2023-09-19 PROCEDURE — 80053 COMPREHEN METABOLIC PANEL: CPT | Performed by: INTERNAL MEDICINE

## 2023-09-19 PROCEDURE — 25010000002 ENOXAPARIN PER 10 MG: Performed by: INTERNAL MEDICINE

## 2023-09-19 PROCEDURE — 83036 HEMOGLOBIN GLYCOSYLATED A1C: CPT | Performed by: INTERNAL MEDICINE

## 2023-09-19 PROCEDURE — 82948 REAGENT STRIP/BLOOD GLUCOSE: CPT

## 2023-09-19 PROCEDURE — G0378 HOSPITAL OBSERVATION PER HR: HCPCS

## 2023-09-19 RX ORDER — INSULIN LISPRO 100 [IU]/ML
2-7 INJECTION, SOLUTION INTRAVENOUS; SUBCUTANEOUS
Status: DISCONTINUED | OUTPATIENT
Start: 2023-09-19 | End: 2023-09-19 | Stop reason: HOSPADM

## 2023-09-19 RX ORDER — DIPHENHYDRAMINE HYDROCHLORIDE 50 MG/ML
25 INJECTION INTRAMUSCULAR; INTRAVENOUS EVERY 6 HOURS PRN
Status: DISCONTINUED | OUTPATIENT
Start: 2023-09-19 | End: 2023-09-19 | Stop reason: HOSPADM

## 2023-09-19 RX ORDER — NICOTINE 21 MG/24HR
1 PATCH, TRANSDERMAL 24 HOURS TRANSDERMAL
Status: DISCONTINUED | OUTPATIENT
Start: 2023-09-19 | End: 2023-09-19 | Stop reason: HOSPADM

## 2023-09-19 RX ORDER — ONDANSETRON 4 MG/1
4 TABLET, FILM COATED ORAL EVERY 6 HOURS PRN
Status: CANCELLED | OUTPATIENT
Start: 2023-09-19

## 2023-09-19 RX ORDER — BISACODYL 5 MG/1
5 TABLET, DELAYED RELEASE ORAL DAILY PRN
Status: DISCONTINUED | OUTPATIENT
Start: 2023-09-19 | End: 2023-09-19 | Stop reason: HOSPADM

## 2023-09-19 RX ORDER — BISACODYL 10 MG
10 SUPPOSITORY, RECTAL RECTAL DAILY PRN
Status: DISCONTINUED | OUTPATIENT
Start: 2023-09-19 | End: 2023-09-19 | Stop reason: HOSPADM

## 2023-09-19 RX ORDER — ENOXAPARIN SODIUM 100 MG/ML
40 INJECTION SUBCUTANEOUS DAILY
Status: DISCONTINUED | OUTPATIENT
Start: 2023-09-19 | End: 2023-09-19 | Stop reason: HOSPADM

## 2023-09-19 RX ORDER — POLYETHYLENE GLYCOL 3350 17 G/17G
17 POWDER, FOR SOLUTION ORAL DAILY PRN
Status: DISCONTINUED | OUTPATIENT
Start: 2023-09-19 | End: 2023-09-19 | Stop reason: HOSPADM

## 2023-09-19 RX ORDER — SODIUM CHLORIDE 9 MG/ML
40 INJECTION, SOLUTION INTRAVENOUS AS NEEDED
Status: DISCONTINUED | OUTPATIENT
Start: 2023-09-19 | End: 2023-09-19 | Stop reason: HOSPADM

## 2023-09-19 RX ORDER — SODIUM CHLORIDE 0.9 % (FLUSH) 0.9 %
10 SYRINGE (ML) INJECTION EVERY 12 HOURS SCHEDULED
Status: DISCONTINUED | OUTPATIENT
Start: 2023-09-19 | End: 2023-09-19 | Stop reason: HOSPADM

## 2023-09-19 RX ORDER — SODIUM CHLORIDE 9 MG/ML
100 INJECTION, SOLUTION INTRAVENOUS CONTINUOUS
Status: DISCONTINUED | OUTPATIENT
Start: 2023-09-19 | End: 2023-09-19 | Stop reason: HOSPADM

## 2023-09-19 RX ORDER — NICOTINE POLACRILEX 4 MG
15 LOZENGE BUCCAL
Status: DISCONTINUED | OUTPATIENT
Start: 2023-09-19 | End: 2023-09-19 | Stop reason: HOSPADM

## 2023-09-19 RX ORDER — SODIUM CHLORIDE 0.9 % (FLUSH) 0.9 %
10 SYRINGE (ML) INJECTION AS NEEDED
Status: DISCONTINUED | OUTPATIENT
Start: 2023-09-19 | End: 2023-09-19 | Stop reason: HOSPADM

## 2023-09-19 RX ORDER — NITROGLYCERIN 0.4 MG/1
0.4 TABLET SUBLINGUAL
Status: DISCONTINUED | OUTPATIENT
Start: 2023-09-19 | End: 2023-09-19 | Stop reason: HOSPADM

## 2023-09-19 RX ORDER — AMOXICILLIN 250 MG
2 CAPSULE ORAL 2 TIMES DAILY
Status: DISCONTINUED | OUTPATIENT
Start: 2023-09-19 | End: 2023-09-19 | Stop reason: HOSPADM

## 2023-09-19 RX ORDER — GLUCAGON 1 MG/ML
1 KIT INJECTION
Status: DISCONTINUED | OUTPATIENT
Start: 2023-09-19 | End: 2023-09-19 | Stop reason: HOSPADM

## 2023-09-19 RX ORDER — DEXTROSE MONOHYDRATE 25 G/50ML
25 INJECTION, SOLUTION INTRAVENOUS
Status: DISCONTINUED | OUTPATIENT
Start: 2023-09-19 | End: 2023-09-19 | Stop reason: HOSPADM

## 2023-09-19 RX ORDER — FAMOTIDINE 10 MG/ML
20 INJECTION, SOLUTION INTRAVENOUS 2 TIMES DAILY
Status: DISCONTINUED | OUTPATIENT
Start: 2023-09-19 | End: 2023-09-19 | Stop reason: HOSPADM

## 2023-09-19 RX ADMIN — Medication 10 ML: at 01:02

## 2023-09-19 RX ADMIN — ENOXAPARIN SODIUM 40 MG: 40 INJECTION SUBCUTANEOUS at 01:10

## 2023-09-19 RX ADMIN — ONDANSETRON 8 MG: 2 INJECTION INTRAMUSCULAR; INTRAVENOUS at 01:09

## 2023-09-19 RX ADMIN — Medication 10 ML: at 08:19

## 2023-09-19 RX ADMIN — INSULIN LISPRO 2 UNITS: 100 INJECTION, SOLUTION INTRAVENOUS; SUBCUTANEOUS at 11:08

## 2023-09-19 RX ADMIN — SODIUM CHLORIDE 100 ML/HR: 9 INJECTION, SOLUTION INTRAVENOUS at 08:18

## 2023-09-19 RX ADMIN — SODIUM CHLORIDE 100 ML/HR: 9 INJECTION, SOLUTION INTRAVENOUS at 01:01

## 2023-09-19 NOTE — H&P
[facility] Medicine Services  HISTORY & PHYSICAL    Patient Identification:  Name:  Oliver Osborn  Age:  28 y.o.  Sex:  male  :  1995  MRN:  [id number]   Visit Number:  [id number]  Admit Date: 2023   Primary Care Physician:  Geri Belcher APRN     Subjective     Chief complaint:   Chief Complaint   Patient presents with    Vomiting     History of presenting illness:   Patient is a 28 y.o. male with past medical history significant for type II DM, CKD, tobacco abuse, THC use that presented to the [facility] emergency department for evaluation of vomiting.  Patient was admitted to our services in April for intractable nausea and vomiting, suspected cannabinoid hyperemesis syndrome, and early acute pancreatitis.  Since then patient has returned to our ED multiple times for similar symptoms.  Patient reported to ED provider he last used THC 3 days ago.    And examined at bedside in ED.  Patient sleeping initially, arouses easily to verbal and tactile stimuli.  Patient not very cooperative with questioning, continually nodding off.  Patient had received 5 mg of Compazine x2 as well as 10 mg haloperidol prior to my exam.  Patient states this time the vomiting began this morning.  He states he has lost count of family times he has vomited.  He reports that he last used THC over a week ago.  Patient states he uses the vape pods of the THC, and ran out several days ago and has been unable to buy more.  Denies any diarrhea.  Complains of mild diffuse abdominal pain.  Denies any other symptoms.    Upon arrival to the ED, vitals were temperature 98.4, HR 86, RR 17, /88, SPO2 96% on room air.  Labs significant for creatinine 1.91, BUN 21, glucose 237, WBC 16.73.  DS positive for THC.  UA dark yellow, cloudy, 2+ glucose, 2+ ketones, 2+ blood, trace leukocytes, 3+ protein, moderate bilirubin, 6-12 RBC, 3-5 WBC, 1+ bacteria, 3-6 squamous cells.    Patient has been admitted  to the [facility] unit  ---------------------------------------------------------------------------------------------------------------------   Review of Systems   Constitutional:  Negative for chills, diaphoresis, fatigue and fever.   Respiratory:  Negative for cough, shortness of breath and wheezing.    Cardiovascular:  Negative for chest pain, palpitations and leg swelling.   Gastrointestinal:  Positive for abdominal pain, nausea and vomiting. Negative for constipation and diarrhea.   Genitourinary:  Negative for difficulty urinating, dysuria and flank pain.   Musculoskeletal:  Negative for arthralgias, myalgias and neck stiffness.   Skin:  Negative for rash and wound.   Neurological:  Positive for weakness. Negative for dizziness and light-headedness.   Psychiatric/Behavioral:  Negative for agitation and confusion.     ---------------------------------------------------------------------------------------------------------------------   Past Medical History:   Diagnosis Date    Diabetes mellitus     Tobacco abuse      Past Surgical History:   Procedure Laterality Date    ENDOSCOPY N/A 6/1/2022    Procedure: ESOPHAGOGASTRODUODENOSCOPY WITH ANESTHESIA;  Surgeon: Keon Quezada MD;  [facility]: [facility];  Service: Gastroenterology;  Laterality: N/A;     Family History   Problem Relation Age of Onset    Heart disease Mother     Diabetes Mother     Kidney disease Mother     Heart disease Father     Diabetes Father     Heart disease Maternal Grandmother     Diabetes Maternal Grandmother     Heart disease Maternal Grandfather     Diabetes Maternal Grandfather     Heart disease Paternal Grandmother     Diabetes Paternal Grandmother     Heart disease Paternal Grandfather     Diabetes Paternal Grandfather      Social History     Socioeconomic History    Marital status: Single   Tobacco Use    Smoking status: Every Day     Packs/day: 2.00     Years: 15.00     Pack years: 30.00     Types: Cigarettes    Smokeless tobacco:  Never   Vaping Use    Vaping Use: Former    Substances: Nicotine, Flavoring    Devices: Disposable    Passive vaping exposure: Yes   Substance and Sexual Activity    Alcohol use: Never    Drug use: Not Currently     Frequency: 7.0 times per week     Types: Marijuana    Sexual activity: Defer     ---------------------------------------------------------------------------------------------------------------------   Allergies:  Patient has no known allergies.  ---------------------------------------------------------------------------------------------------------------------   Medications below are reported home medications pulling from within the system; at this time, these medications have not been reconciled unless otherwise specified and are in the verification process for further verifcation as current home medications.    Prior to Admission Medications       Prescriptions Last Dose Informant Patient Reported? Taking?    empagliflozin (Jardiance) 25 MG tablet tablet   Yes Yes    Take  by mouth Daily.    Insulin Glargine (Lantus SoloStar) 100 UNIT/ML injection pen   Yes Yes    12 Units Daily.    ondansetron (ZOFRAN) 4 MG tablet   No Yes    Take 1 tablet by mouth Every 6 (Six) Hours As Needed for Nausea or Vomiting.    prochlorperazine (COMPAZINE) 10 MG tablet   Yes Yes    Take 1 tablet by mouth Every 6 (Six) Hours As Needed for Nausea or Vomiting.    aspirin 81 MG EC tablet   No No    Take 1 tablet by mouth Daily.    prochlorperazine (COMPAZINE) 10 MG tablet   No No    Take 1 tablet by mouth Every 6 (Six) Hours As Needed for Nausea or Vomiting.          ---------------------------------------------------------------------------------------------------------------------    Objective     Hospital Scheduled Meds:          Current listed hospital scheduled medications may not yet reflect those currently placed in orders that are signed and held, awaiting patient's arrival to floor/unit.     ---------------------------------------------------------------------------------------------------------------------   Vital Signs:  Temp:  [98.4 °F (36.9 °C)] 98.4 °F (36.9 °C)  Heart Rate:  [67-87] 68  Resp:  [17] 17  BP: (107-147)/(55-88) 108/55  Mean Arterial Pressure (Non-Invasive) for the past 24 hrs (Last 3 readings):   Noninvasive MAP (mmHg)   09/18/23 2300 77   09/18/23 2256 78   09/18/23 2220 80     SpO2 Percentage    09/18/23 2357 09/18/23 2358 09/18/23 2359   SpO2: 98% 97% 97%     SpO2:  [96 %-100 %] 97 %  on   ;   Device (Oxygen Therapy): room air    Body mass index is 32.28 kg/m².  Wt Readings from Last 3 Encounters:   09/18/23 90.7 kg (200 lb)   09/05/23 93.4 kg (206 lb)   08/28/23 93.4 kg (206 lb)     ---------------------------------------------------------------------------------------------------------------------   Physical Exam:  Physical Exam  Constitutional:       General: He is sleeping. He is not in acute distress.     Appearance: Normal appearance.   HENT:      Head: Normocephalic and atraumatic.      Right Ear: External ear normal.      Left Ear: External ear normal.      Nose: No nasal deformity.      Mouth/Throat:      Lips: Pink.      Mouth: Mucous membranes are moist.   Eyes:      Conjunctiva/sclera: Conjunctivae normal.      Pupils: Pupils are equal, round, and reactive to light.   Cardiovascular:      Rate and Rhythm: Normal rate and regular rhythm.      Pulses:           Dorsalis pedis pulses are 2+ on the right side and 2+ on the left side.      Heart sounds: Normal heart sounds.   Pulmonary:      Effort: Pulmonary effort is normal.      Breath sounds: Normal breath sounds. No wheezing, rhonchi or rales.   Abdominal:      General: Abdomen is flat. Bowel sounds are normal.      Palpations: Abdomen is soft.      Tenderness: There is abdominal tenderness (Mild, diffuse). There is no guarding or rebound.   Genitourinary:     Comments: No leblanc catheter in place   Musculoskeletal:       Cervical back: Neck supple. Normal range of motion.      Right lower leg: No edema.      Left lower leg: No edema.   Skin:     General: Skin is warm and dry.   Neurological:      General: No focal deficit present.      Mental Status: He is oriented to person, place, and time and easily aroused.   Psychiatric:         Mood and Affect: Mood normal.         Behavior: Behavior normal.     ---------------------------------------------------------------------------------------------------------------------  EKG: Normal sinus rhythm.  Heart rate 74.        I have personally reviewed the EKG/Telemetry strip  ---------------------------------------------------------------------------------------------------------------------             Results from last 7 days   Lab Units 09/18/23  1750   CRP mg/dL <0.30   WBC 10*3/mm3 16.73*   HEMOGLOBIN g/dL 16.1   HEMATOCRIT % 47.1   MCV fL 86.4   MCHC g/dL 34.2   PLATELETS 10*3/mm3 371     Results from last 7 days   Lab Units 09/18/23  1750   SODIUM mmol/L 137   POTASSIUM mmol/L 5.0   CHLORIDE mmol/L 97*   CO2 mmol/L 26.0   BUN mg/dL 21*   CREATININE mg/dL 1.91*   CALCIUM mg/dL 10.8*   GLUCOSE mg/dL 237*   ALBUMIN g/dL 4.9   BILIRUBIN mg/dL 0.4   ALK PHOS U/L 72   AST (SGOT) U/L 14   ALT (SGPT) U/L 17   Estimated Creatinine Clearance: 59.7 mL/min (A) (by C-G formula based on SCr of 1.91 mg/dL (H)).  No results found for: AMMONIA    Glucose   Date/Time Value Ref Range Status   09/18/2023 1533 201 (H) 70 - 130 mg/dL Final     Lab Results   Component Value Date    HGBA1C 7.80 (H) 11/06/2022     Lab Results   Component Value Date    TSH 2.850 05/30/2022       No results found for: BLOODCX  No results found for: URINECX  No results found for: WOUNDCX  No results found for: STOOLCX  No results found for: RESPCX  No results found for: MRSACX  Pain Management Panel  More data exists         Latest Ref Rng & Units 9/18/2023 9/5/2023   Pain Management Panel   Amphetamine, Urine Qual Negative  Negative  Negative    Barbiturates Screen, Urine Negative Negative  Negative    Benzodiazepine Screen, Urine Negative Negative  Negative    Buprenorphine, Screen, Urine Negative Negative  Negative    Cocaine Screen, Urine Negative Negative  Negative    Fentanyl, Urine Negative Negative  Negative    Methadone Screen , Urine Negative Negative  Negative    Methamphetamine, Ur Negative Negative  Negative      I have personally reviewed the above laboratory results.   ---------------------------------------------------------------------------------------------------------------------  Imaging Results (Last 7 Days)       Procedure Component Value Units Date/Time    CT Abdomen Pelvis Without Contrast [[id number]] Collected: 09/18/23 2136     Updated: 09/18/23 2140    Narrative:      Procedure: Routine axial CT images of abdomen and pelvis acquired  without IV contrast. Additional 3 mm thick coronal and sagittal  reformats acquired. CT scan performed according to ALARA(as low as  reasonably achievable)dose protocol.     Comparison: None available     Findings:     There is no evidence of urinary tract calculi or obstructive changes.  No hydronephrosis is seen. Right renal atrophy.   There are no focal inflammatory changes identified.  No free air or fluid seen.  No dilated small or large bowel.  No grossly thickened bowel loops.  Normal appendix  Visualized lung bases are negative.       Impression:         No acute process in the abdomen/pelvis.     This report was finalized on 9/18/2023 9:38 PM by Mikhail Quiroz MD.             I have personally reviewed the above radiology results.     Last Echocardiogram:  Results for orders placed during the hospital encounter of 04/19/23    Adult Transthoracic Echo Complete W/ Cont if Necessary Per Protocol    Interpretation Summary    Left ventricular systolic function is normal. Estimated left ventricular EF = 65%    All left ventricular wall segments contract normally.    Left  ventricular diastolic function was normal.    The cardiac chamber dimensions are normal.    All valves appear normal in structure and showed no stenosis or significant regurgitations.    No evidence of pulmonary hypertension is present.    There is no evidence of pericardial effusion.    ---------------------------------------------------------------------------------------------------------------------    Assessment & Plan      Active Hospital Problems    Diagnosis  POA    **Intractable cyclical vomiting with nausea [R11.15]  Yes     Intractable cyclical vomiting with nausea, suspect secondary to cannabinoid hyperemesis syndrome, POA  Ongoing marijuana use  Leukocytosis, suspect reactive in nature  Patient presented with intractable nausea and vomiting  Lipase pending due to early pancreatitis on previous admission  Continue IV fluids, as needed antiemetics.  As needed Benadryl for intractable vomiting  Pepcid IV twice daily  Encourage THC cessation  Repeat labs in the a.m.   Acute Kidney Injury on Chronic Kidney Disease stage II, POA  Baseline Cr ~1.2, was up to 1.91 on admission  Continue mIVFs, Trend Cr and urine output, avoid nephrotoxins, NSAIDs, dehydration and contrast as able.  Repeat BMP in the a.m.   CHRONIC MEDICAL PROBLEMS    Tobacco abuse  Encourage cessation  Nicotine replacement therapy ordered  Type II DM  Start sliding scale insulin for now, titrate insulin therapy as necessary.   Hold any oral hypoglycemics to prevent hypoglycemia. Will review home diabetes medications once available per pharmacy.   Hypoglycemia protocol in place if necessary.   Accuchecks QAC and QHS.  Obesity, BMI 32.28  Affecting all aspects of care  F/E/N: IV NS at 100 mL/h.  Continue to monitor electrolytes.  Clear liquid diet.    ---------------------------------------------------  DVT Prophylaxis: Lovenox  GI Prophylaxis: Famotidine  Activity: Up with assistance    The patient is considered to be a high risk patient due to:  Intractable nausea and vomiting, acute kidney injury    OBSERVATION status, however if further evaluation or treatment plans warrant, status may change.  Based upon current information, I predict patient's care encounter to be less than or equal to 2 midnights.      Code Status: Full code    Disposition/Discharge planning: Pending clinical course, likely home in next 24 to 48 hours    I have discussed the patient's assessment and plan with Dr. Lili Arshad PA-C  Hospitalist Service -- [facility]       09/19/23  00:22 EDT    Attending Physician: Dr. Pearson

## 2023-09-19 NOTE — ED PROVIDER NOTES
Subjective   History of Present Illness  Patient is a 28-year-old male with significant past medical history positive for type 2 diabetes presenting to the ER complaints of nausea and vomiting.  Patient denies fever, cough, shortness of air, and abdominal pain, diarrhea, recent travel or suspicious food intake.  Patient denies any alleviating or worsening factors.    History provided by:  Patient   used: No      Review of Systems   Constitutional: Negative.  Negative for fever.   HENT: Negative.     Respiratory: Negative.     Cardiovascular: Negative.  Negative for chest pain.   Gastrointestinal:  Positive for nausea and vomiting. Negative for abdominal pain.   Endocrine: Negative.    Genitourinary: Negative.  Negative for dysuria.   Skin: Negative.    Neurological: Negative.    Psychiatric/Behavioral: Negative.     All other systems reviewed and are negative.    Past Medical History:   Diagnosis Date    Diabetes mellitus     Tobacco abuse        No Known Allergies    Past Surgical History:   Procedure Laterality Date    ENDOSCOPY N/A 6/1/2022    Procedure: ESOPHAGOGASTRODUODENOSCOPY WITH ANESTHESIA;  Surgeon: Keon Quezada MD;  Location: [facility];  Service: Gastroenterology;  Laterality: N/A;       Family History   Problem Relation Age of Onset    Heart disease Mother     Diabetes Mother     Kidney disease Mother     Heart disease Father     Diabetes Father     Heart disease Maternal Grandmother     Diabetes Maternal Grandmother     Heart disease Maternal Grandfather     Diabetes Maternal Grandfather     Heart disease Paternal Grandmother     Diabetes Paternal Grandmother     Heart disease Paternal Grandfather     Diabetes Paternal Grandfather        Social History     Socioeconomic History    Marital status: Single   Tobacco Use    Smoking status: Every Day     Packs/day: 2.00     Years: 15.00     Pack years: 30.00     Types: Cigarettes    Smokeless tobacco: Never   Vaping Use     Vaping Use: Former    Substances: Nicotine, Flavoring    Devices: Disposable    Passive vaping exposure: Yes   Substance and Sexual Activity    Alcohol use: Never    Drug use: Not Currently     Frequency: 7.0 times per week     Types: Marijuana    Sexual activity: Defer           Objective   Physical Exam  Vitals and nursing note reviewed.   Constitutional:       General: He is in acute distress.      Appearance: He is well-developed. He is not diaphoretic.   HENT:      Head: Normocephalic and atraumatic.      Right Ear: External ear normal.      Left Ear: External ear normal.      Nose: Nose normal.   Eyes:      Conjunctiva/sclera: Conjunctivae normal.      Pupils: Pupils are equal, round, and reactive to light.   Neck:      Vascular: No JVD.      Trachea: No tracheal deviation.   Cardiovascular:      Rate and Rhythm: Normal rate and regular rhythm.      Heart sounds: Normal heart sounds. No murmur heard.  Pulmonary:      Effort: Pulmonary effort is normal. No respiratory distress.      Breath sounds: Normal breath sounds. No wheezing.   Abdominal:      General: Bowel sounds are normal.      Palpations: Abdomen is soft.      Tenderness: There is no abdominal tenderness.   Musculoskeletal:         General: No deformity. Normal range of motion.      Cervical back: Normal range of motion and neck supple.   Skin:     General: Skin is warm and dry.      Capillary Refill: Capillary refill takes less than 2 seconds.      Coloration: Skin is not pale.      Findings: No erythema or rash.   Neurological:      Mental Status: He is alert and oriented to person, place, and time.      Cranial Nerves: No cranial nerve deficit.   Psychiatric:         Behavior: Behavior normal.         Thought Content: Thought content normal.       Procedures       Results for orders placed or performed during the hospital encounter of 09/18/23   Comprehensive Metabolic Panel    Specimen: Blood   Result Value Ref Range    Glucose 237 (H) 65 - 99  mg/dL    BUN 21 (H) 6 - 20 mg/dL    Creatinine 1.91 (H) 0.76 - 1.27 mg/dL    Sodium 137 136 - 145 mmol/L    Potassium 5.0 3.5 - 5.2 mmol/L    Chloride 97 (L) 98 - 107 mmol/L    CO2 26.0 22.0 - 29.0 mmol/L    Calcium 10.8 (H) 8.6 - 10.5 mg/dL    Total Protein 9.0 (H) 6.0 - 8.5 g/dL    Albumin 4.9 3.5 - 5.2 g/dL    ALT (SGPT) 17 1 - 41 U/L    AST (SGOT) 14 1 - 40 U/L    Alkaline Phosphatase 72 39 - 117 U/L    Total Bilirubin 0.4 0.0 - 1.2 mg/dL    Globulin 4.1 gm/dL    A/G Ratio 1.2 g/dL    BUN/Creatinine Ratio 11.0 7.0 - 25.0    Anion Gap 14.0 5.0 - 15.0 mmol/L    eGFR 47.8 (L) >60.0 mL/min/1.73   Urinalysis With Microscopic If Indicated (No Culture) - Urine, Clean Catch    Specimen: Urine, Clean Catch   Result Value Ref Range    Color, UA Dark Yellow (A) Yellow, Straw    Appearance, UA Cloudy (A) Clear    pH, UA 6.0 5.0 - 8.0    Specific Gravity, UA 1.029 1.005 - 1.030    Glucose,  mg/dL (2+) (A) Negative    Ketones, UA 40 mg/dL (2+) (A) Negative    Bilirubin, UA Moderate (2+) (A) Negative    Blood, UA Moderate (2+) (A) Negative    Protein, UA >=300 mg/dL (3+) (A) Negative    Leuk Esterase, UA Trace (A) Negative    Nitrite, UA Negative Negative    Urobilinogen, UA 1.0 E.U./dL 0.2 - 1.0 E.U./dL   C-reactive Protein    Specimen: Blood   Result Value Ref Range    C-Reactive Protein <0.30 0.00 - 0.50 mg/dL   Urine Drug Screen - Urine, Clean Catch    Specimen: Urine, Clean Catch   Result Value Ref Range    THC, Screen, Urine Positive (A) Negative    Phencyclidine (PCP), Urine Negative Negative    Cocaine Screen, Urine Negative Negative    Methamphetamine, Ur Negative Negative    Opiate Screen Negative Negative    Amphetamine Screen, Urine Negative Negative    Benzodiazepine Screen, Urine Negative Negative    Tricyclic Antidepressants Screen Negative Negative    Methadone Screen, Urine Negative Negative    Barbiturates Screen, Urine Negative Negative    Oxycodone Screen, Urine Negative Negative    Propoxyphene  Screen Negative Negative    Buprenorphine, Screen, Urine Negative Negative   CBC Auto Differential    Specimen: Blood   Result Value Ref Range    WBC 16.73 (H) 3.40 - 10.80 10*3/mm3    RBC 5.45 4.14 - 5.80 10*6/mm3    Hemoglobin 16.1 13.0 - 17.7 g/dL    Hematocrit 47.1 37.5 - 51.0 %    MCV 86.4 79.0 - 97.0 fL    MCH 29.5 26.6 - 33.0 pg    MCHC 34.2 31.5 - 35.7 g/dL    RDW 12.3 12.3 - 15.4 %    RDW-SD 38.6 37.0 - 54.0 fl    MPV 8.9 6.0 - 12.0 fL    Platelets 371 140 - 450 10*3/mm3    Neutrophil % 87.9 (H) 42.7 - 76.0 %    Lymphocyte % 7.7 (L) 19.6 - 45.3 %    Monocyte % 3.4 (L) 5.0 - 12.0 %    Eosinophil % 0.2 (L) 0.3 - 6.2 %    Basophil % 0.4 0.0 - 1.5 %    Immature Grans % 0.4 0.0 - 0.5 %    Neutrophils, Absolute 14.72 (H) 1.70 - 7.00 10*3/mm3    Lymphocytes, Absolute 1.28 0.70 - 3.10 10*3/mm3    Monocytes, Absolute 0.57 0.10 - 0.90 10*3/mm3    Eosinophils, Absolute 0.03 0.00 - 0.40 10*3/mm3    Basophils, Absolute 0.06 0.00 - 0.20 10*3/mm3    Immature Grans, Absolute 0.07 (H) 0.00 - 0.05 10*3/mm3    nRBC 0.0 0.0 - 0.2 /100 WBC   Fentanyl, Urine - Urine, Clean Catch    Specimen: Urine, Clean Catch   Result Value Ref Range    Fentanyl, Urine Negative Negative   Urinalysis, Microscopic Only - Urine, Clean Catch    Specimen: Urine, Clean Catch   Result Value Ref Range    RBC, UA 6-12 (A) None Seen, 0-2 /HPF    WBC, UA 3-5 (A) None Seen, 0-2 /HPF    Bacteria, UA 1+ (A) None Seen /HPF    Squamous Epithelial Cells, UA 3-6 (A) None Seen, 0-2 /HPF    Yeast, UA Small/1+ Budding Yeast None Seen /HPF    Hyaline Casts, UA 21-30 None Seen /LPF    Calcium Oxalate Crystals, UA Small/1+ None Seen /HPF    Sperm, UA Occasional (A) None Seen /HPF    Methodology Manual Light Microscopy    POC Glucose Once    Specimen: Blood   Result Value Ref Range    Glucose 201 (H) 70 - 130 mg/dL   ECG 12 Lead QT Measurement   Result Value Ref Range    QT Interval 370 ms    QTC Interval 410 ms   Green Top (Gel)   Result Value Ref Range    Extra Tube  Hold for add-ons.    Lavender Top   Result Value Ref Range    Extra Tube hold for add-on    Gold Top - SST   Result Value Ref Range    Extra Tube Hold for add-ons.    Light Blue Top   Result Value Ref Range    Extra Tube Hold for add-ons.       CT Abdomen Pelvis Without Contrast   Final Result       No acute process in the abdomen/pelvis.       This report was finalized on 9/18/2023 9:38 PM by Mikhail Quiroz MD.               ED Course  ED Course as of 09/18/23 2234   Mon Sep 18, 2023   2151 CT Abdomen Pelvis Without Contrast [SM]   2218 After discharge patient continues to vomit. Patient reports inability to go home at this time. Patient waxing and waning in the bed. Actively vomiting. Discussed Cannabis use and vomiting. Patient has been diagnosed with this before and states he hasn't have any THC for 3 days. Paged hospitalist.  [SM]   2225 EKG done sinus rhythm.  74 beats minute.  I directly evaluated the EKG of this patient and noted no acute abnormalities.  Electronically signed by Rishi Light DO, 09/18/23, 10:25 PM EDT.   [SF]      ED Course User Index  [SF] Rishi Light DO  [SM] Marge Travis, APRN                                           Medical Decision Making  Problems Addressed:  Nausea and vomiting, unspecified vomiting type: complicated acute illness or injury    Amount and/or Complexity of Data Reviewed  Labs: ordered.  Radiology: ordered. Decision-making details documented in ED Course.  ECG/medicine tests: ordered.    Risk  Prescription drug management.        Final diagnoses:   Cannabis hyperemesis syndrome concurrent with and due to cannabis abuse       ED Disposition  ED Disposition       ED Disposition   Intended Admit    Condition   --    Comment   --               Geri Belcher, APRN  [address] [facility] [address]  [phone]    Schedule an appointment as soon as possible for a visit in 2 days  If symptoms worsen         Medication List        ASK your doctor  about these medications      * prochlorperazine 10 MG tablet  Commonly known as: COMPAZINE  Ask about: Which instructions should I use?     * prochlorperazine 10 MG tablet  Commonly known as: COMPAZINE  Take 1 tablet by mouth Every 6 (Six) Hours As Needed for Nausea or Vomiting for up to 5 days.  Ask about: Which instructions should I use?           * This list has 2 medication(s) that are the same as other medications prescribed for you. Read the directions carefully, and ask your doctor or other care provider to review them with you.                   Where to Get Your Medications        These medications were sent to [id number] - [place], [place] - [address] AT [facility] OF [address] - [phone]  - [phone] FX  [address] [place] [address]      Phone: [phone]   prochlorperazine 10 MG tablet            Marge Travis APRN  09/18/23 2200       Marge Travis, ALEXANDER  09/18/23 2239

## 2023-09-19 NOTE — PLAN OF CARE
Goal Outcome Evaluation:  Plan of Care Reviewed With: patient        Progress: no change          PT arrived from ER this shift. Only complaint was of nausea, prn medication administered per MAR. PT ambulated to bathroom and showered this shift. VSS, will continue POC

## 2023-09-19 NOTE — DISCHARGE SUMMARY
[facility] HOSPITALIST MEDICINE DISCHARGE SUMMARY    Patient Identification:  Name:  Oliver Osborn  Age:  28 y.o.  Sex:  male  :  1995  MRN:  [id number]  Visit Number:  [id number]    Date of Admission: 2023  Date of Discharge: 2023    PCP: Geri Belcher, ALEXANDER    DISCHARGE DIAGNOSIS   Intractable vomiting  Suspected cannabinoid hyperemesis syndrome  Acute kidney injury  Type 2 diabetes mellitus      CONSULTS  None      PROCEDURES PERFORMED   None      HOSPITAL COURSE  Mr. Osborn is a 28 y.o. male who presented to [facility] ED on 2023 with a chief complaint of intractable vomiting.  Patient has a past medical history remarkable for type 2 diabetes mellitus, tobacco abuse and THC use.  It should be noted patient was admitted to our service in 2023 for intractable nausea with vomiting and suspected cannabinoid hyperemesis syndrome at that time.  Patient has returned to our emergency department on multiple occasions for similar symptoms.  Patient did present to our emergency department for this hospitalization secondary to 1 day history of intractable nausea and vomiting.  Initial evaluation in the emergency department did consist of basic laboratory work as well as physical exam and vital signs.  Initial vital signs found patient's blood pressure 147/88, respirations 17, heart rate 86, temperature 98.4 °F with oxygen saturation 96% on room air.  Initial lab work did include CBC and CMP.  CMP demonstrated serum creatinine of 1.9.  CBC demonstrated elevated white blood cell count of 16.  Urine drug screen was positive for THC.  Patient was admitted for further treatment and evaluation.    In regards to acute kidney injury, patient was started on IV fluids with normal saline at 125 cc/hour.  Repeat BMP the following day demonstrated improvement in serum creatinine to 1.6.  Elevated white blood cell count also improved down to 12.  CT abdomen/pelvis demonstrated no  acute intra-abdominal process.  Patient was strongly cautioned to cease cannabinoid use as it was felt this is likely leading to recurrent hospitalizations from hyperemesis syndrome.  Patient states he is not sure this is a correct diagnosis and would prefer to follow-up with gastroenterology services for consideration of EGD.  As such, follow-up appointment will be made with GI services at earliest available appointment (preferably on a Thursday per patient request).  Patient states nausea and vomiting have completely resolved today.  As such, it is felt patient has reached maximum medical benefit of current hospitalization and will be discharged home in stable condition today.  The beforementioned plan was thoroughly discussed with the patient and he expressed his understanding and willingness to proceed with the beforementioned plan.    VITAL SIGNS:      09/18/23  1527 09/19/23  0030   Weight: 90.7 kg (200 lb) 83 kg (183 lb) (Lito Cruz)     Body mass index is 29.54 kg/m².    PHYSICAL EXAM:  General -well-nourished  male appearing stated age in no apparent distress  HEENT -head normocephalic and atraumatic, pupils equally round and reactive to light  Lungs -clear to auscultation bilaterally with no wheezes, rales or rhonchi appreciated  Cardiovascular -regular rate and rhythm with no murmurs, rubs or clicks appreciated  GI -abdomen soft, nontender nondistended  Neurological -cranial nerves II through XII intact with no focal deficit or unintentional motor movement appreciated    DISCHARGE DISPOSITION   Stable    DISCHARGE MEDICATIONS:     Discharge Medications        Continue These Medications        Instructions Start Date   Lantus SoloStar 100 UNIT/ML injection pen  Generic drug: Insulin Glargine   12 Units Daily.      ondansetron 4 MG tablet  Commonly known as: ZOFRAN   4 mg, Oral, Every 6 Hours PRN      prochlorperazine 10 MG tablet  Commonly known as: COMPAZINE   10 mg, Oral, Every 6 Hours  PRN                     Additional Instructions for the Follow-ups that You Need to Schedule       Discharge Follow-up with Specialty: GI (please make appointment on a Thursday); 1 Week   As directed      Specialty: GI (please make appointment on a Thursday)   Follow Up: 1 Week   Follow Up Details: recurrent intractable vomiting, consideration for EGD               Follow-up Information       Geri Belcher APRN. Schedule an appointment as soon as possible for a visit in 2 days.    Specialty: Family Medicine  Why: If symptoms worsen  Contact information:  [facility] [address] [phone]                             TEST  RESULTS PENDING AT DISCHARGE  Pending Labs       Order Current Status    Creatinine Urine Random (kidney function) GFR component - Urine, Clean Catch In process    Protein / Creatinine Ratio, Urine - Urine, Clean Catch In process             The ASCVD Risk score (Edwina DK, et al., 2019) failed to calculate for the following reasons:    The 2019 ASCVD risk score is only valid for ages 40 to 79     Marlon Mendoza DO  09/19/23  11:46 EDT    Please note that this discharge summary required more than 30 minutes to complete.    Please send a copy of this dictation to the following providers:  Geri Belcher APRN

## 2023-09-27 ENCOUNTER — HOSPITAL ENCOUNTER (INPATIENT)
Facility: HOSPITAL | Age: 28
LOS: 4 days | Discharge: HOME | DRG: 683 | End: 2023-10-02
Attending: STUDENT IN AN ORGANIZED HEALTH CARE EDUCATION/TRAINING PROGRAM | Admitting: INTERNAL MEDICINE
Payer: MEDICAID

## 2023-09-27 ENCOUNTER — APPOINTMENT (OUTPATIENT)
Dept: CT IMAGING | Facility: HOSPITAL | Age: 28
DRG: 683 | End: 2023-09-27
Payer: MEDICAID

## 2023-09-27 ENCOUNTER — APPOINTMENT (OUTPATIENT)
Dept: GENERAL RADIOLOGY | Facility: HOSPITAL | Age: 28
DRG: 683 | End: 2023-09-27
Payer: MEDICAID

## 2023-09-27 DIAGNOSIS — E87.20 METABOLIC ACIDOSIS: ICD-10-CM

## 2023-09-27 DIAGNOSIS — N17.9 ACUTE RENAL FAILURE, UNSPECIFIED ACUTE RENAL FAILURE TYPE: Primary | ICD-10-CM

## 2023-09-27 PROBLEM — R11.2 INTRACTABLE NAUSEA AND VOMITING: Status: ACTIVE | Noted: 2023-09-27

## 2023-09-27 LAB
A-A DO2: 19.1 MMHG (ref 0–300)
ACETONE BLD QL: NEGATIVE
ALBUMIN SERPL-MCNC: 5 G/DL (ref 3.5–5.2)
ALBUMIN/GLOB SERPL: 1.6 G/DL
ALP SERPL-CCNC: 69 U/L (ref 39–117)
ALT SERPL W P-5'-P-CCNC: 14 U/L (ref 1–41)
AMPHET+METHAMPHET UR QL: NEGATIVE
AMPHETAMINES UR QL: NEGATIVE
ANION GAP SERPL CALCULATED.3IONS-SCNC: 21.2 MMOL/L (ref 5–15)
ARTERIAL PATENCY WRIST A: ABNORMAL
AST SERPL-CCNC: 16 U/L (ref 1–40)
ATMOSPHERIC PRESS: 729 MMHG
BACTERIA UR QL AUTO: ABNORMAL /HPF
BARBITURATES UR QL SCN: NEGATIVE
BASE EXCESS BLDA CALC-SCNC: 2.6 MMOL/L (ref 0–2)
BASOPHILS # BLD AUTO: 0.04 10*3/MM3 (ref 0–0.2)
BASOPHILS NFR BLD AUTO: 0.4 % (ref 0–1.5)
BDY SITE: ABNORMAL
BENZODIAZ UR QL SCN: NEGATIVE
BILIRUB SERPL-MCNC: 0.7 MG/DL (ref 0–1.2)
BILIRUB UR QL STRIP: ABNORMAL
BUN SERPL-MCNC: 38 MG/DL (ref 6–20)
BUN/CREAT SERPL: 11.3 (ref 7–25)
BUPRENORPHINE SERPL-MCNC: NEGATIVE NG/ML
CALCIUM SPEC-SCNC: 10.8 MG/DL (ref 8.6–10.5)
CANNABINOIDS SERPL QL: POSITIVE
CHLORIDE SERPL-SCNC: 84 MMOL/L (ref 98–107)
CLARITY UR: ABNORMAL
CO2 BLDA-SCNC: 25.4 MMOL/L (ref 22–33)
CO2 SERPL-SCNC: 24.8 MMOL/L (ref 22–29)
COCAINE UR QL: NEGATIVE
COHGB MFR BLD: 1.4 % (ref 0–5)
COLOR UR: ABNORMAL
CREAT SERPL-MCNC: 3.36 MG/DL (ref 0.76–1.27)
CRP SERPL-MCNC: <0.3 MG/DL (ref 0–0.5)
D-LACTATE SERPL-SCNC: 2 MMOL/L (ref 0.5–2)
D-LACTATE SERPL-SCNC: 2.2 MMOL/L (ref 0.5–2)
D-LACTATE SERPL-SCNC: 3.5 MMOL/L (ref 0.5–2)
DEPRECATED RDW RBC AUTO: 37.4 FL (ref 37–54)
EGFRCR SERPLBLD CKD-EPI 2021: 24.3 ML/MIN/1.73
EOSINOPHIL # BLD AUTO: 0.1 10*3/MM3 (ref 0–0.4)
EOSINOPHIL NFR BLD AUTO: 0.9 % (ref 0.3–6.2)
ERYTHROCYTE [DISTWIDTH] IN BLOOD BY AUTOMATED COUNT: 12.1 % (ref 12.3–15.4)
FENTANYL UR-MCNC: NEGATIVE NG/ML
GLOBULIN UR ELPH-MCNC: 3.1 GM/DL
GLUCOSE BLDC GLUCOMTR-MCNC: 183 MG/DL (ref 70–130)
GLUCOSE BLDC GLUCOMTR-MCNC: 189 MG/DL (ref 70–130)
GLUCOSE SERPL-MCNC: 203 MG/DL (ref 65–99)
GLUCOSE UR STRIP-MCNC: NEGATIVE MG/DL
HBA1C MFR BLD: 7.4 % (ref 4.8–5.6)
HCO3 BLDA-SCNC: 24.5 MMOL/L (ref 20–26)
HCT VFR BLD AUTO: 47.7 % (ref 37.5–51)
HCT VFR BLD CALC: 44.6 % (ref 38–51)
HGB BLD-MCNC: 16.4 G/DL (ref 13–17.7)
HGB BLDA-MCNC: 14.5 G/DL (ref 14–18)
HGB UR QL STRIP.AUTO: ABNORMAL
HOLD SPECIMEN: NORMAL
HOLD SPECIMEN: NORMAL
HYALINE CASTS UR QL AUTO: ABNORMAL /LPF
IMM GRANULOCYTES # BLD AUTO: 0.03 10*3/MM3 (ref 0–0.05)
IMM GRANULOCYTES NFR BLD AUTO: 0.3 % (ref 0–0.5)
INHALED O2 CONCENTRATION: 21 %
KETONES UR QL STRIP: ABNORMAL
LEUKOCYTE ESTERASE UR QL STRIP.AUTO: ABNORMAL
LIPASE SERPL-CCNC: 29 U/L (ref 13–60)
LYMPHOCYTES # BLD AUTO: 2.84 10*3/MM3 (ref 0.7–3.1)
LYMPHOCYTES NFR BLD AUTO: 25.8 % (ref 19.6–45.3)
Lab: ABNORMAL
MAGNESIUM SERPL-MCNC: 2.3 MG/DL (ref 1.6–2.6)
MCH RBC QN AUTO: 29.3 PG (ref 26.6–33)
MCHC RBC AUTO-ENTMCNC: 34.4 G/DL (ref 31.5–35.7)
MCV RBC AUTO: 85.3 FL (ref 79–97)
METHADONE UR QL SCN: NEGATIVE
METHGB BLD QL: 0.3 % (ref 0–3)
MODALITY: ABNORMAL
MONOCYTES # BLD AUTO: 0.98 10*3/MM3 (ref 0.1–0.9)
MONOCYTES NFR BLD AUTO: 8.9 % (ref 5–12)
NEUTROPHILS NFR BLD AUTO: 63.7 % (ref 42.7–76)
NEUTROPHILS NFR BLD AUTO: 7.01 10*3/MM3 (ref 1.7–7)
NITRITE UR QL STRIP: NEGATIVE
NRBC BLD AUTO-RTO: 0 /100 WBC (ref 0–0.2)
OPIATES UR QL: NEGATIVE
OXYCODONE UR QL SCN: NEGATIVE
OXYHGB MFR BLDV: 96.6 % (ref 94–99)
PCO2 BLDA: 29.7 MM HG (ref 35–45)
PCO2 TEMP ADJ BLD: ABNORMAL MM[HG]
PCP UR QL SCN: NEGATIVE
PH BLDA: 7.53 PH UNITS (ref 7.35–7.45)
PH UR STRIP.AUTO: <=5 [PH] (ref 5–8)
PH, TEMP CORRECTED: ABNORMAL
PHOSPHATE SERPL-MCNC: 5.3 MG/DL (ref 2.5–4.5)
PLATELET # BLD AUTO: 386 10*3/MM3 (ref 140–450)
PMV BLD AUTO: 8.8 FL (ref 6–12)
PO2 BLDA: 90.7 MM HG (ref 83–108)
PO2 TEMP ADJ BLD: ABNORMAL MM[HG]
POTASSIUM SERPL-SCNC: 4.3 MMOL/L (ref 3.5–5.2)
PROCALCITONIN SERPL-MCNC: 0.17 NG/ML (ref 0–0.25)
PROPOXYPH UR QL: NEGATIVE
PROT SERPL-MCNC: 8.1 G/DL (ref 6–8.5)
PROT UR QL STRIP: ABNORMAL
RBC # BLD AUTO: 5.59 10*6/MM3 (ref 4.14–5.8)
RBC # UR STRIP: ABNORMAL /HPF
REF LAB TEST METHOD: ABNORMAL
SAO2 % BLDCOA: 98.3 % (ref 94–99)
SODIUM SERPL-SCNC: 130 MMOL/L (ref 136–145)
SP GR UR STRIP: >=1.03 (ref 1–1.03)
SQUAMOUS #/AREA URNS HPF: ABNORMAL /HPF
TRICYCLICS UR QL SCN: NEGATIVE
UROBILINOGEN UR QL STRIP: ABNORMAL
VENTILATOR MODE: ABNORMAL
WBC # UR STRIP: ABNORMAL /HPF
WBC NRBC COR # BLD: 11 10*3/MM3 (ref 3.4–10.8)
WHOLE BLOOD HOLD COAG: NORMAL
WHOLE BLOOD HOLD SPECIMEN: NORMAL

## 2023-09-27 PROCEDURE — 36415 COLL VENOUS BLD VENIPUNCTURE: CPT

## 2023-09-27 PROCEDURE — 82009 KETONE BODYS QUAL: CPT | Performed by: STUDENT IN AN ORGANIZED HEALTH CARE EDUCATION/TRAINING PROGRAM

## 2023-09-27 PROCEDURE — 71045 X-RAY EXAM CHEST 1 VIEW: CPT

## 2023-09-27 PROCEDURE — 25010000002 ENOXAPARIN PER 10 MG: Performed by: INTERNAL MEDICINE

## 2023-09-27 PROCEDURE — 93005 ELECTROCARDIOGRAM TRACING: CPT | Performed by: STUDENT IN AN ORGANIZED HEALTH CARE EDUCATION/TRAINING PROGRAM

## 2023-09-27 PROCEDURE — 80307 DRUG TEST PRSMV CHEM ANLYZR: CPT | Performed by: INTERNAL MEDICINE

## 2023-09-27 PROCEDURE — 82805 BLOOD GASES W/O2 SATURATION: CPT

## 2023-09-27 PROCEDURE — 74176 CT ABD & PELVIS W/O CONTRAST: CPT | Performed by: RADIOLOGY

## 2023-09-27 PROCEDURE — 74176 CT ABD & PELVIS W/O CONTRAST: CPT

## 2023-09-27 PROCEDURE — 83735 ASSAY OF MAGNESIUM: CPT | Performed by: STUDENT IN AN ORGANIZED HEALTH CARE EDUCATION/TRAINING PROGRAM

## 2023-09-27 PROCEDURE — 99222 1ST HOSP IP/OBS MODERATE 55: CPT

## 2023-09-27 PROCEDURE — 83605 ASSAY OF LACTIC ACID: CPT | Performed by: STUDENT IN AN ORGANIZED HEALTH CARE EDUCATION/TRAINING PROGRAM

## 2023-09-27 PROCEDURE — 86140 C-REACTIVE PROTEIN: CPT | Performed by: HOSPITALIST

## 2023-09-27 PROCEDURE — 71045 X-RAY EXAM CHEST 1 VIEW: CPT | Performed by: RADIOLOGY

## 2023-09-27 PROCEDURE — 84100 ASSAY OF PHOSPHORUS: CPT | Performed by: STUDENT IN AN ORGANIZED HEALTH CARE EDUCATION/TRAINING PROGRAM

## 2023-09-27 PROCEDURE — G0378 HOSPITAL OBSERVATION PER HR: HCPCS

## 2023-09-27 PROCEDURE — 25010000002 METOCLOPRAMIDE PER 10 MG: Performed by: STUDENT IN AN ORGANIZED HEALTH CARE EDUCATION/TRAINING PROGRAM

## 2023-09-27 PROCEDURE — 83036 HEMOGLOBIN GLYCOSYLATED A1C: CPT | Performed by: STUDENT IN AN ORGANIZED HEALTH CARE EDUCATION/TRAINING PROGRAM

## 2023-09-27 PROCEDURE — 36600 WITHDRAWAL OF ARTERIAL BLOOD: CPT

## 2023-09-27 PROCEDURE — 83930 ASSAY OF BLOOD OSMOLALITY: CPT | Performed by: STUDENT IN AN ORGANIZED HEALTH CARE EDUCATION/TRAINING PROGRAM

## 2023-09-27 PROCEDURE — 85025 COMPLETE CBC W/AUTO DIFF WBC: CPT | Performed by: STUDENT IN AN ORGANIZED HEALTH CARE EDUCATION/TRAINING PROGRAM

## 2023-09-27 PROCEDURE — 83690 ASSAY OF LIPASE: CPT | Performed by: STUDENT IN AN ORGANIZED HEALTH CARE EDUCATION/TRAINING PROGRAM

## 2023-09-27 PROCEDURE — 83050 HGB METHEMOGLOBIN QUAN: CPT

## 2023-09-27 PROCEDURE — 25010000002 ONDANSETRON PER 1 MG: Performed by: STUDENT IN AN ORGANIZED HEALTH CARE EDUCATION/TRAINING PROGRAM

## 2023-09-27 PROCEDURE — 84145 PROCALCITONIN (PCT): CPT | Performed by: HOSPITALIST

## 2023-09-27 PROCEDURE — 81001 URINALYSIS AUTO W/SCOPE: CPT | Performed by: STUDENT IN AN ORGANIZED HEALTH CARE EDUCATION/TRAINING PROGRAM

## 2023-09-27 PROCEDURE — 93010 ELECTROCARDIOGRAM REPORT: CPT | Performed by: INTERNAL MEDICINE

## 2023-09-27 PROCEDURE — 82375 ASSAY CARBOXYHB QUANT: CPT

## 2023-09-27 PROCEDURE — 82948 REAGENT STRIP/BLOOD GLUCOSE: CPT

## 2023-09-27 PROCEDURE — 99285 EMERGENCY DEPT VISIT HI MDM: CPT

## 2023-09-27 PROCEDURE — 80053 COMPREHEN METABOLIC PANEL: CPT | Performed by: STUDENT IN AN ORGANIZED HEALTH CARE EDUCATION/TRAINING PROGRAM

## 2023-09-27 RX ORDER — DEXTROSE MONOHYDRATE 25 G/50ML
10-50 INJECTION, SOLUTION INTRAVENOUS
Status: DISCONTINUED | OUTPATIENT
Start: 2023-09-27 | End: 2023-09-27

## 2023-09-27 RX ORDER — SODIUM CHLORIDE 0.9 % (FLUSH) 0.9 %
10 SYRINGE (ML) INJECTION AS NEEDED
Status: DISCONTINUED | OUTPATIENT
Start: 2023-09-27 | End: 2023-09-27

## 2023-09-27 RX ORDER — GLUCAGON 1 MG/ML
1 KIT INJECTION
Status: DISCONTINUED | OUTPATIENT
Start: 2023-09-27 | End: 2023-09-27

## 2023-09-27 RX ORDER — NICOTINE POLACRILEX 4 MG
15 LOZENGE BUCCAL
Status: DISCONTINUED | OUTPATIENT
Start: 2023-09-27 | End: 2023-09-27

## 2023-09-27 RX ORDER — BISACODYL 10 MG
10 SUPPOSITORY, RECTAL RECTAL DAILY PRN
Status: DISCONTINUED | OUTPATIENT
Start: 2023-09-27 | End: 2023-10-02 | Stop reason: HOSPADM

## 2023-09-27 RX ORDER — ONDANSETRON 2 MG/ML
4 INJECTION INTRAMUSCULAR; INTRAVENOUS ONCE
Status: COMPLETED | OUTPATIENT
Start: 2023-09-27 | End: 2023-09-27

## 2023-09-27 RX ORDER — SODIUM CHLORIDE 9 MG/ML
40 INJECTION, SOLUTION INTRAVENOUS AS NEEDED
Status: DISCONTINUED | OUTPATIENT
Start: 2023-09-27 | End: 2023-09-27

## 2023-09-27 RX ORDER — SODIUM CHLORIDE AND POTASSIUM CHLORIDE 150; 900 MG/100ML; MG/100ML
250 INJECTION, SOLUTION INTRAVENOUS CONTINUOUS PRN
Status: DISCONTINUED | OUTPATIENT
Start: 2023-09-27 | End: 2023-09-27

## 2023-09-27 RX ORDER — DEXTROSE MONOHYDRATE, SODIUM CHLORIDE, AND POTASSIUM CHLORIDE 50; 2.98; 4.5 G/1000ML; G/1000ML; G/1000ML
150 INJECTION, SOLUTION INTRAVENOUS CONTINUOUS PRN
Status: DISCONTINUED | OUTPATIENT
Start: 2023-09-27 | End: 2023-09-27

## 2023-09-27 RX ORDER — ONDANSETRON 2 MG/ML
4 INJECTION INTRAMUSCULAR; INTRAVENOUS EVERY 6 HOURS PRN
Status: DISCONTINUED | OUTPATIENT
Start: 2023-09-27 | End: 2023-10-01

## 2023-09-27 RX ORDER — SODIUM CHLORIDE 9 MG/ML
40 INJECTION, SOLUTION INTRAVENOUS AS NEEDED
Status: DISCONTINUED | OUTPATIENT
Start: 2023-09-27 | End: 2023-10-02 | Stop reason: HOSPADM

## 2023-09-27 RX ORDER — SODIUM CHLORIDE AND POTASSIUM CHLORIDE 150; 450 MG/100ML; MG/100ML
250 INJECTION, SOLUTION INTRAVENOUS CONTINUOUS PRN
Status: DISCONTINUED | OUTPATIENT
Start: 2023-09-27 | End: 2023-09-27

## 2023-09-27 RX ORDER — BISACODYL 5 MG/1
5 TABLET, DELAYED RELEASE ORAL DAILY PRN
Status: DISCONTINUED | OUTPATIENT
Start: 2023-09-27 | End: 2023-10-02 | Stop reason: HOSPADM

## 2023-09-27 RX ORDER — ENOXAPARIN SODIUM 100 MG/ML
40 INJECTION SUBCUTANEOUS DAILY
Status: DISCONTINUED | OUTPATIENT
Start: 2023-09-27 | End: 2023-10-02 | Stop reason: HOSPADM

## 2023-09-27 RX ORDER — DEXTROSE AND SODIUM CHLORIDE 5; .45 G/100ML; G/100ML
150 INJECTION, SOLUTION INTRAVENOUS CONTINUOUS PRN
Status: DISCONTINUED | OUTPATIENT
Start: 2023-09-27 | End: 2023-09-27

## 2023-09-27 RX ORDER — SODIUM CHLORIDE 0.9 % (FLUSH) 0.9 %
10 SYRINGE (ML) INJECTION EVERY 12 HOURS SCHEDULED
Status: DISCONTINUED | OUTPATIENT
Start: 2023-09-27 | End: 2023-09-30

## 2023-09-27 RX ORDER — POLYETHYLENE GLYCOL 3350 17 G/17G
17 POWDER, FOR SOLUTION ORAL DAILY PRN
Status: DISCONTINUED | OUTPATIENT
Start: 2023-09-27 | End: 2023-10-02 | Stop reason: HOSPADM

## 2023-09-27 RX ORDER — METOCLOPRAMIDE HYDROCHLORIDE 5 MG/ML
5 INJECTION INTRAMUSCULAR; INTRAVENOUS ONCE
Status: COMPLETED | OUTPATIENT
Start: 2023-09-27 | End: 2023-09-27

## 2023-09-27 RX ORDER — SODIUM CHLORIDE 0.9 % (FLUSH) 0.9 %
10 SYRINGE (ML) INJECTION AS NEEDED
Status: DISCONTINUED | OUTPATIENT
Start: 2023-09-27 | End: 2023-10-02 | Stop reason: HOSPADM

## 2023-09-27 RX ORDER — DEXTROSE MONOHYDRATE, SODIUM CHLORIDE, AND POTASSIUM CHLORIDE 50; 1.49; 4.5 G/1000ML; G/1000ML; G/1000ML
150 INJECTION, SOLUTION INTRAVENOUS CONTINUOUS PRN
Status: DISCONTINUED | OUTPATIENT
Start: 2023-09-27 | End: 2023-09-27

## 2023-09-27 RX ORDER — DEXTROSE AND SODIUM CHLORIDE 5; .9 G/100ML; G/100ML
150 INJECTION, SOLUTION INTRAVENOUS CONTINUOUS PRN
Status: DISCONTINUED | OUTPATIENT
Start: 2023-09-27 | End: 2023-09-27

## 2023-09-27 RX ORDER — SODIUM CHLORIDE AND POTASSIUM CHLORIDE 300; 900 MG/100ML; MG/100ML
250 INJECTION, SOLUTION INTRAVENOUS CONTINUOUS PRN
Status: DISCONTINUED | OUTPATIENT
Start: 2023-09-27 | End: 2023-09-27

## 2023-09-27 RX ORDER — SODIUM CHLORIDE 9 MG/ML
250 INJECTION, SOLUTION INTRAVENOUS CONTINUOUS PRN
Status: DISCONTINUED | OUTPATIENT
Start: 2023-09-27 | End: 2023-09-27

## 2023-09-27 RX ORDER — SODIUM CHLORIDE 0.9 % (FLUSH) 0.9 %
10 SYRINGE (ML) INJECTION EVERY 12 HOURS SCHEDULED
Status: DISCONTINUED | OUTPATIENT
Start: 2023-09-27 | End: 2023-10-02 | Stop reason: HOSPADM

## 2023-09-27 RX ORDER — DEXTROSE MONOHYDRATE, SODIUM CHLORIDE, AND POTASSIUM CHLORIDE 50; 1.49; 9 G/1000ML; G/1000ML; G/1000ML
150 INJECTION, SOLUTION INTRAVENOUS CONTINUOUS PRN
Status: DISCONTINUED | OUTPATIENT
Start: 2023-09-27 | End: 2023-09-27

## 2023-09-27 RX ORDER — SODIUM CHLORIDE 450 MG/100ML
250 INJECTION, SOLUTION INTRAVENOUS CONTINUOUS PRN
Status: DISCONTINUED | OUTPATIENT
Start: 2023-09-27 | End: 2023-09-27

## 2023-09-27 RX ORDER — SODIUM CHLORIDE 9 MG/ML
100 INJECTION, SOLUTION INTRAVENOUS CONTINUOUS
Status: DISCONTINUED | OUTPATIENT
Start: 2023-09-27 | End: 2023-09-28

## 2023-09-27 RX ORDER — AMOXICILLIN 250 MG
2 CAPSULE ORAL 2 TIMES DAILY
Status: DISCONTINUED | OUTPATIENT
Start: 2023-09-27 | End: 2023-10-02 | Stop reason: HOSPADM

## 2023-09-27 RX ADMIN — SODIUM CHLORIDE 1000 ML: 9 INJECTION, SOLUTION INTRAVENOUS at 15:52

## 2023-09-27 RX ADMIN — SODIUM CHLORIDE 100 ML/HR: 9 INJECTION, SOLUTION INTRAVENOUS at 20:29

## 2023-09-27 RX ADMIN — SODIUM CHLORIDE 1000 ML/HR: 9 INJECTION, SOLUTION INTRAVENOUS at 18:20

## 2023-09-27 RX ADMIN — METOCLOPRAMIDE 5 MG: 5 INJECTION, SOLUTION INTRAMUSCULAR; INTRAVENOUS at 18:21

## 2023-09-27 RX ADMIN — ENOXAPARIN SODIUM 40 MG: 40 INJECTION SUBCUTANEOUS at 22:15

## 2023-09-27 RX ADMIN — Medication 10 ML: at 20:29

## 2023-09-27 RX ADMIN — ONDANSETRON 4 MG: 2 INJECTION INTRAMUSCULAR; INTRAVENOUS at 15:52

## 2023-09-27 RX ADMIN — SODIUM CHLORIDE 1000 ML: 9 INJECTION, SOLUTION INTRAVENOUS at 17:29

## 2023-09-27 NOTE — ED PROVIDER NOTES
Subjective   History of Present Illness  28-year-old male with past medical history of diabetes presents to the ER due to concerns for persistent nausea and vomiting with abdominal pain for the past several days.  Patient noted he has not been able to keep down anything but apples for the last several days.  Denies chest pain.  Patient confirmed diffuse radiating abdominal pain.  Patient denies obvious aggravating or alleviating factors.  Severity of abdominal pain waxes and wanes.  Afebrile.  Vital signs stable    Review of Systems   Constitutional:  Positive for appetite change.   Gastrointestinal:  Positive for abdominal pain, nausea and vomiting.   All other systems reviewed and are negative.    Past Medical History:   Diagnosis Date    Diabetes mellitus     Tobacco abuse        No Known Allergies    Past Surgical History:   Procedure Laterality Date    ENDOSCOPY N/A 6/1/2022    Procedure: ESOPHAGOGASTRODUODENOSCOPY WITH ANESTHESIA;  Surgeon: Keon Quezada MD;  Location: [facility];  Service: Gastroenterology;  Laterality: N/A;       Family History   Problem Relation Age of Onset    Heart disease Mother     Diabetes Mother     Kidney disease Mother     Heart disease Father     Diabetes Father     Heart disease Maternal Grandmother     Diabetes Maternal Grandmother     Heart disease Maternal Grandfather     Diabetes Maternal Grandfather     Heart disease Paternal Grandmother     Diabetes Paternal Grandmother     Heart disease Paternal Grandfather     Diabetes Paternal Grandfather        Social History     Socioeconomic History    Marital status: Single   Tobacco Use    Smoking status: Every Day     Packs/day: 2.00     Years: 15.00     Pack years: 30.00     Types: Cigarettes    Smokeless tobacco: Never   Vaping Use    Vaping Use: Former    Substances: Nicotine, Flavoring    Devices: Disposable    Passive vaping exposure: Yes   Substance and Sexual Activity    Alcohol use: Never    Drug use: Not Currently      Frequency: 7.0 times per week     Types: Marijuana    Sexual activity: Defer           Objective   Physical Exam  Constitutional:       General: He is not in acute distress.     Appearance: He is well-developed. He is not ill-appearing.   HENT:      Head: Normocephalic and atraumatic.   Eyes:      Extraocular Movements: Extraocular movements intact.      Pupils: Pupils are equal, round, and reactive to light.   Neck:      Vascular: No JVD.   Cardiovascular:      Rate and Rhythm: Normal rate and regular rhythm.      Heart sounds: Normal heart sounds. No murmur heard.  Pulmonary:      Effort: No tachypnea, accessory muscle usage or respiratory distress.      Breath sounds: Normal breath sounds. No stridor. No decreased breath sounds, wheezing, rhonchi or rales.   Chest:      Chest wall: No deformity, tenderness or crepitus.   Abdominal:      General: Bowel sounds are normal.      Palpations: Abdomen is soft.      Tenderness: There is generalized abdominal tenderness. There is no guarding or rebound.   Musculoskeletal:         General: Normal range of motion.      Cervical back: Normal range of motion and neck supple.      Right lower leg: No tenderness. No edema.      Left lower leg: No tenderness. No edema.   Lymphadenopathy:      Cervical: No cervical adenopathy.   Skin:     General: Skin is warm and dry.      Coloration: Skin is not cyanotic.      Findings: No ecchymosis or erythema.   Neurological:      General: No focal deficit present.      Mental Status: He is alert and oriented to person, place, and time.      Cranial Nerves: No cranial nerve deficit.      Motor: No weakness.   Psychiatric:         Mood and Affect: Mood normal. Mood is not anxious.         Behavior: Behavior normal. Behavior is not agitated.       Procedures           ED Course  ED Course as of 09/27/23 1844   Wed Sep 27, 2023   1630 ECG 12 Lead Syncope  Vent. Rate :  65 BPM     Atrial Rate :  65 BPM     P-R Int : 122 ms          QRS Dur :   80 ms      QT Int : 426 ms       P-R-T Axes :  47  76  20 degrees     QTc Int : 443 ms     Normal sinus rhythm  Nonspecific ST and T wave abnormality  Abnormal ECG  When compared with ECG of 18-SEP-2023 22:12,  ST elevation now present in Anterior leads  Inverted T waves have replaced nonspecific T wave abnormality in Inferior leads   [ES]   1654 EKG was sinus rhythm.  65 bpm.  No acute ST elevation.  QTc 443.  Electronically signed by Rishi Light DO, 09/27/23, 4:54 PM EDT.   [SF]      ED Course User Index  [ES] Srinivas Sutherland MD  [SF] Rishi Light DO      CT Abdomen Pelvis Without Contrast    Result Date: 9/27/2023   1.  Mild enteritis and mild mesenteric adenitis. 2.  No bowel or renal obstruction. 3.  No renal, ureter, or bladder stone. 4.  Normal appendix is well seen. 5.  Bilateral lower pelvic phleboliths. 6.  Chronic bilateral L5 pars defects with chronic grade 1 anterolisthesis of L5 on S1. 7.  No free fluid or free air. 8.  No abscess or hematoma.  This report was finalized on 9/27/2023 6:22 PM by Maico Miller MD.       Results for orders placed or performed during the hospital encounter of 09/27/23   Comprehensive Metabolic Panel    Specimen: Blood   Result Value Ref Range    Glucose 203 (H) 65 - 99 mg/dL    BUN 38 (H) 6 - 20 mg/dL    Creatinine 3.36 (H) 0.76 - 1.27 mg/dL    Sodium 130 (L) 136 - 145 mmol/L    Potassium 4.3 3.5 - 5.2 mmol/L    Chloride 84 (L) 98 - 107 mmol/L    CO2 24.8 22.0 - 29.0 mmol/L    Calcium 10.8 (H) 8.6 - 10.5 mg/dL    Total Protein 8.1 6.0 - 8.5 g/dL    Albumin 5.0 3.5 - 5.2 g/dL    ALT (SGPT) 14 1 - 41 U/L    AST (SGOT) 16 1 - 40 U/L    Alkaline Phosphatase 69 39 - 117 U/L    Total Bilirubin 0.7 0.0 - 1.2 mg/dL    Globulin 3.1 gm/dL    A/G Ratio 1.6 g/dL    BUN/Creatinine Ratio 11.3 7.0 - 25.0    Anion Gap 21.2 (H) 5.0 - 15.0 mmol/L    eGFR 24.3 (L) >60.0 mL/min/1.73   Lipase    Specimen: Blood   Result Value Ref Range    Lipase 29 13 - 60 U/L   Urinalysis  With Microscopic If Indicated (No Culture) - Urine, Clean Catch    Specimen: Urine, Clean Catch   Result Value Ref Range    Color, UA Dark Yellow (A) Yellow, Straw    Appearance, UA Cloudy (A) Clear    pH, UA <=5.0 5.0 - 8.0    Specific Gravity, UA >=1.030 1.005 - 1.030    Glucose, UA Negative Negative    Ketones, UA Trace (A) Negative    Bilirubin, UA Moderate (2+) (A) Negative    Blood, UA Moderate (2+) (A) Negative    Protein, UA >=300 mg/dL (3+) (A) Negative    Leuk Esterase, UA Trace (A) Negative    Nitrite, UA Negative Negative    Urobilinogen, UA 1.0 E.U./dL 0.2 - 1.0 E.U./dL   Lactic Acid, Plasma    Specimen: Blood   Result Value Ref Range    Lactate 2.2 (C) 0.5 - 2.0 mmol/L   CBC Auto Differential    Specimen: Blood   Result Value Ref Range    WBC 11.00 (H) 3.40 - 10.80 10*3/mm3    RBC 5.59 4.14 - 5.80 10*6/mm3    Hemoglobin 16.4 13.0 - 17.7 g/dL    Hematocrit 47.7 37.5 - 51.0 %    MCV 85.3 79.0 - 97.0 fL    MCH 29.3 26.6 - 33.0 pg    MCHC 34.4 31.5 - 35.7 g/dL    RDW 12.1 (L) 12.3 - 15.4 %    RDW-SD 37.4 37.0 - 54.0 fl    MPV 8.8 6.0 - 12.0 fL    Platelets 386 140 - 450 10*3/mm3    Neutrophil % 63.7 42.7 - 76.0 %    Lymphocyte % 25.8 19.6 - 45.3 %    Monocyte % 8.9 5.0 - 12.0 %    Eosinophil % 0.9 0.3 - 6.2 %    Basophil % 0.4 0.0 - 1.5 %    Immature Grans % 0.3 0.0 - 0.5 %    Neutrophils, Absolute 7.01 (H) 1.70 - 7.00 10*3/mm3    Lymphocytes, Absolute 2.84 0.70 - 3.10 10*3/mm3    Monocytes, Absolute 0.98 (H) 0.10 - 0.90 10*3/mm3    Eosinophils, Absolute 0.10 0.00 - 0.40 10*3/mm3    Basophils, Absolute 0.04 0.00 - 0.20 10*3/mm3    Immature Grans, Absolute 0.03 0.00 - 0.05 10*3/mm3    nRBC 0.0 0.0 - 0.2 /100 WBC   Acetone    Specimen: Blood   Result Value Ref Range    Acetone Negative Negative   Phosphorus    Specimen: Arm, Left; Blood   Result Value Ref Range    Phosphorus 5.3 (H) 2.5 - 4.5 mg/dL   Magnesium    Specimen: Arm, Left; Blood   Result Value Ref Range    Magnesium 2.3 1.6 - 2.6 mg/dL   Blood  Gas, Arterial With Co-Ox    Specimen: Arterial Blood   Result Value Ref Range    Site Right Brachial     Butch's Test N/A     pH, Arterial 7.525 (H) 7.350 - 7.450 pH units    pCO2, Arterial 29.7 (L) 35.0 - 45.0 mm Hg    pO2, Arterial 90.7 83.0 - 108.0 mm Hg    HCO3, Arterial 24.5 20.0 - 26.0 mmol/L    Base Excess, Arterial 2.6 (H) 0.0 - 2.0 mmol/L    O2 Saturation, Arterial 98.3 94.0 - 99.0 %    Hemoglobin, Blood Gas 14.5 14 - 18 g/dL    Hematocrit, Blood Gas 44.6 38.0 - 51.0 %    Oxyhemoglobin 96.6 94 - 99 %    Methemoglobin 0.30 0.00 - 3.00 %    Carboxyhemoglobin 1.4 0 - 5 %    A-a DO2 19.1 0.0 - 300.0 mmHg    CO2 Content 25.4 22 - 33 mmol/L    Barometric Pressure for Blood Gas 729 mmHg    Modality Room Air     FIO2 21 %    Ventilator Mode NA     Collected by [id number]     pH, Temp Corrected      pCO2, Temperature Corrected      pO2, Temperature Corrected     POC Glucose Once    Specimen: Blood   Result Value Ref Range    Glucose 189 (H) 70 - 130 mg/dL   POC Glucose Once    Specimen: Blood   Result Value Ref Range    Glucose 183 (H) 70 - 130 mg/dL   ECG 12 Lead Syncope   Result Value Ref Range    QT Interval 426 ms    QTC Interval 443 ms   Green Top (Gel)   Result Value Ref Range    Extra Tube Hold for add-ons.    Lavender Top   Result Value Ref Range    Extra Tube hold for add-on    Gold Top - SST   Result Value Ref Range    Extra Tube Hold for add-ons.    Light Blue Top   Result Value Ref Range    Extra Tube Hold for add-ons.                                           Medical Decision Making  Initial concerns for possible DKA given elevated anion gap metabolic acidosis with hyperglycemia.  However, acute renal failure noted.  Acetone negative.  ABG unremarkable and not consistent with DKA.  Aggressive IV fluid resuscitation continued.  Urinalysis reveals concerns for dehydration.  CT of the abdomen and pelvis notes no acute abnormality other than signs of enteritis.  Recommend admission for further work up and  treatment.  Hospitalist team consulted and made aware of the patient.  Consults and orders placed per hospitalist request.  Patient was agreeable to admission plan.  Vitals stable on admission.    Problems Addressed:  Acute renal failure, unspecified acute renal failure type: complicated acute illness or injury  Metabolic acidosis: complicated acute illness or injury    Amount and/or Complexity of Data Reviewed  Labs: ordered. Decision-making details documented in ED Course.  Radiology: ordered. Decision-making details documented in ED Course.  ECG/medicine tests: ordered.    Risk  OTC drugs.  Prescription drug management.  Decision regarding hospitalization.        Final diagnoses:   Acute renal failure, unspecified acute renal failure type   Metabolic acidosis       ED Disposition  ED Disposition       ED Disposition   Decision to Admit    Condition   --    Comment   Level of Care: Med/Surg [1]   Diagnosis: Intractable nausea and vomiting [671610]   Admitting Physician: CARIN SALTER [[id number]]   Attending Physician: CARIN SALTER [[id number]]                 No follow-up provider specified.       Medication List      No changes were made to your prescriptions during this visit.            Rishi Light,   09/27/23 1847     (SGPT) 13 1 - 41 U/L    AST (SGOT) 16 1 - 40 U/L    Alkaline Phosphatase 57 39 - 117 U/L    Total Bilirubin 0.6 0.0 - 1.2 mg/dL    Globulin 2.7 gm/dL    A/G Ratio 1.6 g/dL    BUN/Creatinine Ratio 13.7 7.0 - 25.0    Anion Gap 20.1 (H) 5.0 - 15.0 mmol/L    eGFR 33.6 (L) >60.0 mL/min/1.73   CBC (No Diff)    Specimen: Blood   Result Value Ref Range    WBC 13.50 (H) 3.40 - 10.80 10*3/mm3    RBC 4.73 4.14 - 5.80 10*6/mm3    Hemoglobin 13.9 13.0 - 17.7 g/dL    Hematocrit 39.9 37.5 - 51.0 %    MCV 84.4 79.0 - 97.0 fL    MCH 29.4 26.6 - 33.0 pg    MCHC 34.8 31.5 - 35.7 g/dL    RDW 12.0 (L) 12.3 - 15.4 %    RDW-SD 36.8 (L) 37.0 - 54.0 fl    MPV 9.0 6.0 - 12.0 fL    Platelets 346 140 - 450 10*3/mm3   Protein / Creatinine Ratio, Urine - Urine, Clean Catch    Specimen: Urine, Clean Catch   Result Value Ref Range    Protein/Creatinine Ratio, Urine 1,039.3 (H) 0.0 - 200.0 mg/G Crea    Creatinine, Urine 96.7 mg/dL    Total Protein, Urine 100.5 mg/dL   Microalbumin / Creatinine Urine Ratio - Urine, Clean Catch    Specimen: Urine, Clean Catch   Result Value Ref Range    Microalbumin/Creatinine Ratio 671.1 mg/g    Creatinine, Urine 96.7 mg/dL    Microalbumin, Urine 64.9 mg/dL   CK    Specimen: Blood   Result Value Ref Range    Creatine Kinase 180 20 - 200 U/L   Urinalysis without microscopic (no culture) - Urine, Clean Catch    Specimen: Urine, Clean Catch   Result Value Ref Range    Color, UA Yellow Yellow, Straw    Appearance, UA Clear Clear    pH, UA 7.0 5.0 - 8.0    Specific Gravity, UA 1.015 1.005 - 1.030    Glucose,  mg/dL (1+) (A) Negative    Ketones, UA 80 mg/dL (3+) (A) Negative    Bilirubin, UA Negative Negative    Blood, UA Moderate (2+) (A) Negative    Protein,  mg/dL (2+) (A) Negative    Leuk Esterase, UA Negative Negative    Nitrite, UA Negative Negative    Urobilinogen, UA 0.2 E.U./dL 0.2 - 1.0 E.U./dL   Urinalysis, Microscopic Only - Urine, Clean Catch    Specimen: Urine, Clean Catch   Result Value Ref  Range    RBC, UA 13-20 (A) None Seen, 0-2 /HPF    WBC, UA 0-2 None Seen, 0-2 /HPF    Bacteria, UA None Seen None Seen /HPF    Squamous Epithelial Cells, UA 0-2 None Seen, 0-2 /HPF    Hyaline Casts, UA 0-2 None Seen /LPF    Methodology Automated Microscopy    Comprehensive Metabolic Panel    Specimen: Blood   Result Value Ref Range    Glucose 145 (H) 65 - 99 mg/dL    BUN 27 (H) 6 - 20 mg/dL    Creatinine 1.89 (H) 0.76 - 1.27 mg/dL    Sodium 134 (L) 136 - 145 mmol/L    Potassium 3.9 3.5 - 5.2 mmol/L    Chloride 94 (L) 98 - 107 mmol/L    CO2 28.7 22.0 - 29.0 mmol/L    Calcium 9.5 8.6 - 10.5 mg/dL    Total Protein 6.3 6.0 - 8.5 g/dL    Albumin 3.7 3.5 - 5.2 g/dL    ALT (SGPT) 10 1 - 41 U/L    AST (SGOT) 13 1 - 40 U/L    Alkaline Phosphatase 50 39 - 117 U/L    Total Bilirubin 0.4 0.0 - 1.2 mg/dL    Globulin 2.6 gm/dL    A/G Ratio 1.4 g/dL    BUN/Creatinine Ratio 14.3 7.0 - 25.0    Anion Gap 11.3 5.0 - 15.0 mmol/L    eGFR 48.4 (L) >60.0 mL/min/1.73   CBC (No Diff)    Specimen: Blood   Result Value Ref Range    WBC 9.93 3.40 - 10.80 10*3/mm3    RBC 4.24 4.14 - 5.80 10*6/mm3    Hemoglobin 12.8 (L) 13.0 - 17.7 g/dL    Hematocrit 36.2 (L) 37.5 - 51.0 %    MCV 85.4 79.0 - 97.0 fL    MCH 30.2 26.6 - 33.0 pg    MCHC 35.4 31.5 - 35.7 g/dL    RDW 11.9 (L) 12.3 - 15.4 %    RDW-SD 36.9 (L) 37.0 - 54.0 fl    MPV 9.1 6.0 - 12.0 fL    Platelets 333 140 - 450 10*3/mm3   C3 Complement    Specimen: Blood   Result Value Ref Range    C3 Complement 134.0 82.0 - 167.0 mg/dl   C4 Complement    Specimen: Blood   Result Value Ref Range    C4 Complement 31.0 14.0 - 44.0 mg/dl   Antinuclear Antibodies Direct    Specimen: Blood   Result Value Ref Range    LESVIA Direct Negative Negative   ANCA Panel    Specimen: Blood   Result Value Ref Range    ANTI-MPO ANTIBODIES <0.2 0.0 - 0.9 units    ANTI-PR3 ANTIBODIES <0.2 0.0 - 0.9 units    C-ANCA <1:20 Neg:<1:20 titer    P-ANCA <1:20 Neg:<1:20 titer    Atypical pANCA <1:20 Neg:<1:20 titer   Protein Elec +  Interp, Serum    Specimen: Blood   Result Value Ref Range    Total Protein 6.7 6.0 - 8.5 g/dL    Albumin 3.8 2.9 - 4.4 g/dL    Alpha-1-Globulin 0.2 0.0 - 0.4 g/dL    Alpha-2-Globulin 1.1 (H) 0.4 - 1.0 g/dL    Beta Globulin 0.9 0.7 - 1.3 g/dL    Gamma Globulin 0.7 0.4 - 1.8 g/dL    M-Sonido Not Observed Not Observed g/dL    Globulin 2.9 2.2 - 3.9 g/dL    A/G Ratio 1.3 0.7 - 1.7    Please note Comment     SPE Interpretation Comment    Protein Electrophoresis, 24 Hr Urine - Urine, Clean Catch    Specimen: Urine, Clean Catch   Result Value Ref Range    Total Protein, Urine 52.4 Not Estab. mg/dL    Protein, 24H Urine 969 (H) 30 - 150 mg/24 hr    Albumin, U 83.4 %    Alpha-1-Globulin, U 4.0 %    Alpha-2-Globulin, U 3.0 %    Beta Globulin, U 6.9 %    Gamma Globulin, Urine 2.7 %    M-Sonido, % Not Observed Not Observed %    Please note Comment    Immunoglobulin Free LT Chains Blood    Specimen: Blood   Result Value Ref Range    Free Light Chain, Kappa 49.3 (H) 3.3 - 19.4 mg/L    Free Lambda Light Chains 28.4 (H) 5.7 - 26.3 mg/L    Kappa/Lambda Ratio 1.74 (H) 0.26 - 1.65   Kappa / Lambda Light Chains, Urine, 24 Hour - Urine, Clean Catch    Specimen: Urine, Clean Catch; 24 Hour Urine   Result Value Ref Range    Free Kappa Lt Chains,Ur 14.79 1.17 - 86.46 mg/L    Free Kappa Lt Chains, 24hr 27.36 Undefined mg/24 hr    Free Lambda Lt Chains,Ur 1.99 0.27 - 15.21 mg/L    Free Lambda Lt Chains, 24 Hr 3.68 Undefined mg/24 hr    Kappa/Lambda Ratio,U 7.43 1.83 - 14.26   Hepatitis Panel, Acute    Specimen: Blood   Result Value Ref Range    Hepatitis B Surface Ag Non-Reactive Non-Reactive    Hep A IgM Non-Reactive Non-Reactive    Hep B C IgM Non-Reactive Non-Reactive    Hepatitis C Ab Non-Reactive Non-Reactive   Antistreptolysin O Screen    Specimen: Blood   Result Value Ref Range    ASO Negative Negative   Comprehensive Metabolic Panel    Specimen: Blood   Result Value Ref Range    Glucose 174 (H) 65 - 99 mg/dL    BUN 20 6 - 20 mg/dL     Creatinine 1.40 (H) 0.76 - 1.27 mg/dL    Sodium 133 (L) 136 - 145 mmol/L    Potassium 3.6 3.5 - 5.2 mmol/L    Chloride 98 98 - 107 mmol/L    CO2 23.0 22.0 - 29.0 mmol/L    Calcium 8.9 8.6 - 10.5 mg/dL    Total Protein 6.1 6.0 - 8.5 g/dL    Albumin 3.5 3.5 - 5.2 g/dL    ALT (SGPT) 12 1 - 41 U/L    AST (SGOT) 14 1 - 40 U/L    Alkaline Phosphatase 56 39 - 117 U/L    Total Bilirubin 0.2 0.0 - 1.2 mg/dL    Globulin 2.6 gm/dL    A/G Ratio 1.3 g/dL    BUN/Creatinine Ratio 14.3 7.0 - 25.0    Anion Gap 12.0 5.0 - 15.0 mmol/L    eGFR 69.3 >60.0 mL/min/1.73   CBC (No Diff)    Specimen: Blood   Result Value Ref Range    WBC 9.52 3.40 - 10.80 10*3/mm3    RBC 4.07 (L) 4.14 - 5.80 10*6/mm3    Hemoglobin 12.3 (L) 13.0 - 17.7 g/dL    Hematocrit 37.0 (L) 37.5 - 51.0 %    MCV 90.9 79.0 - 97.0 fL    MCH 30.2 26.6 - 33.0 pg    MCHC 33.2 31.5 - 35.7 g/dL    RDW 11.9 (L) 12.3 - 15.4 %    RDW-SD 39.5 37.0 - 54.0 fl    MPV 9.0 6.0 - 12.0 fL    Platelets 290 140 - 450 10*3/mm3   Protein / Creatinine Ratio, Urine - Urine, Clean Catch    Specimen: Urine, Clean Catch   Result Value Ref Range    Protein/Creatinine Ratio, Urine 617.7 (H) 0.0 - 200.0 mg/G Crea    Creatinine, Urine 29.3 mg/dL    Total Protein, Urine 18.1 mg/dL   Urinalysis without microscopic (no culture) -    Specimen: Urine   Result Value Ref Range    Color, UA Yellow Yellow, Straw    Appearance, UA Clear Clear    pH, UA 7.5 5.0 - 8.0    Specific Gravity, UA 1.006 1.005 - 1.030    Glucose, UA Negative Negative    Ketones, UA Negative Negative    Bilirubin, UA Negative Negative    Blood, UA Trace (A) Negative    Protein, UA Trace (A) Negative    Leuk Esterase, UA Negative Negative    Nitrite, UA Negative Negative    Urobilinogen, UA 0.2 E.U./dL 0.2 - 1.0 E.U./dL   Comprehensive Metabolic Panel    Specimen: Blood   Result Value Ref Range    Glucose 162 (H) 65 - 99 mg/dL    BUN 19 6 - 20 mg/dL    Creatinine 1.24 0.76 - 1.27 mg/dL    Sodium 137 136 - 145 mmol/L    Potassium 4.2 3.5 -  5.2 mmol/L    Chloride 103 98 - 107 mmol/L    CO2 22.9 22.0 - 29.0 mmol/L    Calcium 8.9 8.6 - 10.5 mg/dL    Total Protein 5.8 (L) 6.0 - 8.5 g/dL    Albumin 3.4 (L) 3.5 - 5.2 g/dL    ALT (SGPT) 20 1 - 41 U/L    AST (SGOT) 15 1 - 40 U/L    Alkaline Phosphatase 62 39 - 117 U/L    Total Bilirubin <0.2 0.0 - 1.2 mg/dL    Globulin 2.4 gm/dL    A/G Ratio 1.4 g/dL    BUN/Creatinine Ratio 15.3 7.0 - 25.0    Anion Gap 11.1 5.0 - 15.0 mmol/L    eGFR 80.2 >60.0 mL/min/1.73   CBC Auto Differential    Specimen: Blood   Result Value Ref Range    WBC 8.43 3.40 - 10.80 10*3/mm3    RBC 3.90 (L) 4.14 - 5.80 10*6/mm3    Hemoglobin 11.7 (L) 13.0 - 17.7 g/dL    Hematocrit 33.8 (L) 37.5 - 51.0 %    MCV 86.7 79.0 - 97.0 fL    MCH 30.0 26.6 - 33.0 pg    MCHC 34.6 31.5 - 35.7 g/dL    RDW 12.1 (L) 12.3 - 15.4 %    RDW-SD 38.5 37.0 - 54.0 fl    MPV 9.1 6.0 - 12.0 fL    Platelets 318 140 - 450 10*3/mm3    Neutrophil % 47.3 42.7 - 76.0 %    Lymphocyte % 42.1 19.6 - 45.3 %    Monocyte % 6.5 5.0 - 12.0 %    Eosinophil % 3.0 0.3 - 6.2 %    Basophil % 0.7 0.0 - 1.5 %    Immature Grans % 0.4 0.0 - 0.5 %    Neutrophils, Absolute 3.99 1.70 - 7.00 10*3/mm3    Lymphocytes, Absolute 3.55 (H) 0.70 - 3.10 10*3/mm3    Monocytes, Absolute 0.55 0.10 - 0.90 10*3/mm3    Eosinophils, Absolute 0.25 0.00 - 0.40 10*3/mm3    Basophils, Absolute 0.06 0.00 - 0.20 10*3/mm3    Immature Grans, Absolute 0.03 0.00 - 0.05 10*3/mm3    nRBC 0.0 0.0 - 0.2 /100 WBC   Magnesium    Specimen: Blood   Result Value Ref Range    Magnesium 1.7 1.6 - 2.6 mg/dL   Phosphorus    Specimen: Blood   Result Value Ref Range    Phosphorus 4.0 2.5 - 4.5 mg/dL   Urine Drug Screen - Urine, Clean Catch    Specimen: Urine, Clean Catch   Result Value Ref Range    THC, Screen, Urine Positive (A) Negative    Phencyclidine (PCP), Urine Negative Negative    Cocaine Screen, Urine Negative Negative    Methamphetamine, Ur Negative Negative    Opiate Screen Negative Negative    Amphetamine Screen, Urine  Negative Negative    Benzodiazepine Screen, Urine Negative Negative    Tricyclic Antidepressants Screen Negative Negative    Methadone Screen, Urine Negative Negative    Barbiturates Screen, Urine Negative Negative    Oxycodone Screen, Urine Negative Negative    Propoxyphene Screen Negative Negative    Buprenorphine, Screen, Urine Negative Negative   Fentanyl, Urine - Urine, Clean Catch    Specimen: Urine, Clean Catch   Result Value Ref Range    Fentanyl, Urine Negative Negative   Comprehensive Metabolic Panel    Specimen: Blood   Result Value Ref Range    Glucose 126 (H) 65 - 99 mg/dL    BUN 16 6 - 20 mg/dL    Creatinine 1.25 0.76 - 1.27 mg/dL    Sodium 138 136 - 145 mmol/L    Potassium 3.9 3.5 - 5.2 mmol/L    Chloride 102 98 - 107 mmol/L    CO2 24.5 22.0 - 29.0 mmol/L    Calcium 9.3 8.6 - 10.5 mg/dL    Total Protein 6.5 6.0 - 8.5 g/dL    Albumin 3.7 3.5 - 5.2 g/dL    ALT (SGPT) 15 1 - 41 U/L    AST (SGOT) 14 1 - 40 U/L    Alkaline Phosphatase 49 39 - 117 U/L    Total Bilirubin 0.3 0.0 - 1.2 mg/dL    Globulin 2.8 gm/dL    A/G Ratio 1.3 g/dL    BUN/Creatinine Ratio 12.8 7.0 - 25.0    Anion Gap 11.5 5.0 - 15.0 mmol/L    eGFR 79.4 >60.0 mL/min/1.73   POC Glucose Once    Specimen: Blood   Result Value Ref Range    Glucose 189 (H) 70 - 130 mg/dL   POC Glucose Once    Specimen: Blood   Result Value Ref Range    Glucose 183 (H) 70 - 130 mg/dL   POC Glucose Once    Specimen: Blood   Result Value Ref Range    Glucose 185 (H) 70 - 130 mg/dL   POC Glucose Once    Specimen: Blood   Result Value Ref Range    Glucose 173 (H) 70 - 130 mg/dL   POC Glucose Once    Specimen: Blood   Result Value Ref Range    Glucose 152 (H) 70 - 130 mg/dL   POC Glucose Once    Specimen: Blood   Result Value Ref Range    Glucose 130 70 - 130 mg/dL   POC Glucose Once    Specimen: Blood   Result Value Ref Range    Glucose 172 (H) 70 - 130 mg/dL   POC Glucose Once    Specimen: Blood   Result Value Ref Range    Glucose 185 (H) 70 - 130 mg/dL   POC  Glucose Once    Specimen: Blood   Result Value Ref Range    Glucose 176 (H) 70 - 130 mg/dL   POC Glucose Once    Specimen: Blood   Result Value Ref Range    Glucose 160 (H) 70 - 130 mg/dL   POC Glucose Once    Specimen: Blood   Result Value Ref Range    Glucose 158 (H) 70 - 130 mg/dL   POC Glucose Once    Specimen: Blood   Result Value Ref Range    Glucose 151 (H) 70 - 130 mg/dL   POC Glucose Once    Specimen: Blood   Result Value Ref Range    Glucose 169 (H) 70 - 130 mg/dL   POC Glucose Once    Specimen: Blood   Result Value Ref Range    Glucose 169 (H) 70 - 130 mg/dL   POC Glucose Once    Specimen: Blood   Result Value Ref Range    Glucose 156 (H) 70 - 130 mg/dL   POC Glucose Once    Specimen: Blood   Result Value Ref Range    Glucose 178 (H) 70 - 130 mg/dL   POC Glucose Once    Specimen: Blood   Result Value Ref Range    Glucose 164 (H) 70 - 130 mg/dL   POC Glucose Once    Specimen: Blood   Result Value Ref Range    Glucose 199 (H) 70 - 130 mg/dL   POC Glucose Once    Specimen: Blood   Result Value Ref Range    Glucose 131 (H) 70 - 130 mg/dL   POC Glucose Once    Specimen: Blood   Result Value Ref Range    Glucose 115 70 - 130 mg/dL   POC Glucose Once    Specimen: Blood   Result Value Ref Range    Glucose 131 (H) 70 - 130 mg/dL   POC Glucose Once    Specimen: Blood   Result Value Ref Range    Glucose 255 (H) 70 - 130 mg/dL   ECG 12 Lead Syncope   Result Value Ref Range    QT Interval 426 ms    QTC Interval 443 ms   Green Top (Gel)   Result Value Ref Range    Extra Tube Hold for add-ons.    Lavender Top   Result Value Ref Range    Extra Tube hold for add-on    Gold Top - SST   Result Value Ref Range    Extra Tube Hold for add-ons.    Light Blue Top   Result Value Ref Range    Extra Tube Hold for add-ons.                                           Medical Decision Making  Initial concerns for possible DKA given elevated anion gap metabolic acidosis with hyperglycemia.  However, acute renal failure noted.  Acetone  negative.  ABG unremarkable and not consistent with DKA.  Aggressive IV fluid resuscitation continued.  Urinalysis reveals concerns for dehydration.  CT of the abdomen and pelvis notes no acute abnormality other than signs of enteritis.  Recommend admission for further work up and treatment.  Hospitalist team consulted and made aware of the patient.  Consults and orders placed per hospitalist request.  Patient was agreeable to admission plan.  Vitals stable on admission.    Problems Addressed:  Acute renal failure, unspecified acute renal failure type: complicated acute illness or injury  Metabolic acidosis: complicated acute illness or injury    Amount and/or Complexity of Data Reviewed  Labs: ordered. Decision-making details documented in ED Course.  Radiology: ordered. Decision-making details documented in ED Course.  ECG/medicine tests: ordered. Decision-making details documented in ED Course.    Risk  OTC drugs.  Prescription drug management.  Decision regarding hospitalization.        Final diagnoses:   Acute renal failure, unspecified acute renal failure type   Metabolic acidosis       ED Disposition  ED Disposition       ED Disposition   Decision to Admit    Condition   --    Comment   Level of Care: Med/Surg [1]   Diagnosis: Intractable nausea and vomiting [627539]   Admitting Physician: CARIN SALTER [[id number]]   Attending Physician: CARIN SALTER [[id number]]                 Geri Belcher, APRN  [address] [facility] [address]  [phone]      1 week post hospital follow up    Geri Belcher, APRN  [address] [facility] [address]  [phone]               Where to Get Your Medications        These medications were sent to [address] [place] [address]      Hours: Monday to Friday 7 AM to 6 PM Phone: [phone]   ondansetron 4 MG tablet          Medication List      No changes were made to your prescriptions during this visit.            Kuldeep  Rishi ALEXANDER DO  09/27/23 1844       Srinivas Sutherland MD  10/16/23 1129

## 2023-09-27 NOTE — ED NOTES
Patient provided with urinal and instructed to provide urine specimen and notify staff when feeling the need to void.

## 2023-09-27 NOTE — CASE MANAGEMENT/SOCIAL WORK
Discharge Planning Assessment   [place]     Patient Name: Oliver Osborn  MRN: [id number]  Today's Date: 9/27/2023    Admit Date: 9/27/2023    Plan: Spoke with patient at bedside. Patient lives with dad and stepmom. Patient has no POA or Living Will. Patient uses no DME, Oxygen or HH. Patient is requesting a glucometer at discharge he has lost his. Patient PCP Geri Belcher. Pharmacy [place], [place]. Patient will return home at discharge and family will transport.   Discharge Needs Assessment       Row Name 09/27/23 1904       Living Environment    People in Home parent(s)    Name(s) of People in Home Farhan harris    Potentially Unsafe Housing Conditions none    Primary Care Provided by self    Provides Primary Care For no one    Family Caregiver if Needed parent(s)    Quality of Family Relationships unable to assess    Able to Return to Prior Arrangements yes       Resource/Environmental Concerns    Resource/Environmental Concerns none    Transportation Concerns none       Transportation Needs    In the past 12 months, has lack of transportation kept you from medical appointments or from getting medications? no    In the past 12 months, has lack of transportation kept you from meetings, work, or from getting things needed for daily living? No       Food Insecurity    Within the past 12 months, you worried that your food would run out before you got the money to buy more. Never true    Within the past 12 months, the food you bought just didn't last and you didn't have money to get more. Never true       Transition Planning    Patient/Family Anticipates Transition to home    Patient/Family Anticipated Services at Transition none    Transportation Anticipated family or friend will provide       Discharge Needs Assessment    Readmission Within the Last 30 Days no previous admission in last 30 days    Equipment Currently Used at Home none    Concerns to be Addressed discharge planning    Anticipated Changes Related to  Illness none    Equipment Needed After Discharge glucometer                   Discharge Plan       Row Name 09/27/23 1397       Plan    Plan Spoke with patient at bedside. Patient lives with dad and stepmom. Patient has no POA or Living Will. Patient uses no DME, Oxygen or HH. Patient is requesting a glucometer at discharge he has lost his. Patient PCP Geri Belcher. Pharmacy [place], [place]. Patient will return home at discharge and family will transport.    Patient/Family in Agreement with Plan yes                  Continued Care and Services - Admitted Since 9/27/2023    Coordination has not been started for this encounter.          Demographic Summary       Row Name 09/27/23 1905       General Information    Admission Type observation    Arrived From home    Referral Source emergency department    Reason for Consult discharge planning    Preferred Language English                 Janell Friend

## 2023-09-28 PROBLEM — N17.9 AKI (ACUTE KIDNEY INJURY): Status: ACTIVE | Noted: 2023-09-28

## 2023-09-28 LAB
ALBUMIN SERPL-MCNC: 4.3 G/DL (ref 3.5–5.2)
ALBUMIN/GLOB SERPL: 1.6 G/DL
ALP SERPL-CCNC: 57 U/L (ref 39–117)
ALT SERPL W P-5'-P-CCNC: 13 U/L (ref 1–41)
ANION GAP SERPL CALCULATED.3IONS-SCNC: 20.1 MMOL/L (ref 5–15)
AST SERPL-CCNC: 16 U/L (ref 1–40)
BACTERIA UR QL AUTO: ABNORMAL /HPF
BILIRUB SERPL-MCNC: 0.6 MG/DL (ref 0–1.2)
BILIRUB UR QL STRIP: NEGATIVE
BUN SERPL-MCNC: 35 MG/DL (ref 6–20)
BUN/CREAT SERPL: 13.7 (ref 7–25)
CALCIUM SPEC-SCNC: 9.3 MG/DL (ref 8.6–10.5)
CHLORIDE SERPL-SCNC: 91 MMOL/L (ref 98–107)
CK SERPL-CCNC: 180 U/L (ref 20–200)
CLARITY UR: CLEAR
CO2 SERPL-SCNC: 22.9 MMOL/L (ref 22–29)
COLOR UR: YELLOW
CREAT SERPL-MCNC: 2.56 MG/DL (ref 0.76–1.27)
DEPRECATED RDW RBC AUTO: 36.8 FL (ref 37–54)
EGFRCR SERPLBLD CKD-EPI 2021: 33.6 ML/MIN/1.73
ERYTHROCYTE [DISTWIDTH] IN BLOOD BY AUTOMATED COUNT: 12 % (ref 12.3–15.4)
GLOBULIN UR ELPH-MCNC: 2.7 GM/DL
GLUCOSE BLDC GLUCOMTR-MCNC: 152 MG/DL (ref 70–130)
GLUCOSE BLDC GLUCOMTR-MCNC: 173 MG/DL (ref 70–130)
GLUCOSE BLDC GLUCOMTR-MCNC: 185 MG/DL (ref 70–130)
GLUCOSE SERPL-MCNC: 166 MG/DL (ref 65–99)
GLUCOSE UR STRIP-MCNC: ABNORMAL MG/DL
HCT VFR BLD AUTO: 39.9 % (ref 37.5–51)
HGB BLD-MCNC: 13.9 G/DL (ref 13–17.7)
HGB UR QL STRIP.AUTO: ABNORMAL
HYALINE CASTS UR QL AUTO: ABNORMAL /LPF
KETONES UR QL STRIP: ABNORMAL
LEUKOCYTE ESTERASE UR QL STRIP.AUTO: NEGATIVE
MCH RBC QN AUTO: 29.4 PG (ref 26.6–33)
MCHC RBC AUTO-ENTMCNC: 34.8 G/DL (ref 31.5–35.7)
MCV RBC AUTO: 84.4 FL (ref 79–97)
NITRITE UR QL STRIP: NEGATIVE
OSMOLALITY SERPL: 293 MOSM/KG (ref 275–300)
PH UR STRIP.AUTO: 7 [PH] (ref 5–8)
PLATELET # BLD AUTO: 346 10*3/MM3 (ref 140–450)
PMV BLD AUTO: 9 FL (ref 6–12)
POTASSIUM SERPL-SCNC: 3.7 MMOL/L (ref 3.5–5.2)
PROT SERPL-MCNC: 7 G/DL (ref 6–8.5)
PROT UR QL STRIP: ABNORMAL
QT INTERVAL: 426 MS
QTC INTERVAL: 443 MS
RBC # BLD AUTO: 4.73 10*6/MM3 (ref 4.14–5.8)
RBC # UR STRIP: ABNORMAL /HPF
REF LAB TEST METHOD: ABNORMAL
SODIUM SERPL-SCNC: 134 MMOL/L (ref 136–145)
SP GR UR STRIP: 1.01 (ref 1–1.03)
SQUAMOUS #/AREA URNS HPF: ABNORMAL /HPF
UROBILINOGEN UR QL STRIP: ABNORMAL
WBC # UR STRIP: ABNORMAL /HPF
WBC NRBC COR # BLD: 13.5 10*3/MM3 (ref 3.4–10.8)

## 2023-09-28 PROCEDURE — 25010000002 ONDANSETRON PER 1 MG: Performed by: INTERNAL MEDICINE

## 2023-09-28 PROCEDURE — 94799 UNLISTED PULMONARY SVC/PX: CPT

## 2023-09-28 PROCEDURE — 85027 COMPLETE CBC AUTOMATED: CPT | Performed by: INTERNAL MEDICINE

## 2023-09-28 PROCEDURE — 81001 URINALYSIS AUTO W/SCOPE: CPT | Performed by: INTERNAL MEDICINE

## 2023-09-28 PROCEDURE — 87150 DNA/RNA AMPLIFIED PROBE: CPT | Performed by: INTERNAL MEDICINE

## 2023-09-28 PROCEDURE — 82570 ASSAY OF URINE CREATININE: CPT | Performed by: INTERNAL MEDICINE

## 2023-09-28 PROCEDURE — 25010000002 PROCHLORPERAZINE 10 MG/2ML SOLUTION

## 2023-09-28 PROCEDURE — 87040 BLOOD CULTURE FOR BACTERIA: CPT | Performed by: INTERNAL MEDICINE

## 2023-09-28 PROCEDURE — 63710000001 INSULIN REGULAR HUMAN PER 5 UNITS: Performed by: INTERNAL MEDICINE

## 2023-09-28 PROCEDURE — 80053 COMPREHEN METABOLIC PANEL: CPT | Performed by: INTERNAL MEDICINE

## 2023-09-28 PROCEDURE — 25010000002 ENOXAPARIN PER 10 MG: Performed by: INTERNAL MEDICINE

## 2023-09-28 PROCEDURE — 82948 REAGENT STRIP/BLOOD GLUCOSE: CPT

## 2023-09-28 PROCEDURE — 84156 ASSAY OF PROTEIN URINE: CPT | Performed by: INTERNAL MEDICINE

## 2023-09-28 PROCEDURE — 87147 CULTURE TYPE IMMUNOLOGIC: CPT | Performed by: INTERNAL MEDICINE

## 2023-09-28 PROCEDURE — 82550 ASSAY OF CK (CPK): CPT | Performed by: INTERNAL MEDICINE

## 2023-09-28 PROCEDURE — 99233 SBSQ HOSP IP/OBS HIGH 50: CPT | Performed by: INTERNAL MEDICINE

## 2023-09-28 PROCEDURE — 82043 UR ALBUMIN QUANTITATIVE: CPT | Performed by: INTERNAL MEDICINE

## 2023-09-28 RX ORDER — PROCHLORPERAZINE EDISYLATE 5 MG/ML
2.5 INJECTION INTRAMUSCULAR; INTRAVENOUS EVERY 6 HOURS PRN
Status: DISCONTINUED | OUTPATIENT
Start: 2023-09-28 | End: 2023-10-01

## 2023-09-28 RX ORDER — PANTOPRAZOLE SODIUM 40 MG/1
40 TABLET, DELAYED RELEASE ORAL DAILY
COMMUNITY

## 2023-09-28 RX ORDER — DEXTROSE MONOHYDRATE 25 G/50ML
25 INJECTION, SOLUTION INTRAVENOUS
Status: DISCONTINUED | OUTPATIENT
Start: 2023-09-28 | End: 2023-10-02 | Stop reason: HOSPADM

## 2023-09-28 RX ORDER — ACETAMINOPHEN 325 MG/1
650 TABLET ORAL EVERY 6 HOURS PRN
Status: DISCONTINUED | OUTPATIENT
Start: 2023-09-28 | End: 2023-10-02 | Stop reason: HOSPADM

## 2023-09-28 RX ORDER — ONDANSETRON 4 MG/1
4 TABLET, FILM COATED ORAL EVERY 6 HOURS PRN
Status: CANCELLED | OUTPATIENT
Start: 2023-09-28

## 2023-09-28 RX ORDER — PANTOPRAZOLE SODIUM 40 MG/1
40 TABLET, DELAYED RELEASE ORAL DAILY
Status: DISCONTINUED | OUTPATIENT
Start: 2023-09-28 | End: 2023-10-02 | Stop reason: HOSPADM

## 2023-09-28 RX ORDER — NICOTINE POLACRILEX 4 MG
15 LOZENGE BUCCAL
Status: DISCONTINUED | OUTPATIENT
Start: 2023-09-28 | End: 2023-10-02 | Stop reason: HOSPADM

## 2023-09-28 RX ORDER — SODIUM CHLORIDE, SODIUM LACTATE, POTASSIUM CHLORIDE, CALCIUM CHLORIDE 600; 310; 30; 20 MG/100ML; MG/100ML; MG/100ML; MG/100ML
100 INJECTION, SOLUTION INTRAVENOUS CONTINUOUS
Status: DISCONTINUED | OUTPATIENT
Start: 2023-09-28 | End: 2023-10-02

## 2023-09-28 RX ORDER — GLUCAGON 1 MG/ML
1 KIT INJECTION
Status: DISCONTINUED | OUTPATIENT
Start: 2023-09-28 | End: 2023-10-02 | Stop reason: HOSPADM

## 2023-09-28 RX ADMIN — ONDANSETRON 4 MG: 2 INJECTION INTRAMUSCULAR; INTRAVENOUS at 08:43

## 2023-09-28 RX ADMIN — Medication 10 ML: at 08:44

## 2023-09-28 RX ADMIN — ONDANSETRON 4 MG: 2 INJECTION INTRAMUSCULAR; INTRAVENOUS at 00:55

## 2023-09-28 RX ADMIN — ENOXAPARIN SODIUM 40 MG: 40 INJECTION SUBCUTANEOUS at 08:43

## 2023-09-28 RX ADMIN — PANTOPRAZOLE SODIUM 40 MG: 40 TABLET, DELAYED RELEASE ORAL at 11:22

## 2023-09-28 RX ADMIN — SODIUM CHLORIDE 100 ML/HR: 9 INJECTION, SOLUTION INTRAVENOUS at 00:25

## 2023-09-28 RX ADMIN — DOCUSATE SODIUM 50 MG AND SENNOSIDES 8.6 MG 2 TABLET: 8.6; 5 TABLET, FILM COATED ORAL at 08:43

## 2023-09-28 RX ADMIN — PROCHLORPERAZINE EDISYLATE 2.5 MG: 5 INJECTION INTRAMUSCULAR; INTRAVENOUS at 19:34

## 2023-09-28 RX ADMIN — Medication 10 ML: at 20:29

## 2023-09-28 RX ADMIN — SODIUM CHLORIDE, POTASSIUM CHLORIDE, SODIUM LACTATE AND CALCIUM CHLORIDE 100 ML/HR: 600; 310; 30; 20 INJECTION, SOLUTION INTRAVENOUS at 08:49

## 2023-09-28 RX ADMIN — INSULIN HUMAN 2 UNITS: 100 INJECTION, SOLUTION PARENTERAL at 11:22

## 2023-09-28 RX ADMIN — INSULIN HUMAN 2 UNITS: 100 INJECTION, SOLUTION PARENTERAL at 17:15

## 2023-09-28 RX ADMIN — PROCHLORPERAZINE EDISYLATE 2.5 MG: 5 INJECTION INTRAMUSCULAR; INTRAVENOUS at 13:12

## 2023-09-28 NOTE — CONSULTS
Nephrology Consult Note    Referring Provider: Dr. Estrada  Reason for Consultation: Elevated creatinine    Subjective       History of present illness:  Oliver Osborn is a 28 y.o. male who presented to [facility] emergency department with chief complaint of nausea and vomiting.  Patient is known to have type 2 diabetes mellitus, tobacco abuse and drug abuse    Tricyclic's.  Patient has had multiple admissions for similar presentation.  And has been labeled as cyclical vomiting syndrome in setting of marijuana  Patient has had elevated creatinine levels in the past with baseline creatinine that appears to be around 1.3 previously and that has been most recently around 1.5.  Patient was admitted with a creatinine of 1.9.  Patient denied any history of recent sore throat, upper respiratory tract infection, hematuria, any NSAIDs intake Patient denies hematuria, dysuria, difficulty passing urine. No prior history of renal stones. No family history of renal disease    History  Past Medical History:   Diagnosis Date    Diabetes mellitus     Tobacco abuse    ,   Past Surgical History:   Procedure Laterality Date    ENDOSCOPY N/A 6/1/2022    Procedure: ESOPHAGOGASTRODUODENOSCOPY WITH ANESTHESIA;  Surgeon: Keon Quezada MD;  Location: [facility];  Service: Gastroenterology;  Laterality: N/A;   ,   Family History   Problem Relation Age of Onset    Heart disease Mother     Diabetes Mother     Kidney disease Mother     Heart disease Father     Diabetes Father     Heart disease Maternal Grandmother     Diabetes Maternal Grandmother     Heart disease Maternal Grandfather     Diabetes Maternal Grandfather     Heart disease Paternal Grandmother     Diabetes Paternal Grandmother     Heart disease Paternal Grandfather     Diabetes Paternal Grandfather    ,   Social History     Tobacco Use    Smoking status: Every Day     Packs/day: 2.00     Years: 15.00     Pack years: 30.00     Types: Cigarettes     Passive exposure:  Never    Smokeless tobacco: Never   Vaping Use    Vaping Use: Former    Substances: Nicotine, Flavoring    Devices: Disposable    Passive vaping exposure: Yes   Substance Use Topics    Alcohol use: Never    Drug use: Not Currently     Frequency: 7.0 times per week     Types: Marijuana   ,   Medications Prior to Admission   Medication Sig Dispense Refill Last Dose    Insulin Glargine (Lantus SoloStar) 100 UNIT/ML injection pen Inject 12 Units under the skin into the appropriate area as directed Daily.   9/28/2023    ondansetron (ZOFRAN) 4 MG tablet Take 1 tablet by mouth Every 6 (Six) Hours As Needed for Nausea or Vomiting. 15 tablet 0 9/28/2023    pantoprazole (PROTONIX) 40 MG EC tablet Take 1 tablet by mouth Daily.   Past Week   , Scheduled Meds:  enoxaparin, 40 mg, Subcutaneous, Daily  senna-docusate sodium, 2 tablet, Oral, BID  sodium chloride, 10 mL, Intravenous, Q12H  sodium chloride, 10 mL, Intravenous, Q12H    , Continuous Infusions:  lactated ringers, 100 mL/hr    , PRN Meds:    acetaminophen    senna-docusate sodium **AND** polyethylene glycol **AND** bisacodyl **AND** bisacodyl    Calcium Replacement - Follow Nurse / BPA Driven Protocol    ondansetron    prochlorperazine    sodium chloride    sodium chloride and Allergies:  Patient has no known allergies.    Review of Systems  More than 10 point review of systems was done. Pertinent items are noted in HPI, all other systems reviewed and negative    Objective     Vital Signs  Temp:  [98.4 °F (36.9 °C)-98.7 °F (37.1 °C)] 98.7 °F (37.1 °C)  Heart Rate:  [66-90] 68  Resp:  [16-18] 16  BP: (105-140)/(59-97) 112/67    Intake/Output                   09/27/23 0701 - 09/28/23 0700     [id number] Total              Intake    P.O.  --  360 360    Total Intake -- 360 360       Output    Total Output -- -- --             Physical Examination:  General Appearance: not in acute distress  No JVD  Lungs: Clear to auscultation, respirations regular and  unlabored  Heart: Regular rhythm & normal rate, normal S1, S2, no murmur, no gallop, no rub   Abdomen: Normal bowel sounds, no masses and soft non-tender  Extremities: No edema  Neurologic: Unable to assess    Laboratory Data :      WBC WBC   Date Value Ref Range Status   09/28/2023 13.50 (H) 3.40 - 10.80 10*3/mm3 Final   09/27/2023 11.00 (H) 3.40 - 10.80 10*3/mm3 Final      HGB Hemoglobin   Date Value Ref Range Status   09/28/2023 13.9 13.0 - 17.7 g/dL Final   09/27/2023 16.4 13.0 - 17.7 g/dL Final      HCT Hematocrit   Date Value Ref Range Status   09/28/2023 39.9 37.5 - 51.0 % Final   09/27/2023 47.7 37.5 - 51.0 % Final      Platlets No results found for: LABPLAT   MCV MCV   Date Value Ref Range Status   09/28/2023 84.4 79.0 - 97.0 fL Final   09/27/2023 85.3 79.0 - 97.0 fL Final          Sodium Sodium   Date Value Ref Range Status   09/28/2023 134 (L) 136 - 145 mmol/L Final   09/27/2023 130 (L) 136 - 145 mmol/L Final      Potassium Potassium   Date Value Ref Range Status   09/28/2023 3.7 3.5 - 5.2 mmol/L Final     Comment:     Slight hemolysis detected by analyzer. Results may be affected.   09/27/2023 4.3 3.5 - 5.2 mmol/L Final     Comment:     Slight hemolysis detected by analyzer. Results may be affected.      Chloride Chloride   Date Value Ref Range Status   09/28/2023 91 (L) 98 - 107 mmol/L Final   09/27/2023 84 (L) 98 - 107 mmol/L Final      CO2 CO2   Date Value Ref Range Status   09/28/2023 22.9 22.0 - 29.0 mmol/L Final   09/27/2023 24.8 22.0 - 29.0 mmol/L Final      BUN BUN   Date Value Ref Range Status   09/28/2023 35 (H) 6 - 20 mg/dL Final   09/27/2023 38 (H) 6 - 20 mg/dL Final      Creatinine Creatinine   Date Value Ref Range Status   09/28/2023 2.56 (H) 0.76 - 1.27 mg/dL Final   09/27/2023 3.36 (H) 0.76 - 1.27 mg/dL Final      Calcium Calcium   Date Value Ref Range Status   09/28/2023 9.3 8.6 - 10.5 mg/dL Final   09/27/2023 10.8 (H) 8.6 - 10.5 mg/dL Final      PO4 No results found for: CAPO4   Albumin  Albumin   Date Value Ref Range Status   09/28/2023 4.3 3.5 - 5.2 g/dL Final   09/27/2023 5.0 3.5 - 5.2 g/dL Final      Magnesium Magnesium   Date Value Ref Range Status   09/27/2023 2.3 1.6 - 2.6 mg/dL Final      Uric Acid No results found for: URICACID     Radiology results :     Imaging Results (Last 72 Hours)       Procedure Component Value Units Date/Time    XR Chest 1 View [[id number]] Collected: 09/27/23 1913     Updated: 09/27/23 1916    Narrative:      PROCEDURE: Portable chest x-ray examination performed on September 27, 2023. Single view. Semiupright position.     HISTORY: Evaluate for fluid overload.     FINDINGS:     Normal heart size.  No edema, pneumonia, pleural effusion, or pneumothorax.  No central pulmonary vascular congestion.  Mild levoconvex curvature at the cervical thoracic spine junction.  No free air in the upper abdomen.       Impression:         1.  Normal heart size.  2.  No lobar consolidation or edema.  3.  No pleural effusion or pneumothorax.  4.  Levoconvex curvature at the cervical thoracic spine junction.     This report was finalized on 9/27/2023 7:14 PM by Maico Miller MD.       CT Abdomen Pelvis Without Contrast [[id number]] Collected: 09/27/23 1819     Updated: 09/27/23 1824    Narrative:      PROCEDURE: CT of the abdomen and pelvis performed without IV contrast on  September 27, 2023. The examination was performed with 5 mm axial  imaging and 5 mm sagittal and coronal reconstruction images. Total DLP =  496. The examination was performed according to as low as reasonably  achievable dose protocol.     HISTORY: Abdominal pain.     FINDINGS:     Normal heart size with trace volume of pericardial fluid.  No lobar consolidation or edema.  No pleural effusion or pneumothorax.  No acute process seen in the liver, spleen, gallbladder, pancreas,  adrenal glands, and kidneys.  Mild small bowel wall thickening with small mesenteric nodes suggestive  of underlying enteritis and mild  mesenteric adenitis.  A normal appendix is well seen.  Bilateral lower pelvic phleboliths.  No acute process seen in the bladder, prostate gland, and seminal  vesicles.  No bowel or renal obstruction.  Accessory splenule in the left upper quadrant.  Chronic bilateral L5 pars defects with chronic grade 1 anterolisthesis  of L5 on S1.  No acute or chronic compression fracture deformity.       Impression:         1.  Mild enteritis and mild mesenteric adenitis.  2.  No bowel or renal obstruction.  3.  No renal, ureter, or bladder stone.  4.  Normal appendix is well seen.  5.  Bilateral lower pelvic phleboliths.  6.  Chronic bilateral L5 pars defects with chronic grade 1  anterolisthesis of L5 on S1.  7.  No free fluid or free air.  8.  No abscess or hematoma.     This report was finalized on 9/27/2023 6:22 PM by Maico Miller MD.                 Medications:      enoxaparin, 40 mg, Subcutaneous, Daily  senna-docusate sodium, 2 tablet, Oral, BID  sodium chloride, 10 mL, Intravenous, Q12H  sodium chloride, 10 mL, Intravenous, Q12H      lactated ringers, 100 mL/hr        Assessment & Plan       MINA (acute kidney injury)    Intractable nausea and vomiting    -MINA, nonoliguric  - Hyponatremia, most likely hypovolemic hyponatremia  - Intractable nausea and vomiting in settings of cannabinoid abuse  - Type 2 diabetes mellitus  - Tobacco use disorder    MINA most likely due to prerenal azotemia/early ATN.  Other differentials include IgA nephropathy and PI GN  Significant hematuria +2.5 g proteinuria  No obstruction on renal imaging  - Recheck UA with microscopy  - Check urine protein creatinine ratio and urine albumin creatinine ratio and decide about serology  - DC normal saline  - Started on lactated Ringer +20 mEq/L of potassium chloride and run at 100 cc an hour  - Strict intake and output    Thanks Dr. Estrada for the consult. Nephrology will follow the patient.   I discussed the patient's findings and my recommendations  with patient and consulting provider    Ashley Simon MD  09/28/23  07:52 EDT

## 2023-09-28 NOTE — CONSULTS
"Diabetes Education  Assessment/Teaching    Patient Name:  Oliver Osborn  YOB: 1995  MRN: [id number]  Admit Date:  9/27/2023      Assessment Date:  9/28/2023  Flowsheet Row Most Recent Value   General Information     Height 167.6 cm (66\")   Height Method Stated   Weight 83.1 kg (183 lb 1.6 oz)   Weight Method Bed scale   Pregnancy Assessment    Diabetes History    Education Preferences    Nutrition Information    Assessment Topics    DM Goals             Flowsheet Row Most Recent Value   DM Education Needs    Meter Has own   Frequency of Testing PRN   Medication Insulin   Problem Solving Hypoglycemia, Hyperglycemia, Sick days, Signs, Symptoms, Treatment   Reducing Risks Cardiovascular, Immunizations, Foot care, Dental exam, Eye exam, Blood pressure, Lipids, A1C testing, Neuropathy, Retinopathy   Healthy Eating Basic meal plan provided   Physical Activity Walking   Physical Activity Frequency Occasionally   Healthy Coping Appropriate   Discharge Plan Home, Follow-up with PCP   Motivation Moderate   Teaching Method Explanation, Discussion, Handouts   Patient Response Verbalized understanding              Other Comments:  A1C 7.4 Patient was educated on 5/31/2022 where his A1C 9.9. Patient was educated and received AADE7 and ADA handouts on diet, activity, checking blood glucose, taking medication as prescribed, checking feet daily and S/S of hypo/hyperglycemia. Patient was educated on sick rule days. Patient had no questions or concerns. Please re-consult or call for concerns or questions. Thank you.        Electronically signed by:  Briseyda Mccall RN  09/28/23 15:05 EDT  "

## 2023-09-28 NOTE — PLAN OF CARE
Goal Outcome Evaluation:  Plan of Care Reviewed With: patient        Progress: no change  Outcome Evaluation: Patient arrived to unit this shift from ER. Patient A&O x4. VSS on room air. Patient has ambulated in hallway independently throughout shift. Patient c/o nausea and vomitting, PRN medication given. Will continue plan of care.

## 2023-09-28 NOTE — H&P
[facility]  HISTORY & PHYSICAL    Patient Identification:  Name:  Oliver Osborn  Age:  28 y.o.  Sex:  male  :  1995  MRN:  [id number]   Visit Number:  [id number]  Admit Date: 2023   Primary Care Physician:  Geri Belcher APRN     Subjective     Chief complaint:   Chief Complaint   Patient presents with    Vomiting    Syncope     History of presenting illness:   Patient is a 28 y.o. male with past medical history significant for type II DM, CKD, tobacco abuse, THC use that presented to the [facility] emergency department for evaluation of vomiting.  Patient was recently admitted to our service 2023 for marijuana induced cyclical vomiting syndrome.  Patient states he has had some intermittent vomiting since discharge, but it has worsened over the past 2 days.  He states he is unable to keep anything down, not even water.  He states he is still able to produce urine, but notices a decrease in urine output.  Denies dysuria, hematuria or other urinary symptoms.  He states he had an episode of syncope after forceful vomiting.  Denies chest pain, palpitations, shortness of breath, focal neurological symptoms.  Patient denies any THC use since last admission, however still positive for THC on UDS.     Upon arrival to the ED, vitals were temperature 98.4, HR 90, RR 17, /97, SPO2 99% on room air.  Labs significant for sodium 130, anion gap 21.2, BUN 38, creatinine 3.36, GFR 24.3, glucose 203.  Initial lactate 2.2, repeat 3.5, WBC 11.0.  UA dark, cloudy, trace ketones, moderate blood, trace leukocytes, large protein, moderate bilirubin, 21-30 RBC, 3-5 WBC, 2+ bacteria.  THC positive on UDS.    CT abdomen pelvis shows mild enteritis and mild mesenteric adenitis.  No bowel or renal obstruction.  No renal, ureter, or bladder stone, normal appendix, bilateral lower pelvic phleboliths.  Chronic bilateral L5 pars defect with chronic grade 1  anterolisthesis of L5 on S1.  No free fluid or free air.  No abscess or hematoma.    CXR normal heart size.  No lobar consolidation or edema.  No pleural effusion or pneumothorax.  Levoconvex curvature at thoracic spine junction.    Patient has been admitted to 3N  ---------------------------------------------------------------------------------------------------------------------   Review of Systems   Constitutional:  Negative for chills, diaphoresis, fatigue and fever.   Respiratory:  Negative for cough, shortness of breath and wheezing.    Cardiovascular:  Negative for chest pain, palpitations and leg swelling.   Gastrointestinal:  Positive for abdominal pain, nausea and vomiting. Negative for constipation and diarrhea.   Genitourinary:  Negative for difficulty urinating, dysuria and flank pain.   Musculoskeletal:  Negative for arthralgias, myalgias and neck stiffness.   Skin:  Negative for rash and wound.   Neurological:  Positive for syncope. Negative for dizziness and weakness.   Psychiatric/Behavioral:  Negative for agitation and confusion.     ---------------------------------------------------------------------------------------------------------------------   Past Medical History:   Diagnosis Date    Diabetes mellitus     Tobacco abuse      Past Surgical History:   Procedure Laterality Date    ENDOSCOPY N/A 6/1/2022    Procedure: ESOPHAGOGASTRODUODENOSCOPY WITH ANESTHESIA;  Surgeon: Keon Quezada MD;  Location: [facility];  Service: Gastroenterology;  Laterality: N/A;     Family History   Problem Relation Age of Onset    Heart disease Mother     Diabetes Mother     Kidney disease Mother     Heart disease Father     Diabetes Father     Heart disease Maternal Grandmother     Diabetes Maternal Grandmother     Heart disease Maternal Grandfather     Diabetes Maternal Grandfather     Heart disease Paternal Grandmother     Diabetes Paternal Grandmother     Heart disease Paternal Grandfather     Diabetes  Paternal Grandfather      Social History     Socioeconomic History    Marital status: Single   Tobacco Use    Smoking status: Every Day     Packs/day: 2.00     Years: 15.00     Pack years: 30.00     Types: Cigarettes    Smokeless tobacco: Never   Vaping Use    Vaping Use: Former    Substances: Nicotine, Flavoring    Devices: Disposable    Passive vaping exposure: Yes   Substance and Sexual Activity    Alcohol use: Never    Drug use: Not Currently     Frequency: 7.0 times per week     Types: Marijuana    Sexual activity: Defer     ---------------------------------------------------------------------------------------------------------------------   Allergies:  Patient has no known allergies.  ---------------------------------------------------------------------------------------------------------------------   Medications below are reported home medications pulling from within the system; at this time, these medications have not been reconciled unless otherwise specified and are in the verification process for further verifcation as current home medications.    Prior to Admission Medications       Prescriptions Last Dose Informant Patient Reported? Taking?    Insulin Glargine (Lantus SoloStar) 100 UNIT/ML injection pen  Self, Other Yes No    12 Units Daily.    ondansetron (ZOFRAN) 4 MG tablet  Self, Other No No    Take 1 tablet by mouth Every 6 (Six) Hours As Needed for Nausea or Vomiting.          ---------------------------------------------------------------------------------------------------------------------    Objective     Hospital Scheduled Meds:  enoxaparin, 40 mg, Subcutaneous, Daily  senna-docusate sodium, 2 tablet, Oral, BID  sodium chloride, 10 mL, Intravenous, Q12H  sodium chloride, 10 mL, Intravenous, Q12H      sodium chloride, 100 mL/hr        Current listed hospital scheduled medications may not yet reflect those currently placed in orders that are signed and held, awaiting patient's arrival to  floor/unit.    ---------------------------------------------------------------------------------------------------------------------   Vital Signs:  Temp:  [98.4 °F (36.9 °C)-98.6 °F (37 °C)] 98.6 °F (37 °C)  Heart Rate:  [66-90] 83  Resp:  [17-18] 18  BP: (114-140)/(69-97) 119/78  Mean Arterial Pressure (Non-Invasive) for the past 24 hrs (Last 3 readings):   Noninvasive MAP (mmHg)   09/27/23 1700 85   09/27/23 1645 83   09/27/23 1613 88     SpO2 Percentage    09/27/23 1613 09/27/23 1645 09/27/23 1700   SpO2: 99% 99% 99%     SpO2:  [99 %] 99 %  on   ;   Device (Oxygen Therapy): room air    Body mass index is 29.05 kg/m².  Wt Readings from Last 3 Encounters:   09/27/23 81.6 kg (180 lb)   09/19/23 83 kg (183 lb)   09/05/23 93.4 kg (206 lb)     ---------------------------------------------------------------------------------------------------------------------   Physical Exam:  Physical Exam  Constitutional:       General: He is not in acute distress.     Appearance: Normal appearance.   HENT:      Head: Normocephalic and atraumatic.      Right Ear: External ear normal.      Left Ear: External ear normal.      Nose: No nasal deformity.      Mouth/Throat:      Lips: Pink.      Mouth: Mucous membranes are moist.   Eyes:      Conjunctiva/sclera: Conjunctivae normal.      Pupils: Pupils are equal, round, and reactive to light.   Cardiovascular:      Rate and Rhythm: Normal rate and regular rhythm.      Heart sounds: Normal heart sounds.   Pulmonary:      Effort: Pulmonary effort is normal.      Breath sounds: Normal breath sounds. No wheezing, rhonchi or rales.   Abdominal:      General: Abdomen is flat. Bowel sounds are normal.      Palpations: Abdomen is soft.      Tenderness: There is generalized abdominal tenderness. There is no guarding or rebound.   Genitourinary:     Comments: No leblanc catheter in place   Musculoskeletal:      Cervical back: Neck supple. Normal range of motion.      Right lower leg: No edema.       Left lower leg: No edema.   Skin:     General: Skin is warm and dry.   Neurological:      General: No focal deficit present.      Mental Status: He is alert and oriented to person, place, and time.   Psychiatric:         Mood and Affect: Mood normal.         Behavior: Behavior normal.     ---------------------------------------------------------------------------------------------------------------------  EKG: normal sinus rhythm .  Heart rate 65.  QTc 443.  No acute ST elevation.      I have personally reviewed the EKG/Telemetry strip  ---------------------------------------------------------------------------------------------------------------------           Results from last 7 days   Lab Units 09/27/23  1825   PH, ARTERIAL pH units 7.525*   PO2 ART mm Hg 90.7   PCO2, ARTERIAL mm Hg 29.7*   HCO3 ART mmol/L 24.5     Results from last 7 days   Lab Units 09/27/23 1818 09/27/23  1549   CRP mg/dL <0.30  --    LACTATE mmol/L 3.5* 2.2*   WBC 10*3/mm3  --  11.00*   HEMOGLOBIN g/dL  --  16.4   HEMATOCRIT %  --  47.7   MCV fL  --  85.3   MCHC g/dL  --  34.4   PLATELETS 10*3/mm3  --  386     Results from last 7 days   Lab Units 09/27/23 1818 09/27/23  1549   SODIUM mmol/L  --  130*   POTASSIUM mmol/L  --  4.3   MAGNESIUM mg/dL 2.3  --    CHLORIDE mmol/L  --  84*   CO2 mmol/L  --  24.8   BUN mg/dL  --  38*   CREATININE mg/dL  --  3.36*   CALCIUM mg/dL  --  10.8*   GLUCOSE mg/dL  --  203*   ALBUMIN g/dL  --  5.0   BILIRUBIN mg/dL  --  0.7   ALK PHOS U/L  --  69   AST (SGOT) U/L  --  16   ALT (SGPT) U/L  --  14   Estimated Creatinine Clearance: 32.2 mL/min (A) (by C-G formula based on SCr of 3.36 mg/dL (H)).  No results found for: AMMONIA    Hemoglobin A1C   Date/Time Value Ref Range Status   09/27/2023 1818 7.40 (H) 4.80 - 5.60 % Final     Glucose   Date/Time Value Ref Range Status   09/27/2023 1805 183 (H) 70 - 130 mg/dL Final   09/27/2023 1521 189 (H) 70 - 130 mg/dL Final     Lab Results   Component Value Date    HGBA1C  7.40 (H) 09/27/2023     Lab Results   Component Value Date    TSH 2.850 05/30/2022       No results found for: BLOODCX  No results found for: URINECX  No results found for: WOUNDCX  No results found for: STOOLCX  No results found for: RESPCX  No results found for: MRSACX  Pain Management Panel  More data exists         Latest Ref Rng & Units 9/27/2023 9/18/2023   Pain Management Panel   Creatinine, Urine mg/dL - 361.9  361.9    Amphetamine, Urine Qual Negative Negative  Negative    Barbiturates Screen, Urine Negative Negative  Negative    Benzodiazepine Screen, Urine Negative Negative  Negative    Buprenorphine, Screen, Urine Negative Negative  Negative    Cocaine Screen, Urine Negative Negative  Negative    Fentanyl, Urine Negative Negative  Negative    Methadone Screen , Urine Negative Negative  Negative    Methamphetamine, Ur Negative Negative  Negative      I have personally reviewed the above laboratory results.   ---------------------------------------------------------------------------------------------------------------------  Imaging Results (Last 7 Days)       Procedure Component Value Units Date/Time    XR Chest 1 View [[id number]] Collected: 09/27/23 1913     Updated: 09/27/23 1916    Narrative:      PROCEDURE: Portable chest x-ray examination performed on September 27, 2023. Single view. Semiupright position.     HISTORY: Evaluate for fluid overload.     FINDINGS:     Normal heart size.  No edema, pneumonia, pleural effusion, or pneumothorax.  No central pulmonary vascular congestion.  Mild levoconvex curvature at the cervical thoracic spine junction.  No free air in the upper abdomen.       Impression:         1.  Normal heart size.  2.  No lobar consolidation or edema.  3.  No pleural effusion or pneumothorax.  4.  Levoconvex curvature at the cervical thoracic spine junction.     This report was finalized on 9/27/2023 7:14 PM by Maico Miller MD.       CT Abdomen Pelvis Without Contrast [[id number]]  Collected: 09/27/23 1819     Updated: 09/27/23 1824    Narrative:      PROCEDURE: CT of the abdomen and pelvis performed without IV contrast on  September 27, 2023. The examination was performed with 5 mm axial  imaging and 5 mm sagittal and coronal reconstruction images. Total DLP =  496. The examination was performed according to as low as reasonably  achievable dose protocol.     HISTORY: Abdominal pain.     FINDINGS:     Normal heart size with trace volume of pericardial fluid.  No lobar consolidation or edema.  No pleural effusion or pneumothorax.  No acute process seen in the liver, spleen, gallbladder, pancreas,  adrenal glands, and kidneys.  Mild small bowel wall thickening with small mesenteric nodes suggestive  of underlying enteritis and mild mesenteric adenitis.  A normal appendix is well seen.  Bilateral lower pelvic phleboliths.  No acute process seen in the bladder, prostate gland, and seminal  vesicles.  No bowel or renal obstruction.  Accessory splenule in the left upper quadrant.  Chronic bilateral L5 pars defects with chronic grade 1 anterolisthesis  of L5 on S1.  No acute or chronic compression fracture deformity.       Impression:         1.  Mild enteritis and mild mesenteric adenitis.  2.  No bowel or renal obstruction.  3.  No renal, ureter, or bladder stone.  4.  Normal appendix is well seen.  5.  Bilateral lower pelvic phleboliths.  6.  Chronic bilateral L5 pars defects with chronic grade 1  anterolisthesis of L5 on S1.  7.  No free fluid or free air.  8.  No abscess or hematoma.     This report was finalized on 9/27/2023 6:22 PM by Maico Miller MD.             I have personally reviewed the above radiology results.     Last Echocardiogram:  Results for orders placed during the hospital encounter of 04/19/23    Adult Transthoracic Echo Complete W/ Cont if Necessary Per Protocol    Interpretation Summary    Left ventricular systolic function is normal. Estimated left ventricular EF = 65%     All left ventricular wall segments contract normally.    Left ventricular diastolic function was normal.    The cardiac chamber dimensions are normal.    All valves appear normal in structure and showed no stenosis or significant regurgitations.    No evidence of pulmonary hypertension is present.    There is no evidence of pericardial effusion.    ---------------------------------------------------------------------------------------------------------------------    Assessment & Plan      Active Hospital Problems    Diagnosis  POA    Intractable nausea and vomiting [R11.2]  Yes     Intractable cyclical vomiting with nausea, suspect secondary to cannabinoid hyperemesis syndrome, POA  Ongoing marijuana use  Leukocytosis, suspect reactive in nature  Hyponatremia, suspect hypovolemic  Patient presented with intractable nausea and vomiting  Continue IV fluids, as needed antiemetics.    Encouraged THC cessation.  Patient denied THC use since last admission, however THC still positive on UA.  Monitor for sodium improvement after fluid resuscitation  Repeat labs in the a.m.   Acute Kidney Injury on Chronic Kidney Disease stage II, POA  Asymptomatic bacteriuria, POA  Proteinuria, POA  Baseline Cr ~1.2, was up to 3.36 on admission  Received fluid bolus in ED.  Continue mIVFs, Trend Cr and urine output, avoid nephrotoxins, NSAIDs, dehydration and contrast as able.  Due to recurrent MINA on CKD, nephrology consulted, assistance appreciated  Repeat BMP in the a.m  UA positive for bacteria, trace leukocytes.  Patient denies any dysuria or other symptoms of UTI.  Hold antibiotics at this time.    CHRONIC MEDICAL PROBLEMS    Tobacco abuse  Encourage cessation  Nicotine replacement therapy ordered  Type II DM  Start sliding scale insulin for now, titrate insulin therapy as necessary.   Hold any oral hypoglycemics to prevent hypoglycemia. Will review home diabetes medications once available per pharmacy.   Hypoglycemia protocol in  place if necessary.   Accuchecks QAC and QHS.  Obesity, BMI 32.28  Affecting all aspects of care  F/E/N: IV NS at 100 mL/h.  Continue to monitor electrolytes.  Renal diet.    ---------------------------------------------------  DVT Prophylaxis: Lovenox  GI Prophylaxis: Bowel regimen  Activity: Ad alex.    The patient is considered to be a high risk patient due to: Intractable nausea and vomiting, MINA on CKD    OBSERVATION status, however if further evaluation or treatment plans warrant, status may change.  Based upon current information, I predict patient's care encounter to be less than or equal to 2 midnights.     Code Status: Full Code    Disposition/Discharge planning: pending clinical course    I have discussed the patient's assessment and plan with Dr. Nick Arshad PA-C  Hospitalist Service -- [facility]       09/27/23  20:24 EDT    Attending Physician: Dr. Romero.

## 2023-09-28 NOTE — PLAN OF CARE
Goal Outcome Evaluation:  Plan of Care Reviewed With: patient        Progress: no change  Outcome Evaluation: Patient VSS. Patient c/o nausea and vomiting this shift and PRN medication was given. See MAR. Patient ambulated independently this shift. Patient has no requests or concerns at this time. Will continue with plan of care.

## 2023-09-28 NOTE — PROGRESS NOTES
[facility] HOSPITALIST PROGRESS NOTE     Patient Identification:  Name:  Oliver Osborn  Age:  28 y.o.  Sex:  male  :  1995  MRN:  [id number]  Visit Number:  [id number]  ROOM: [address] [place] [address]     Primary Care Provider:  Geri Belcher APRN    Length of stay in inpatient status:  0    Subjective     Chief Compliant:    Chief Complaint   Patient presents with    Vomiting    Syncope     History of Presenting Illness:    Patient remains ill today, no acute events overnight, no new complaints, denies any fevers or chills, was actively throwing up when seen in room, poor oral intake, added FSBG to monitor for hypoglycemia, holding long acting insulin, having poor oral intake, no mIVFs, creatinine improved some, discussed case with Nephrology, formal recommendations pending, Dr. Simon with concerns for ongoing chronic process and may need to consider renal biopsy, follow up formal recommendations, follow up creatinine, remains afebrile, holding antibiotics, check blood cultures.  Objective     Current Hospital Meds:  enoxaparin, 40 mg, Subcutaneous, Daily  insulin regular, 2-7 Units, Subcutaneous, Q6H  pantoprazole, 40 mg, Oral, Daily  senna-docusate sodium, 2 tablet, Oral, BID  sodium chloride, 10 mL, Intravenous, Q12H  sodium chloride, 10 mL, Intravenous, Q12H      lactated ringers, 100 mL/hr, Last Rate: 100 mL/hr (23 0849)      ----------------------------------------------------------------------------------------------------------------------  Vital Signs:  Temp:  [98.4 °F (36.9 °C)-98.7 °F (37.1 °C)] 98.7 °F (37.1 °C)  Heart Rate:  [66-90] 68  Resp:  [16-18] 16  BP: (105-140)/(59-97) 112/67  SpO2:  [99 %] 99 %  on   ;   Device (Oxygen Therapy): room air  Body mass index is 29.55 kg/m².      Intake/Output Summary (Last 24 hours) at 2023 0940  Last data filed at 2023 0300  Gross per 24 hour   Intake 360 ml   Output --   Net 360 ml       ----------------------------------------------------------------------------------------------------------------------  Physical exam:  Constitutional:  Well-developed and well-nourished.  Mild acute distress.      HENT:  Head:  Normocephalic and atraumatic.  Mouth:  Moist mucous membranes.    Eyes:  Conjunctivae and EOM are normal. No scleral icterus.    Neck:  Neck supple.  No JVD present.    Cardiovascular:  Normal rate, regular rhythm and normal heart sounds with no murmur.  Pulmonary/Chest:  No respiratory distress, no wheezes, no crackles, with normal breath sounds and good air movement.  Abdominal:  Soft. No distension and no tenderness.   Musculoskeletal:  No tenderness, and no deformity.  No red or swollen joints anywhere.    Neurological:  Alert and oriented to person, place, and time.  No cranial nerve deficit.    Skin:  Skin is warm and dry. No rash noted. No pallor.   Peripheral vascular:  No clubbing, no cyanosis, no edema.  Psychiatric: Appropriate mood and affect  Edited by: Holger Estrada MD at 9/28/2023 0939  ----------------------------------------------------------------------------------------------------------------------  WBC/HGB/HCT/PLT   13.50/13.9/39.9/346 (09/28 0212)  BUN/CREAT/GLUC/ALT/AST/YOKO/LIP    35/2.56/166/13/16/--/29 (09/27 1549-09/28 0212)  LYTES - Na/K/Cl/CO2: 134*/3.7/91*/22.9 (09/28 0212)  pH/pCO2/pO2/HCO3   7.525/29.7/90.7/24.5 (09/27 1825)  No results found for: URINECX  No results found for: BLOODCX    I have personally looked at the labs and they are summarized above.  ----------------------------------------------------------------------------------------------------------------------  Detailed radiology reports for the last 24 hours:  CT Abdomen Pelvis Without Contrast    Result Date: 9/27/2023   1.  Mild enteritis and mild mesenteric adenitis. 2.  No bowel or renal obstruction. 3.  No renal, ureter, or bladder stone. 4.  Normal appendix is well seen. 5.  Bilateral  lower pelvic phleboliths. 6.  Chronic bilateral L5 pars defects with chronic grade 1 anterolisthesis of L5 on S1. 7.  No free fluid or free air. 8.  No abscess or hematoma.  This report was finalized on 9/27/2023 6:22 PM by Maico Miller MD.      XR Chest 1 View    Result Date: 9/27/2023   1.  Normal heart size. 2.  No lobar consolidation or edema. 3.  No pleural effusion or pneumothorax. 4.  Levoconvex curvature at the cervical thoracic spine junction.  This report was finalized on 9/27/2023 7:14 PM by Maico Miller MD.     Assessment & Plan    28M Obesity by BMI, Smoker, THC use PMH Diabetes Mellitus Type II, Chronic Kidney Disease, recurrent history of Cannabinoid Hyperemesis Syndrome, presented to [facility] emergency room 9/27 with persisting nausea and vomiting, elevated creatinine.     #Intractable Nausea and Vomiting, suspected due to Cannabinoid Hyperemesis Syndrome, but also in setting of Leukocytosis, suspected reactive, with possible enteritis/mesenteric adenitis  #Non-Oliguric Acute Kidney Injury on Chronic Kidney Disease stage II, complicated by proteinuria and urine sediment  #Asymptomatic Bacteriuria   #Acute Hypovolemic Hyponatremia  - Baseline creatinine difficult to say due to recurrent admissions with Acute Kidney Injury, was up to 3.36 on admission, sodium 130, urine drug screen positive for THC again, mild leukocytosis which appears to be chronic; Now creatinine 2.56, sodium 134  - Consult Nephrology; Recommendations pending  - Continue mIVFs, as needed antiemetics  - Do not suspect acute infection, hold antibiotics, though follow up blood cultures  - Trend Cr and urine output, avoid nephrotoxins, NSAIDs, dehydration and contrast as able.     #Insulin Dependent Diabetes Mellitus Type II, uncontrolled, unknown complications  - Hgb A1c = 7.4%  - Holding home oral agents, continue FSBG and SSI ever 6hrs while having poor oral intake, hold home long acting insulin 10U nightly for now,  resume as indicated     #Gastroesophageal Reflux Disease  - Continue home PPI    #Tobacco Dependence  - Recommended cessation, nicotine replacement therapy as needed.    #Obesity by BMI  - Body mass index is 32.28 kg/m².; complicates all aspects of care     F: Oral, mIVFs   E: Monitor & Replace as needed   N: Diet: Renal Diets; Low Sodium (2-3g), Low Potassium, Low Phosphorus; Texture: Regular Texture (IDDSI 7); Fluid Consistency: Thin (IDDSI 0)  PPx: Prophylactic Lovenox   Code Status (Patient has no pulse and is not breathing): CPR   Medical Interventions (Patient has pulse or is breathing): Full Support     Dispo: Pending workup and clinical improvement    *This patient is considered high risk secondary to Acute Kidney Injury on Chronic Kidney Disease, intractable nausea and vomiting, Cannabis Hyperemesis Syndrome.     Edited by: Holger Estrada MD at 9/28/2023 5958  [facility]

## 2023-09-29 LAB
ALBUMIN SERPL-MCNC: 3.7 G/DL (ref 3.5–5.2)
ALBUMIN UR-MCNC: 64.9 MG/DL
ALBUMIN/GLOB SERPL: 1.4 G/DL
ALP SERPL-CCNC: 50 U/L (ref 39–117)
ALT SERPL W P-5'-P-CCNC: 10 U/L (ref 1–41)
ANION GAP SERPL CALCULATED.3IONS-SCNC: 11.3 MMOL/L (ref 5–15)
ASO AB SERPL-ACNC: NEGATIVE [IU]/ML
AST SERPL-CCNC: 13 U/L (ref 1–40)
BACTERIA BLD CULT: ABNORMAL
BILIRUB SERPL-MCNC: 0.4 MG/DL (ref 0–1.2)
BOTTLE TYPE: ABNORMAL
BUN SERPL-MCNC: 27 MG/DL (ref 6–20)
BUN/CREAT SERPL: 14.3 (ref 7–25)
C3 SERPL-MCNC: 134 MG/DL (ref 82–167)
C4 SERPL-MCNC: 31 MG/DL (ref 14–44)
CALCIUM SPEC-SCNC: 9.5 MG/DL (ref 8.6–10.5)
CHLORIDE SERPL-SCNC: 94 MMOL/L (ref 98–107)
CO2 SERPL-SCNC: 28.7 MMOL/L (ref 22–29)
CREAT SERPL-MCNC: 1.89 MG/DL (ref 0.76–1.27)
CREAT UR-MCNC: 96.7 MG/DL
CREAT UR-MCNC: 96.7 MG/DL
DEPRECATED RDW RBC AUTO: 36.9 FL (ref 37–54)
EGFRCR SERPLBLD CKD-EPI 2021: 48.4 ML/MIN/1.73
ERYTHROCYTE [DISTWIDTH] IN BLOOD BY AUTOMATED COUNT: 11.9 % (ref 12.3–15.4)
GLOBULIN UR ELPH-MCNC: 2.6 GM/DL
GLUCOSE BLDC GLUCOMTR-MCNC: 130 MG/DL (ref 70–130)
GLUCOSE BLDC GLUCOMTR-MCNC: 172 MG/DL (ref 70–130)
GLUCOSE BLDC GLUCOMTR-MCNC: 176 MG/DL (ref 70–130)
GLUCOSE BLDC GLUCOMTR-MCNC: 185 MG/DL (ref 70–130)
GLUCOSE SERPL-MCNC: 145 MG/DL (ref 65–99)
HAV IGM SERPL QL IA: NORMAL
HBV CORE IGM SERPL QL IA: NORMAL
HBV SURFACE AG SERPL QL IA: NORMAL
HCT VFR BLD AUTO: 36.2 % (ref 37.5–51)
HCV AB SER DONR QL: NORMAL
HGB BLD-MCNC: 12.8 G/DL (ref 13–17.7)
MCH RBC QN AUTO: 30.2 PG (ref 26.6–33)
MCHC RBC AUTO-ENTMCNC: 35.4 G/DL (ref 31.5–35.7)
MCV RBC AUTO: 85.4 FL (ref 79–97)
MICROALBUMIN/CREAT UR: 671.1 MG/G
PLATELET # BLD AUTO: 333 10*3/MM3 (ref 140–450)
PMV BLD AUTO: 9.1 FL (ref 6–12)
POTASSIUM SERPL-SCNC: 3.9 MMOL/L (ref 3.5–5.2)
PROT ?TM UR-MCNC: 100.5 MG/DL
PROT SERPL-MCNC: 6.3 G/DL (ref 6–8.5)
PROT/CREAT UR: 1039.3 MG/G CREA (ref 0–200)
RBC # BLD AUTO: 4.24 10*6/MM3 (ref 4.14–5.8)
SODIUM SERPL-SCNC: 134 MMOL/L (ref 136–145)
WBC NRBC COR # BLD: 9.93 10*3/MM3 (ref 3.4–10.8)

## 2023-09-29 PROCEDURE — 86037 ANCA TITER EACH ANTIBODY: CPT | Performed by: INTERNAL MEDICINE

## 2023-09-29 PROCEDURE — 25010000002 PROCHLORPERAZINE 10 MG/2ML SOLUTION

## 2023-09-29 PROCEDURE — 86160 COMPLEMENT ANTIGEN: CPT | Performed by: INTERNAL MEDICINE

## 2023-09-29 PROCEDURE — 80053 COMPREHEN METABOLIC PANEL: CPT | Performed by: INTERNAL MEDICINE

## 2023-09-29 PROCEDURE — 86063 ANTISTREPTOLYSIN O SCREEN: CPT | Performed by: INTERNAL MEDICINE

## 2023-09-29 PROCEDURE — 83521 IG LIGHT CHAINS FREE EACH: CPT | Performed by: INTERNAL MEDICINE

## 2023-09-29 PROCEDURE — 85027 COMPLETE CBC AUTOMATED: CPT | Performed by: INTERNAL MEDICINE

## 2023-09-29 PROCEDURE — 80074 ACUTE HEPATITIS PANEL: CPT | Performed by: INTERNAL MEDICINE

## 2023-09-29 PROCEDURE — 82948 REAGENT STRIP/BLOOD GLUCOSE: CPT

## 2023-09-29 PROCEDURE — 99233 SBSQ HOSP IP/OBS HIGH 50: CPT | Performed by: INTERNAL MEDICINE

## 2023-09-29 PROCEDURE — 84165 PROTEIN E-PHORESIS SERUM: CPT | Performed by: INTERNAL MEDICINE

## 2023-09-29 PROCEDURE — 63710000001 INSULIN REGULAR HUMAN PER 5 UNITS: Performed by: INTERNAL MEDICINE

## 2023-09-29 PROCEDURE — 25010000002 ENOXAPARIN PER 10 MG: Performed by: INTERNAL MEDICINE

## 2023-09-29 PROCEDURE — 86038 ANTINUCLEAR ANTIBODIES: CPT | Performed by: INTERNAL MEDICINE

## 2023-09-29 PROCEDURE — 83516 IMMUNOASSAY NONANTIBODY: CPT | Performed by: INTERNAL MEDICINE

## 2023-09-29 RX ADMIN — PANTOPRAZOLE SODIUM 40 MG: 40 TABLET, DELAYED RELEASE ORAL at 08:19

## 2023-09-29 RX ADMIN — PROCHLORPERAZINE EDISYLATE 2.5 MG: 5 INJECTION INTRAMUSCULAR; INTRAVENOUS at 05:36

## 2023-09-29 RX ADMIN — INSULIN HUMAN 2 UNITS: 100 INJECTION, SOLUTION PARENTERAL at 11:30

## 2023-09-29 RX ADMIN — INSULIN HUMAN 2 UNITS: 100 INJECTION, SOLUTION PARENTERAL at 17:57

## 2023-09-29 RX ADMIN — SODIUM CHLORIDE, POTASSIUM CHLORIDE, SODIUM LACTATE AND CALCIUM CHLORIDE 100 ML/HR: 600; 310; 30; 20 INJECTION, SOLUTION INTRAVENOUS at 17:58

## 2023-09-29 RX ADMIN — ENOXAPARIN SODIUM 40 MG: 40 INJECTION SUBCUTANEOUS at 08:19

## 2023-09-29 RX ADMIN — Medication 10 ML: at 08:20

## 2023-09-29 RX ADMIN — Medication 10 ML: at 21:26

## 2023-09-29 RX ADMIN — DOCUSATE SODIUM 50 MG AND SENNOSIDES 8.6 MG 2 TABLET: 8.6; 5 TABLET, FILM COATED ORAL at 21:26

## 2023-09-29 NOTE — PLAN OF CARE
Goal Outcome Evaluation:              Outcome Evaluation: Pt resting in bed at this time. Pt states they are feeling better. Pt has ambulated through hospital. No complaints or acute changes. [facility] IV[facility]

## 2023-09-29 NOTE — PROGRESS NOTES
Nephrology Progress Note      Subjective     Patient still not able to hold stuff down family.  Continues to be nauseous.  No chest pain or shortness of breath    Objective       Vital signs :     Temp:  [98.1 °F (36.7 °C)-98.5 °F (36.9 °C)] 98.5 °F (36.9 °C)  Heart Rate:  [60-78] 60  Resp:  [16] 16  BP: (114-134)/(62-82) 114/62    Intake/Output                         09/27/23 0701 - 09/28/23 0700 09/28/23 0701 - 09/29/23 0700     3801-1378 5430-9668 Total 8990-0783 0130-8106 Total                 Intake    P.O.  --  360 360  240  480 720    I.V.  --  -- --  --  1200 1200    Total Intake -- 360  1920       Output    Urine  --  -- --  250  1200 1450    Total Output -- -- -- 250 1200 1450             Physical Exam:    General Appearance : alert, pleasant, appears stated age, cooperative and alert  Lungs : clear to auscultation, respirations regular  Heart :  regular rhythm & normal rate, normal S1, S2 and no murmur, no rub  Abdomen : soft, non distended  Extremities : No edema,   Neurologic :   orientated to person, place, time and situation, Grossly no focal deficits        Laboratory Data :     Albumin Albumin   Date Value Ref Range Status   09/29/2023 3.7 3.5 - 5.2 g/dL Final   09/28/2023 4.3 3.5 - 5.2 g/dL Final   09/27/2023 5.0 3.5 - 5.2 g/dL Final      Magnesium Magnesium   Date Value Ref Range Status   09/27/2023 2.3 1.6 - 2.6 mg/dL Final          PTH               No results found for: PTH    CBC and coagulation:  Results from last 7 days   Lab Units 09/29/23  0103 09/28/23  0212 09/27/23  2134 09/27/23  1908 09/27/23  1818 09/27/23  1549   PROCALCITONIN ng/mL  --   --   --  0.17  --   --    LACTATE mmol/L  --   --  2.0  --  3.5* 2.2*   CRP mg/dL  --   --   --   --  <0.30  --    WBC 10*3/mm3 9.93 13.50*  --   --   --  11.00*   HEMOGLOBIN g/dL 12.8* 13.9  --   --   --  16.4   HEMATOCRIT % 36.2* 39.9  --   --   --  47.7   MCV fL 85.4 84.4  --   --   --  85.3   MCHC g/dL 35.4 34.8  --   --   --  34.4    PLATELETS 10*3/mm3 333 346  --   --   --  386     Acid/base balance:  Results from last 7 days   Lab Units 09/27/23  1825   PH, ARTERIAL pH units 7.525*   PO2 ART mm Hg 90.7   PCO2, ARTERIAL mm Hg 29.7*   HCO3 ART mmol/L 24.5     Renal and electrolytes:    Results from last 7 days   Lab Units 09/29/23  0103 09/28/23  0212 09/27/23  1818 09/27/23  1549   SODIUM mmol/L 134* 134*  --  130*   POTASSIUM mmol/L 3.9 3.7  --  4.3   MAGNESIUM mg/dL  --   --  2.3  --    CHLORIDE mmol/L 94* 91*  --  84*   CO2 mmol/L 28.7 22.9  --  24.8   BUN mg/dL 27* 35*  --  38*   CREATININE mg/dL 1.89* 2.56*  --  3.36*   CALCIUM mg/dL 9.5 9.3  --  10.8*   PHOSPHORUS mg/dL  --   --  5.3*  --      Estimated Creatinine Clearance: 58 mL/min (A) (by C-G formula based on SCr of 1.89 mg/dL (H)).  @GFRCG:3@   Liver and pancreatic function:  Results from last 7 days   Lab Units 09/29/23  0103 09/28/23  0212 09/27/23  1549   ALBUMIN g/dL 3.7 4.3 5.0   BILIRUBIN mg/dL 0.4 0.6 0.7   ALK PHOS U/L 50 57 69   AST (SGOT) U/L 13 16 16   ALT (SGPT) U/L 10 13 14   LIPASE U/L  --   --  29         Cardiac:      Liver and pancreatic function:  Results from last 7 days   Lab Units 09/29/23  0103 09/28/23  0212 09/27/23  1549   ALBUMIN g/dL 3.7 4.3 5.0   BILIRUBIN mg/dL 0.4 0.6 0.7   ALK PHOS U/L 50 57 69   AST (SGOT) U/L 13 16 16   ALT (SGPT) U/L 10 13 14   LIPASE U/L  --   --  29       Medications :     enoxaparin, 40 mg, Subcutaneous, Daily  insulin regular, 2-7 Units, Subcutaneous, Q6H  pantoprazole, 40 mg, Oral, Daily  senna-docusate sodium, 2 tablet, Oral, BID  sodium chloride, 10 mL, Intravenous, Q12H  sodium chloride, 10 mL, Intravenous, Q12H      lactated ringers, 100 mL/hr, Last Rate: 100 mL/hr (09/29/23 0230)          Assessment & Plan     -MINA, nonoliguric  - Hyponatremia, most likely hypovolemic hyponatremia  - Intractable nausea and vomiting in settings of cannabinoid abuse  - Type 2 diabetes mellitus  - Tobacco use disorder    Renal functions  started improving with creatinine down to 1.8.  Significant proteinuria and very active urinary sediment, have ordered full serology and discussed about native kidney biopsy and patient is agreeable to proceed with if indicated.  At this point, we will continue to follow lactated Ringer and follow-up serology     MINA most likely due to prerenal azotemia/early ATN.  Other differentials include IgA nephropathy and PI GN  Significant hematuria +2.5 g proteinuria  baseline creatinine that appears to be around 1.3 previously and that has been most recently around 1.5. Patient was admitted with a creatinine of 1.9.   No obstruction on renal imaging  - Recheck UA with microscopy  - Check urine protein creatinine ratio and urine albumin creatinine ratio and decide about serology  - DC normal saline  - Started on lactated Ringer +20 mEq/L of potassium chloride and run at 100 cc an hour  - Strict intake and output      Ashley Simon MD  09/29/23  08:06 EDT

## 2023-09-29 NOTE — PROGRESS NOTES
[facility] HOSPITALIST PROGRESS NOTE     Patient Identification:  Name:  Oliver Osborn  Age:  28 y.o.  Sex:  male  :  1995  MRN:  [id number]  Visit Number:  [id number]  ROOM: [address] [place] [address]     Primary Care Provider:  Geri Belcher APRN    Length of stay in inpatient status:  1    Subjective     Chief Compliant:    Chief Complaint   Patient presents with    Vomiting    Syncope     History of Presenting Illness:    Patient remains ill but stable today, no acute events overnight, no new complaints, denies any fevers or chills, still nauseas and not able to hold down liquids, on IVFs, creatinine improved some, Nephrology consulted and following, sent serological workup, considering renal biopsy.   Objective     Current Hospital Meds:  enoxaparin, 40 mg, Subcutaneous, Daily  insulin regular, 2-7 Units, Subcutaneous, Q6H  pantoprazole, 40 mg, Oral, Daily  senna-docusate sodium, 2 tablet, Oral, BID  lactated ringers, 100 mL/hr, Last Rate: 100 mL/hr (23 0230)    ----------------------------------------------------------------------------------------------------------------------  Vital Signs:  Temp:  [98.1 °F (36.7 °C)-98.5 °F (36.9 °C)] 98.4 °F (36.9 °C)  Heart Rate:  [60-80] 80  Resp:  [16-17] 17  BP: (114-137)/(62-82) 137/74  SpO2:  [97 %] 97 %  on   ;   Device (Oxygen Therapy): room air  Body mass index is 29.83 kg/m².      Intake/Output Summary (Last 24 hours) at 2023 1008  Last data filed at 2023 0915  Gross per 24 hour   Intake 2040 ml   Output 1450 ml   Net 590 ml      ----------------------------------------------------------------------------------------------------------------------  Physical exam:  Constitutional:  Well-developed and well-nourished. Improved acute distress.      HENT:  Head:  Normocephalic and atraumatic.  Mouth:  Moist mucous membranes.    Eyes:  Conjunctivae and EOM are normal. No scleral icterus.    Neck:  Neck supple.  No JVD present.    Cardiovascular:   Normal rate, regular rhythm and normal heart sounds with no murmur.  Pulmonary/Chest:  No respiratory distress, no wheezes, no crackles, on room air  Abdominal:  Soft. No distension and no tenderness.   Musculoskeletal:  No tenderness, and no deformity.  No red or swollen joints anywhere.    Neurological:  Alert and oriented to person, place, and time.  No gross focal deficits     Skin:  Skin is warm and dry. No rash noted. No pallor.   Peripheral vascular:  No clubbing, no cyanosis, no edema.  Psychiatric: Appropriate mood and affect  Edited by: Holger Estrada MD at 9/29/2023 1007  ----------------------------------------------------------------------------------------------------------------------  WBC/HGB/HCT/PLT   9.93/12.8/36.2/333 (09/29 0103)  BUN/CREAT/GLUC/ALT/AST/YOKO/LIP    27/1.89/145/10/13/--/-- (09/29 0103)  LYTES - Na/K/Cl/CO2: 134*/3.9/94*/28.7 (09/29 0103)     No results found for: URINECX  Blood Culture   Date Value Ref Range Status   09/28/2023 Abnormal Stain (C)  Preliminary     I have personally looked at the labs and they are summarized above.  ----------------------------------------------------------------------------------------------------------------------  Detailed radiology reports for the last 24 hours:  CT Abdomen Pelvis Without Contrast    Result Date: 9/27/2023   1.  Mild enteritis and mild mesenteric adenitis. 2.  No bowel or renal obstruction. 3.  No renal, ureter, or bladder stone. 4.  Normal appendix is well seen. 5.  Bilateral lower pelvic phleboliths. 6.  Chronic bilateral L5 pars defects with chronic grade 1 anterolisthesis of L5 on S1. 7.  No free fluid or free air. 8.  No abscess or hematoma.  This report was finalized on 9/27/2023 6:22 PM by Maico Miller MD.      XR Chest 1 View    Result Date: 9/27/2023   1.  Normal heart size. 2.  No lobar consolidation or edema. 3.  No pleural effusion or pneumothorax. 4.  Levoconvex curvature at the cervical thoracic spine junction.   This report was finalized on 9/27/2023 7:14 PM by Maico Miller MD.     Assessment & Plan    28M Obesity by BMI, Smoker, THC use PMH Diabetes Mellitus Type II, Chronic Kidney Disease, recurrent history of Cannabinoid Hyperemesis Syndrome, presented to [facility] emergency room 9/27 with persisting nausea and vomiting, elevated creatinine.     #Intractable Nausea and Vomiting, suspected due to Cannabinoid Hyperemesis Syndrome, but also in setting of Leukocytosis, suspected reactive, with possible enteritis/mesenteric adenitis  #Non-Oliguric Acute Kidney Injury on Chronic Kidney Disease stage II, complicated by proteinuria and urine sediment  #Asymptomatic Bacteriuria   #Acute Hypovolemic Hyponatremia  - Baseline creatinine difficult to say due to recurrent admissions with Acute Kidney Injury, was up to 3.36 on admission, sodium 130, urine drug screen positive for THC again, mild leukocytosis which appears to be chronic; Now creatinine 1.89, sodium 134  - Nephrology consulted and following, have sent serological workup  - Continue mIVFs, as needed antiemetics  - Do not suspect acute infection, hold antibiotics, blood culture with contaminant but still not suspecting active infection, WBC count normalized  - Trend Cr and urine output, avoid nephrotoxins, NSAIDs, dehydration and contrast as able.     #Insulin Dependent Diabetes Mellitus Type II, uncontrolled, unknown complications  - Hgb A1c = 7.4%  - Holding home oral agents, continue FSBG and SSI ever 6hrs while having poor oral intake, hold home long acting insulin 10U nightly for now, resume as indicated     #Gastroesophageal Reflux Disease  - Continue home PPI    #Tobacco Dependence  - Recommended cessation, nicotine replacement therapy as needed.    #Obesity by BMI  - Body mass index is 32.28 kg/m².; complicates all aspects of care     F: Oral, mIVFs   E: Monitor & Replace as needed   N: Diet: Renal Diets; Low Sodium (2-3g), Low Potassium, Low  Phosphorus; Texture: Regular Texture (IDDSI 7); Fluid Consistency: Thin (IDDSI 0)  PPx: Prophylactic Lovenox   Code Status (Patient has no pulse and is not breathing): CPR   Medical Interventions (Patient has pulse or is breathing): Full Support     Dispo: Pending workup and clinical improvement    *This patient is considered high risk secondary to Acute Kidney Injury on Chronic Kidney Disease, intractable nausea and vomiting, Cannabis Hyperemesis Syndrome.     Edited by: Holger Estrada MD at 9/29/2023 [address] [place] [address]

## 2023-09-29 NOTE — PLAN OF CARE
Goal Outcome Evaluation:  Plan of Care Reviewed With: patient        Progress: no change  Outcome Evaluation: Pt has rested in bed overnight. VSS on RA. PRN antiemetics administered. No acute changes. Will continue POC.

## 2023-09-30 LAB
ALBUMIN SERPL-MCNC: 3.5 G/DL (ref 3.5–5.2)
ALBUMIN/GLOB SERPL: 1.3 G/DL
ALP SERPL-CCNC: 56 U/L (ref 39–117)
ALT SERPL W P-5'-P-CCNC: 12 U/L (ref 1–41)
ANION GAP SERPL CALCULATED.3IONS-SCNC: 12 MMOL/L (ref 5–15)
AST SERPL-CCNC: 14 U/L (ref 1–40)
BACTERIA SPEC AEROBE CULT: ABNORMAL
BILIRUB SERPL-MCNC: 0.2 MG/DL (ref 0–1.2)
BUN SERPL-MCNC: 20 MG/DL (ref 6–20)
BUN/CREAT SERPL: 14.3 (ref 7–25)
CALCIUM SPEC-SCNC: 8.9 MG/DL (ref 8.6–10.5)
CHLORIDE SERPL-SCNC: 98 MMOL/L (ref 98–107)
CO2 SERPL-SCNC: 23 MMOL/L (ref 22–29)
CREAT SERPL-MCNC: 1.4 MG/DL (ref 0.76–1.27)
DEPRECATED RDW RBC AUTO: 39.5 FL (ref 37–54)
EGFRCR SERPLBLD CKD-EPI 2021: 69.3 ML/MIN/1.73
ERYTHROCYTE [DISTWIDTH] IN BLOOD BY AUTOMATED COUNT: 11.9 % (ref 12.3–15.4)
GLOBULIN UR ELPH-MCNC: 2.6 GM/DL
GLUCOSE BLDC GLUCOMTR-MCNC: 151 MG/DL (ref 70–130)
GLUCOSE BLDC GLUCOMTR-MCNC: 156 MG/DL (ref 70–130)
GLUCOSE BLDC GLUCOMTR-MCNC: 158 MG/DL (ref 70–130)
GLUCOSE BLDC GLUCOMTR-MCNC: 160 MG/DL (ref 70–130)
GLUCOSE BLDC GLUCOMTR-MCNC: 169 MG/DL (ref 70–130)
GLUCOSE BLDC GLUCOMTR-MCNC: 169 MG/DL (ref 70–130)
GLUCOSE SERPL-MCNC: 174 MG/DL (ref 65–99)
GRAM STN SPEC: ABNORMAL
HCT VFR BLD AUTO: 37 % (ref 37.5–51)
HGB BLD-MCNC: 12.3 G/DL (ref 13–17.7)
ISOLATED FROM: ABNORMAL
MCH RBC QN AUTO: 30.2 PG (ref 26.6–33)
MCHC RBC AUTO-ENTMCNC: 33.2 G/DL (ref 31.5–35.7)
MCV RBC AUTO: 90.9 FL (ref 79–97)
PLATELET # BLD AUTO: 290 10*3/MM3 (ref 140–450)
PMV BLD AUTO: 9 FL (ref 6–12)
POTASSIUM SERPL-SCNC: 3.6 MMOL/L (ref 3.5–5.2)
PROT SERPL-MCNC: 6.1 G/DL (ref 6–8.5)
RBC # BLD AUTO: 4.07 10*6/MM3 (ref 4.14–5.8)
SODIUM SERPL-SCNC: 133 MMOL/L (ref 136–145)
WBC NRBC COR # BLD: 9.52 10*3/MM3 (ref 3.4–10.8)

## 2023-09-30 PROCEDURE — 80053 COMPREHEN METABOLIC PANEL: CPT | Performed by: INTERNAL MEDICINE

## 2023-09-30 PROCEDURE — 82948 REAGENT STRIP/BLOOD GLUCOSE: CPT

## 2023-09-30 PROCEDURE — 25010000002 ENOXAPARIN PER 10 MG: Performed by: INTERNAL MEDICINE

## 2023-09-30 PROCEDURE — 63710000001 INSULIN REGULAR HUMAN PER 5 UNITS: Performed by: INTERNAL MEDICINE

## 2023-09-30 PROCEDURE — 99232 SBSQ HOSP IP/OBS MODERATE 35: CPT | Performed by: INTERNAL MEDICINE

## 2023-09-30 PROCEDURE — 84166 PROTEIN E-PHORESIS/URINE/CSF: CPT | Performed by: INTERNAL MEDICINE

## 2023-09-30 PROCEDURE — 25010000002 ONDANSETRON PER 1 MG: Performed by: INTERNAL MEDICINE

## 2023-09-30 PROCEDURE — 81050 URINALYSIS VOLUME MEASURE: CPT | Performed by: INTERNAL MEDICINE

## 2023-09-30 PROCEDURE — 84156 ASSAY OF PROTEIN URINE: CPT | Performed by: INTERNAL MEDICINE

## 2023-09-30 PROCEDURE — 85027 COMPLETE CBC AUTOMATED: CPT | Performed by: INTERNAL MEDICINE

## 2023-09-30 PROCEDURE — 83521 IG LIGHT CHAINS FREE EACH: CPT | Performed by: INTERNAL MEDICINE

## 2023-09-30 RX ADMIN — INSULIN HUMAN 2 UNITS: 100 INJECTION, SOLUTION PARENTERAL at 00:33

## 2023-09-30 RX ADMIN — INSULIN HUMAN 2 UNITS: 100 INJECTION, SOLUTION PARENTERAL at 11:23

## 2023-09-30 RX ADMIN — Medication 10 ML: at 08:40

## 2023-09-30 RX ADMIN — INSULIN HUMAN 2 UNITS: 100 INJECTION, SOLUTION PARENTERAL at 23:47

## 2023-09-30 RX ADMIN — Medication 10 ML: at 20:57

## 2023-09-30 RX ADMIN — ENOXAPARIN SODIUM 40 MG: 40 INJECTION SUBCUTANEOUS at 08:35

## 2023-09-30 RX ADMIN — SODIUM CHLORIDE, POTASSIUM CHLORIDE, SODIUM LACTATE AND CALCIUM CHLORIDE 100 ML/HR: 600; 310; 30; 20 INJECTION, SOLUTION INTRAVENOUS at 08:36

## 2023-09-30 RX ADMIN — ONDANSETRON 4 MG: 2 INJECTION INTRAMUSCULAR; INTRAVENOUS at 06:21

## 2023-09-30 RX ADMIN — PANTOPRAZOLE SODIUM 40 MG: 40 TABLET, DELAYED RELEASE ORAL at 08:36

## 2023-09-30 RX ADMIN — DOCUSATE SODIUM 50 MG AND SENNOSIDES 8.6 MG 2 TABLET: 8.6; 5 TABLET, FILM COATED ORAL at 20:57

## 2023-09-30 RX ADMIN — INSULIN HUMAN 2 UNITS: 100 INJECTION, SOLUTION PARENTERAL at 05:28

## 2023-09-30 RX ADMIN — INSULIN HUMAN 2 UNITS: 100 INJECTION, SOLUTION PARENTERAL at 17:42

## 2023-09-30 NOTE — PLAN OF CARE
Goal Outcome Evaluation:  Plan of Care Reviewed With: patient           Outcome Evaluation: Pt resting in bed at this time. Pt states that they are feeling better and are ready to go home. 24hr urine collected and completed. No complaints or concerns at this time. Will continue with plan of care.

## 2023-09-30 NOTE — PROGRESS NOTES
[facility] HOSPITALIST PROGRESS NOTE     Patient Identification:  Name:  Oliver Osborn  Age:  28 y.o.  Sex:  male  :  1995  MRN:  [id number]  Visit Number:  [id number]  ROOM: [address] [place] [address]     Primary Care Provider:  Geri Belcher APRN     Date of Admission: 2023    Length of stay in inpatient status:  2    Subjective     Chief Compliant:    Chief Complaint   Patient presents with    Vomiting    Syncope     History of Presenting Illness:  The patient is really wanting to go home.  He denies any nausea, vomiting, diarrhea, chest pain, trouble breathing, coughing, edema.  He is urinating well.    Objective     Current Hospital Meds:  enoxaparin, 40 mg, Subcutaneous, Daily  insulin regular, 2-7 Units, Subcutaneous, Q6H  pantoprazole, 40 mg, Oral, Daily  senna-docusate sodium, 2 tablet, Oral, BID  sodium chloride, 10 mL, Intravenous, Q12H  sodium chloride, 10 mL, Intravenous, Q12H    lactated ringers, 100 mL/hr, Last Rate: 100 mL/hr (23 0836)      Current Antimicrobial Therapy:  Anti-Infectives (From admission, onward)      None          Current Diuretic Therapy:  Diuretics (From admission, onward)      None          ----------------------------------------------------------------------------------------------------------------------  Vital Signs:  Temp:  [98 °F (36.7 °C)-98.2 °F (36.8 °C)] 98.1 °F (36.7 °C)  Heart Rate:  [77-96] 77  Resp:  [18] 18  BP: (115-128)/(71-77) 128/77  SpO2:  [98 %-99 %] 99 %  on   ;   Device (Oxygen Therapy): room air  Body mass index is 28.47 kg/m².    Wt Readings from Last 3 Encounters:   23 80 kg (176 lb 5.9 oz)   23 83 kg (183 lb)   23 93.4 kg (206 lb)     Intake & Output (last 3 days)          0701  10/01 07    P.O. 360 720 720 360    I.V. (mL/kg)  1200 (14.3) 2238 (28)     Total Intake(mL/kg) 360 (4.3) 1920 (22.9) 2958 (37) 360 (4.5)    Urine (mL/kg/hr)  1450  (0.7) 1275 (0.7)     Stool  0      Total Output  1450 1275     Net +360 +470 +1683 +360            Urine Unmeasured Occurrence 2 x       Stool Unmeasured Occurrence  0 x            Diet: Renal Diets; Low Sodium (2-3g), Low Potassium, Low Phosphorus; Texture: Regular Texture (IDDSI 7); Fluid Consistency: Thin (IDDSI 0)  ----------------------------------------------------------------------------------------------------------------------  Physical Exam  Vitals and nursing note reviewed.   Constitutional:       General: He is not in acute distress.     Appearance: He is not ill-appearing, toxic-appearing or diaphoretic.   HENT:      Head: Normocephalic and atraumatic.   Cardiovascular:      Rate and Rhythm: Normal rate and regular rhythm.      Pulses: Normal pulses.      Heart sounds: No murmur heard.  Pulmonary:      Effort: Pulmonary effort is normal. No respiratory distress.      Breath sounds: No wheezing or rales.   Musculoskeletal:         General: No swelling or deformity.   Skin:     Capillary Refill: Capillary refill takes less than 2 seconds.      Coloration: Skin is not jaundiced or pale.   Neurological:      Mental Status: He is alert and oriented to person, place, and time. Mental status is at baseline.      Cranial Nerves: No cranial nerve deficit.   Psychiatric:         Mood and Affect: Mood normal.         Behavior: Behavior normal.         Thought Content: Thought content normal.         Judgment: Judgment normal.     ----------------------------------------------------------------------------------------------------------------------  Tele:  Refusing telemetry.  Thus, I will discontinue this.  ----------------------------------------------------------------------------------------------------------------------  LABS:    CBC and coagulation:  Results from last 7 days   Lab Units 09/30/23  0200 09/29/23  0103 09/28/23  0212 09/27/23  2134 09/27/23  1908 09/27/23  1818 09/27/23  1549   PROCALCITONIN  ng/mL  --   --   --   --  0.17  --   --    LACTATE mmol/L  --   --   --  2.0  --  3.5* 2.2*   CRP mg/dL  --   --   --   --   --  <0.30  --    WBC 10*3/mm3 9.52 9.93 13.50*  --   --   --  11.00*   HEMOGLOBIN g/dL 12.3* 12.8* 13.9  --   --   --  16.4   HEMATOCRIT % 37.0* 36.2* 39.9  --   --   --  47.7   MCV fL 90.9 85.4 84.4  --   --   --  85.3   MCHC g/dL 33.2 35.4 34.8  --   --   --  34.4   PLATELETS 10*3/mm3 290 333 346  --   --   --  386     Renal and electrolytes:  Results from last 7 days   Lab Units 09/30/23 0200 09/29/23 0103 09/28/23 0212 09/27/23  1818 09/27/23  1549   SODIUM mmol/L 133* 134* 134*  --  130*   POTASSIUM mmol/L 3.6 3.9 3.7  --  4.3   MAGNESIUM mg/dL  --   --   --  2.3  --    CHLORIDE mmol/L 98 94* 91*  --  84*   CO2 mmol/L 23.0 28.7 22.9  --  24.8   BUN mg/dL 20 27* 35*  --  38*   CREATININE mg/dL 1.40* 1.89* 2.56*  --  3.36*   CALCIUM mg/dL 8.9 9.5 9.3  --  10.8*   PHOSPHORUS mg/dL  --   --   --  5.3*  --    GLUCOSE mg/dL 174* 145* 166*  --  203*   ANION GAP mmol/L 12.0 11.3 20.1*  --  21.2*     Estimated Creatinine Clearance: 76.7 mL/min (A) (by C-G formula based on SCr of 1.4 mg/dL (H)).    Liver and pancreatic function:  Results from last 7 days   Lab Units 09/30/23 0200 09/29/23 0103 09/28/23 0212 09/27/23  1549   ALBUMIN g/dL 3.5 3.7 4.3 5.0   BILIRUBIN mg/dL 0.2 0.4 0.6 0.7   ALK PHOS U/L 56 50 57 69   AST (SGOT) U/L 14 13 16 16   ALT (SGPT) U/L 12 10 13 14   LIPASE U/L  --   --   --  29     Endocrine function:  Lab Results   Component Value Date    HGBA1C 7.40 (H) 09/27/2023     Point of care bedside glucose levels:  Results from last 7 days   Lab Units 09/30/23  1049 09/30/23  0640 09/30/23  0522 09/30/23  0020 09/29/23  1623 09/29/23  1105 09/29/23  0643 09/29/23  0531 09/28/23  2301 09/28/23  1634 09/28/23  1035 09/27/23  1805 09/27/23  1521   GLUCOSE mg/dL 169* 151* 158* 160* 176* 185* 172* 130 152* 173* 185* 183* 189*     Glucose levels from the [facility]:  Results from last 7  days   Lab Units 09/30/23  0200 09/29/23  0103 09/28/23  0212 09/27/23  1549   GLUCOSE mg/dL 174* 145* 166* 203*       Cultures:  Lab Results   Component Value Date    COLORU Yellow 09/28/2023    CLARITYU Clear 09/28/2023    PHUR 7.0 09/28/2023    PROTEINUR 100.5 09/28/2023    GLUCOSEU 250 mg/dL (1+) (A) 09/28/2023    KETONESU 80 mg/dL (3+) (A) 09/28/2023    BLOODU Moderate (2+) (A) 09/28/2023    NITRITEU Negative 09/28/2023    LEUKOCYTESUR Negative 09/28/2023    BILIRUBINUR Negative 09/28/2023    UROBILINOGEN 0.2 E.U./dL 09/28/2023    RBCUA 13-20 (A) 09/28/2023    WBCUA 0-2 09/28/2023    BACTERIA None Seen 09/28/2023     Microbiology Results (last 10 days)       Procedure Component Value - Date/Time    Blood Culture - Blood, Hand, Left [[id number]]  (Abnormal) Collected: 09/28/23 1433    Lab Status: Final result Specimen: Blood from Hand, Left Updated: 09/30/23 0701     Blood Culture Staphylococcus, coagulase negative     Isolated from Pediatric Bottle     Gram Stain Pediatric Bottle Gram positive cocci in clusters    Narrative:      Probable contaminant requires clinical correlation, susceptibility not performed unless requested by physician.      Blood Culture ID, PCR - Blood, Hand, Left [[id number]]  (Abnormal) Collected: 09/28/23 1433    Lab Status: Final result Specimen: Blood from Hand, Left Updated: 09/29/23 0735     BCID, PCR Staph spp, not aureus or lugdunensis. Identification by BCID2 PCR.     BOTTLE TYPE Pediatric Bottle    Blood Culture - Blood, Hand, Right [[id number]]  (Normal) Collected: 09/28/23 1421    Lab Status: Preliminary result Specimen: Blood from Hand, Right Updated: 09/29/23 1515     Blood Culture No growth at 24 hours          I have personally looked at the labs and they are summarized above.    Assessment & Plan      -Intractable Nausea and Vomiting, suspected due to Cannabinoid Hyperemesis Syndrome   -Non-Oliguric Acute Kidney Injury on Chronic Kidney Disease stage II, complicated by  proteinuria and urine sediment   -Asymptomatic Bacteriuria that was present on admission  -Acute Hypovolemic Hyponatremia that was present on admission and clinically significant, improving with IV fluids  -History of Insulin Dependent Diabetes Mellitus Type II, uncontrolled   -History of Gastroesophageal Reflux Disease   -Tobacco smoking addiction    Serologies are still pending.  The 24-hour urine was collected today and it is now being processed in the lab.  Nephrology would like to continue with IV fluids for now and repeat the blood work in the morning.  Please note that the blood pressures are at goal and stable.  The glucose levels are currently controlled with the sliding scale Humalog and thus we will continue this and continue monitoring the glucose levels accordingly.    VTE Prophylaxis:  Mechanical Order History:       None          Pharmalogical Order History:        Ordered     Dose Route Frequency Stop    09/27/23 7874  Enoxaparin Sodium (LOVENOX) syringe 40 mg         40 mg SC Daily --                  The patient is high risk due to the following diagnoses/reasons: Nonoliguric acute kidney injury on top of chronic kidney disease stage II    Disposition: Anticipate home in 1 to 2 days after nephrology is satisfied with the inpatient work-up    Simeon Barnes MD  [facility] Hospitalist  09/30/23  14:47 EDT

## 2023-09-30 NOTE — PROGRESS NOTES
Nephrology Progress Note      Subjective     09/29/2023  Patient still not able to hold stuff down family.  Continues to be nauseous.  No chest pain or shortness of breath    09/30/2023  Patient is feeling much better today does not have any nausea vomiting diarrhea is trying to eat and drink better    Objective       Vital signs :     Temp:  [98 °F (36.7 °C)-98.5 °F (36.9 °C)] 98.1 °F (36.7 °C)  Heart Rate:  [75-96] 77  Resp:  [16-18] 18  BP: (115-132)/(71-79) 128/77    Intake/Output                         09/28/23 0701 - 09/29/23 0700 09/29/23 0701 - 09/30/23 0700     [id number] 2101-0916 Total 8676-8082 7715-9194 Total                 Intake    P.O.  240  480 720  480  240 720    I.V.  --  1200 1200  1400  838 2238    Total Intake 240 1680 1920 1880 1078 2958       Output    Urine  250  1200 1450  525  750 1275    Total Output 250 1200 1450            09/30/2023 examined and reviewed  Physical Exam:    General Appearance : alert, pleasant, appears stated age, cooperative and alert  Lungs : clear to auscultation, respirations regular  Heart :  regular rhythm & normal rate, normal S1, S2 and no murmur, no rub  Abdomen : soft, non distended  Extremities : No edema,   Neurologic :   orientated to person, place, time and situation, Grossly no focal deficits        Laboratory Data :     Albumin Albumin   Date Value Ref Range Status   09/30/2023 3.5 3.5 - 5.2 g/dL Final   09/29/2023 3.7 3.5 - 5.2 g/dL Final   09/28/2023 4.3 3.5 - 5.2 g/dL Final   09/27/2023 5.0 3.5 - 5.2 g/dL Final      Magnesium Magnesium   Date Value Ref Range Status   09/27/2023 2.3 1.6 - 2.6 mg/dL Final          PTH               No results found for: PTH    CBC and coagulation:  Results from last 7 days   Lab Units 09/30/23  0200 09/29/23  0103 09/28/23  0212 09/27/23  2134 09/27/23  1908 09/27/23  1818 09/27/23  1549   PROCALCITONIN ng/mL  --   --   --   --  0.17  --   --    LACTATE mmol/L  --   --   --  2.0  --  3.5* 2.2*   CRP mg/dL   --   --   --   --   --  <0.30  --    WBC 10*3/mm3 9.52 9.93 13.50*  --   --   --  11.00*   HEMOGLOBIN g/dL 12.3* 12.8* 13.9  --   --   --  16.4   HEMATOCRIT % 37.0* 36.2* 39.9  --   --   --  47.7   MCV fL 90.9 85.4 84.4  --   --   --  85.3   MCHC g/dL 33.2 35.4 34.8  --   --   --  34.4   PLATELETS 10*3/mm3 290 333 346  --   --   --  386     Acid/base balance:  Results from last 7 days   Lab Units 09/27/23  1825   PH, ARTERIAL pH units 7.525*   PO2 ART mm Hg 90.7   PCO2, ARTERIAL mm Hg 29.7*   HCO3 ART mmol/L 24.5     Renal and electrolytes:    Results from last 7 days   Lab Units 09/30/23 0200 09/29/23 0103 09/28/23 0212 09/27/23  1818 09/27/23  1549   SODIUM mmol/L 133* 134* 134*  --  130*   POTASSIUM mmol/L 3.6 3.9 3.7  --  4.3   MAGNESIUM mg/dL  --   --   --  2.3  --    CHLORIDE mmol/L 98 94* 91*  --  84*   CO2 mmol/L 23.0 28.7 22.9  --  24.8   BUN mg/dL 20 27* 35*  --  38*   CREATININE mg/dL 1.40* 1.89* 2.56*  --  3.36*   CALCIUM mg/dL 8.9 9.5 9.3  --  10.8*   PHOSPHORUS mg/dL  --   --   --  5.3*  --      Estimated Creatinine Clearance: 76.7 mL/min (A) (by C-G formula based on SCr of 1.4 mg/dL (H)).  @GFRCG:3@   Liver and pancreatic function:  Results from last 7 days   Lab Units 09/30/23 0200 09/29/23 0103 09/28/23 0212 09/27/23  1549   ALBUMIN g/dL 3.5 3.7 4.3 5.0   BILIRUBIN mg/dL 0.2 0.4 0.6 0.7   ALK PHOS U/L 56 50 57 69   AST (SGOT) U/L 14 13 16 16   ALT (SGPT) U/L 12 10 13 14   LIPASE U/L  --   --   --  29         Cardiac:      Liver and pancreatic function:  Results from last 7 days   Lab Units 09/30/23  0200 09/29/23  0103 09/28/23  0212 09/27/23  1549   ALBUMIN g/dL 3.5 3.7 4.3 5.0   BILIRUBIN mg/dL 0.2 0.4 0.6 0.7   ALK PHOS U/L 56 50 57 69   AST (SGOT) U/L 14 13 16 16   ALT (SGPT) U/L 12 10 13 14   LIPASE U/L  --   --   --  29       Medications :     enoxaparin, 40 mg, Subcutaneous, Daily  insulin regular, 2-7 Units, Subcutaneous, Q6H  pantoprazole, 40 mg, Oral, Daily  senna-docusate sodium, 2  tablet, Oral, BID  sodium chloride, 10 mL, Intravenous, Q12H  sodium chloride, 10 mL, Intravenous, Q12H      lactated ringers, 100 mL/hr, Last Rate: 100 mL/hr (09/30/23 0836)          Assessment & Plan     -MINA, nonoliguric  - Hyponatremia, most likely hypovolemic hyponatremia  - Intractable nausea and vomiting in settings of cannabinoid abuse  - Type 2 diabetes mellitus  - Tobacco use disorder    09/29/2023  Renal functions started improving with creatinine down to 1.8.  Significant proteinuria and very active urinary sediment, have ordered full serology and discussed about native kidney biopsy and patient is agreeable to proceed with if indicated.  At this point, we will continue to follow lactated Ringer and follow-up serology     MINA most likely due to prerenal azotemia/early ATN.  Other differentials include IgA nephropathy and PI GN  Significant hematuria +2.5 g proteinuria  baseline creatinine that appears to be around 1.3 previously and that has been most recently around 1.5. Patient was admitted with a creatinine of 1.9.   No obstruction on renal imaging  - Recheck UA with microscopy  - Check urine protein creatinine ratio and urine albumin creatinine ratio and decide about serology  - DC normal saline  - Started on lactated Ringer +20 mEq/L of potassium chloride and run at 100 cc an hour  - Strict intake and output      09/30/2023  Patient's creatinine is down to 1.4 from 1.8 from 4.0, has significant proteinuria with hematuria serologies pending if kidney function does not get better we will plan kidney biopsy after serology  Acute kidney injury most likely prerenal azotemia  We will continue Ringer lactate from    Prognosis guarded    Discussed with RN    Please avoid nephrotoxic medication and pharmacy to adjust the medication according to the GFR    Plan of care was discussed with the patient, understood verbalized agreed          S Per Marrero MD  09/30/23  10:26 EDT

## 2023-09-30 NOTE — PLAN OF CARE
Goal Outcome Evaluation:                      PT has ambulated on and off the unit this shift. No c/o or acute changes, VSS, will continue POC

## 2023-10-01 LAB
ALBUMIN SERPL-MCNC: 3.4 G/DL (ref 3.5–5.2)
ALBUMIN/GLOB SERPL: 1.4 G/DL
ALP SERPL-CCNC: 62 U/L (ref 39–117)
ALT SERPL W P-5'-P-CCNC: 20 U/L (ref 1–41)
AMPHET+METHAMPHET UR QL: NEGATIVE
AMPHETAMINES UR QL: NEGATIVE
ANION GAP SERPL CALCULATED.3IONS-SCNC: 11.1 MMOL/L (ref 5–15)
AST SERPL-CCNC: 15 U/L (ref 1–40)
BARBITURATES UR QL SCN: NEGATIVE
BASOPHILS # BLD AUTO: 0.06 10*3/MM3 (ref 0–0.2)
BASOPHILS NFR BLD AUTO: 0.7 % (ref 0–1.5)
BENZODIAZ UR QL SCN: NEGATIVE
BILIRUB SERPL-MCNC: <0.2 MG/DL (ref 0–1.2)
BILIRUB UR QL STRIP: NEGATIVE
BUN SERPL-MCNC: 19 MG/DL (ref 6–20)
BUN/CREAT SERPL: 15.3 (ref 7–25)
BUPRENORPHINE SERPL-MCNC: NEGATIVE NG/ML
CALCIUM SPEC-SCNC: 8.9 MG/DL (ref 8.6–10.5)
CANNABINOIDS SERPL QL: POSITIVE
CHLORIDE SERPL-SCNC: 103 MMOL/L (ref 98–107)
CLARITY UR: CLEAR
CO2 SERPL-SCNC: 22.9 MMOL/L (ref 22–29)
COCAINE UR QL: NEGATIVE
COLOR UR: YELLOW
CREAT SERPL-MCNC: 1.24 MG/DL (ref 0.76–1.27)
CREAT UR-MCNC: 29.3 MG/DL
DEPRECATED RDW RBC AUTO: 38.5 FL (ref 37–54)
EGFRCR SERPLBLD CKD-EPI 2021: 80.2 ML/MIN/1.73
EOSINOPHIL # BLD AUTO: 0.25 10*3/MM3 (ref 0–0.4)
EOSINOPHIL NFR BLD AUTO: 3 % (ref 0.3–6.2)
ERYTHROCYTE [DISTWIDTH] IN BLOOD BY AUTOMATED COUNT: 12.1 % (ref 12.3–15.4)
FENTANYL UR-MCNC: NEGATIVE NG/ML
GLOBULIN UR ELPH-MCNC: 2.4 GM/DL
GLUCOSE BLDC GLUCOMTR-MCNC: 131 MG/DL (ref 70–130)
GLUCOSE BLDC GLUCOMTR-MCNC: 164 MG/DL (ref 70–130)
GLUCOSE BLDC GLUCOMTR-MCNC: 178 MG/DL (ref 70–130)
GLUCOSE BLDC GLUCOMTR-MCNC: 199 MG/DL (ref 70–130)
GLUCOSE SERPL-MCNC: 162 MG/DL (ref 65–99)
GLUCOSE UR STRIP-MCNC: NEGATIVE MG/DL
HCT VFR BLD AUTO: 33.8 % (ref 37.5–51)
HGB BLD-MCNC: 11.7 G/DL (ref 13–17.7)
HGB UR QL STRIP.AUTO: ABNORMAL
IMM GRANULOCYTES # BLD AUTO: 0.03 10*3/MM3 (ref 0–0.05)
IMM GRANULOCYTES NFR BLD AUTO: 0.4 % (ref 0–0.5)
KETONES UR QL STRIP: NEGATIVE
LEUKOCYTE ESTERASE UR QL STRIP.AUTO: NEGATIVE
LYMPHOCYTES # BLD AUTO: 3.55 10*3/MM3 (ref 0.7–3.1)
LYMPHOCYTES NFR BLD AUTO: 42.1 % (ref 19.6–45.3)
MAGNESIUM SERPL-MCNC: 1.7 MG/DL (ref 1.6–2.6)
MCH RBC QN AUTO: 30 PG (ref 26.6–33)
MCHC RBC AUTO-ENTMCNC: 34.6 G/DL (ref 31.5–35.7)
MCV RBC AUTO: 86.7 FL (ref 79–97)
METHADONE UR QL SCN: NEGATIVE
MONOCYTES # BLD AUTO: 0.55 10*3/MM3 (ref 0.1–0.9)
MONOCYTES NFR BLD AUTO: 6.5 % (ref 5–12)
NEUTROPHILS NFR BLD AUTO: 3.99 10*3/MM3 (ref 1.7–7)
NEUTROPHILS NFR BLD AUTO: 47.3 % (ref 42.7–76)
NITRITE UR QL STRIP: NEGATIVE
NRBC BLD AUTO-RTO: 0 /100 WBC (ref 0–0.2)
OPIATES UR QL: NEGATIVE
OXYCODONE UR QL SCN: NEGATIVE
PCP UR QL SCN: NEGATIVE
PH UR STRIP.AUTO: 7.5 [PH] (ref 5–8)
PHOSPHATE SERPL-MCNC: 4 MG/DL (ref 2.5–4.5)
PLATELET # BLD AUTO: 318 10*3/MM3 (ref 140–450)
PMV BLD AUTO: 9.1 FL (ref 6–12)
POTASSIUM SERPL-SCNC: 4.2 MMOL/L (ref 3.5–5.2)
PROPOXYPH UR QL: NEGATIVE
PROT ?TM UR-MCNC: 18.1 MG/DL
PROT SERPL-MCNC: 5.8 G/DL (ref 6–8.5)
PROT UR QL STRIP: ABNORMAL
PROT/CREAT UR: 617.7 MG/G CREA (ref 0–200)
RBC # BLD AUTO: 3.9 10*6/MM3 (ref 4.14–5.8)
SODIUM SERPL-SCNC: 137 MMOL/L (ref 136–145)
SP GR UR STRIP: 1.01 (ref 1–1.03)
TRICYCLICS UR QL SCN: NEGATIVE
UROBILINOGEN UR QL STRIP: ABNORMAL
WBC NRBC COR # BLD: 8.43 10*3/MM3 (ref 3.4–10.8)

## 2023-10-01 PROCEDURE — 82570 ASSAY OF URINE CREATININE: CPT | Performed by: INTERNAL MEDICINE

## 2023-10-01 PROCEDURE — 25010000002 ONDANSETRON PER 1 MG: Performed by: INTERNAL MEDICINE

## 2023-10-01 PROCEDURE — 85025 COMPLETE CBC W/AUTO DIFF WBC: CPT | Performed by: INTERNAL MEDICINE

## 2023-10-01 PROCEDURE — 82948 REAGENT STRIP/BLOOD GLUCOSE: CPT

## 2023-10-01 PROCEDURE — 80307 DRUG TEST PRSMV CHEM ANLYZR: CPT | Performed by: STUDENT IN AN ORGANIZED HEALTH CARE EDUCATION/TRAINING PROGRAM

## 2023-10-01 PROCEDURE — 99232 SBSQ HOSP IP/OBS MODERATE 35: CPT | Performed by: STUDENT IN AN ORGANIZED HEALTH CARE EDUCATION/TRAINING PROGRAM

## 2023-10-01 PROCEDURE — 25010000002 ENOXAPARIN PER 10 MG: Performed by: INTERNAL MEDICINE

## 2023-10-01 PROCEDURE — 84100 ASSAY OF PHOSPHORUS: CPT | Performed by: INTERNAL MEDICINE

## 2023-10-01 PROCEDURE — 84156 ASSAY OF PROTEIN URINE: CPT | Performed by: INTERNAL MEDICINE

## 2023-10-01 PROCEDURE — 25010000002 PROCHLORPERAZINE 10 MG/2ML SOLUTION: Performed by: STUDENT IN AN ORGANIZED HEALTH CARE EDUCATION/TRAINING PROGRAM

## 2023-10-01 PROCEDURE — 80053 COMPREHEN METABOLIC PANEL: CPT | Performed by: INTERNAL MEDICINE

## 2023-10-01 PROCEDURE — 83735 ASSAY OF MAGNESIUM: CPT | Performed by: INTERNAL MEDICINE

## 2023-10-01 PROCEDURE — 81003 URINALYSIS AUTO W/O SCOPE: CPT | Performed by: INTERNAL MEDICINE

## 2023-10-01 PROCEDURE — 63710000001 INSULIN REGULAR HUMAN PER 5 UNITS: Performed by: INTERNAL MEDICINE

## 2023-10-01 RX ORDER — PROCHLORPERAZINE EDISYLATE 5 MG/ML
5 INJECTION INTRAMUSCULAR; INTRAVENOUS EVERY 6 HOURS PRN
Status: DISCONTINUED | OUTPATIENT
Start: 2023-10-01 | End: 2023-10-02 | Stop reason: HOSPADM

## 2023-10-01 RX ADMIN — PROCHLORPERAZINE EDISYLATE 5 MG: 5 INJECTION INTRAMUSCULAR; INTRAVENOUS at 20:44

## 2023-10-01 RX ADMIN — PANTOPRAZOLE SODIUM 40 MG: 40 TABLET, DELAYED RELEASE ORAL at 08:22

## 2023-10-01 RX ADMIN — INSULIN HUMAN 2 UNITS: 100 INJECTION, SOLUTION PARENTERAL at 13:21

## 2023-10-01 RX ADMIN — Medication 10 ML: at 08:23

## 2023-10-01 RX ADMIN — Medication 10 ML: at 20:39

## 2023-10-01 RX ADMIN — ONDANSETRON 4 MG: 2 INJECTION INTRAMUSCULAR; INTRAVENOUS at 08:23

## 2023-10-01 RX ADMIN — SODIUM CHLORIDE, POTASSIUM CHLORIDE, SODIUM LACTATE AND CALCIUM CHLORIDE 100 ML/HR: 600; 310; 30; 20 INJECTION, SOLUTION INTRAVENOUS at 13:21

## 2023-10-01 RX ADMIN — ENOXAPARIN SODIUM 40 MG: 40 INJECTION SUBCUTANEOUS at 08:22

## 2023-10-01 RX ADMIN — INSULIN HUMAN 2 UNITS: 100 INJECTION, SOLUTION PARENTERAL at 05:49

## 2023-10-01 NOTE — PROGRESS NOTES
[facility] HOSPITALIST PROGRESS NOTE     Patient Identification:  Name:  Oliver Osborn  Age:  28 y.o.  Sex:  male  :  1995  MRN:  [id number]  Visit Number:  [id number]  ROOM: [address] [place] [address]     Primary Care Provider:  Geri Belcher APRN    Length of stay in inpatient status:  3    Subjective     Chief Compliant:    Chief Complaint   Patient presents with    Vomiting    Syncope       History of Presenting Illness: Patient seen and evaluated in follow-up for intractable nausea and vomiting with acute kidney injury.  Initially suspected secondary to cannabinoid hyperemesis syndrome however patient denying continued use despite persistent positive drug screen.  Patient with improved nausea but still having intermittent episodes of emesis with oral intake.    Objective     Current Hospital Meds:  enoxaparin, 40 mg, Subcutaneous, Daily  insulin regular, 2-7 Units, Subcutaneous, Q6H  pantoprazole, 40 mg, Oral, Daily  senna-docusate sodium, 2 tablet, Oral, BID  sodium chloride, 10 mL, Intravenous, Q12H      lactated ringers, 100 mL/hr, Last Rate: 100 mL/hr (10/01/23 1321)      ----------------------------------------------------------------------------------------------------------------------  Vital Signs:  Temp:  [97.8 °F (36.6 °C)-98.8 °F (37.1 °C)] 97.8 °F (36.6 °C)  Heart Rate:  [62-82] 62  Resp:  [18-20] 18  BP: (114-158)/(70-85) 158/85  SpO2:  [98 %-99 %] 99 %  on   ;   Device (Oxygen Therapy): room air  Body mass index is 28.47 kg/m².      Intake/Output Summary (Last 24 hours) at 10/1/2023 1650  Last data filed at 10/1/2023 1500  Gross per 24 hour   Intake 2381 ml   Output 1800 ml   Net 581 ml      ----------------------------------------------------------------------------------------------------------------------  Physical exam:  Constitutional:  Well-developed and well-nourished.  No acute distress.      HENT:  Head:  Normocephalic and atraumatic.  Mouth:  Moist mucous membranes.    Eyes:   Conjunctivae and EOM are normal. No scleral icterus.    Neck:  Neck supple.  No JVD present.    Cardiovascular:  Normal rate, regular rhythm and normal heart sounds with no murmur.  Pulmonary/Chest:  No respiratory distress, no wheezes, no crackles, with normal breath sounds and good air movement.  Abdominal:  Soft.  Bowel sounds are normal.  No distension with mild tenderness to palpation.   Musculoskeletal:  No tenderness and no deformity.  No red or swollen joints anywhere.  Functional ROM intact.   Neurological:  Alert and oriented to person, place, and time.  No cranial nerve deficit.  No tongue deviation.  No facial droop.  No slurred speech. Intact Sensation throughout  Skin:  Skin is warm and dry. No rash or lesion noted. No pallor.   Peripheral vascular:  Pulses in all 4 extremities with no clubbing, no cyanosis, no edema.  Psychiatric: Appropriate mood and affect, pleasant.   ----------------------------------------------------------------------------------------------------------------------  WBC/HGB/HCT/PLT   8.43/11.7/33.8/318 (10/01 0356)  BUN/CREAT/GLUC/ALT/AST/YOKO/LIP    19/1.24/162/20/15/--/-- (10/01 0356)  LYTES - Na/K/Cl/CO2: 137/4.2/103/22.9 (10/01 0356)        No results found for: URINECX  Blood Culture   Date Value Ref Range Status   09/28/2023 Staphylococcus, coagulase negative (C)  Final   09/28/2023 No growth at 3 days  Preliminary       I have personally looked at the labs and they are summarized above.  ----------------------------------------------------------------------------------------------------------------------  Detailed radiology reports for the last 24 hours:  No radiology results for the last day  Assessment & Plan      Intractable nausea and vomiting  Suspected cannabinoid hyperemesis syndrome  Likely enteritis and mesenteric adenitis    -Continue supportive care with antiemetics    -Advance diet as tolerated    -Patient with previous diagnosis of cannabinoid hyperemesis  syndrome.  Patient denies THC usage despite chronic history of persistent positive drug screen.    -Given patient's continued nausea and vomiting and recurrent presentations to the hospital we will proceed with gastric emptying studying    MINA on CKD stage II  Proteinuria with hematuria    -Patient acute kidney injury improving with volume resuscitation likely secondary to his recurrent emesis    -Nephrology consulted at admission given significant acute kidney injury with initial creatinine of 4.    -Complements, ASO titers all negative    -Nephrology considering renal biopsy    Diabetes mellitus type 2    -Hemoglobin A1c of 7.4    -Continue sliding scale insulin only for now given complaints of emesis and nausea and reduced oral intake    GERD    -Continue PPI    Smoking tobacco dependence    -Cessation counseled however patient often frequently leaving the floor with IV pole and suspect may be leaving the premises to smoke.  Have instructed the patient on the dangers of doing this and recommended against however patient does not seem interested.    Copied text in portions of the note has been reviewed and is accurate as of 10/01/23    VTE Prophylaxis:   Mechanical Order History:       None          Pharmalogical Order History:        Ordered     Dose Route Frequency Stop    09/27/23 6776  Enoxaparin Sodium (LOVENOX) syringe 40 mg         40 mg SC Daily --                    Disposition likely home self-care once nausea and vomiting improved.    Lucas Dai DO  [facility] Hospitalist  10/01/23  16:50 EDT

## 2023-10-01 NOTE — PLAN OF CARE
Goal Outcome Evaluation:  Plan of Care Reviewed With: patient           Outcome Evaluation: Patient rested in bed during shift. Patient ambulated in room, in casas and outside. Patient recieved PRN nausea medication. Will continue with plan of care.

## 2023-10-01 NOTE — PROGRESS NOTES
Nephrology Progress Note      Subjective     09/29/2023  Patient still not able to hold stuff down family.  Continues to be nauseous.  No chest pain or shortness of breath    09/30/2023  Patient is feeling much better today does not have any nausea vomiting diarrhea is trying to eat and drink better    10/01/2023  Patient is still completely COVID nausea vomiting going on denies any chest pain no urgency no frequency shortness no shortness of breath      Objective       Vital signs :     Temp:  [97.8 °F (36.6 °C)-98.8 °F (37.1 °C)] 97.8 °F (36.6 °C)  Heart Rate:  [66-82] 66  Resp:  [18-20] 20  BP: (114-137)/(70-80) 137/80    Intake/Output                         09/29/23 0701 - 09/30/23 0700 09/30/23 0701 - 10/01/23 0700     9902-4267 1915-0736 Total 6758-6354 0422-8154 Total                 Intake    P.O.  480  240 720  360  240 600    I.V.  1400  838 2238  630  1031 1661    Total Intake 1880 1078 2958 990 1271 2261       Output    Urine  525  750 1275  --  -- --    Total Output  -- -- --           09/30/2023 examined and reviewed  10/01/2023 examined and reviewed    Physical Exam:    General Appearance : alert, pleasant, appears stated age, cooperative and alert  Lungs : clear to auscultation, respirations regular  Heart :  regular rhythm & normal rate, normal S1, S2 and no murmur, no rub  Abdomen : soft, non distended  Extremities : No edema,   Neurologic :   orientated to person, place, time and situation, Grossly no focal deficits        Laboratory Data :     Albumin Albumin   Date Value Ref Range Status   10/01/2023 3.4 (L) 3.5 - 5.2 g/dL Final   09/30/2023 3.5 3.5 - 5.2 g/dL Final   09/29/2023 3.7 3.5 - 5.2 g/dL Final      Magnesium Magnesium   Date Value Ref Range Status   10/01/2023 1.7 1.6 - 2.6 mg/dL Final          PTH               No results found for: PTH    CBC and coagulation:  Results from last 7 days   Lab Units 10/01/23  0356 09/30/23  0200 09/29/23  0103 09/28/23  0212 09/27/23  2134  09/27/23  1908 09/27/23  1818 09/27/23  1549   PROCALCITONIN ng/mL  --   --   --   --   --  0.17  --   --    LACTATE mmol/L  --   --   --   --  2.0  --  3.5* 2.2*   CRP mg/dL  --   --   --   --   --   --  <0.30  --    WBC 10*3/mm3 8.43 9.52 9.93   < >  --   --   --  11.00*   HEMOGLOBIN g/dL 11.7* 12.3* 12.8*   < >  --   --   --  16.4   HEMATOCRIT % 33.8* 37.0* 36.2*   < >  --   --   --  47.7   MCV fL 86.7 90.9 85.4   < >  --   --   --  85.3   MCHC g/dL 34.6 33.2 35.4   < >  --   --   --  34.4   PLATELETS 10*3/mm3 318 290 333   < >  --   --   --  386    < > = values in this interval not displayed.     Acid/base balance:  Results from last 7 days   Lab Units 09/27/23  1825   PH, ARTERIAL pH units 7.525*   PO2 ART mm Hg 90.7   PCO2, ARTERIAL mm Hg 29.7*   HCO3 ART mmol/L 24.5     Renal and electrolytes:    Results from last 7 days   Lab Units 10/01/23  0356 09/30/23  0200 09/29/23  0103 09/28/23  0212 09/27/23  1818 09/27/23  1549   SODIUM mmol/L 137 133* 134* 134*  --  130*   POTASSIUM mmol/L 4.2 3.6 3.9 3.7  --  4.3   MAGNESIUM mg/dL 1.7  --   --   --  2.3  --    CHLORIDE mmol/L 103 98 94* 91*  --  84*   CO2 mmol/L 22.9 23.0 28.7 22.9  --  24.8   BUN mg/dL 19 20 27* 35*  --  38*   CREATININE mg/dL 1.24 1.40* 1.89* 2.56*  --  3.36*   CALCIUM mg/dL 8.9 8.9 9.5 9.3  --  10.8*   PHOSPHORUS mg/dL 4.0  --   --   --  5.3*  --      Estimated Creatinine Clearance: 86.6 mL/min (by C-G formula based on SCr of 1.24 mg/dL).  @GFRCG:3@   Liver and pancreatic function:  Results from last 7 days   Lab Units 10/01/23  0356 09/30/23  0200 09/29/23  0103 09/28/23  0212 09/27/23  1549   ALBUMIN g/dL 3.4* 3.5 3.7   < > 5.0   BILIRUBIN mg/dL <0.2 0.2 0.4   < > 0.7   ALK PHOS U/L 62 56 50   < > 69   AST (SGOT) U/L 15 14 13   < > 16   ALT (SGPT) U/L 20 12 10   < > 14   LIPASE U/L  --   --   --   --  29    < > = values in this interval not displayed.         Cardiac:      Liver and pancreatic function:  Results from last 7 days   Lab Units  10/01/23  0356 09/30/23  0200 09/29/23  0103 09/28/23  0212 09/27/23  1549   ALBUMIN g/dL 3.4* 3.5 3.7   < > 5.0   BILIRUBIN mg/dL <0.2 0.2 0.4   < > 0.7   ALK PHOS U/L 62 56 50   < > 69   AST (SGOT) U/L 15 14 13   < > 16   ALT (SGPT) U/L 20 12 10   < > 14   LIPASE U/L  --   --   --   --  29    < > = values in this interval not displayed.       Medications :     enoxaparin, 40 mg, Subcutaneous, Daily  insulin regular, 2-7 Units, Subcutaneous, Q6H  pantoprazole, 40 mg, Oral, Daily  senna-docusate sodium, 2 tablet, Oral, BID  sodium chloride, 10 mL, Intravenous, Q12H      lactated ringers, 100 mL/hr, Last Rate: 100 mL/hr (09/30/23 0836)          Assessment & Plan     -MINA, nonoliguric  - Hyponatremia, most likely hypovolemic hyponatremia  - Intractable nausea and vomiting in settings of cannabinoid abuse  - Type 2 diabetes mellitus  - Tobacco use disorder    09/29/2023  Renal functions started improving with creatinine down to 1.8.  Significant proteinuria and very active urinary sediment, have ordered full serology and discussed about native kidney biopsy and patient is agreeable to proceed with if indicated.  At this point, we will continue to follow lactated Ringer and follow-up serology     MINA most likely due to prerenal azotemia/early ATN.  Other differentials include IgA nephropathy and PI GN  Significant hematuria +2.5 g proteinuria  baseline creatinine that appears to be around 1.3 previously and that has been most recently around 1.5. Patient was admitted with a creatinine of 1.9.   No obstruction on renal imaging  - Recheck UA with microscopy  - Check urine protein creatinine ratio and urine albumin creatinine ratio and decide about serology  - DC normal saline  - Started on lactated Ringer +20 mEq/L of potassium chloride and run at 100 cc an hour  - Strict intake and output      09/30/2023  Patient's creatinine is down to 1.4 from 1.8 from 4.0, has significant proteinuria with hematuria serologies pending  if kidney function does not get better we will plan kidney biopsy after serology  Acute kidney injury most likely prerenal azotemia  We will continue Ringer lactate from      10/01/2023  Patient's creatinine is down to 1.2, from a peak of 4.0, patient has 1 g of proteinuria and hematuria, hepatitis profile complement ASO titer is negative other serologies pending we will continue Ringer lactate solution as the patient still having nausea vomiting diarrhea if the kidney function does not get better or serology shows any abnormality we will plan for kidney biopsy    Prognosis guarded    Discussed with RN    Please avoid nephrotoxic medication and pharmacy to adjust the medication according to the GFR    Plan of care was discussed with the patient, understood verbalized agreed          S Per Marrero MD  10/01/23  09:38 EDT

## 2023-10-01 NOTE — PLAN OF CARE
Goal Outcome Evaluation:                      PT has ambulated on and off unit this shift. States he feels fine, has not requested any prn medications this shift. VSS, will continue POC

## 2023-10-01 NOTE — NURSING NOTE
Patient educated on safety of staying on floor with IV pole. Patient verbalized understanding but continues to leave floor. Dr Dai made aware.

## 2023-10-02 ENCOUNTER — READMISSION MANAGEMENT (OUTPATIENT)
Dept: CALL CENTER | Facility: HOSPITAL | Age: 28
End: 2023-10-02
Payer: MEDICAID

## 2023-10-02 ENCOUNTER — APPOINTMENT (OUTPATIENT)
Dept: NUCLEAR MEDICINE | Facility: HOSPITAL | Age: 28
DRG: 683 | End: 2023-10-02
Payer: MEDICAID

## 2023-10-02 VITALS
HEIGHT: 66 IN | DIASTOLIC BLOOD PRESSURE: 77 MMHG | WEIGHT: 176.37 LBS | BODY MASS INDEX: 28.34 KG/M2 | HEART RATE: 74 BPM | OXYGEN SATURATION: 95 % | SYSTOLIC BLOOD PRESSURE: 124 MMHG | TEMPERATURE: 98.7 F | RESPIRATION RATE: 18 BRPM

## 2023-10-02 LAB
ALBUMIN SERPL ELPH-MCNC: 3.8 G/DL (ref 2.9–4.4)
ALBUMIN SERPL-MCNC: 3.7 G/DL (ref 3.5–5.2)
ALBUMIN/GLOB SERPL: 1.3 G/DL
ALBUMIN/GLOB SERPL: 1.3 {RATIO} (ref 0.7–1.7)
ALP SERPL-CCNC: 49 U/L (ref 39–117)
ALPHA1 GLOB SERPL ELPH-MCNC: 0.2 G/DL (ref 0–0.4)
ALPHA2 GLOB SERPL ELPH-MCNC: 1.1 G/DL (ref 0.4–1)
ALT SERPL W P-5'-P-CCNC: 15 U/L (ref 1–41)
ANA SER QL: NEGATIVE
ANION GAP SERPL CALCULATED.3IONS-SCNC: 11.5 MMOL/L (ref 5–15)
AST SERPL-CCNC: 14 U/L (ref 1–40)
B-GLOBULIN SERPL ELPH-MCNC: 0.9 G/DL (ref 0.7–1.3)
BILIRUB SERPL-MCNC: 0.3 MG/DL (ref 0–1.2)
BUN SERPL-MCNC: 16 MG/DL (ref 6–20)
BUN/CREAT SERPL: 12.8 (ref 7–25)
CALCIUM SPEC-SCNC: 9.3 MG/DL (ref 8.6–10.5)
CHLORIDE SERPL-SCNC: 102 MMOL/L (ref 98–107)
CO2 SERPL-SCNC: 24.5 MMOL/L (ref 22–29)
CREAT SERPL-MCNC: 1.25 MG/DL (ref 0.76–1.27)
EGFRCR SERPLBLD CKD-EPI 2021: 79.4 ML/MIN/1.73
GAMMA GLOB SERPL ELPH-MCNC: 0.7 G/DL (ref 0.4–1.8)
GLOBULIN SER CALC-MCNC: 2.9 G/DL (ref 2.2–3.9)
GLOBULIN UR ELPH-MCNC: 2.8 GM/DL
GLUCOSE BLDC GLUCOMTR-MCNC: 115 MG/DL (ref 70–130)
GLUCOSE BLDC GLUCOMTR-MCNC: 131 MG/DL (ref 70–130)
GLUCOSE BLDC GLUCOMTR-MCNC: 255 MG/DL (ref 70–130)
GLUCOSE SERPL-MCNC: 126 MG/DL (ref 65–99)
KAPPA LC FREE SER-MCNC: 49.3 MG/L (ref 3.3–19.4)
KAPPA LC FREE/LAMBDA FREE SER: 1.74 {RATIO} (ref 0.26–1.65)
LABORATORY COMMENT REPORT: ABNORMAL
LAMBDA LC FREE SERPL-MCNC: 28.4 MG/L (ref 5.7–26.3)
M PROTEIN SERPL ELPH-MCNC: ABNORMAL G/DL
POTASSIUM SERPL-SCNC: 3.9 MMOL/L (ref 3.5–5.2)
PROT PATTERN SERPL ELPH-IMP: ABNORMAL
PROT SERPL-MCNC: 6.5 G/DL (ref 6–8.5)
PROT SERPL-MCNC: 6.7 G/DL (ref 6–8.5)
SODIUM SERPL-SCNC: 138 MMOL/L (ref 136–145)

## 2023-10-02 PROCEDURE — 80053 COMPREHEN METABOLIC PANEL: CPT | Performed by: INTERNAL MEDICINE

## 2023-10-02 PROCEDURE — 99239 HOSP IP/OBS DSCHRG MGMT >30: CPT | Performed by: STUDENT IN AN ORGANIZED HEALTH CARE EDUCATION/TRAINING PROGRAM

## 2023-10-02 PROCEDURE — 0 TECHNETIUM SULFUR COLLOID: Performed by: STUDENT IN AN ORGANIZED HEALTH CARE EDUCATION/TRAINING PROGRAM

## 2023-10-02 PROCEDURE — 25010000002 PROCHLORPERAZINE 10 MG/2ML SOLUTION: Performed by: STUDENT IN AN ORGANIZED HEALTH CARE EDUCATION/TRAINING PROGRAM

## 2023-10-02 PROCEDURE — A9541 TC99M SULFUR COLLOID: HCPCS | Performed by: STUDENT IN AN ORGANIZED HEALTH CARE EDUCATION/TRAINING PROGRAM

## 2023-10-02 PROCEDURE — 78264 GASTRIC EMPTYING IMG STUDY: CPT | Performed by: RADIOLOGY

## 2023-10-02 PROCEDURE — 82948 REAGENT STRIP/BLOOD GLUCOSE: CPT

## 2023-10-02 PROCEDURE — 78264 GASTRIC EMPTYING IMG STUDY: CPT

## 2023-10-02 PROCEDURE — 63710000001 INSULIN LISPRO (HUMAN) PER 5 UNITS: Performed by: STUDENT IN AN ORGANIZED HEALTH CARE EDUCATION/TRAINING PROGRAM

## 2023-10-02 PROCEDURE — 25010000002 ENOXAPARIN PER 10 MG: Performed by: INTERNAL MEDICINE

## 2023-10-02 RX ORDER — ONDANSETRON 4 MG/1
4 TABLET, FILM COATED ORAL EVERY 6 HOURS PRN
Qty: 15 TABLET | Refills: 0 | Status: SHIPPED | OUTPATIENT
Start: 2023-10-02 | End: 2023-10-02 | Stop reason: SDUPTHER

## 2023-10-02 RX ORDER — INSULIN LISPRO 100 [IU]/ML
2-7 INJECTION, SOLUTION INTRAVENOUS; SUBCUTANEOUS
Status: DISCONTINUED | OUTPATIENT
Start: 2023-10-02 | End: 2023-10-02 | Stop reason: HOSPADM

## 2023-10-02 RX ORDER — ONDANSETRON 4 MG/1
4 TABLET, FILM COATED ORAL EVERY 6 HOURS PRN
Qty: 15 TABLET | Refills: 0 | Status: SHIPPED | OUTPATIENT
Start: 2023-10-02

## 2023-10-02 RX ADMIN — ENOXAPARIN SODIUM 40 MG: 40 INJECTION SUBCUTANEOUS at 09:29

## 2023-10-02 RX ADMIN — Medication 10 ML: at 09:29

## 2023-10-02 RX ADMIN — TECHNETIUM TC 99M SULFUR COLLOID 1 DOSE: KIT at 07:52

## 2023-10-02 RX ADMIN — SODIUM CHLORIDE, POTASSIUM CHLORIDE, SODIUM LACTATE AND CALCIUM CHLORIDE 100 ML/HR: 600; 310; 30; 20 INJECTION, SOLUTION INTRAVENOUS at 01:58

## 2023-10-02 RX ADMIN — PANTOPRAZOLE SODIUM 40 MG: 40 TABLET, DELAYED RELEASE ORAL at 09:29

## 2023-10-02 RX ADMIN — INSULIN LISPRO 4 UNITS: 100 INJECTION, SOLUTION INTRAVENOUS; SUBCUTANEOUS at 12:00

## 2023-10-02 RX ADMIN — PROCHLORPERAZINE EDISYLATE 5 MG: 5 INJECTION INTRAMUSCULAR; INTRAVENOUS at 05:33

## 2023-10-02 NOTE — PLAN OF CARE
Goal Outcome Evaluation:  Plan of Care Reviewed With: patient        Progress: no change  Outcome Evaluation: Patient resting in bed at this time. VSS. Patient ambulated throughout room independently. Patient c/o nausea, PRN medications given. No other complaints or acute changes. Will continue plan of care.

## 2023-10-02 NOTE — PROGRESS NOTES
Nephrology Progress Note      Subjective   No chest pain, shortness of breath      Objective       Vital signs :     Temp:  [98.7 °F (37.1 °C)] 98.7 °F (37.1 °C)  Heart Rate:  [58-64] 58  Resp:  [16-18] 16  BP: (108-158)/(60-85) 108/60    Intake/Output                         09/30/23 0701 - 10/01/23 0700 10/01/23 0701 - 10/02/23 0700     9077-3171 6218-8172 Total 6133-0970 4726-6922 Total                 Intake    P.O.  360  240 600  480  600 1080    I.V.  630  1031 1661  --  1907.6 1907.6    Total Intake 990 1271 2261 480 2507.6 2987.6       Output    Urine  --  -- --  1800  500 2300    Total Output -- -- -- 3393 073 6910            Physical Exam:    General Appearance : alert, pleasant, appears stated age, cooperative and alert  Lungs : clear to auscultation, respirations regular  Heart :  regular rhythm & normal rate, normal S1, S2 and no murmur, no rub  Abdomen : soft, non distended  Extremities : No edema,   Neurologic :   orientated to person, place, time and situation, Grossly no focal deficits        Laboratory Data :     Albumin Albumin   Date Value Ref Range Status   10/02/2023 3.7 3.5 - 5.2 g/dL Final   10/01/2023 3.4 (L) 3.5 - 5.2 g/dL Final   09/30/2023 3.5 3.5 - 5.2 g/dL Final      Magnesium Magnesium   Date Value Ref Range Status   10/01/2023 1.7 1.6 - 2.6 mg/dL Final          PTH               No results found for: PTH    CBC and coagulation:  Results from last 7 days   Lab Units 10/01/23  0356 09/30/23  0200 09/29/23  0103 09/28/23  0212 09/27/23  2134 09/27/23  1908 09/27/23  1818 09/27/23  1549   PROCALCITONIN ng/mL  --   --   --   --   --  0.17  --   --    LACTATE mmol/L  --   --   --   --  2.0  --  3.5* 2.2*   CRP mg/dL  --   --   --   --   --   --  <0.30  --    WBC 10*3/mm3 8.43 9.52 9.93   < >  --   --   --  11.00*   HEMOGLOBIN g/dL 11.7* 12.3* 12.8*   < >  --   --   --  16.4   HEMATOCRIT % 33.8* 37.0* 36.2*   < >  --   --   --  47.7   MCV fL 86.7 90.9 85.4   < >  --   --   --  85.3   MCHC  g/dL 34.6 33.2 35.4   < >  --   --   --  34.4   PLATELETS 10*3/mm3 318 290 333   < >  --   --   --  386    < > = values in this interval not displayed.     Acid/base balance:  Results from last 7 days   Lab Units 09/27/23  1825   PH, ARTERIAL pH units 7.525*   PO2 ART mm Hg 90.7   PCO2, ARTERIAL mm Hg 29.7*   HCO3 ART mmol/L 24.5     Renal and electrolytes:    Results from last 7 days   Lab Units 10/02/23  0109 10/01/23  0356 09/30/23  0200 09/29/23  0103 09/28/23  0212 09/27/23  1818   SODIUM mmol/L 138 137 133* 134* 134*  --    POTASSIUM mmol/L 3.9 4.2 3.6 3.9 3.7  --    MAGNESIUM mg/dL  --  1.7  --   --   --  2.3   CHLORIDE mmol/L 102 103 98 94* 91*  --    CO2 mmol/L 24.5 22.9 23.0 28.7 22.9  --    BUN mg/dL 16 19 20 27* 35*  --    CREATININE mg/dL 1.25 1.24 1.40* 1.89* 2.56*  --    CALCIUM mg/dL 9.3 8.9 8.9 9.5 9.3  --    PHOSPHORUS mg/dL  --  4.0  --   --   --  5.3*     Estimated Creatinine Clearance: 85.9 mL/min (by C-G formula based on SCr of 1.25 mg/dL).  @GFRCG:3@   Liver and pancreatic function:  Results from last 7 days   Lab Units 10/02/23  0109 10/01/23  0356 09/30/23  0200 09/28/23 0212 09/27/23  1549   ALBUMIN g/dL 3.7 3.4* 3.5   < > 5.0   BILIRUBIN mg/dL 0.3 <0.2 0.2   < > 0.7   ALK PHOS U/L 49 62 56   < > 69   AST (SGOT) U/L 14 15 14   < > 16   ALT (SGPT) U/L 15 20 12   < > 14   LIPASE U/L  --   --   --   --  29    < > = values in this interval not displayed.         Cardiac:      Liver and pancreatic function:  Results from last 7 days   Lab Units 10/02/23  0109 10/01/23  0356 09/30/23  0200 09/28/23  0212 09/27/23  1549   ALBUMIN g/dL 3.7 3.4* 3.5   < > 5.0   BILIRUBIN mg/dL 0.3 <0.2 0.2   < > 0.7   ALK PHOS U/L 49 62 56   < > 69   AST (SGOT) U/L 14 15 14   < > 16   ALT (SGPT) U/L 15 20 12   < > 14   LIPASE U/L  --   --   --   --  29    < > = values in this interval not displayed.       Medications :     enoxaparin, 40 mg, Subcutaneous, Daily  insulin regular, 2-7 Units, Subcutaneous,  Q6H  pantoprazole, 40 mg, Oral, Daily  senna-docusate sodium, 2 tablet, Oral, BID  sodium chloride, 10 mL, Intravenous, Q12H      lactated ringers, 100 mL/hr, Last Rate: 100 mL/hr (10/02/23 0158)          Assessment & Plan     -MINA, nonoliguric  - Hyponatremia, most likely hypovolemic hyponatremia  - Intractable nausea and vomiting in settings of cannabinoid abuse  - Type 2 diabetes mellitus  - Tobacco use disorder    Creatinine has returned to baseline, 1.2 from 1.4.  Proteinuria has improved significantly as well down to 617.  So previous 2 g proteinuria was likely overestimating in setting of acute kidney injury  Serologies are so far negative.  We will hold off biopsy and follow-up serologies.  And if the creatinine gets worse over serologies are positive C to biopsy if clinically indicated  Patient can be discharged home from nephrology standpoint, will follow-up in clinic in 4 weeks after discharge    Renal functions started improving with creatinine down to 1.8.  Significant proteinuria and very active urinary sediment, have ordered full serology and discussed about native kidney biopsy and patient is agreeable to proceed with if indicated.  At this point, we will continue to follow lactated Ringer and follow-up serology     MINA most likely due to prerenal azotemia/early ATN.  Other differentials include IgA nephropathy and PI GN  Significant hematuria +2.5 g proteinuria  baseline creatinine that appears to be around 1.3 previously and that has been most recently around 1.5. Patient was admitted with a creatinine of 1.9.   No obstruction on renal imaging    Please avoid nephrotoxic medication and pharmacy to adjust the medication according to the GFR    Plan of care was discussed with the patient, understood verbalized agreed          Ashley Simon MD  10/02/23  08:26 EDT

## 2023-10-02 NOTE — PLAN OF CARE
Goal Outcome Evaluation:           Progress: no change  Outcome Evaluation: pt has been resting in bed. pt indep ambulates in room and casas. no other changes to note at this time. POC ongoing

## 2023-10-03 LAB
BACTERIA SPEC AEROBE CULT: NORMAL
C-ANCA TITR SER IF: NORMAL TITER
KAPPA LC FREE 24H UR-MRATE: 27.36 MG/24 HR
KAPPA LC FREE UR-MCNC: 14.79 MG/L (ref 1.17–86.46)
KAPPA LC FREE/LAMBDA FREE UR: 7.43 (ref 1.83–14.26)
LAMBDA LC FREE 24H UR-MRATE: 3.68 MG/24 HR
LAMBDA LC FREE UR-MCNC: 1.99 MG/L (ref 0.27–15.21)
MYELOPEROXIDASE AB SER IA-ACNC: <0.2 UNITS (ref 0–0.9)
P-ANCA ATYPICAL TITR SER IF: NORMAL TITER
P-ANCA TITR SER IF: NORMAL TITER
PROTEINASE3 AB SER IA-ACNC: <0.2 UNITS (ref 0–0.9)

## 2023-10-03 NOTE — OUTREACH NOTE
Prep Survey      Flowsheet Row Responses   [facility]omin[facility] facility patient discharged from? [facility]   Is LACE score < 7 ? No   Eligibility Readm Mgmt   Discharge diagnosis MINA (acute kidney injury)   Does the patient have one of the following disease processes/diagnoses(primary or secondary)? Other   Does the patient have Home health ordered? No   Is there a DME ordered? No   Prep survey completed? Yes            Minerva PABON - Registered Nurse

## 2023-10-04 LAB
ALBUMIN 24H MFR UR ELPH: 83.4 %
ALPHA1 GLOB 24H MFR UR ELPH: 4 %
ALPHA2 GLOB 24H MFR UR ELPH: 3 %
B-GLOBULIN 24H MFR UR ELPH: 6.9 %
GAMMA GLOB 24H MFR UR ELPH: 2.7 %
LABORATORY COMMENT REPORT: ABNORMAL
M PROTEIN 24H MFR UR ELPH: ABNORMAL %
PROT 24H UR-MRATE: 969 MG/24 HR (ref 30–150)
PROT UR-MCNC: 52.4 MG/DL

## 2023-10-04 NOTE — DISCHARGE SUMMARY
[facility] HOSPITALISTS DISCHARGE SUMMARY    Patient Identification:  Name:  Oliver Osborn  Age:  28 y.o.  Sex:  male  :  1995  MRN:  [id number]  Visit Number:  [id number]    Date of Admission: 2023  Date of Discharge: 10/2/2023    PCP: Geri Belcher, ALEXANDER    DISCHARGE DIAGNOSIS  Intractable nausea and vomiting  Suspected cannabinoid hyperemesis syndrome  Likely enteritis and mesenteric adenitis  MINA on CKD stage II  Proteinuria with hematuria  Diabetes mellitus type 2  GERD  Smoking tobacco dependence    CONSULTS   Nephrology  Diabetes education    PROCEDURES PERFORMED      HOSPITAL COURSE  Patient is a 28 y.o. male presented to [facility] complaining of intractable cyclical vomiting with nausea.  Please see the admitting history and physical for further details.      Patient with frequent visits to the emergency department with complaints of intractable nausea and vomiting with ongoing marijuana use consistent with cyclical vomiting syndrome secondary to cannabinoid hyperemesis syndrome.  However patient presenting with acute kidney injury and hypovolemic hyponatremia at time of presentation admitted to the hospitalist service for supportive care and further evaluation by nephrology service in consultation.  With supportive care and IV fluids and antiemetics patient's status did improve and MINA did resolve.  Patient was noted to have proteinuria with hematuria however this did improve as well during his hospitalization.  He was seen and followed by nephrology while in hospital who did eventually clear for discharge and did not recommend any further evaluation after initial laboratory evaluations by them were without significant findings including complement, ASO titers, etc.  Patient continued to complain of nausea and concerns over his cyclical vomiting denying that he thought this was due to THC despite continued education about this and during previous  hospitalizations.  Today and a gastric emptying study was obtained to evaluate for gastroparesis given his history of diabetes and young age however this was also negative and patient was subsequently discharged home in stable medical condition to follow-up with his PCP.  He was again encouraged and admonished on refraining from THC usage which he can continues to use despite prior recommendations.    VITAL SIGNS:        on   ;        Body mass index is 28.47 kg/m².  Wt Readings from Last 3 Encounters:   09/30/23 80 kg (176 lb 5.9 oz)   09/19/23 83 kg (183 lb)   09/05/23 93.4 kg (206 lb)       PHYSICAL EXAM:  Constitutional:  Well-developed and well-nourished.  No acute distress.      HENT:  Head:  Normocephalic and atraumatic.  Mouth:  Moist mucous membranes.    Eyes:  Conjunctivae and EOM are normal. No scleral icterus.    Neck:  Neck supple.  No JVD present.    Cardiovascular:  Normal rate, regular rhythm and normal heart sounds with no murmur.  Pulmonary/Chest:  No respiratory distress, no wheezes, no crackles, with normal breath sounds and good air movement.  Abdominal:  Soft.  Bowel sounds are normal.  No distension with mild tenderness to palpation.   Musculoskeletal:  No tenderness and no deformity.  No red or swollen joints anywhere.  Functional ROM intact.   Neurological:  Alert and oriented to person, place, and time.  No cranial nerve deficit.  No tongue deviation.  No facial droop.  No slurred speech. Intact Sensation throughout  Skin:  Skin is warm and dry. No rash or lesion noted. No pallor.   Peripheral vascular:  Pulses in all 4 extremities with no clubbing, no cyanosis, no edema.  Psychiatric: Appropriate mood and affect, pleasant.     DISCHARGE DISPOSITION   Stable    DISCHARGE MEDICATIONS:     Discharge Medications        Continue These Medications        Instructions Start Date   Lantus SoloStar 100 UNIT/ML injection pen  Generic drug: Insulin Glargine   Inject 12 Units under the skin into the  appropriate area as directed Daily.      ondansetron 4 MG tablet  Commonly known as: ZOFRAN   4 mg, Oral, Every 6 Hours PRN      pantoprazole 40 MG EC tablet  Commonly known as: PROTONIX   40 mg, Oral, Daily               Diet Instructions    Renal low sodium diet         Activity Instructions    As tolerated           Additional Instructions for the Follow-ups that You Need to Schedule       Discharge Follow-up with PCP   As directed       Currently Documented PCP:    Geri Belcher APRN    PCP Phone Number:    [phone]     Follow Up Details: 1 week post hospital follow up               Follow-up Information       Geri Belcher APRN .    Specialty: Family Medicine  Why: 1 week post hospital follow up  Contact information:  [address] [address]  [phone]               Geri Belcher APRN .    Specialty: Family Medicine  Contact information:  [address] [address]  [phone]                              TEST  RESULTS PENDING AT DISCHARGE  Pending Labs       Order Current Status    Protein Electrophoresis, 24 Hr Urine - Urine, Clean Catch In process             The ASCVD Risk score (Edwina DK, et al., 2019) failed to calculate for the following reasons:    The 2019 ASCVD risk score is only valid for ages 40 to 79     CODE STATUS  Code Status and Medical Interventions:   Ordered at: 09/27/23 1843     Code Status (Patient has no pulse and is not breathing):    CPR (Attempt to Resuscitate)     Medical Interventions (Patient has pulse or is breathing):    Full Support       Lucas Dai DO  [facility] Hospitalist  10/03/23  20:21 EDT    Please note that this discharge summary required more than 30 minutes to complete.

## 2023-10-06 ENCOUNTER — READMISSION MANAGEMENT (OUTPATIENT)
Dept: CALL CENTER | Facility: HOSPITAL | Age: 28
End: 2023-10-06
Payer: MEDICAID

## 2023-10-06 NOTE — OUTREACH NOTE
Medical Week 1 Survey      Flowsheet Row Responses   [facility] facility patient discharged from? [facility]   Does the patient have one of the following disease processes/diagnoses(primary or secondary)? Other   Week 1 attempt successful? No   Unsuccessful attempts Attempt 1   Revoke Other  [Suspected cannabinoid syndrome, recurring]            JOSE ANTONIO ALEXANDER - Registered Nurse

## 2023-12-11 ENCOUNTER — HOSPITAL ENCOUNTER (INPATIENT)
Facility: HOSPITAL | Age: 28
LOS: 1 days | Discharge: LEFT BEFORE DISCHARGE | End: 2023-12-12
Attending: STUDENT IN AN ORGANIZED HEALTH CARE EDUCATION/TRAINING PROGRAM | Admitting: INTERNAL MEDICINE
Payer: MEDICAID

## 2023-12-11 ENCOUNTER — APPOINTMENT (OUTPATIENT)
Dept: CT IMAGING | Facility: HOSPITAL | Age: 28
End: 2023-12-11
Payer: MEDICAID

## 2023-12-11 ENCOUNTER — APPOINTMENT (OUTPATIENT)
Dept: GENERAL RADIOLOGY | Facility: HOSPITAL | Age: 28
End: 2023-12-11
Payer: MEDICAID

## 2023-12-11 DIAGNOSIS — N17.9 AKI (ACUTE KIDNEY INJURY): Primary | ICD-10-CM

## 2023-12-11 LAB
A-A DO2: 15.8 MMHG (ref 0–300)
ACETONE BLD QL: ABNORMAL
ALBUMIN SERPL-MCNC: 5 G/DL (ref 3.5–5.2)
ALBUMIN/GLOB SERPL: 1.3 G/DL
ALP SERPL-CCNC: 85 U/L (ref 39–117)
ALT SERPL W P-5'-P-CCNC: 16 U/L (ref 1–41)
ANION GAP SERPL CALCULATED.3IONS-SCNC: 23.7 MMOL/L (ref 5–15)
ARTERIAL PATENCY WRIST A: ABNORMAL
AST SERPL-CCNC: 23 U/L (ref 1–40)
ATMOSPHERIC PRESS: 731 MMHG
BASE EXCESS BLDA CALC-SCNC: 0.3 MMOL/L (ref 0–2)
BASOPHILS # BLD AUTO: 0.04 10*3/MM3 (ref 0–0.2)
BASOPHILS NFR BLD AUTO: 0.2 % (ref 0–1.5)
BDY SITE: ABNORMAL
BILIRUB SERPL-MCNC: 0.9 MG/DL (ref 0–1.2)
BUN SERPL-MCNC: 68 MG/DL (ref 6–20)
BUN/CREAT SERPL: 17.9 (ref 7–25)
CALCIUM SPEC-SCNC: 10 MG/DL (ref 8.6–10.5)
CHLORIDE SERPL-SCNC: 82 MMOL/L (ref 98–107)
CK SERPL-CCNC: 1485 U/L (ref 20–200)
CO2 BLDA-SCNC: 25.6 MMOL/L (ref 22–33)
CO2 SERPL-SCNC: 25.3 MMOL/L (ref 22–29)
COHGB MFR BLD: 2 % (ref 0–5)
CREAT SERPL-MCNC: 3.8 MG/DL (ref 0.76–1.27)
CRP SERPL-MCNC: 0.37 MG/DL (ref 0–0.5)
D-LACTATE SERPL-SCNC: 1.7 MMOL/L (ref 0.5–2)
D-LACTATE SERPL-SCNC: 3.5 MMOL/L (ref 0.5–2)
DEPRECATED RDW RBC AUTO: 39.4 FL (ref 37–54)
EGFRCR SERPLBLD CKD-EPI 2021: 20.9 ML/MIN/1.73
EOSINOPHIL # BLD AUTO: 0 10*3/MM3 (ref 0–0.4)
EOSINOPHIL NFR BLD AUTO: 0 % (ref 0.3–6.2)
ERYTHROCYTE [DISTWIDTH] IN BLOOD BY AUTOMATED COUNT: 12.6 % (ref 12.3–15.4)
FLUAV RNA RESP QL NAA+PROBE: NOT DETECTED
FLUBV RNA RESP QL NAA+PROBE: NOT DETECTED
GEN 5 2HR TROPONIN T REFLEX: 32 NG/L
GLOBULIN UR ELPH-MCNC: 4 GM/DL
GLUCOSE BLDC GLUCOMTR-MCNC: 185 MG/DL (ref 70–130)
GLUCOSE BLDC GLUCOMTR-MCNC: 205 MG/DL (ref 70–130)
GLUCOSE BLDC GLUCOMTR-MCNC: 253 MG/DL (ref 70–130)
GLUCOSE BLDC GLUCOMTR-MCNC: 256 MG/DL (ref 70–130)
GLUCOSE SERPL-MCNC: 306 MG/DL (ref 65–99)
HCO3 BLDA-SCNC: 24.4 MMOL/L (ref 20–26)
HCT VFR BLD AUTO: 44.2 % (ref 37.5–51)
HCT VFR BLD CALC: 41.8 % (ref 38–51)
HGB BLD-MCNC: 15.5 G/DL (ref 13–17.7)
HGB BLDA-MCNC: 13.7 G/DL (ref 14–18)
HOLD SPECIMEN: NORMAL
HOLD SPECIMEN: NORMAL
IMM GRANULOCYTES # BLD AUTO: 0.08 10*3/MM3 (ref 0–0.05)
IMM GRANULOCYTES NFR BLD AUTO: 0.5 % (ref 0–0.5)
INHALED O2 CONCENTRATION: 21 %
LIPASE SERPL-CCNC: 46 U/L (ref 13–60)
LYMPHOCYTES # BLD AUTO: 1.15 10*3/MM3 (ref 0.7–3.1)
LYMPHOCYTES NFR BLD AUTO: 6.5 % (ref 19.6–45.3)
Lab: ABNORMAL
MCH RBC QN AUTO: 30 PG (ref 26.6–33)
MCHC RBC AUTO-ENTMCNC: 35.1 G/DL (ref 31.5–35.7)
MCV RBC AUTO: 85.5 FL (ref 79–97)
METHGB BLD QL: 0.1 % (ref 0–3)
MODALITY: ABNORMAL
MONOCYTES # BLD AUTO: 0.81 10*3/MM3 (ref 0.1–0.9)
MONOCYTES NFR BLD AUTO: 4.6 % (ref 5–12)
NEUTROPHILS NFR BLD AUTO: 15.68 10*3/MM3 (ref 1.7–7)
NEUTROPHILS NFR BLD AUTO: 88.2 % (ref 42.7–76)
NRBC BLD AUTO-RTO: 0 /100 WBC (ref 0–0.2)
OXYHGB MFR BLDV: 95.3 % (ref 94–99)
PCO2 BLDA: 37.2 MM HG (ref 35–45)
PCO2 TEMP ADJ BLD: ABNORMAL MM[HG]
PH BLDA: 7.43 PH UNITS (ref 7.35–7.45)
PH, TEMP CORRECTED: ABNORMAL
PLATELET # BLD AUTO: 423 10*3/MM3 (ref 140–450)
PMV BLD AUTO: 9.1 FL (ref 6–12)
PO2 BLDA: 85.9 MM HG (ref 83–108)
PO2 TEMP ADJ BLD: ABNORMAL MM[HG]
POTASSIUM SERPL-SCNC: 4.7 MMOL/L (ref 3.5–5.2)
PROT SERPL-MCNC: 9 G/DL (ref 6–8.5)
QT INTERVAL: 396 MS
QTC INTERVAL: 433 MS
RBC # BLD AUTO: 5.17 10*6/MM3 (ref 4.14–5.8)
SAO2 % BLDCOA: 97.3 % (ref 94–99)
SARS-COV-2 RNA RESP QL NAA+PROBE: NOT DETECTED
SODIUM SERPL-SCNC: 131 MMOL/L (ref 136–145)
TROPONIN T DELTA: -11 NG/L
TROPONIN T SERPL HS-MCNC: 43 NG/L
VENTILATOR MODE: ABNORMAL
WBC NRBC COR # BLD AUTO: 17.76 10*3/MM3 (ref 3.4–10.8)
WHOLE BLOOD HOLD COAG: NORMAL
WHOLE BLOOD HOLD SPECIMEN: NORMAL

## 2023-12-11 PROCEDURE — 71046 X-RAY EXAM CHEST 2 VIEWS: CPT | Performed by: RADIOLOGY

## 2023-12-11 PROCEDURE — 25810000003 SODIUM CHLORIDE 0.9 % SOLUTION: Performed by: PHYSICIAN ASSISTANT

## 2023-12-11 PROCEDURE — 85025 COMPLETE CBC W/AUTO DIFF WBC: CPT | Performed by: STUDENT IN AN ORGANIZED HEALTH CARE EDUCATION/TRAINING PROGRAM

## 2023-12-11 PROCEDURE — 82375 ASSAY CARBOXYHB QUANT: CPT

## 2023-12-11 PROCEDURE — 87636 SARSCOV2 & INF A&B AMP PRB: CPT | Performed by: INTERNAL MEDICINE

## 2023-12-11 PROCEDURE — 86140 C-REACTIVE PROTEIN: CPT | Performed by: PHYSICIAN ASSISTANT

## 2023-12-11 PROCEDURE — 25010000002 PROCHLORPERAZINE 10 MG/2ML SOLUTION: Performed by: STUDENT IN AN ORGANIZED HEALTH CARE EDUCATION/TRAINING PROGRAM

## 2023-12-11 PROCEDURE — 93005 ELECTROCARDIOGRAM TRACING: CPT | Performed by: STUDENT IN AN ORGANIZED HEALTH CARE EDUCATION/TRAINING PROGRAM

## 2023-12-11 PROCEDURE — 87147 CULTURE TYPE IMMUNOLOGIC: CPT | Performed by: PHYSICIAN ASSISTANT

## 2023-12-11 PROCEDURE — 82550 ASSAY OF CK (CPK): CPT | Performed by: PHYSICIAN ASSISTANT

## 2023-12-11 PROCEDURE — 99285 EMERGENCY DEPT VISIT HI MDM: CPT

## 2023-12-11 PROCEDURE — 63710000001 INSULIN LISPRO (HUMAN) PER 5 UNITS: Performed by: INTERNAL MEDICINE

## 2023-12-11 PROCEDURE — 83690 ASSAY OF LIPASE: CPT | Performed by: PHYSICIAN ASSISTANT

## 2023-12-11 PROCEDURE — 82948 REAGENT STRIP/BLOOD GLUCOSE: CPT

## 2023-12-11 PROCEDURE — 84484 ASSAY OF TROPONIN QUANT: CPT | Performed by: STUDENT IN AN ORGANIZED HEALTH CARE EDUCATION/TRAINING PROGRAM

## 2023-12-11 PROCEDURE — 36600 WITHDRAWAL OF ARTERIAL BLOOD: CPT

## 2023-12-11 PROCEDURE — 74176 CT ABD & PELVIS W/O CONTRAST: CPT

## 2023-12-11 PROCEDURE — 99223 1ST HOSP IP/OBS HIGH 75: CPT | Performed by: PHYSICIAN ASSISTANT

## 2023-12-11 PROCEDURE — 25010000002 PROCHLORPERAZINE 10 MG/2ML SOLUTION: Performed by: PHYSICIAN ASSISTANT

## 2023-12-11 PROCEDURE — 83605 ASSAY OF LACTIC ACID: CPT | Performed by: PHYSICIAN ASSISTANT

## 2023-12-11 PROCEDURE — 82009 KETONE BODYS QUAL: CPT | Performed by: PHYSICIAN ASSISTANT

## 2023-12-11 PROCEDURE — 82805 BLOOD GASES W/O2 SATURATION: CPT

## 2023-12-11 PROCEDURE — 83050 HGB METHEMOGLOBIN QUAN: CPT

## 2023-12-11 PROCEDURE — 80053 COMPREHEN METABOLIC PANEL: CPT | Performed by: STUDENT IN AN ORGANIZED HEALTH CARE EDUCATION/TRAINING PROGRAM

## 2023-12-11 PROCEDURE — 36415 COLL VENOUS BLD VENIPUNCTURE: CPT

## 2023-12-11 PROCEDURE — 25010000002 ONDANSETRON PER 1 MG: Performed by: PHYSICIAN ASSISTANT

## 2023-12-11 PROCEDURE — 87040 BLOOD CULTURE FOR BACTERIA: CPT | Performed by: PHYSICIAN ASSISTANT

## 2023-12-11 PROCEDURE — 25010000002 ONDANSETRON PER 1 MG: Performed by: STUDENT IN AN ORGANIZED HEALTH CARE EDUCATION/TRAINING PROGRAM

## 2023-12-11 PROCEDURE — 87150 DNA/RNA AMPLIFIED PROBE: CPT | Performed by: PHYSICIAN ASSISTANT

## 2023-12-11 PROCEDURE — 74176 CT ABD & PELVIS W/O CONTRAST: CPT | Performed by: RADIOLOGY

## 2023-12-11 PROCEDURE — 63710000001 INSULIN GLARGINE PER 5 UNITS: Performed by: INTERNAL MEDICINE

## 2023-12-11 PROCEDURE — 71046 X-RAY EXAM CHEST 2 VIEWS: CPT

## 2023-12-11 PROCEDURE — 25010000002 HEPARIN (PORCINE) PER 1000 UNITS: Performed by: INTERNAL MEDICINE

## 2023-12-11 RX ORDER — POLYETHYLENE GLYCOL 3350 17 G/17G
17 POWDER, FOR SOLUTION ORAL DAILY PRN
Status: DISCONTINUED | OUTPATIENT
Start: 2023-12-11 | End: 2023-12-12 | Stop reason: HOSPADM

## 2023-12-11 RX ORDER — BISACODYL 10 MG
10 SUPPOSITORY, RECTAL RECTAL DAILY PRN
Status: DISCONTINUED | OUTPATIENT
Start: 2023-12-11 | End: 2023-12-12 | Stop reason: HOSPADM

## 2023-12-11 RX ORDER — SODIUM CHLORIDE 9 MG/ML
40 INJECTION, SOLUTION INTRAVENOUS AS NEEDED
Status: DISCONTINUED | OUTPATIENT
Start: 2023-12-11 | End: 2023-12-12 | Stop reason: HOSPADM

## 2023-12-11 RX ORDER — PROCHLORPERAZINE EDISYLATE 5 MG/ML
5 INJECTION INTRAMUSCULAR; INTRAVENOUS EVERY 6 HOURS PRN
Status: DISCONTINUED | OUTPATIENT
Start: 2023-12-11 | End: 2023-12-12 | Stop reason: HOSPADM

## 2023-12-11 RX ORDER — SODIUM CHLORIDE 9 MG/ML
75 INJECTION, SOLUTION INTRAVENOUS CONTINUOUS
Status: DISCONTINUED | OUTPATIENT
Start: 2023-12-11 | End: 2023-12-12 | Stop reason: HOSPADM

## 2023-12-11 RX ORDER — DEXTROSE MONOHYDRATE 25 G/50ML
25 INJECTION, SOLUTION INTRAVENOUS
Status: DISCONTINUED | OUTPATIENT
Start: 2023-12-11 | End: 2023-12-12 | Stop reason: HOSPADM

## 2023-12-11 RX ORDER — SODIUM CHLORIDE 0.9 % (FLUSH) 0.9 %
10 SYRINGE (ML) INJECTION AS NEEDED
Status: DISCONTINUED | OUTPATIENT
Start: 2023-12-11 | End: 2023-12-12 | Stop reason: HOSPADM

## 2023-12-11 RX ORDER — PROCHLORPERAZINE EDISYLATE 5 MG/ML
10 INJECTION INTRAMUSCULAR; INTRAVENOUS ONCE
Status: COMPLETED | OUTPATIENT
Start: 2023-12-11 | End: 2023-12-11

## 2023-12-11 RX ORDER — GLUCAGON 1 MG/ML
1 KIT INJECTION
Status: DISCONTINUED | OUTPATIENT
Start: 2023-12-11 | End: 2023-12-12 | Stop reason: HOSPADM

## 2023-12-11 RX ORDER — NICOTINE POLACRILEX 4 MG
15 LOZENGE BUCCAL
Status: DISCONTINUED | OUTPATIENT
Start: 2023-12-11 | End: 2023-12-12 | Stop reason: HOSPADM

## 2023-12-11 RX ORDER — INSULIN LISPRO 100 [IU]/ML
2-9 INJECTION, SOLUTION INTRAVENOUS; SUBCUTANEOUS
Status: DISCONTINUED | OUTPATIENT
Start: 2023-12-11 | End: 2023-12-12 | Stop reason: HOSPADM

## 2023-12-11 RX ORDER — AMOXICILLIN 250 MG
2 CAPSULE ORAL 2 TIMES DAILY
Status: DISCONTINUED | OUTPATIENT
Start: 2023-12-11 | End: 2023-12-12 | Stop reason: HOSPADM

## 2023-12-11 RX ORDER — NICOTINE 21 MG/24HR
1 PATCH, TRANSDERMAL 24 HOURS TRANSDERMAL
Status: DISCONTINUED | OUTPATIENT
Start: 2023-12-11 | End: 2023-12-12 | Stop reason: HOSPADM

## 2023-12-11 RX ORDER — ACETAMINOPHEN 325 MG/1
650 TABLET ORAL EVERY 6 HOURS PRN
Status: DISCONTINUED | OUTPATIENT
Start: 2023-12-11 | End: 2023-12-12 | Stop reason: HOSPADM

## 2023-12-11 RX ORDER — NITROGLYCERIN 0.4 MG/1
0.4 TABLET SUBLINGUAL
Status: DISCONTINUED | OUTPATIENT
Start: 2023-12-11 | End: 2023-12-12 | Stop reason: HOSPADM

## 2023-12-11 RX ORDER — ONDANSETRON 2 MG/ML
4 INJECTION INTRAMUSCULAR; INTRAVENOUS ONCE
Status: COMPLETED | OUTPATIENT
Start: 2023-12-11 | End: 2023-12-11

## 2023-12-11 RX ORDER — BISACODYL 5 MG/1
5 TABLET, DELAYED RELEASE ORAL DAILY PRN
Status: DISCONTINUED | OUTPATIENT
Start: 2023-12-11 | End: 2023-12-12 | Stop reason: HOSPADM

## 2023-12-11 RX ORDER — SODIUM CHLORIDE 0.9 % (FLUSH) 0.9 %
10 SYRINGE (ML) INJECTION EVERY 12 HOURS SCHEDULED
Status: DISCONTINUED | OUTPATIENT
Start: 2023-12-11 | End: 2023-12-12 | Stop reason: HOSPADM

## 2023-12-11 RX ORDER — ASPIRIN 325 MG
325 TABLET ORAL ONCE
Status: COMPLETED | OUTPATIENT
Start: 2023-12-11 | End: 2023-12-11

## 2023-12-11 RX ORDER — HEPARIN SODIUM 5000 [USP'U]/ML
5000 INJECTION, SOLUTION INTRAVENOUS; SUBCUTANEOUS EVERY 8 HOURS SCHEDULED
Status: DISCONTINUED | OUTPATIENT
Start: 2023-12-11 | End: 2023-12-12 | Stop reason: HOSPADM

## 2023-12-11 RX ORDER — ONDANSETRON 2 MG/ML
4 INJECTION INTRAMUSCULAR; INTRAVENOUS EVERY 6 HOURS PRN
Status: DISCONTINUED | OUTPATIENT
Start: 2023-12-11 | End: 2023-12-12 | Stop reason: HOSPADM

## 2023-12-11 RX ADMIN — ONDANSETRON 4 MG: 2 INJECTION INTRAMUSCULAR; INTRAVENOUS at 13:46

## 2023-12-11 RX ADMIN — PROCHLORPERAZINE EDISYLATE 10 MG: 5 INJECTION INTRAMUSCULAR; INTRAVENOUS at 10:29

## 2023-12-11 RX ADMIN — DOCUSATE SODIUM 50 MG AND SENNOSIDES 8.6 MG 2 TABLET: 8.6; 5 TABLET, FILM COATED ORAL at 12:41

## 2023-12-11 RX ADMIN — ONDANSETRON 4 MG: 2 INJECTION INTRAMUSCULAR; INTRAVENOUS at 08:58

## 2023-12-11 RX ADMIN — INSULIN LISPRO 2 UNITS: 100 INJECTION, SOLUTION INTRAVENOUS; SUBCUTANEOUS at 17:18

## 2023-12-11 RX ADMIN — ASPIRIN 325 MG: 325 TABLET ORAL at 05:33

## 2023-12-11 RX ADMIN — INSULIN LISPRO 6 UNITS: 100 INJECTION, SOLUTION INTRAVENOUS; SUBCUTANEOUS at 12:41

## 2023-12-11 RX ADMIN — SODIUM CHLORIDE 1000 ML: 9 INJECTION, SOLUTION INTRAVENOUS at 06:56

## 2023-12-11 RX ADMIN — HEPARIN SODIUM 5000 UNITS: 5000 INJECTION INTRAVENOUS; SUBCUTANEOUS at 21:11

## 2023-12-11 RX ADMIN — Medication 10 ML: at 12:41

## 2023-12-11 RX ADMIN — HEPARIN SODIUM 5000 UNITS: 5000 INJECTION INTRAVENOUS; SUBCUTANEOUS at 13:46

## 2023-12-11 RX ADMIN — INSULIN GLARGINE 10 UNITS: 100 INJECTION, SOLUTION SUBCUTANEOUS at 12:41

## 2023-12-11 RX ADMIN — DOCUSATE SODIUM 50 MG AND SENNOSIDES 8.6 MG 2 TABLET: 8.6; 5 TABLET, FILM COATED ORAL at 21:12

## 2023-12-11 RX ADMIN — INSULIN LISPRO 4 UNITS: 100 INJECTION, SOLUTION INTRAVENOUS; SUBCUTANEOUS at 21:12

## 2023-12-11 RX ADMIN — Medication 10 ML: at 21:14

## 2023-12-11 RX ADMIN — SODIUM CHLORIDE 1000 ML: 9 INJECTION, SOLUTION INTRAVENOUS at 06:08

## 2023-12-11 RX ADMIN — SODIUM CHLORIDE 75 ML/HR: 9 INJECTION, SOLUTION INTRAVENOUS at 13:46

## 2023-12-11 RX ADMIN — PROCHLORPERAZINE EDISYLATE 10 MG: 5 INJECTION INTRAMUSCULAR; INTRAVENOUS at 06:08

## 2023-12-11 NOTE — ED PROVIDER NOTES
Subjective     History provided by:  Patient  Abdominal Pain  Pain location:  Epigastric  Pain quality: aching and gnawing    Pain radiates to:  Does not radiate  Pain severity:  Moderate  Onset quality:  Sudden  Duration:  2 days  Timing:  Constant  Progression:  Worsening  Chronicity:  New  Relieved by:  Nothing  Associated symptoms: diarrhea, nausea and vomiting    Associated symptoms: no chest pain, no dysuria and no fever        Review of Systems   Constitutional: Negative.  Negative for fever.   HENT: Negative.     Respiratory: Negative.     Cardiovascular: Negative.  Negative for chest pain.   Gastrointestinal:  Positive for abdominal pain, diarrhea, nausea and vomiting.   Endocrine: Negative.    Genitourinary: Negative.  Negative for dysuria.   Skin: Negative.    Neurological: Negative.    Psychiatric/Behavioral: Negative.     All other systems reviewed and are negative.      Past Medical History:   Diagnosis Date    Diabetes mellitus     Tobacco abuse        No Known Allergies    Past Surgical History:   Procedure Laterality Date    ENDOSCOPY N/A 6/1/2022    Procedure: ESOPHAGOGASTRODUODENOSCOPY WITH ANESTHESIA;  Surgeon: Keon Quezada MD;  [facility];  Service: Gastroenterology;  Laterality: N/A;       Family History   Problem Relation Age of Onset    Heart disease Mother     Diabetes Mother     Kidney disease Mother     Heart disease Father     Diabetes Father     Heart disease Maternal Grandmother     Diabetes Maternal Grandmother     Heart disease Maternal Grandfather     Diabetes Maternal Grandfather     Heart disease Paternal Grandmother     Diabetes Paternal Grandmother     Heart disease Paternal Grandfather     Diabetes Paternal Grandfather        Social History     Socioeconomic History    Marital status: Single   Tobacco Use    Smoking status: Every Day     Packs/day: 2.00     Years: 15.00     Additional pack years: 0.00     Total pack years: 30.00     Types: Cigarettes      Passive exposure: Never    Smokeless tobacco: Never   Vaping Use    Vaping Use: Former    Substances: Nicotine, Flavoring    Devices: Disposable    Passive vaping exposure: Yes   Substance and Sexual Activity    Alcohol use: Never    Drug use: Not Currently     Frequency: 7.0 times per week     Types: Marijuana    Sexual activity: Defer           Objective   Physical Exam  Vitals and nursing note reviewed.   Constitutional:       General: He is not in acute distress.     Appearance: He is well-developed. He is not diaphoretic.   HENT:      Head: Normocephalic and atraumatic.      Right Ear: External ear normal.      Left Ear: External ear normal.      Nose: Nose normal.   Eyes:      Conjunctiva/sclera: Conjunctivae normal.   Neck:      Vascular: No JVD.      Trachea: No tracheal deviation.   Cardiovascular:      Rate and Rhythm: Normal rate.      Heart sounds: No murmur heard.  Pulmonary:      Effort: Pulmonary effort is normal. No respiratory distress.      Breath sounds: No wheezing.   Abdominal:      Palpations: Abdomen is soft.      Tenderness: There is abdominal tenderness (epigastric).   Musculoskeletal:         General: No deformity. Normal range of motion.      Cervical back: Normal range of motion and neck supple.   Skin:     General: Skin is warm and dry.      Coloration: Skin is not pale.      Findings: No erythema or rash.   Neurological:      Mental Status: He is alert and oriented to person, place, and time.      Cranial Nerves: No cranial nerve deficit.   Psychiatric:         Behavior: Behavior normal.         Thought Content: Thought content normal.         Procedures           ED Course  ED Course as of 12/11/23 2342   Mon Dec 11, 2023   0506 XR chest rad interpreted:  NO ACUTE ABNORMALITY IN THE CHEST. [RB]      ED Course User Index  [RB] Manpreet Jung II, PA                                             Medical Decision Making  Amount and/or Complexity of Data Reviewed  Labs: ordered.  Radiology:  ordered.  ECG/medicine tests: ordered.    Risk  OTC drugs.  Prescription drug management.  Decision regarding hospitalization.        Final diagnoses:   MINA (acute kidney injury)       ED Disposition  ED Disposition       ED Disposition   Decision to Admit    Condition   --    Comment   Level of Care: Med/Surg [1]   Diagnosis: MINA (acute kidney injury) [441250]   Certification: I Certify That Inpatient Hospital Services Are Medically Necessary For Greater Than 2 Midnights                 No follow-up provider specified.       Medication List      No changes were made to your prescriptions during this visit.            Manpreet Jung II, PA  12/11/23 3526

## 2023-12-11 NOTE — PLAN OF CARE
Goal Outcome Evaluation:  Pt resting in bed. Pt transferred from ED this shift. Pt had several episodes of vomiting. Prn zofran given per orders. No complaints or requests at this time. VSS. Will continue plan of care.

## 2023-12-11 NOTE — H&P
[facility] Medicine Services  HISTORY & PHYSICAL    Patient Identification:  Name:  Oliver Osborn  Age:  28 y.o.  Sex:  male  :  1995  MRN:  [id number]   Visit Number:  [id number]  Admit Date: 2023   Primary Care Physician:  Geri Belcher APRN     Subjective     Chief complaint:   Chief Complaint   Patient presents with    Vomiting    Dehydration    Chest Pain     History of presenting illness:   Patient is a 28 y.o. male with past medical history significant for insulin-dependent type 2 diabetes mellitus, obesity by BMI, tobacco abuse, and THC abuse that presented to the [facility] emergency department for evaluation of nausea, vomiting, and abdominal discomfort.  Patient reports onset of symptoms was 2 days ago.  She reports having nausea and ongoing emesis.  He reports unable to keep down solids and liquids.  He reports generalized abdominal pain from frequent retching/emesis.  He reports similar episodes in the past.  Denies any fevers or chills.  No ill contacts.  No consumption of spoiled or uncooked food, no consumption of unfiltered water.  He had reported chest pain to the ED, however denies radiating chest pain, reports discomfort in chest with vomiting.  Denies any dyspnea.  No cough or upper respiratory complaints.  Denies any urinary complaints, no dysuria or hematuria.  Denies any headache dizziness or LOC.  Denies any other complaints at this time.  Patient states he is eager to get home by tomorrow as he has to get back to work.    Upon arrival to the ED, vitals were temperature 98.2, heart rate 71, respiratory rate 20, blood pressure 122/78, oxygen saturation 97% on room air.  ABG with pH 7.426, pCO2 37.2, pO2 85.9, HCO3 24.4, and oxygen saturation 97.3% on room air.  Initial high-sensitivity troponin T 43 with repeat 32, -11 delta.  Creatinine kinase 1485.  CMP with sodium 131, chloride 82, anion gap 23.7, BUN 68, creatinine 3.80, EGFR 20.9,  and glucose 306.  Blood positive for small acetone.  Lipase 46.  C-reactive protein 0.37.  Initial lactate 3.5 with reflex at 1.7.  CBC with white blood cell count 17.76, neutrophil percentage 88.2%, absolute neutrophil count 15.68.  COVID 19/influenza screen negative.  Chest x-ray with no evidence of acute cardiopulmonary disease.  CT abdomen pelvis without contrast with no acute findings identified within the abdomen/pelvis.  No obstructive uropathy.    Patient has been admitted to the medical surgical floor for further evaluation and treatment.     Present during exam: Ed RN  ---------------------------------------------------------------------------------------------------------------------   Review of Systems   Constitutional:  Positive for appetite change. Negative for activity change, chills, diaphoresis, fatigue and fever.   HENT:  Negative for congestion and sore throat.    Eyes:  Negative for discharge and visual disturbance.   Respiratory:  Negative for cough, chest tightness, shortness of breath and wheezing.    Cardiovascular:  Negative for chest pain, palpitations and leg swelling.   Gastrointestinal:  Positive for abdominal pain, nausea and vomiting. Negative for constipation and diarrhea.   Genitourinary:  Negative for decreased urine volume, dysuria, frequency and urgency.   Musculoskeletal:  Negative for arthralgias and myalgias.   Skin:  Negative for color change and wound.   Neurological:  Negative for dizziness, syncope, weakness, light-headedness and headaches.   Hematological:  Negative for adenopathy. Does not bruise/bleed easily.   Psychiatric/Behavioral:  Negative for confusion. The patient is not nervous/anxious.       ---------------------------------------------------------------------------------------------------------------------   Past Medical History:   Diagnosis Date    Diabetes mellitus     Tobacco abuse      Past Surgical History:   Procedure Laterality Date    ENDOSCOPY N/A  6/1/2022    Procedure: ESOPHAGOGASTRODUODENOSCOPY WITH ANESTHESIA;  Surgeon: Keon Quezada MD;  Location: [facility];  Service: Gastroenterology;  Laterality: N/A;     Family History   Problem Relation Age of Onset    Heart disease Mother     Diabetes Mother     Kidney disease Mother     Heart disease Father     Diabetes Father     Heart disease Maternal Grandmother     Diabetes Maternal Grandmother     Heart disease Maternal Grandfather     Diabetes Maternal Grandfather     Heart disease Paternal Grandmother     Diabetes Paternal Grandmother     Heart disease Paternal Grandfather     Diabetes Paternal Grandfather      Social History     Socioeconomic History    Marital status: Single   Tobacco Use    Smoking status: Every Day     Packs/day: 2.00     Years: 15.00     Additional pack years: 0.00     Total pack years: 30.00     Types: Cigarettes     Passive exposure: Never    Smokeless tobacco: Never   Vaping Use    Vaping Use: Former    Substances: Nicotine, Flavoring    Devices: Disposable    Passive vaping exposure: Yes   Substance and Sexual Activity    Alcohol use: Never    Drug use: Not Currently     Frequency: 7.0 times per week     Types: Marijuana    Sexual activity: Defer     ---------------------------------------------------------------------------------------------------------------------   Allergies:  Patient has no known allergies.  ---------------------------------------------------------------------------------------------------------------------   Medications below are reported home medications pulling from within the system; at this time, these medications have not been reconciled unless otherwise specified and are in the verification process for further verifcation as current home medications.    Prior to Admission Medications       Prescriptions Last Dose Informant Patient Reported? Taking?    Insulin Glargine (Lantus SoloStar) 100 UNIT/ML injection pen 12/8/2023 Self Yes No    Inject 14  Units under the skin into the appropriate area as directed Daily.          ---------------------------------------------------------------------------------------------------------------------    Objective     Hospital Scheduled Meds:  heparin (porcine), 5,000 Units, Subcutaneous, Q8H  insulin glargine, 10 Units, Subcutaneous, Daily  insulin lispro, 2-9 Units, Subcutaneous, 4x Daily AC & at Bedtime  senna-docusate sodium, 2 tablet, Oral, BID  sodium chloride, 10 mL, Intravenous, Q12H         Current listed hospital scheduled medications may not yet reflect those currently placed in orders that are signed and held, awaiting patient's arrival to floor/unit.    ---------------------------------------------------------------------------------------------------------------------   Vital Signs:  Temp:  [98.2 °F (36.8 °C)-98.5 °F (36.9 °C)] 98.5 °F (36.9 °C)  Heart Rate:  [64-79] 64  Resp:  [18-20] 18  BP: (122-142)/(75-78) 142/75    SpO2 Percentage    12/11/23 0256 12/11/23 1006   SpO2: 97% 96%     SpO2:  [96 %-97 %] 96 %  on   ;   Device (Oxygen Therapy): room air    Body mass index is 32.28 kg/m².  Wt Readings from Last 3 Encounters:   12/11/23 90.7 kg (200 lb)   09/30/23 80 kg (176 lb 5.9 oz)   09/19/23 83 kg (183 lb)       ---------------------------------------------------------------------------------------------------------------------   Physical Exam:  Physical Exam  Nursing note reviewed.   Constitutional:       General: He is awake. He is not in acute distress.     Appearance: He is well-developed. He is obese. He is not toxic-appearing.   HENT:      Head: Normocephalic and atraumatic.      Mouth/Throat:      Mouth: Mucous membranes are moist.   Eyes:      Conjunctiva/sclera: Conjunctivae normal.      Pupils: Pupils are equal, round, and reactive to light.   Cardiovascular:      Rate and Rhythm: Normal rate and regular rhythm.      Pulses:           Dorsalis pedis pulses are 2+ on the right side and 2+ on the  left side.      Heart sounds: Normal heart sounds. No murmur heard.     No friction rub. No gallop.   Pulmonary:      Effort: Pulmonary effort is normal. No tachypnea, accessory muscle usage or respiratory distress.      Breath sounds: Normal breath sounds and air entry. No wheezing, rhonchi or rales.   Abdominal:      General: Bowel sounds are normal. There is no distension.      Palpations: Abdomen is soft.      Tenderness: There is no abdominal tenderness.   Musculoskeletal:      Cervical back: Neck supple.      Right lower leg: No edema.      Left lower leg: No edema.   Skin:     General: Skin is warm and dry.      Capillary Refill: Capillary refill takes less than 2 seconds.   Neurological:      General: No focal deficit present.      Mental Status: He is alert and oriented to person, place, and time.      Sensory: Sensation is intact.      Motor: Motor function is intact.      Comments: Awake and alert. Follows commands. Answers questions appropriately. Moves all extremities equally. Strength and sensation intact. No focal neuro deficit on exam.   Psychiatric:         Attention and Perception: Attention normal.         Mood and Affect: Mood and affect normal.         Speech: Speech normal.         Behavior: Behavior is cooperative.     ---------------------------------------------------------------------------------------------------------------------  EKG:  Pending cardiology read. Per my interpretation: NSR with HR 72 BPM. QTc 433 ms. Non specific changes. No acute ST elevation grossly appreciated. Await final cardiology read.     Telemetry:    Not currently on telemetry    I have personally reviewed the EKG/Telemetry strip  ---------------------------------------------------------------------------------------------------------------------   Results from last 7 days   Lab Units 12/11/23 0806 12/11/23  0605   CK TOTAL U/L 1,485*  --    HSTROP T ng/L 32* 43*         Results from last 7 days   Lab Units  12/11/23  0904   PH, ARTERIAL pH units 7.426   PO2 ART mm Hg 85.9   PCO2, ARTERIAL mm Hg 37.2   HCO3 ART mmol/L 24.4     Results from last 7 days   Lab Units 12/11/23  1123 12/11/23  0806 12/11/23  0605   CRP mg/dL  --   --  0.37   LACTATE mmol/L 1.7 3.5*  --    WBC 10*3/mm3  --   --  17.76*   HEMOGLOBIN g/dL  --   --  15.5   HEMATOCRIT %  --   --  44.2   MCV fL  --   --  85.5   MCHC g/dL  --   --  35.1   PLATELETS 10*3/mm3  --   --  423     Results from last 7 days   Lab Units 12/11/23  0605   SODIUM mmol/L 131*   POTASSIUM mmol/L 4.7   CHLORIDE mmol/L 82*   CO2 mmol/L 25.3   BUN mg/dL 68*   CREATININE mg/dL 3.80*   CALCIUM mg/dL 10.0   GLUCOSE mg/dL 306*   ALBUMIN g/dL 5.0   BILIRUBIN mg/dL 0.9   ALK PHOS U/L 85   AST (SGOT) U/L 23   ALT (SGPT) U/L 16   Estimated Creatinine Clearance: 30 mL/min (A) (by C-G formula based on SCr of 3.8 mg/dL (H)).    Glucose   Date/Time Value Ref Range Status   12/11/2023 1159 253 (H) 70 - 130 mg/dL Final   12/11/2023 0804 256 (H) 70 - 130 mg/dL Final     Lab Results   Component Value Date    HGBA1C 7.40 (H) 09/27/2023     Lab Results   Component Value Date    TSH 2.850 05/30/2022     Microbiology Results (last 10 days)       Procedure Component Value - Date/Time    COVID PRE-OP / PRE-PROCEDURE SCREENING ORDER (NO ISOLATION) - Swab, Nasopharynx [[id number]]  (Normal) Collected: 12/11/23 0952    Lab Status: Final result Specimen: Swab from Nasopharynx Updated: 12/11/23 1040    Narrative:      The following orders were created for panel order COVID PRE-OP / PRE-PROCEDURE SCREENING ORDER (NO ISOLATION) - Swab, Nasopharynx.  Procedure                               Abnormality         Status                     ---------                               -----------         ------                     COVID-19 and FLU A/B PCR...[[id number]]  Normal              Final result                 Please view results for these tests on the individual orders.    COVID-19 and FLU A/B PCR, 1 HR TAT -  Swab, Nasopharynx [[id number]]  (Normal) Collected: 12/11/23 0952    Lab Status: Final result Specimen: Swab from Nasopharynx Updated: 12/11/23 1040     COVID19 Not Detected     Influenza A PCR Not Detected     Influenza B PCR Not Detected    Narrative:      Fact sheet for providers: https://www.fda.gov/media/605163/download    Fact sheet for patients: https://www.fda.gov/media/345913/download    Test performed by PCR.           Pain Management Panel  More data exists         Latest Ref Rng & Units 10/1/2023 9/28/2023   Pain Management Panel   Creatinine, Urine mg/dL 29.3  96.7  96.7    Amphetamine, Urine Qual Negative Negative  -   Barbiturates Screen, Urine Negative Negative  -   Benzodiazepine Screen, Urine Negative Negative  -   Buprenorphine, Screen, Urine Negative Negative  -   Cocaine Screen, Urine Negative Negative  -   Fentanyl, Urine Negative Negative  -   Methadone Screen , Urine Negative Negative  -   Methamphetamine, Ur Negative Negative  -     I have personally reviewed the above laboratory results.   ---------------------------------------------------------------------------------------------------------------------  Imaging Results (Last 7 Days)       Procedure Component Value Units Date/Time    XR Chest 2 View [[id number]] Collected: 12/11/23 0423     Updated: 12/11/23 0854    Narrative:      CLINICAL HISTORY: Chest pain.     COMPARISON: 9/27/2023.     TECHNIQUE: PA and lateral views of the chest were obtained.     FINDINGS: Lungs are clear and there is no pleural effusion or  pneumothorax.  The mediastinal silhouette is normal.  No acute osseous  or upper abdominal abnormality is seen.       Impression:         NO ACUTE ABNORMALITY IN THE CHEST.           This report was finalized on 12/11/2023 4:24 AM by [place]ia Espinosa MD.       CT Abdomen Pelvis Without Contrast [[id number]] Collected: 12/11/23 0848     Updated: 12/11/23 0854    Narrative:      EXAM:    CT Abdomen and Pelvis Without Intravenous  Contrast     EXAM DATE:    12/11/2023 7:40 AM     CLINICAL HISTORY:    MINA, DKA     TECHNIQUE:    Axial computed tomography images of the abdomen and pelvis without  intravenous contrast.  Sagittal and coronal reformatted images were  created and reviewed.  This CT exam was performed using one or more of  the following dose reduction techniques:  automated exposure control,  adjustment of the mA and/or kV according to patient size, and/or use of  iterative reconstruction technique.     COMPARISON:    9/27/2023     FINDINGS:    Lung bases:  Lung bases are clear.      ABDOMEN:    Liver:  Unremarkable as visualized.    Gallbladder and bile ducts:  Unremarkable as visualized.  No calcified  stones.  No ductal dilation.    Pancreas:  Unremarkable as visualized.  No ductal dilation.    Spleen:  Unremarkable as visualized.  No splenomegaly.    Adrenals:  Unremarkable as visualized.  No mass.    Kidneys and ureters:  There is mild size asymmetry of the kidneys left  larger than right which is stable from previous exams. This could be due  to duplicated renal collecting system.  No hydronephrosis.  No  obstructing renal collecting system stones identified.    Stomach and bowel:  Unremarkable as visualized.  No obstruction.  No  mucosal thickening.      PELVIS:    Appendix:  Normal appendix.    Bladder:  Unremarkable as visualized.  No stones.    Reproductive:  Unremarkable as visualized.      ABDOMEN and PELVIS:    Intraperitoneal space:  Unremarkable as visualized.  No  pneumoperitoneum identified.  No ascites or abscess.    Bones/joints:  No acute fracture.  No dislocation.    Soft tissues:  Unremarkable as visualized.    Vasculature:  Unremarkable as visualized.  No abdominal aortic  aneurysm.    Lymph nodes:  Unremarkable as visualized.  No enlarged lymph nodes.       Impression:      1.  No acute findings identified within abdomen or pelvis.  2.  Stable size asymmetry of the kidneys, left greater than right which  may  be in part related to duplicated system. No obstructive uropathy  identified on CT.  3.  Other incidental/nonacute findings above.        This report was finalized on 12/11/2023 8:50 AM by Dr. Prasanth Jones MD.             I have personally reviewed the above radiology results.     Last Echocardiogram:  Results for orders placed during the hospital encounter of 04/19/23    Adult Transthoracic Echo Complete W/ Cont if Necessary Per Protocol    Interpretation Summary    Left ventricular systolic function is normal. Estimated left ventricular EF = 65%    All left ventricular wall segments contract normally.    Left ventricular diastolic function was normal.    The cardiac chamber dimensions are normal.    All valves appear normal in structure and showed no stenosis or significant regurgitations.    No evidence of pulmonary hypertension is present.    There is no evidence of pericardial effusion.    ---------------------------------------------------------------------------------------------------------------------    Assessment & Plan      ACUTE HOSPITAL PROBLEMS    -Acute kidney injury  -Elevated creatine kinase  -Intractable nausea and vomiting  -Concern for cannabinoid hyperemesis   -Elevated anion gap metabolic acidosis   -Hypovolemic hyponatremia and hypochloremia   -Lactic acidosis, resolved   Baseline creatinine 1.2 with creatinine on admission of 3.8  Patient with N/V, likely pre-renal in nature   IV fluids continued   CK elevated, mild trop elevation, likely due to renal dysfunction  Patient with several ED visits and hospitalizations for similar symptoms, has been counseled on cannabinoid hyperemesis   CT abd/pelvis unremarkable for acute process  Lipase WNL  PRN antiemetics   AG elevated, small blood acetone. However, pH compensated, not in DKA.   Monitor urine output and renal function closely  Avoid nephrotoxins as much as possible   Repeat chemistry panel in AM    -SIRS   SIRS: RR 20, WBC elevated   No  clear infectious source at this time   UA not concerning for UTI   CXR without consolidation/infiltrate   CT abdomen/pelvis without acute process   Obtain blood cultures x 2   Hold on abx for now   Leukocytosis certainly could be due to hypovolemia   Repeat labs in AM     -F/E/N  Gentle IV fluids. Replace electrolytes per protocol as necessary.     CHRONIC MEDICAL PROBLEMS    -Type II diabetes mellitus, insulin dependent  Obtain HgbA1C  Review home medication regimen once reconciled per pharmacy   Hold home oral DM medications for now.  Start low dose SSI, titrate dosage as necessary   Basal insulin   Closely monitor blood glucose levels with AccuCheks before meals and at bedtime.  Hypoglycemia protocol in place should it be necessary  -Obesity by BMI, Body mass index is 32.28 kg/m².  Affecting all aspects of care  -Tobacco abuse: Strongly encourage cessation   -THC abuse: Strongly encourage cessation   ---------------------------------------------------  DVT Prophylaxis: SQ heparin   Activity: Up with assistance as tolerated   ---------------------------------------------------  INPATIENT status due to the need for care which can only be reasonably provided in an hospital setting such as aggressive/expedited ancillary services and/or consultation services, the necessity for IV medications, close physician monitoring and/or the possible need for procedures.  In such, I feel patient’s risk for adverse outcomes and need for care warrant INPATIENT evaluation and predict the patient’s care encounter to likely last beyond 2 midnights.     Code Status: FULL CODE   ---------------------------------------------------  Disposition/Discharge planning: Plans on home once medically stable   ---------------------------------------------------  I have discussed the patient's assessment and plan with the patient, nursing staff, and attending physician Dr. Barraza, MD Chery Flores PA-C  Hospitalist Service -- [facility]   Pager: [phone]    12/11/23  12:17 EST    Attending Physician: Harvinder Barraza MD        ---------------------------------------------------------------------------------------------------------------------

## 2023-12-11 NOTE — ED NOTES
Attempted to call report at this time spoke with HCPM who states she will have the nurse call me back.

## 2023-12-12 VITALS
OXYGEN SATURATION: 97 % | RESPIRATION RATE: 18 BRPM | BODY MASS INDEX: 34.15 KG/M2 | HEART RATE: 66 BPM | SYSTOLIC BLOOD PRESSURE: 153 MMHG | TEMPERATURE: 98.9 F | HEIGHT: 64 IN | WEIGHT: 200 LBS | DIASTOLIC BLOOD PRESSURE: 77 MMHG

## 2023-12-12 LAB
AMPHET+METHAMPHET UR QL: NEGATIVE
AMPHETAMINES UR QL: NEGATIVE
ANION GAP SERPL CALCULATED.3IONS-SCNC: 13.7 MMOL/L (ref 5–15)
BACTERIA BLD CULT: ABNORMAL
BACTERIA UR QL AUTO: NORMAL /HPF
BARBITURATES UR QL SCN: NEGATIVE
BASOPHILS # BLD AUTO: 0.04 10*3/MM3 (ref 0–0.2)
BASOPHILS NFR BLD AUTO: 0.3 % (ref 0–1.5)
BENZODIAZ UR QL SCN: NEGATIVE
BILIRUB UR QL STRIP: NEGATIVE
BOTTLE TYPE: ABNORMAL
BUN SERPL-MCNC: 49 MG/DL (ref 6–20)
BUN/CREAT SERPL: 22.4 (ref 7–25)
BUPRENORPHINE SERPL-MCNC: NEGATIVE NG/ML
CALCIUM SPEC-SCNC: 8.4 MG/DL (ref 8.6–10.5)
CANNABINOIDS SERPL QL: POSITIVE
CHLORIDE SERPL-SCNC: 95 MMOL/L (ref 98–107)
CLARITY UR: CLEAR
CO2 SERPL-SCNC: 24.3 MMOL/L (ref 22–29)
COCAINE UR QL: NEGATIVE
COLOR UR: YELLOW
CREAT SERPL-MCNC: 2.19 MG/DL (ref 0.76–1.27)
DEPRECATED RDW RBC AUTO: 40.1 FL (ref 37–54)
EGFRCR SERPLBLD CKD-EPI 2021: 40.5 ML/MIN/1.73
EOSINOPHIL # BLD AUTO: 0.07 10*3/MM3 (ref 0–0.4)
EOSINOPHIL NFR BLD AUTO: 0.6 % (ref 0.3–6.2)
ERYTHROCYTE [DISTWIDTH] IN BLOOD BY AUTOMATED COUNT: 12.5 % (ref 12.3–15.4)
FENTANYL UR-MCNC: NEGATIVE NG/ML
GLUCOSE BLDC GLUCOMTR-MCNC: 211 MG/DL (ref 70–130)
GLUCOSE SERPL-MCNC: 138 MG/DL (ref 65–99)
GLUCOSE UR STRIP-MCNC: NEGATIVE MG/DL
HCT VFR BLD AUTO: 37.7 % (ref 37.5–51)
HGB BLD-MCNC: 13 G/DL (ref 13–17.7)
HGB UR QL STRIP.AUTO: ABNORMAL
HYALINE CASTS UR QL AUTO: NORMAL /LPF
IMM GRANULOCYTES # BLD AUTO: 0.04 10*3/MM3 (ref 0–0.05)
IMM GRANULOCYTES NFR BLD AUTO: 0.3 % (ref 0–0.5)
KETONES UR QL STRIP: NEGATIVE
LEUKOCYTE ESTERASE UR QL STRIP.AUTO: NEGATIVE
LYMPHOCYTES # BLD AUTO: 2.41 10*3/MM3 (ref 0.7–3.1)
LYMPHOCYTES NFR BLD AUTO: 19.7 % (ref 19.6–45.3)
MAGNESIUM SERPL-MCNC: 2 MG/DL (ref 1.6–2.6)
MCH RBC QN AUTO: 30.1 PG (ref 26.6–33)
MCHC RBC AUTO-ENTMCNC: 34.5 G/DL (ref 31.5–35.7)
MCV RBC AUTO: 87.3 FL (ref 79–97)
METHADONE UR QL SCN: NEGATIVE
MONOCYTES # BLD AUTO: 1.14 10*3/MM3 (ref 0.1–0.9)
MONOCYTES NFR BLD AUTO: 9.3 % (ref 5–12)
NEUTROPHILS NFR BLD AUTO: 69.8 % (ref 42.7–76)
NEUTROPHILS NFR BLD AUTO: 8.52 10*3/MM3 (ref 1.7–7)
NITRITE UR QL STRIP: NEGATIVE
NRBC BLD AUTO-RTO: 0 /100 WBC (ref 0–0.2)
OPIATES UR QL: NEGATIVE
OXYCODONE UR QL SCN: NEGATIVE
PCP UR QL SCN: NEGATIVE
PH UR STRIP.AUTO: 5.5 [PH] (ref 5–8)
PLATELET # BLD AUTO: 345 10*3/MM3 (ref 140–450)
PMV BLD AUTO: 9.1 FL (ref 6–12)
POTASSIUM SERPL-SCNC: 3.6 MMOL/L (ref 3.5–5.2)
PROT UR QL STRIP: ABNORMAL
RBC # BLD AUTO: 4.32 10*6/MM3 (ref 4.14–5.8)
RBC # UR STRIP: NORMAL /HPF
REF LAB TEST METHOD: NORMAL
SODIUM SERPL-SCNC: 133 MMOL/L (ref 136–145)
SP GR UR STRIP: 1.01 (ref 1–1.03)
SQUAMOUS #/AREA URNS HPF: NORMAL /HPF
TRICYCLICS UR QL SCN: NEGATIVE
UROBILINOGEN UR QL STRIP: ABNORMAL
WBC # UR STRIP: NORMAL /HPF
WBC NRBC COR # BLD AUTO: 12.22 10*3/MM3 (ref 3.4–10.8)

## 2023-12-12 PROCEDURE — 81001 URINALYSIS AUTO W/SCOPE: CPT | Performed by: PHYSICIAN ASSISTANT

## 2023-12-12 PROCEDURE — 80307 DRUG TEST PRSMV CHEM ANLYZR: CPT | Performed by: PHYSICIAN ASSISTANT

## 2023-12-12 PROCEDURE — 25810000003 SODIUM CHLORIDE 0.9 % SOLUTION: Performed by: PHYSICIAN ASSISTANT

## 2023-12-12 PROCEDURE — 83735 ASSAY OF MAGNESIUM: CPT | Performed by: PHYSICIAN ASSISTANT

## 2023-12-12 PROCEDURE — 99239 HOSP IP/OBS DSCHRG MGMT >30: CPT | Performed by: INTERNAL MEDICINE

## 2023-12-12 PROCEDURE — 63710000001 INSULIN GLARGINE PER 5 UNITS: Performed by: INTERNAL MEDICINE

## 2023-12-12 PROCEDURE — 25010000002 PROCHLORPERAZINE 10 MG/2ML SOLUTION: Performed by: INTERNAL MEDICINE

## 2023-12-12 PROCEDURE — 63710000001 INSULIN LISPRO (HUMAN) PER 5 UNITS: Performed by: INTERNAL MEDICINE

## 2023-12-12 PROCEDURE — 80048 BASIC METABOLIC PNL TOTAL CA: CPT | Performed by: INTERNAL MEDICINE

## 2023-12-12 PROCEDURE — 85025 COMPLETE CBC W/AUTO DIFF WBC: CPT | Performed by: INTERNAL MEDICINE

## 2023-12-12 PROCEDURE — 82948 REAGENT STRIP/BLOOD GLUCOSE: CPT

## 2023-12-12 PROCEDURE — 25010000002 HEPARIN (PORCINE) PER 1000 UNITS: Performed by: INTERNAL MEDICINE

## 2023-12-12 RX ADMIN — HEPARIN SODIUM 5000 UNITS: 5000 INJECTION INTRAVENOUS; SUBCUTANEOUS at 06:25

## 2023-12-12 RX ADMIN — INSULIN LISPRO 4 UNITS: 100 INJECTION, SOLUTION INTRAVENOUS; SUBCUTANEOUS at 08:44

## 2023-12-12 RX ADMIN — SODIUM CHLORIDE 75 ML/HR: 9 INJECTION, SOLUTION INTRAVENOUS at 03:29

## 2023-12-12 RX ADMIN — PROCHLORPERAZINE EDISYLATE 5 MG: 5 INJECTION INTRAMUSCULAR; INTRAVENOUS at 05:36

## 2023-12-12 RX ADMIN — INSULIN GLARGINE 10 UNITS: 100 INJECTION, SOLUTION SUBCUTANEOUS at 08:44

## 2023-12-12 RX ADMIN — Medication 10 ML: at 08:44

## 2023-12-12 NOTE — DISCHARGE SUMMARY
[facility] HOSPITALISTS DISCHARGE SUMMARY    Patient Identification:  Name:  Oliver Osborn  Age:  28 y.o.  Sex:  male  :  1995  MRN:  [id number]  Visit Number:  [id number]    Date of Admission: 2023  Date of Discharge:  2023    PCP: Geri Belcher, APRN    DISCHARGE DIAGNOSIS  -Acute kidney injury  -Elevated creatine kinase  -Intractable nausea and vomiting  -Concern for cannabinoid hyperemesis   -Elevated anion gap metabolic acidosis   -Hypovolemic hyponatremia and hypochloremia   -Lactic acidosis, resolved   -SIRS  -DM II, uncontrolled   -Tobacco use  -THC use     CONSULTS   None    PROCEDURES PERFORMED  CT abdomen/pelvis:  IMPRESSION:  1.  No acute findings identified within abdomen or pelvis.  2.  Stable size asymmetry of the kidneys, left greater than right which  may be in part related to duplicated system. No obstructive uropathy  identified on CT.  3.  Other incidental/nonacute findings above.    HOSPITAL COURSE  Patient is a 28 y.o. male presented on  to [facility] complaining of nausea, vomiting.  Please see the admitting history and physical for further details.      Mr. Osborn is our 27 yo with hx of THC use, DM II, tobacco use who presents with persistent N/V found to have MINA. Similar presentation in September of this year thought to be cannabis hyperemesis. Patient noted continud cannabis use but notes it is infrequent. Patient found to have MINA again in ED. No obstruction or acute finding on CT abdomen/pelvis. Suspect renal injury prerenal. CK elevated but not high enough to cause pigment induced renal injury. Will repeat labs tomorrow after continue hydration. We have started to hydrate and patient has started to tolerate CLD. Cr improved significantly today but still not at baseline. Was notified by patient's nurse he was requesting to leave AMA. I went directly to talk to patient and discuss plan of care. He reported he was feeling better and  had to leave to go to work. I discussed improvement in renal function but still significantly worse than baseline. Recommended to stay to continue IVF and repeat labs in AM and possible discharge then. Patient refused. I discussed risk of renal failure including death. We discussed stopping any illicit substances including cannabis that may be contributing to N/V and presenting intravascular depletion. We discussed avoiding NSAIDs. Patient noted understanding but continued desire to sign out AMA at this time.      VITAL SIGNS:  Temp:  [98.2 °F (36.8 °C)-98.9 °F (37.2 °C)] 98.9 °F (37.2 °C)  Heart Rate:  [66-93] 66  Resp:  [18-20] 18  BP: (107-153)/(65-83) 153/77  SpO2:  [96 %-97 %] 97 %  on   ;   Device (Oxygen Therapy): room air    Body mass index is 34.33 kg/m².  Wt Readings from Last 3 Encounters:   12/11/23 90.7 kg (200 lb)   09/30/23 80 kg (176 lb 5.9 oz)   09/19/23 83 kg (183 lb)       PHYSICAL EXAM:  Constitutional:  Well-developed and well-nourished.  No respiratory distress. Walking in hallway.   HENT:  Head:  Normocephalic and atraumatic.  Mouth:  Moist mucous membranes.    Eyes:  Conjunctivae and EOM are normal.  Pupils are equal, round, and reactive to light.  No scleral icterus.    Cardiovascular:  Normal rate, regular rhythm and normal heart sounds with no murmur.  Pulmonary/Chest:  No respiratory distress, no wheezes, no crackles, with normal breath sounds and good air movement.  Abdominal:  Soft.  Bowel sounds are normal.  No distension and no tenderness.   Musculoskeletal:  No edema, no tenderness, and no deformity.  No red or swollen joints anywhere.    Neurological:  Alert and oriented to person, place, and time.  No gross neurological deficit.   Skin:  Skin is warm and dry. No rash noted. No pallor.   Peripheral vascular:  Strong pulses in all 4 extremities with no clubbing, no cyanosis, no edema.    DISCHARGE DISPOSITION   Stable, AMA    DISCHARGE MEDICATIONS:     Discharge Medications         Continue These Medications        Instructions Start Date   Lantus SoloStar 100 UNIT/ML injection pen  Generic drug: Insulin Glargine   Inject 14 Units under the skin into the appropriate area as directed Daily.                  Follow-up Information       Geri Belcher, ALEXANDER .    Specialty: Family Medicine  Contact information:  [address] [facility] [id number] [phone]                              TEST  RESULTS PENDING AT DISCHARGE  Pending Labs       Order Current Status    Blood Culture - Blood, Arm, Right In process    Blood Culture - Blood, Hand, Right In process             CODE STATUS  Code Status and Medical Interventions:   Ordered at: 12/11/23 0949     Code Status (Patient has no pulse and is not breathing):    CPR (Attempt to Resuscitate)     Medical Interventions (Patient has pulse or is breathing):    Full Support       Harvinder Barraza MD  [facility] Hospital[facility]  12/12/23  11:23 EST    Please note that this discharge summary required more than 30 minutes to complete.

## 2023-12-12 NOTE — NURSING NOTE
Patient requested to leave AMA. Dr. Barraza made aware. Pt was educated on risks of leaving as well as benefits of staying at the hospital. Pt is alert and oriented x4 and voices understanding. IV removed and pt walked out.

## 2023-12-12 NOTE — PROGRESS NOTES
Was notified by patient's nurse he was leaving AMA. I went directly to talk to patient and discuss plan of care. He reported he was feeling better and had to leave to go to work. I discussed improvement in renal function but still significantly worse than baseline. Recommended to stay to continue IVF and repeat labs in AM and possible discharge then. Patient refused. I discussed risk of renal failure including death. We discussed stopping any illicit substances including cannabis that may be contributing to N/V and presenting intravascular depletion. We discussed avoiding NSAIDs. Patient noted understanding but continued desire to sign out AMA at this time.

## 2023-12-12 NOTE — PLAN OF CARE
Goal Outcome Evaluation:  Plan of Care Reviewed With: patient           Outcome Evaluation: Pt rested in bed this shift. Pt took multiple showers, refused pulse ox, VSS and will continue with POC.

## 2023-12-13 LAB
BACTERIA SPEC AEROBE CULT: ABNORMAL
GRAM STN SPEC: ABNORMAL
ISOLATED FROM: ABNORMAL

## 2023-12-13 NOTE — PAYOR COMM NOTE
"CONTACT: CRYSTAL KAUFFMAN RN  UTILIZATION MANAGEMENT DEPT.  [facility]  [address] [id number]  PHONE: [phone]  FAX: [phone]        DISCHARGE NOTIFICATION  DC DATE: 23 - AMA    REF#[id number]       Oliver Osborn (28 y.o. Male)       Date of Birth   1995    Social Security Number       Address   [address] [address]    Home Phone       MRN   [id number]       Gnosticism   None    Marital Status   Single                            Admission Date   23    Admission Type   Emergency    Admitting Provider   Harvinder Barraza MD    Attending Provider       Department, Room/Bed   [address], 3341/1S       Discharge Date   2023    Discharge Disposition   Left Against Medical Advice    Discharge Destination   Home                              Attending Provider: (none)   Allergies: No Known Allergies    Isolation: None   Infection: None   Code Status: Prior    Ht: 162.6 cm (64\")   Wt: 90.7 kg (200 lb)    Admission Cmt: None   Principal Problem: MINA (acute kidney injury) [N17.9]                   Active Insurance as of 2023       Primary Coverage       Payor Plan Insurance Group Employer/Plan Group    [place] [id number]       Payor Plan Address Payor Plan Phone Number Payor Plan Fax Number Effective Dates    [employer] [address] [address] [phone]  2022 - None Entered    [facility] [id number]         Subscriber Name Subscriber Birth Date Member ID       OLIVER OSBORN 1995 [id number]                     Emergency Contacts        (Rel.) Home Phone Work Phone Mobile Phone    ROBINSON OSBORN (Father) [phone] -- --    CAROLYN OSBORN (Mother) [phone] -- --                 Discharge Summary        Harvinder Barraza MD at 23 1118              [facility] HOSPITALISTS DISCHARGE SUMMARY    Patient Identification:  Name:  Oliver Osborn  Age:  28 y.o.  Sex:  male  :  " 1995  MRN:  [id number]  Visit Number:  [id number]    Date of Admission: 12/11/2023  Date of Discharge:  12/12/2023    PCP: Geri Belcher, APRN    DISCHARGE DIAGNOSIS  -Acute kidney injury  -Elevated creatine kinase  -Intractable nausea and vomiting  -Concern for cannabinoid hyperemesis   -Elevated anion gap metabolic acidosis   -Hypovolemic hyponatremia and hypochloremia   -Lactic acidosis, resolved   -SIRS  -DM II, uncontrolled   -Tobacco use  -THC use     CONSULTS   None    PROCEDURES PERFORMED  CT abdomen/pelvis:  IMPRESSION:  1.  No acute findings identified within abdomen or pelvis.  2.  Stable size asymmetry of the kidneys, left greater than right which  may be in part related to duplicated system. No obstructive uropathy  identified on CT.  3.  Other incidental/nonacute findings above.    HOSPITAL COURSE  Patient is a 28 y.o. male presented on 12/11 to [facility] complaining of nausea, vomiting.  Please see the admitting history and physical for further details.      Mr. Osborn is our 29 yo with hx of THC use, DM II, tobacco use who presents with persistent N/V found to have MINA. Similar presentation in September of this year thought to be cannabis hyperemesis. Patient noted continud cannabis use but notes it is infrequent. Patient found to have MINA again in ED. No obstruction or acute finding on CT abdomen/pelvis. Suspect renal injury prerenal. CK elevated but not high enough to cause pigment induced renal injury. Will repeat labs tomorrow after continue hydration. We have started to hydrate and patient has started to tolerate CLD. Cr improved significantly today but still not at baseline. Was notified by patient's nurse he was requesting to leave AMA. I went directly to talk to patient and discuss plan of care. He reported he was feeling better and had to leave to go to work. I discussed improvement in renal function but still significantly worse than baseline. Recommended to stay to  continue IVF and repeat labs in AM and possible discharge then. Patient refused. I discussed risk of renal failure including death. We discussed stopping any illicit substances including cannabis that may be contributing to N/V and presenting intravascular depletion. We discussed avoiding NSAIDs. Patient noted understanding but continued desire to sign out AMA at this time.      VITAL SIGNS:  Temp:  [98.2 °F (36.8 °C)-98.9 °F (37.2 °C)] 98.9 °F (37.2 °C)  Heart Rate:  [66-93] 66  Resp:  [18-20] 18  BP: (107-153)/(65-83) 153/77  SpO2:  [96 %-97 %] 97 %  on   ;   Device (Oxygen Therapy): room air    Body mass index is 34.33 kg/m².  Wt Readings from Last 3 Encounters:   12/11/23 90.7 kg (200 lb)   09/30/23 80 kg (176 lb 5.9 oz)   09/19/23 83 kg (183 lb)       PHYSICAL EXAM:  Constitutional:  Well-developed and well-nourished.  No respiratory distress. Walking in hallway.   HENT:  Head:  Normocephalic and atraumatic.  Mouth:  Moist mucous membranes.    Eyes:  Conjunctivae and EOM are normal.  Pupils are equal, round, and reactive to light.  No scleral icterus.    Cardiovascular:  Normal rate, regular rhythm and normal heart sounds with no murmur.  Pulmonary/Chest:  No respiratory distress, no wheezes, no crackles, with normal breath sounds and good air movement.  Abdominal:  Soft.  Bowel sounds are normal.  No distension and no tenderness.   Musculoskeletal:  No edema, no tenderness, and no deformity.  No red or swollen joints anywhere.    Neurological:  Alert and oriented to person, place, and time.  No gross neurological deficit.   Skin:  Skin is warm and dry. No rash noted. No pallor.   Peripheral vascular:  Strong pulses in all 4 extremities with no clubbing, no cyanosis, no edema.    DISCHARGE DISPOSITION   Stable, AMA    DISCHARGE MEDICATIONS:     Discharge Medications        Continue These Medications        Instructions Start Date   Lantus SoloStar 100 UNIT/ML injection pen  Generic drug: Insulin Glargine    Inject 14 Units under the skin into the appropriate area as directed Daily.                  Follow-up Information       Geri Belcher, ALEXANDER .    Specialty: Family Medicine  Contact information:  [address] [place] [id number] [phone]                              TEST  RESULTS PENDING AT DISCHARGE  Pending Labs       Order Current Status    Blood Culture - Blood, Arm, Right In process    Blood Culture - Blood, Hand, Right In process             CODE STATUS  Code Status and Medical Interventions:   Ordered at: 12/11/23 0949     Code Status (Patient has no pulse and is not breathing):    CPR (Attempt to Resuscitate)     Medical Interventions (Patient has pulse or is breathing):    Full Support       Harvinder Barraza MD  [facility] Hospitalist  12/12/23  11:23 EST    Please note that this discharge summary required more than 30 minutes to complete.      Electronically signed by Harvinder Barraza MD at 12/12/23 1135       Discharge Order (From admission, onward)       Start     Ordered    12/12/23 1124  Discharge patient  Once,   Status:  Canceled        Expected Discharge Date: 12/12/23   Expected Discharge Time: Morning   Discharge Disposition: Left Against Medical Advice   Physician of Record for Attribution - Please select from Treatment Team: HARVINDER BARRAZA [[id number]]   Review needed by CMO to determine Physician of Record: No      Question Answer Comment   Physician of Record for Attribution - Please select from Treatment Team HARVINDER BARRAZA    Review needed by CMO to determine Physician of Record No        12/12/23 1123

## 2023-12-16 LAB — BACTERIA SPEC AEROBE CULT: NORMAL

## 2024-07-05 ENCOUNTER — APPOINTMENT (OUTPATIENT)
Dept: CT IMAGING | Facility: HOSPITAL | Age: 29
End: 2024-07-05
Payer: MEDICAID

## 2024-07-05 ENCOUNTER — APPOINTMENT (OUTPATIENT)
Dept: GENERAL RADIOLOGY | Facility: HOSPITAL | Age: 29
End: 2024-07-05
Payer: MEDICAID

## 2024-07-05 ENCOUNTER — HOSPITAL ENCOUNTER (INPATIENT)
Facility: HOSPITAL | Age: 29
LOS: 4 days | Discharge: HOME | End: 2024-07-09
Attending: EMERGENCY MEDICINE | Admitting: INTERNAL MEDICINE
Payer: MEDICAID

## 2024-07-05 DIAGNOSIS — N17.9 ACUTE RENAL FAILURE SUPERIMPOSED ON CHRONIC KIDNEY DISEASE, UNSPECIFIED ACUTE RENAL FAILURE TYPE, UNSPECIFIED CKD STAGE: Primary | ICD-10-CM

## 2024-07-05 DIAGNOSIS — F12.90 CANNABINOID HYPEREMESIS SYNDROME: ICD-10-CM

## 2024-07-05 DIAGNOSIS — N18.9 ACUTE RENAL FAILURE SUPERIMPOSED ON CHRONIC KIDNEY DISEASE, UNSPECIFIED ACUTE RENAL FAILURE TYPE, UNSPECIFIED CKD STAGE: Primary | ICD-10-CM

## 2024-07-05 DIAGNOSIS — R11.2 CANNABINOID HYPEREMESIS SYNDROME: ICD-10-CM

## 2024-07-05 DIAGNOSIS — A41.9 SEPSIS, DUE TO UNSPECIFIED ORGANISM, UNSPECIFIED WHETHER ACUTE ORGAN DYSFUNCTION PRESENT: ICD-10-CM

## 2024-07-05 LAB
A-A DO2: 25.9 MMHG (ref 0–300)
ACETONE BLD QL: ABNORMAL
ALBUMIN SERPL-MCNC: 5 G/DL (ref 3.5–5.2)
ALBUMIN/GLOB SERPL: 1.3 G/DL
ALP SERPL-CCNC: 91 U/L (ref 39–117)
ALT SERPL W P-5'-P-CCNC: 13 U/L (ref 1–41)
AMPHET+METHAMPHET UR QL: NEGATIVE
AMPHETAMINES UR QL: NEGATIVE
ANION GAP SERPL CALCULATED.3IONS-SCNC: 31.9 MMOL/L (ref 5–15)
ARTERIAL PATENCY WRIST A: ABNORMAL
AST SERPL-CCNC: 25 U/L (ref 1–40)
ATMOSPHERIC PRESS: 724 MMHG
BACTERIA UR QL AUTO: ABNORMAL /HPF
BARBITURATES UR QL SCN: NEGATIVE
BASE EXCESS BLDA CALC-SCNC: -2.3 MMOL/L (ref 0–2)
BASOPHILS # BLD AUTO: 0.04 10*3/MM3 (ref 0–0.2)
BASOPHILS NFR BLD AUTO: 0.2 % (ref 0–1.5)
BDY SITE: ABNORMAL
BENZODIAZ UR QL SCN: NEGATIVE
BILIRUB SERPL-MCNC: 0.7 MG/DL (ref 0–1.2)
BILIRUB UR QL STRIP: NEGATIVE
BUN SERPL-MCNC: 55 MG/DL (ref 6–20)
BUN/CREAT SERPL: 8.4 (ref 7–25)
BUPRENORPHINE SERPL-MCNC: NEGATIVE NG/ML
CALCIUM SPEC-SCNC: 10.3 MG/DL (ref 8.6–10.5)
CANNABINOIDS SERPL QL: POSITIVE
CHLORIDE SERPL-SCNC: 78 MMOL/L (ref 98–107)
CK SERPL-CCNC: 705 U/L (ref 20–200)
CLARITY UR: CLEAR
CO2 BLDA-SCNC: 22.1 MMOL/L (ref 22–33)
CO2 SERPL-SCNC: 20.1 MMOL/L (ref 22–29)
COCAINE UR QL: NEGATIVE
COHGB MFR BLD: 0.9 % (ref 0–5)
COLOR UR: YELLOW
CREAT SERPL-MCNC: 6.55 MG/DL (ref 0.76–1.27)
CRP SERPL-MCNC: 0.86 MG/DL (ref 0–0.5)
D-LACTATE SERPL-SCNC: 2 MMOL/L (ref 0.5–2)
D-LACTATE SERPL-SCNC: 2.8 MMOL/L (ref 0.5–2)
DEPRECATED RDW RBC AUTO: 37.7 FL (ref 37–54)
EGFRCR SERPLBLD CKD-EPI 2021: 10.8 ML/MIN/1.73
EOSINOPHIL # BLD AUTO: 0.03 10*3/MM3 (ref 0–0.4)
EOSINOPHIL NFR BLD AUTO: 0.1 % (ref 0.3–6.2)
ERYTHROCYTE [DISTWIDTH] IN BLOOD BY AUTOMATED COUNT: 12.6 % (ref 12.3–15.4)
ETHANOL BLD-MCNC: <10 MG/DL (ref 0–10)
ETHANOL UR QL: <0.01 %
FENTANYL UR-MCNC: NEGATIVE NG/ML
GLOBULIN UR ELPH-MCNC: 3.9 GM/DL
GLUCOSE BLDC GLUCOMTR-MCNC: 186 MG/DL (ref 70–130)
GLUCOSE BLDC GLUCOMTR-MCNC: 258 MG/DL (ref 70–130)
GLUCOSE BLDC GLUCOMTR-MCNC: 259 MG/DL (ref 70–130)
GLUCOSE SERPL-MCNC: 335 MG/DL (ref 65–99)
GLUCOSE UR STRIP-MCNC: ABNORMAL MG/DL
HBA1C MFR BLD: 8.8 % (ref 4.8–5.6)
HCO3 BLDA-SCNC: 21.2 MMOL/L (ref 20–26)
HCT VFR BLD AUTO: 46.9 % (ref 37.5–51)
HCT VFR BLD CALC: 46.5 % (ref 38–51)
HGB BLD-MCNC: 16.6 G/DL (ref 13–17.7)
HGB BLDA-MCNC: 15.2 G/DL (ref 14–18)
HGB UR QL STRIP.AUTO: ABNORMAL
HOLD SPECIMEN: NORMAL
HOLD SPECIMEN: NORMAL
HYALINE CASTS UR QL AUTO: ABNORMAL /LPF
IMM GRANULOCYTES # BLD AUTO: 0.17 10*3/MM3 (ref 0–0.05)
IMM GRANULOCYTES NFR BLD AUTO: 0.7 % (ref 0–0.5)
INHALED O2 CONCENTRATION: 21 %
KETONES UR QL STRIP: ABNORMAL
LEUKOCYTE ESTERASE UR QL STRIP.AUTO: NEGATIVE
LIPASE SERPL-CCNC: 23 U/L (ref 13–60)
LYMPHOCYTES # BLD AUTO: 1.46 10*3/MM3 (ref 0.7–3.1)
LYMPHOCYTES NFR BLD AUTO: 6.1 % (ref 19.6–45.3)
Lab: ABNORMAL
MCH RBC QN AUTO: 29.2 PG (ref 26.6–33)
MCHC RBC AUTO-ENTMCNC: 35.4 G/DL (ref 31.5–35.7)
MCV RBC AUTO: 82.4 FL (ref 79–97)
METHADONE UR QL SCN: NEGATIVE
METHGB BLD QL: 0.5 % (ref 0–3)
MODALITY: ABNORMAL
MONOCYTES # BLD AUTO: 1.43 10*3/MM3 (ref 0.1–0.9)
MONOCYTES NFR BLD AUTO: 5.9 % (ref 5–12)
NEUTROPHILS NFR BLD AUTO: 20.97 10*3/MM3 (ref 1.7–7)
NEUTROPHILS NFR BLD AUTO: 87 % (ref 42.7–76)
NITRITE UR QL STRIP: NEGATIVE
NRBC BLD AUTO-RTO: 0 /100 WBC (ref 0–0.2)
OPIATES UR QL: NEGATIVE
OXYCODONE UR QL SCN: NEGATIVE
OXYHGB MFR BLDV: 94.2 % (ref 94–99)
PCO2 BLDA: 32.3 MM HG (ref 35–45)
PCO2 TEMP ADJ BLD: ABNORMAL MM[HG]
PCP UR QL SCN: NEGATIVE
PH BLDA: 7.42 PH UNITS (ref 7.35–7.45)
PH UR STRIP.AUTO: <=5 [PH] (ref 5–8)
PH, TEMP CORRECTED: ABNORMAL
PLATELET # BLD AUTO: 428 10*3/MM3 (ref 140–450)
PMV BLD AUTO: 9.1 FL (ref 6–12)
PO2 BLDA: 79.8 MM HG (ref 83–108)
PO2 TEMP ADJ BLD: ABNORMAL MM[HG]
POTASSIUM SERPL-SCNC: 4.5 MMOL/L (ref 3.5–5.2)
PROT SERPL-MCNC: 8.9 G/DL (ref 6–8.5)
PROT UR QL STRIP: ABNORMAL
RBC # BLD AUTO: 5.69 10*6/MM3 (ref 4.14–5.8)
RBC # UR STRIP: ABNORMAL /HPF
REF LAB TEST METHOD: ABNORMAL
SAO2 % BLDCOA: 95.5 % (ref 94–99)
SODIUM SERPL-SCNC: 130 MMOL/L (ref 136–145)
SP GR UR STRIP: 1.02 (ref 1–1.03)
SQUAMOUS #/AREA URNS HPF: ABNORMAL /HPF
TRICYCLICS UR QL SCN: NEGATIVE
UROBILINOGEN UR QL STRIP: ABNORMAL
VENTILATOR MODE: ABNORMAL
WBC # UR STRIP: ABNORMAL /HPF
WBC NRBC COR # BLD AUTO: 24.1 10*3/MM3 (ref 3.4–10.8)
WHOLE BLOOD HOLD COAG: NORMAL
WHOLE BLOOD HOLD SPECIMEN: NORMAL

## 2024-07-05 PROCEDURE — 36415 COLL VENOUS BLD VENIPUNCTURE: CPT

## 2024-07-05 PROCEDURE — 82009 KETONE BODYS QUAL: CPT | Performed by: PHYSICIAN ASSISTANT

## 2024-07-05 PROCEDURE — 82550 ASSAY OF CK (CPK): CPT | Performed by: INTERNAL MEDICINE

## 2024-07-05 PROCEDURE — 82948 REAGENT STRIP/BLOOD GLUCOSE: CPT

## 2024-07-05 PROCEDURE — 82805 BLOOD GASES W/O2 SATURATION: CPT

## 2024-07-05 PROCEDURE — 36600 WITHDRAWAL OF ARTERIAL BLOOD: CPT

## 2024-07-05 PROCEDURE — 74176 CT ABD & PELVIS W/O CONTRAST: CPT | Performed by: RADIOLOGY

## 2024-07-05 PROCEDURE — 71045 X-RAY EXAM CHEST 1 VIEW: CPT | Performed by: RADIOLOGY

## 2024-07-05 PROCEDURE — 25810000003 SODIUM CHLORIDE 0.9 % SOLUTION: Performed by: PHYSICIAN ASSISTANT

## 2024-07-05 PROCEDURE — 81001 URINALYSIS AUTO W/SCOPE: CPT | Performed by: INTERNAL MEDICINE

## 2024-07-05 PROCEDURE — 83690 ASSAY OF LIPASE: CPT | Performed by: PHYSICIAN ASSISTANT

## 2024-07-05 PROCEDURE — 93005 ELECTROCARDIOGRAM TRACING: CPT | Performed by: PHYSICIAN ASSISTANT

## 2024-07-05 PROCEDURE — 80053 COMPREHEN METABOLIC PANEL: CPT | Performed by: PHYSICIAN ASSISTANT

## 2024-07-05 PROCEDURE — 87040 BLOOD CULTURE FOR BACTERIA: CPT | Performed by: PHYSICIAN ASSISTANT

## 2024-07-05 PROCEDURE — 51798 US URINE CAPACITY MEASURE: CPT

## 2024-07-05 PROCEDURE — 83050 HGB METHEMOGLOBIN QUAN: CPT

## 2024-07-05 PROCEDURE — 82077 ASSAY SPEC XCP UR&BREATH IA: CPT | Performed by: PHYSICIAN ASSISTANT

## 2024-07-05 PROCEDURE — 83605 ASSAY OF LACTIC ACID: CPT | Performed by: PHYSICIAN ASSISTANT

## 2024-07-05 PROCEDURE — 80307 DRUG TEST PRSMV CHEM ANLYZR: CPT | Performed by: INTERNAL MEDICINE

## 2024-07-05 PROCEDURE — 83036 HEMOGLOBIN GLYCOSYLATED A1C: CPT | Performed by: PHYSICIAN ASSISTANT

## 2024-07-05 PROCEDURE — 25010000002 PROCHLORPERAZINE 10 MG/2ML SOLUTION

## 2024-07-05 PROCEDURE — 71045 X-RAY EXAM CHEST 1 VIEW: CPT

## 2024-07-05 PROCEDURE — 85025 COMPLETE CBC W/AUTO DIFF WBC: CPT | Performed by: PHYSICIAN ASSISTANT

## 2024-07-05 PROCEDURE — 99285 EMERGENCY DEPT VISIT HI MDM: CPT

## 2024-07-05 PROCEDURE — 25010000002 DROPERIDOL PER 5 MG: Performed by: PHYSICIAN ASSISTANT

## 2024-07-05 PROCEDURE — 25810000003 LACTATED RINGERS PER 1000 ML: Performed by: INTERNAL MEDICINE

## 2024-07-05 PROCEDURE — 36415 COLL VENOUS BLD VENIPUNCTURE: CPT | Performed by: PHYSICIAN ASSISTANT

## 2024-07-05 PROCEDURE — 86140 C-REACTIVE PROTEIN: CPT | Performed by: PHYSICIAN ASSISTANT

## 2024-07-05 PROCEDURE — 93010 ELECTROCARDIOGRAM REPORT: CPT | Performed by: INTERNAL MEDICINE

## 2024-07-05 PROCEDURE — 99223 1ST HOSP IP/OBS HIGH 75: CPT

## 2024-07-05 PROCEDURE — 25010000002 METOCLOPRAMIDE PER 10 MG: Performed by: PHYSICIAN ASSISTANT

## 2024-07-05 PROCEDURE — 25010000002 ONDANSETRON PER 1 MG: Performed by: INTERNAL MEDICINE

## 2024-07-05 PROCEDURE — 74176 CT ABD & PELVIS W/O CONTRAST: CPT

## 2024-07-05 PROCEDURE — 63710000001 INSULIN LISPRO (HUMAN) PER 5 UNITS: Performed by: INTERNAL MEDICINE

## 2024-07-05 PROCEDURE — 25010000002 PIPERACILLIN SOD-TAZOBACTAM PER 1 G: Performed by: PHYSICIAN ASSISTANT

## 2024-07-05 PROCEDURE — 82375 ASSAY CARBOXYHB QUANT: CPT

## 2024-07-05 RX ORDER — NICOTINE POLACRILEX 4 MG
15 LOZENGE BUCCAL
Status: DISCONTINUED | OUTPATIENT
Start: 2024-07-05 | End: 2024-07-09 | Stop reason: HOSPADM

## 2024-07-05 RX ORDER — SODIUM CHLORIDE 0.9 % (FLUSH) 0.9 %
10 SYRINGE (ML) INJECTION AS NEEDED
Status: DISCONTINUED | OUTPATIENT
Start: 2024-07-05 | End: 2024-07-09 | Stop reason: HOSPADM

## 2024-07-05 RX ORDER — HEPARIN SODIUM 5000 [USP'U]/ML
5000 INJECTION, SOLUTION INTRAVENOUS; SUBCUTANEOUS EVERY 8 HOURS SCHEDULED
Status: DISCONTINUED | OUTPATIENT
Start: 2024-07-05 | End: 2024-07-09 | Stop reason: HOSPADM

## 2024-07-05 RX ORDER — DROPERIDOL 2.5 MG/ML
1.25 INJECTION, SOLUTION INTRAMUSCULAR; INTRAVENOUS ONCE
Status: COMPLETED | OUTPATIENT
Start: 2024-07-05 | End: 2024-07-05

## 2024-07-05 RX ORDER — POLYETHYLENE GLYCOL 3350 17 G/17G
17 POWDER, FOR SOLUTION ORAL DAILY PRN
Status: DISCONTINUED | OUTPATIENT
Start: 2024-07-05 | End: 2024-07-09 | Stop reason: HOSPADM

## 2024-07-05 RX ORDER — GLUCAGON 1 MG/ML
1 KIT INJECTION
Status: DISCONTINUED | OUTPATIENT
Start: 2024-07-05 | End: 2024-07-09 | Stop reason: HOSPADM

## 2024-07-05 RX ORDER — PROCHLORPERAZINE EDISYLATE 5 MG/ML
5 INJECTION INTRAMUSCULAR; INTRAVENOUS ONCE
Status: COMPLETED | OUTPATIENT
Start: 2024-07-05 | End: 2024-07-05

## 2024-07-05 RX ORDER — DEXTROSE MONOHYDRATE 25 G/50ML
25 INJECTION, SOLUTION INTRAVENOUS
Status: DISCONTINUED | OUTPATIENT
Start: 2024-07-05 | End: 2024-07-09 | Stop reason: HOSPADM

## 2024-07-05 RX ORDER — ONDANSETRON 2 MG/ML
4 INJECTION INTRAMUSCULAR; INTRAVENOUS EVERY 6 HOURS PRN
Status: DISCONTINUED | OUTPATIENT
Start: 2024-07-05 | End: 2024-07-09 | Stop reason: HOSPADM

## 2024-07-05 RX ORDER — AMOXICILLIN 250 MG
2 CAPSULE ORAL 2 TIMES DAILY
Status: DISCONTINUED | OUTPATIENT
Start: 2024-07-05 | End: 2024-07-09 | Stop reason: HOSPADM

## 2024-07-05 RX ORDER — ERGOCALCIFEROL 1.25 MG/1
50000 CAPSULE ORAL WEEKLY
COMMUNITY

## 2024-07-05 RX ORDER — CALCIUM CARBONATE 500 MG/1
2 TABLET, CHEWABLE ORAL 2 TIMES DAILY PRN
Status: DISCONTINUED | OUTPATIENT
Start: 2024-07-05 | End: 2024-07-09 | Stop reason: HOSPADM

## 2024-07-05 RX ORDER — SODIUM CHLORIDE 9 MG/ML
40 INJECTION, SOLUTION INTRAVENOUS AS NEEDED
Status: DISCONTINUED | OUTPATIENT
Start: 2024-07-05 | End: 2024-07-09 | Stop reason: HOSPADM

## 2024-07-05 RX ORDER — SEMAGLUTIDE 0.68 MG/ML
0.25 INJECTION, SOLUTION SUBCUTANEOUS WEEKLY
COMMUNITY
End: 2024-07-09 | Stop reason: HOSPADM

## 2024-07-05 RX ORDER — BISACODYL 10 MG
10 SUPPOSITORY, RECTAL RECTAL DAILY PRN
Status: DISCONTINUED | OUTPATIENT
Start: 2024-07-05 | End: 2024-07-09 | Stop reason: HOSPADM

## 2024-07-05 RX ORDER — BISACODYL 5 MG/1
5 TABLET, DELAYED RELEASE ORAL DAILY PRN
Status: DISCONTINUED | OUTPATIENT
Start: 2024-07-05 | End: 2024-07-09 | Stop reason: HOSPADM

## 2024-07-05 RX ORDER — METOCLOPRAMIDE HYDROCHLORIDE 5 MG/ML
10 INJECTION INTRAMUSCULAR; INTRAVENOUS ONCE
Status: COMPLETED | OUTPATIENT
Start: 2024-07-05 | End: 2024-07-05

## 2024-07-05 RX ORDER — SODIUM CHLORIDE, SODIUM LACTATE, POTASSIUM CHLORIDE, CALCIUM CHLORIDE 600; 310; 30; 20 MG/100ML; MG/100ML; MG/100ML; MG/100ML
125 INJECTION, SOLUTION INTRAVENOUS CONTINUOUS
Status: DISCONTINUED | OUTPATIENT
Start: 2024-07-05 | End: 2024-07-06

## 2024-07-05 RX ORDER — INSULIN LISPRO 100 [IU]/ML
2-9 INJECTION, SOLUTION INTRAVENOUS; SUBCUTANEOUS
Status: DISCONTINUED | OUTPATIENT
Start: 2024-07-05 | End: 2024-07-09 | Stop reason: HOSPADM

## 2024-07-05 RX ORDER — SODIUM CHLORIDE 0.9 % (FLUSH) 0.9 %
10 SYRINGE (ML) INJECTION EVERY 12 HOURS SCHEDULED
Status: DISCONTINUED | OUTPATIENT
Start: 2024-07-05 | End: 2024-07-09 | Stop reason: HOSPADM

## 2024-07-05 RX ADMIN — INSULIN LISPRO 6 UNITS: 100 INJECTION, SOLUTION INTRAVENOUS; SUBCUTANEOUS at 17:51

## 2024-07-05 RX ADMIN — SODIUM CHLORIDE 1000 ML: 9 INJECTION, SOLUTION INTRAVENOUS at 13:43

## 2024-07-05 RX ADMIN — ONDANSETRON 4 MG: 2 INJECTION INTRAMUSCULAR; INTRAVENOUS at 17:54

## 2024-07-05 RX ADMIN — Medication 10 ML: at 21:08

## 2024-07-05 RX ADMIN — METOCLOPRAMIDE 10 MG: 5 INJECTION, SOLUTION INTRAMUSCULAR; INTRAVENOUS at 12:54

## 2024-07-05 RX ADMIN — ANTACID TABLETS 2 TABLET: 500 TABLET, CHEWABLE ORAL at 17:54

## 2024-07-05 RX ADMIN — INSULIN LISPRO 2 UNITS: 100 INJECTION, SOLUTION INTRAVENOUS; SUBCUTANEOUS at 21:08

## 2024-07-05 RX ADMIN — PIPERACILLIN SODIUM AND TAZOBACTAM SODIUM 3.38 G: 3; .375 INJECTION, POWDER, LYOPHILIZED, FOR SOLUTION INTRAVENOUS at 13:50

## 2024-07-05 RX ADMIN — SODIUM CHLORIDE, POTASSIUM CHLORIDE, SODIUM LACTATE AND CALCIUM CHLORIDE 100 ML/HR: 600; 310; 30; 20 INJECTION, SOLUTION INTRAVENOUS at 15:30

## 2024-07-05 RX ADMIN — SODIUM CHLORIDE 1000 ML: 9 INJECTION, SOLUTION INTRAVENOUS at 12:40

## 2024-07-05 RX ADMIN — PROCHLORPERAZINE EDISYLATE 5 MG: 5 INJECTION INTRAMUSCULAR; INTRAVENOUS at 22:52

## 2024-07-05 RX ADMIN — DROPERIDOL 1.25 MG: 2.5 INJECTION, SOLUTION INTRAMUSCULAR; INTRAVENOUS at 13:44

## 2024-07-05 NOTE — ED NOTES
Pt advised the need for urine sample still. Urine collection supplies at bedside. Pt states still unable to provide. Provider aware

## 2024-07-05 NOTE — ED PROVIDER NOTES
Subjective   History of Present Illness  29-year-old male with past medical history of diabetes and cannabis use presents to the emergency room with intractable vomiting and epigastric abdominal pain which began yesterday.  Patient states he has been like this all week but became worse yesterday after getting synthetic marijuana from a gas station.  He states this happens every time he uses synthetic marijuana and used it this week secondary to depression.  Denies any radiation of his abdominal pain.  He does endorse nausea and vomiting.  Denies any recent illness, fever, chills, body aches.  Denies any other illicit drug use.  Denies any specific alleviating factors.  Denies any other complaints or concerns at this time.    History provided by:  Patient   used: No        Review of Systems   Constitutional: Negative.  Negative for fever.   HENT: Negative.     Respiratory: Negative.     Cardiovascular: Negative.  Negative for chest pain.   Gastrointestinal:  Positive for abdominal pain and vomiting.   Endocrine: Negative.    Genitourinary: Negative.  Negative for dysuria.   Skin: Negative.    Neurological: Negative.    Psychiatric/Behavioral: Negative.     All other systems reviewed and are negative.      Past Medical History:   Diagnosis Date    Diabetes mellitus     Tobacco abuse        No Known Allergies    Past Surgical History:   Procedure Laterality Date    ENDOSCOPY N/A 6/1/2022    Procedure: ESOPHAGOGASTRODUODENOSCOPY WITH ANESTHESIA;  Surgeon: Keon Quezada MD;  Location: [facility];  Service: Gastroenterology;  Laterality: N/A;       Family History   Problem Relation Age of Onset    Heart disease Mother     Diabetes Mother     Kidney disease Mother     Heart disease Father     Diabetes Father     Heart disease Maternal Grandmother     Diabetes Maternal Grandmother     Heart disease Maternal Grandfather     Diabetes Maternal Grandfather     Heart disease Paternal Grandmother      Diabetes Paternal Grandmother     Heart disease Paternal Grandfather     Diabetes Paternal Grandfather        Social History     Socioeconomic History    Marital status: Single   Tobacco Use    Smoking status: Every Day     Current packs/day: 2.00     Average packs/day: 2.0 packs/day for 15.0 years (30.0 ttl pk-yrs)     Types: Cigarettes     Passive exposure: Never    Smokeless tobacco: Never   Vaping Use    Vaping status: Former    Substances: Nicotine, Flavoring    Devices: Disposable    Passive vaping exposure: Yes   Substance and Sexual Activity    Alcohol use: Never    Drug use: Yes     Frequency: 7.0 times per week     Types: Marijuana    Sexual activity: Defer           Objective   Physical Exam  Vitals and nursing note reviewed.   Constitutional:       General: He is not in acute distress.     Appearance: He is well-developed. He is not diaphoretic.      Comments: Continuous retching   HENT:      Head: Normocephalic and atraumatic.      Right Ear: External ear normal.      Left Ear: External ear normal.      Nose: Nose normal.   Eyes:      Conjunctiva/sclera: Conjunctivae normal.      Pupils: Pupils are equal, round, and reactive to light.   Neck:      Vascular: No JVD.      Trachea: No tracheal deviation.   Cardiovascular:      Rate and Rhythm: Normal rate and regular rhythm.      Heart sounds: Normal heart sounds. No murmur heard.  Pulmonary:      Effort: Pulmonary effort is normal. No respiratory distress.      Breath sounds: Normal breath sounds. No wheezing.   Abdominal:      General: Bowel sounds are normal.      Palpations: Abdomen is soft.      Tenderness: There is no abdominal tenderness in the epigastric area.   Musculoskeletal:         General: No deformity. Normal range of motion.      Cervical back: Normal range of motion and neck supple.   Skin:     General: Skin is warm and dry.      Coloration: Skin is not pale.      Findings: No erythema or rash.   Neurological:      Mental Status: He is  alert and oriented to person, place, and time.      Cranial Nerves: No cranial nerve deficit.   Psychiatric:         Behavior: Behavior normal.         Thought Content: Thought content normal.         Procedures           ED Course  ED Course as of 07/05/24 2333 Fri Jul 05, 2024   1345 Corrected sodium 136 [TK]   1429 Acetone(!!): Trace [TK]   1429 CT Abdomen Pelvis Without Contrast [TK]   1431 XR Chest 1 View [TK]   1456 Bladder scan yielded 95cc [TK]   1457 Paged hospitalist [TK]   1510 Spoke with Dr. Desir, he will admit patient to hospitalist services. [TK]      ED Course User Index  [TK] Louis Valentine, JESICA                                   Results for orders placed or performed during the hospital encounter of 07/05/24   Comprehensive Metabolic Panel    Specimen: Arm, Right; Blood   Result Value Ref Range    Glucose 335 (H) 65 - 99 mg/dL    BUN 55 (H) 6 - 20 mg/dL    Creatinine 6.55 (H) 0.76 - 1.27 mg/dL    Sodium 130 (L) 136 - 145 mmol/L    Potassium 4.5 3.5 - 5.2 mmol/L    Chloride 78 (L) 98 - 107 mmol/L    CO2 20.1 (L) 22.0 - 29.0 mmol/L    Calcium 10.3 8.6 - 10.5 mg/dL    Total Protein 8.9 (H) 6.0 - 8.5 g/dL    Albumin 5.0 3.5 - 5.2 g/dL    ALT (SGPT) 13 1 - 41 U/L    AST (SGOT) 25 1 - 40 U/L    Alkaline Phosphatase 91 39 - 117 U/L    Total Bilirubin 0.7 0.0 - 1.2 mg/dL    Globulin 3.9 gm/dL    A/G Ratio 1.3 g/dL    BUN/Creatinine Ratio 8.4 7.0 - 25.0    Anion Gap 31.9 (H) 5.0 - 15.0 mmol/L    eGFR 10.8 (L) >60.0 mL/min/1.73   Lipase    Specimen: Arm, Right; Blood   Result Value Ref Range    Lipase 23 13 - 60 U/L   Urinalysis With Microscopic If Indicated (No Culture) - Urine, Clean Catch    Specimen: Urine, Clean Catch   Result Value Ref Range    Color, UA Yellow Yellow, Straw    Appearance, UA Clear Clear    pH, UA <=5.0 5.0 - 8.0    Specific Gravity, UA 1.018 1.005 - 1.030    Glucose,  mg/dL (1+) (A) Negative    Ketones, UA 15 mg/dL (1+) (A) Negative    Bilirubin, UA Negative Negative     Blood, UA Moderate (2+) (A) Negative    Protein, UA >=300 mg/dL (3+) (A) Negative    Leuk Esterase, UA Negative Negative    Nitrite, UA Negative Negative    Urobilinogen, UA 0.2 E.U./dL 0.2 - 1.0 E.U./dL   Urine Drug Screen - Urine, Clean Catch    Specimen: Urine, Clean Catch   Result Value Ref Range    THC, Screen, Urine Positive (A) Negative    Phencyclidine (PCP), Urine Negative Negative    Cocaine Screen, Urine Negative Negative    Methamphetamine, Ur Negative Negative    Opiate Screen Negative Negative    Amphetamine Screen, Urine Negative Negative    Benzodiazepine Screen, Urine Negative Negative    Tricyclic Antidepressants Screen Negative Negative    Methadone Screen, Urine Negative Negative    Barbiturates Screen, Urine Negative Negative    Oxycodone Screen, Urine Negative Negative    Buprenorphine, Screen, Urine Negative Negative   CBC Auto Differential    Specimen: Arm, Right; Blood   Result Value Ref Range    WBC 24.10 (H) 3.40 - 10.80 10*3/mm3    RBC 5.69 4.14 - 5.80 10*6/mm3    Hemoglobin 16.6 13.0 - 17.7 g/dL    Hematocrit 46.9 37.5 - 51.0 %    MCV 82.4 79.0 - 97.0 fL    MCH 29.2 26.6 - 33.0 pg    MCHC 35.4 31.5 - 35.7 g/dL    RDW 12.6 12.3 - 15.4 %    RDW-SD 37.7 37.0 - 54.0 fl    MPV 9.1 6.0 - 12.0 fL    Platelets 428 140 - 450 10*3/mm3    Neutrophil % 87.0 (H) 42.7 - 76.0 %    Lymphocyte % 6.1 (L) 19.6 - 45.3 %    Monocyte % 5.9 5.0 - 12.0 %    Eosinophil % 0.1 (L) 0.3 - 6.2 %    Basophil % 0.2 0.0 - 1.5 %    Immature Grans % 0.7 (H) 0.0 - 0.5 %    Neutrophils, Absolute 20.97 (H) 1.70 - 7.00 10*3/mm3    Lymphocytes, Absolute 1.46 0.70 - 3.10 10*3/mm3    Monocytes, Absolute 1.43 (H) 0.10 - 0.90 10*3/mm3    Eosinophils, Absolute 0.03 0.00 - 0.40 10*3/mm3    Basophils, Absolute 0.04 0.00 - 0.20 10*3/mm3    Immature Grans, Absolute 0.17 (H) 0.00 - 0.05 10*3/mm3    nRBC 0.0 0.0 - 0.2 /100 WBC   Lactic Acid, Plasma    Specimen: Blood   Result Value Ref Range    Lactate 2.8 (C) 0.5 - 2.0 mmol/L   Acetone     Specimen: Arm, Left; Blood   Result Value Ref Range    Acetone Trace (C) Negative   Hemoglobin A1c    Specimen: Arm, Right; Blood   Result Value Ref Range    Hemoglobin A1C 8.80 (H) 4.80 - 5.60 %   C-reactive Protein    Specimen: Arm, Right; Blood   Result Value Ref Range    C-Reactive Protein 0.86 (H) 0.00 - 0.50 mg/dL   Blood Gas, Arterial With Co-Ox    Specimen: Arterial Blood   Result Value Ref Range    Site Left Brachial     Butch's Test N/A     pH, Arterial 7.425 7.350 - 7.450 pH units    pCO2, Arterial 32.3 (L) 35.0 - 45.0 mm Hg    pO2, Arterial 79.8 (L) 83.0 - 108.0 mm Hg    HCO3, Arterial 21.2 20.0 - 26.0 mmol/L    Base Excess, Arterial -2.3 (L) 0.0 - 2.0 mmol/L    O2 Saturation, Arterial 95.5 94.0 - 99.0 %    Hemoglobin, Blood Gas 15.2 14 - 18 g/dL    Hematocrit, Blood Gas 46.5 38.0 - 51.0 %    Oxyhemoglobin 94.2 94 - 99 %    Methemoglobin 0.50 0.00 - 3.00 %    Carboxyhemoglobin 0.9 0 - 5 %    A-a DO2 25.9 0.0 - 300.0 mmHg    CO2 Content 22.1 22 - 33 mmol/L    Barometric Pressure for Blood Gas 724 mmHg    Modality Room Air     FIO2 21 %    Ventilator Mode NA     Collected by [id number]     pH, Temp Corrected      pCO2, Temperature Corrected      pO2, Temperature Corrected     STAT Lactic Acid, Reflex    Specimen: Blood   Result Value Ref Range    Lactate 2.0 0.5 - 2.0 mmol/L   Ethanol    Specimen: Arm, Right; Blood   Result Value Ref Range    Ethanol <10 0 - 10 mg/dL    Ethanol % <0.010 %   CK    Specimen: Arm, Right; Blood   Result Value Ref Range    Creatine Kinase 705 (H) 20 - 200 U/L   Fentanyl, Urine - Urine, Clean Catch    Specimen: Urine, Clean Catch   Result Value Ref Range    Fentanyl, Urine Negative Negative   Urinalysis, Microscopic Only - Urine, Clean Catch    Specimen: Urine, Clean Catch   Result Value Ref Range    RBC, UA 11-20 (A) None Seen, 0-2 /HPF    WBC, UA 3-5 (A) None Seen, 0-2 /HPF    Bacteria, UA None Seen None Seen /HPF    Squamous Epithelial Cells, UA 0-2 None Seen, 0-2 /HPF     Hyaline Casts, UA 3-6 None Seen /LPF    Methodology Automated Microscopy    POC Glucose Once    Specimen: Blood   Result Value Ref Range    Glucose 259 (H) 70 - 130 mg/dL   POC Glucose Once    Specimen: Blood   Result Value Ref Range    Glucose 258 (H) 70 - 130 mg/dL   POC Glucose Once    Specimen: Blood   Result Value Ref Range    Glucose 186 (H) 70 - 130 mg/dL   ECG 12 Lead QT Measurement   Result Value Ref Range    QT Interval 352 ms    QTC Interval 435 ms   Green Top (Gel)   Result Value Ref Range    Extra Tube Hold for add-ons.    Lavender Top   Result Value Ref Range    Extra Tube hold for add-on    Gold Top - SST   Result Value Ref Range    Extra Tube Hold for add-ons.    Light Blue Top   Result Value Ref Range    Extra Tube Hold for add-ons.        XR Chest 1 View   Final Result   No radiographic evidence of acute cardiac or pulmonary disease.               This report was finalized on 7/5/2024 2:29 PM by Dr. Vishnu Almazan MD.          CT Abdomen Pelvis Without Contrast   Final Result       1. Moderate to large stool burden       2. Other findings as above                                   This report was finalized on 7/5/2024 2:23 PM by Dr. Vishnu Almazan MD.                        Medical Decision Making  29-year-old male with past medical history of diabetes and cannabis use presents to the emergency room with intractable vomiting and epigastric abdominal pain which began yesterday.  Patient states he has been like this all week but became worse yesterday after getting synthetic marijuana from a gas station.  He states this happens every time he uses synthetic marijuana and used it this week secondary to depression.  Denies any radiation of his abdominal pain.  He does endorse nausea and vomiting.  Denies any recent illness, fever, chills, body aches.  Denies any other illicit drug use.  Denies any specific alleviating factors.  Denies any other complaints or concerns at this time.      Problems  Addressed:  Acute renal failure superimposed on chronic kidney disease, unspecified acute renal failure type, unspecified CKD stage: complicated acute illness or injury  Cannabinoid hyperemesis syndrome: complicated acute illness or injury  Sepsis, due to unspecified organism, unspecified whether acute organ dysfunction present: complicated acute illness or injury    Amount and/or Complexity of Data Reviewed  Labs: ordered. Decision-making details documented in ED Course.  Radiology: ordered. Decision-making details documented in ED Course.  ECG/medicine tests: ordered.  Discussion of management or test interpretation with external provider(s): Thang Gunter  Prescription drug management.  Decision regarding hospitalization.        Final diagnoses:   Acute renal failure superimposed on chronic kidney disease, unspecified acute renal failure type, unspecified CKD stage   Sepsis, due to unspecified organism, unspecified whether acute organ dysfunction present   Cannabinoid hyperemesis syndrome       ED Disposition  ED Disposition       ED Disposition   Decision to Admit    Condition   --    Comment   Level of Care: Med/Surg [1]   Diagnosis: MINA (acute kidney injury) [717653]   Admitting Physician: EM DUENAS [[id number]]   Attending Physician: EM DUENAS [[id number]]   Certification: I Certify That Inpatient Hospital Services Are Medically Necessary For Greater Than 2 Midnights                 No follow-up provider specified.       Medication List      No changes were made to your prescriptions during this visit.            Louis Valentine PA-C  07/05/24 [id number]

## 2024-07-05 NOTE — H&P
"    [facility] Medicine Services  History & Physical    Patient Identification:  Name:  Oliver Osborn  Age:  29 y.o.  Sex:  male  :  1995  MRN:  [id number]   Visit Number:  [id number]  Admit Date: 2024   Primary Care Physician:  Geri Belcher APRN    Subjective     Chief complaint: Vomiting    History of presenting illness:      Oliver Osborn is a 29 y.o. male who presented for further evaluation of nausea, vomiting, epigastric abdominal pain. He was seen and examined in the ED with no family present at bedside. He is ill appearing. He reports he smoked marijuana about a week and a half ago and \"I don't react well to it.\" He reports sudden onset nausea and vomiting about 2 days ago and states he hasn't urinated in 2 days either. On further discussion he did report that he was started on ozempic a little over a month ago and he hasn't been able to eat or drink very much since initial dose. His last dose was 8 days ago. He denies any diarrhea. No report of constipation. No fever or chills. Further review of systems was negative. He is an insulin requiring diabetic but since onset on N/V he has not taken any insulin. He denies the use of any other illicit drugs. He does not drink alcohol.    Past medical history is significant for IDDM, Hx suspected cannabinoid hyperemesis syndrome, Hx tobacco abuse    Upon arrival to the ED, vital signs were temp 98.2, heart rate 102, respirations 20, /69, SpO2 99% on RA.  CMP in the ED noted sodium low at 130, anion gap elevated at 31.9 with bicarb 20.1.  Creatinine is 6.55 with BUN 55.  Glucose was 335.  A1c is 8.8.  Trace acetone detected, ABG showed normal pH.  CRP was 0.86, lactate is 2.8, white blood cell count is 24.1.  CXR was negative, CT abdomen pelvis only noted moderate to large stool burden.    Known Emergency Department medications received prior to my evaluation included droperidol, Reglan, Zosyn, 2 L normal saline, now on LR " infusion.   Emergency Department Room location at the time of my evaluation was 409.     ---------------------------------------------------------------------------------------------------------------------   Review of Systems   Constitutional:  Positive for activity change and appetite change. Negative for chills and fever.   HENT:  Negative for congestion and rhinorrhea.    Respiratory:  Negative for cough and shortness of breath.    Cardiovascular:  Negative for chest pain and palpitations.   Gastrointestinal:  Positive for abdominal pain, nausea and vomiting. Negative for constipation and diarrhea.   Genitourinary:  Positive for decreased urine volume. Negative for flank pain.   Musculoskeletal:  Negative for arthralgias and myalgias.   Skin:  Negative for rash and wound.   Neurological:  Negative for dizziness and light-headedness.        ---------------------------------------------------------------------------------------------------------------------   Past Medical History:   Diagnosis Date    Diabetes mellitus     Tobacco abuse      Past Surgical History:   Procedure Laterality Date    ENDOSCOPY N/A 6/1/2022    Procedure: ESOPHAGOGASTRODUODENOSCOPY WITH ANESTHESIA;  Surgeon: Keon Quezada MD;  Location: [facility] OR;  Service: Gastroenterology;  Laterality: N/A;     Family History   Problem Relation Age of Onset    Heart disease Mother     Diabetes Mother     Kidney disease Mother     Heart disease Father     Diabetes Father     Heart disease Maternal Grandmother     Diabetes Maternal Grandmother     Heart disease Maternal Grandfather     Diabetes Maternal Grandfather     Heart disease Paternal Grandmother     Diabetes Paternal Grandmother     Heart disease Paternal Grandfather     Diabetes Paternal Grandfather      Social History     Socioeconomic History    Marital status: Single   Tobacco Use    Smoking status: Every Day     Current packs/day: 2.00     Average packs/day: 2.0 packs/day for 15.0  years (30.0 ttl pk-yrs)     Types: Cigarettes     Passive exposure: Never    Smokeless tobacco: Never   Vaping Use    Vaping status: Former    Substances: Nicotine, Flavoring    Devices: Disposable    Passive vaping exposure: Yes   Substance and Sexual Activity    Alcohol use: Never    Drug use: Not Currently     Frequency: 7.0 times per week     Types: Marijuana    Sexual activity: Defer     ---------------------------------------------------------------------------------------------------------------------   Allergies:  Patient has no known allergies.  ---------------------------------------------------------------------------------------------------------------------   Home medications:    Medications below are reported home medications pulling from within the system; at this time, these medications have not been reconciled unless otherwise specified and are in the verification process for further verifcation as current home medications.  (Not in a hospital admission)      Hospital Scheduled Meds:     lactated ringers, 100 mL/hr, Last Rate: 100 mL/hr (07/05/24 6800)        Current listed hospital scheduled medications may not yet reflect those currently placed in orders that are signed and held awaiting patient's arrival to floor.   ---------------------------------------------------------------------------------------------------------------------     Objective     Vital Signs:  Temp:  [98.2 °F (36.8 °C)] 98.2 °F (36.8 °C)  Heart Rate:  [] 82  Resp:  [20] 20  BP: (110-173)/(69-95) 158/80      07/05/24  1159   Weight: 90.7 kg (200 lb)     Body mass index is 32.28 kg/m².  ---------------------------------------------------------------------------------------------------------------------       Physical Exam  Vitals and nursing note reviewed.   Constitutional:       General: He is not in acute distress.     Appearance: He is obese. He is ill-appearing.   HENT:      Head: Normocephalic and atraumatic.   Eyes:  "     General: No scleral icterus.     Extraocular Movements: Extraocular movements intact.   Cardiovascular:      Rate and Rhythm: Regular rhythm. Tachycardia present.   Pulmonary:      Effort: Pulmonary effort is normal.      Breath sounds: Normal breath sounds.   Abdominal:      Palpations: Abdomen is soft.      Tenderness: There is no abdominal tenderness.   Musculoskeletal:      Right lower leg: No edema.      Left lower leg: No edema.   Skin:     General: Skin is warm and dry.   Neurological:      Mental Status: He is alert. Mental status is at baseline.   Psychiatric:         Mood and Affect: Mood normal.             ---------------------------------------------------------------------------------------------------------------------  EKG:        I have personally looked at the EKG.  ---------------------------------------------------------------------------------------------------------------------   Results from last 7 days   Lab Units 07/05/24  1349 07/05/24  1240   CRP mg/dL  --  0.86*   LACTATE mmol/L 2.8*  --    WBC 10*3/mm3  --  24.10*   HEMOGLOBIN g/dL  --  16.6   HEMATOCRIT %  --  46.9   MCV fL  --  82.4   MCHC g/dL  --  35.4   PLATELETS 10*3/mm3  --  428     Results from last 7 days   Lab Units 07/05/24  1340   PH, ARTERIAL pH units 7.425   PO2 ART mm Hg 79.8*   PCO2, ARTERIAL mm Hg 32.3*   HCO3 ART mmol/L 21.2     Results from last 7 days   Lab Units 07/05/24  1240   SODIUM mmol/L 130*   POTASSIUM mmol/L 4.5   CHLORIDE mmol/L 78*   CO2 mmol/L 20.1*   BUN mg/dL 55*   CREATININE mg/dL 6.55*   CALCIUM mg/dL 10.3   GLUCOSE mg/dL 335*   ALBUMIN g/dL 5.0   BILIRUBIN mg/dL 0.7   ALK PHOS U/L 91   AST (SGOT) U/L 25   ALT (SGPT) U/L 13   Estimated Creatinine Clearance: 17.2 mL/min (A) (by C-G formula based on SCr of 6.55 mg/dL (H)).  No results found for: \"AMMONIA\"          Lab Results   Component Value Date    HGBA1C 8.80 (H) 07/05/2024     Lab Results   Component Value Date    TSH 2.850 05/30/2022     No " "results found for: \"PREGTESTUR\", \"PREGSERUM\", \"HCG\", \"HCGQUANT\"  Pain Management Panel  More data exists         Latest Ref Rng & Units 12/12/2023 10/1/2023   Pain Management Panel   Creatinine, Urine mg/dL - 29.3    Amphetamine, Urine Qual Negative Negative  Negative    Barbiturates Screen, Urine Negative Negative  Negative    Benzodiazepine Screen, Urine Negative Negative  Negative    Buprenorphine, Screen, Urine Negative Negative  Negative    Cocaine Screen, Urine Negative Negative  Negative    Fentanyl, Urine Negative Negative  Negative    Methadone Screen , Urine Negative Negative  Negative    Methamphetamine, Ur Negative Negative  Negative      No results found for: \"BLOODCX\"  No results found for: \"URINECX\"  No results found for: \"WOUNDCX\"  No results found for: \"STOOLCX\"      ---------------------------------------------------------------------------------------------------------------------  Imaging Results (Last 7 Days)       Procedure Component Value Units Date/Time    XR Chest 1 View [[id number]] Collected: 07/05/24 1429     Updated: 07/05/24 1431    Narrative:      XR CHEST 1 VW-     CLINICAL INDICATION: vomiting; N17.9-Acute kidney failure, unspecified;  N18.9-Chronic kidney disease, unspecified        COMPARISON: 12/11/2023     TECHNIQUE: Single frontal view of the chest.     FINDINGS:     LUNGS: Lungs are adequately aerated.      HEART AND MEDIASTINUM: Heart and mediastinal contours are unremarkable        SKELETON: Bony and soft tissue structures are unremarkable.             Impression:      No radiographic evidence of acute cardiac or pulmonary disease.           This report was finalized on 7/5/2024 2:29 PM by Dr. iVshnu Almazan MD.       CT Abdomen Pelvis Without Contrast [[id number]] Collected: 07/05/24 1416     Updated: 07/05/24 1425    Narrative:      EXAM: CT ABDOMEN PELVIS WO CONTRAST-         TECHNIQUE: Multiple axial CT images were obtained from lung bases  through pubic symphysis WITHOUT " "administration of IV contrast.  Reformatted images in the coronal and/or sagittal plane(s) were  generated from the axial data set to facilitate diagnostic accuracy  and/or surgical planning.  Oral Contrast:NONE.     Radiation dose reduction techniques were utilized per ALARA protocol.  Automated exposure control was initiated through either or [employer] or  [employer] software packages by  protocol.    DOSE:     Clinical information epigastric abd pain; N17.9-Acute kidney failure,  unspecified; N18.9-Chronic kidney disease, unspecified      Comparison 12/11/2023     FINDINGS:     Lower thorax: Clear. No effusions.     Abdomen:     Liver: Homogeneous. No focal hepatic mass or ductal dilatation.     Gallbladder: No dilation or stone identified.     Pancreas: Unremarkable. No mass or ductal dilatation.     Spleen: Homogeneous. No splenomegaly.     Adrenals: No mass.     Kidneys/ureters: No mass. No obstructive uropathy.  No evidence of  urolithiasis.     GI tract: Non-dilated. No definite wall thickening.. Moderate to large  stool burden     MESENTERY: No free fluid, walled off fluid collections, mesenteric  stranding, or enlarged lymph nodes        Vasculature: No evidence of aneurysm.     Abdominal wall: No focal hernia or mass.        Bladder: No focal mass or significant wall thickening     Reproductive: Unremarkable as visualized     Bones: Grade 1 anterolisthesis L5 on S1       Impression:         1. Moderate to large stool burden     2. Other findings as above                          This report was finalized on 7/5/2024 2:23 PM by Dr. Vishnu Almazan MD.               Cultures:  No results found for: \"BLOODCX\", \"URINECX\", \"WOUNDCX\", \"MRSACX\", \"RESPCX\", \"STOOLCX\"    Last echocardiogram:  Results for orders placed during the hospital encounter of 04/19/23    Adult Transthoracic Echo Complete W/ Cont if Necessary Per Protocol    Interpretation Summary    Left ventricular systolic function is normal. " Estimated left ventricular EF = 65%    All left ventricular wall segments contract normally.    Left ventricular diastolic function was normal.    The cardiac chamber dimensions are normal.    All valves appear normal in structure and showed no stenosis or significant regurgitations.    No evidence of pulmonary hypertension is present.    There is no evidence of pericardial effusion.          I have personally reviewed the above radiology images and read the final radiology report on 07/05/24  ---------------------------------------------------------------------------------------------------------------------  Assessment / Plan     Active Hospital Problems    Diagnosis  POA    **MINA (acute kidney injury) [N17.9]  Yes       ASSESSMENT/PLAN:    Acute renal failure, suspect prerenal  SIRS  Nausea with vomiting  History of concern for cannabinoid hyperemesis syndrome  THC use  Patient presents secondary to reported 2-day history of nausea and vomiting, further reporting that he was started on Ozempic a little over 1 month ago and has been able to eat or drink very little since that time due to epigastric abdominal pain.  Lipase was WNL in the ED though creatinine is 6.55 with BUN 55.  Anion gap is 31.9.  He was hyperglycemic on arrival at 335 with trace acetone detected though pH normal on ABG.  Lactate is 2.8 which may be contributing, also concern for starvation ketosis.  CRP was 0.68, WBC count 24.1 with neutrophil percentage 87.0.  CK is elevated at 700.  He has been tachycardic in the ED.  He meets SIRS criteria-no source of infection per workup thus far.  CXR clear, CT abdomen and pelvis only noted moderate to large stool burden.  UA pending as patient has not been able to produce sample  Admit to [facility]  Consult nephrology for further assistance and recommendations  Obtain and follow-up UA with culture if indicated and UDS when able  CLD for now  Continue LR at 100 mill per hour  Avoid nephrotoxins as able.   Monitor I's and O's closely.  Repeat labs in the a.m. to trend  Continue supportive care measures    Poorly controlled IDDM with hyperglycemia  A1c 8.8.  Patient reports has not taken insulin since onset of N/V.  Start Lantus 15 units daily, 2 -9 units SSI with meals and at bedtime for now.  Monitor with Accu-Cheks and titrate as needed  Hypoglycemia protocol in place    Hx tobacco abuse  ----------  -DVT prophylaxis: Subcu heparin  -Activity: Ad alex.  -Expected length of stay: INPATIENT status due to the need for care which can only be reasonably provided in an hospital setting such as aggressive/expedited ancillary services and/or consultation services, the necessity for IV medications, close physician monitoring and/or the possible need for procedures.  In such, I feel patient’s risk for adverse outcomes and need for care warrant INPATIENT evaluation and predict the patient’s care encounter to likely last beyond 2 midnights.   -Disposition pending course    High risk secondary to acute renal failure    Code Status and Medical Interventions:   Ordered at: 07/05/24 1520     Code Status (Patient has no pulse and is not breathing):    CPR (Attempt to Resuscitate)     Medical Interventions (Patient has pulse or is breathing):    Full Support       Kerwin Loyd PA-C   07/05/24  15:47 EDT

## 2024-07-05 NOTE — PLAN OF CARE
Goal Outcome Evaluation:  Plan of Care Reviewed With: patient        Progress: no change  Outcome Evaluation: Pt transferred from ER. VSS on RA. Pt had c/o N/V, PRN medication given. Pt voices no other concerns. Will continue with poc.

## 2024-07-05 NOTE — ED NOTES
Pt asked for urine specimen and use of straight cath, pt does not wish to be cathed for urine at this time.

## 2024-07-06 ENCOUNTER — APPOINTMENT (OUTPATIENT)
Dept: GENERAL RADIOLOGY | Facility: HOSPITAL | Age: 29
End: 2024-07-06
Payer: MEDICAID

## 2024-07-06 LAB
A-A DO2: 44.4 MMHG (ref 0–300)
ACETONE BLD QL: NEGATIVE
ALBUMIN SERPL-MCNC: 3.9 G/DL (ref 3.5–5.2)
ALBUMIN/GLOB SERPL: 1.2 G/DL
ALP SERPL-CCNC: 70 U/L (ref 39–117)
ALT SERPL W P-5'-P-CCNC: 11 U/L (ref 1–41)
AMPHET+METHAMPHET UR QL: NEGATIVE
AMPHETAMINES UR QL: NEGATIVE
ANION GAP SERPL CALCULATED.3IONS-SCNC: 19 MMOL/L (ref 5–15)
ANION GAP SERPL CALCULATED.3IONS-SCNC: 21.5 MMOL/L (ref 5–15)
ANION GAP SERPL CALCULATED.3IONS-SCNC: 22.2 MMOL/L (ref 5–15)
ARTERIAL PATENCY WRIST A: ABNORMAL
AST SERPL-CCNC: 25 U/L (ref 1–40)
ATMOSPHERIC PRESS: 727 MMHG
ATMOSPHERIC PRESS: 727 MMHG
BARBITURATES UR QL SCN: NEGATIVE
BASE EXCESS BLDA CALC-SCNC: 6.8 MMOL/L (ref 0–2)
BASE EXCESS BLDV CALC-SCNC: 8.2 MMOL/L (ref 0–2)
BASOPHILS # BLD AUTO: 0.02 10*3/MM3 (ref 0–0.2)
BASOPHILS NFR BLD AUTO: 0.1 % (ref 0–1.5)
BDY SITE: ABNORMAL
BDY SITE: ABNORMAL
BENZODIAZ UR QL SCN: NEGATIVE
BILIRUB SERPL-MCNC: 0.4 MG/DL (ref 0–1.2)
BUN SERPL-MCNC: 46 MG/DL (ref 6–20)
BUN SERPL-MCNC: 50 MG/DL (ref 6–20)
BUN SERPL-MCNC: 51 MG/DL (ref 6–20)
BUN/CREAT SERPL: 10.4 (ref 7–25)
BUN/CREAT SERPL: 11.3 (ref 7–25)
BUN/CREAT SERPL: 12 (ref 7–25)
BUPRENORPHINE SERPL-MCNC: NEGATIVE NG/ML
CALCIUM SPEC-SCNC: 9.1 MG/DL (ref 8.6–10.5)
CALCIUM SPEC-SCNC: 9.2 MG/DL (ref 8.6–10.5)
CALCIUM SPEC-SCNC: 9.4 MG/DL (ref 8.6–10.5)
CANNABINOIDS SERPL QL: POSITIVE
CHLORIDE SERPL-SCNC: 84 MMOL/L (ref 98–107)
CHLORIDE SERPL-SCNC: 86 MMOL/L (ref 98–107)
CHLORIDE SERPL-SCNC: 89 MMOL/L (ref 98–107)
CO2 BLDA-SCNC: 27.5 MMOL/L (ref 22–33)
CO2 BLDA-SCNC: 33 MMOL/L (ref 22–33)
CO2 SERPL-SCNC: 24.5 MMOL/L (ref 22–29)
CO2 SERPL-SCNC: 25 MMOL/L (ref 22–29)
CO2 SERPL-SCNC: 25.8 MMOL/L (ref 22–29)
COCAINE UR QL: NEGATIVE
COHGB MFR BLD: 0.6 % (ref 0–5)
COHGB MFR BLD: 1.3 % (ref 0–5)
CREAT SERPL-MCNC: 3.82 MG/DL (ref 0.76–1.27)
CREAT SERPL-MCNC: 4.43 MG/DL (ref 0.76–1.27)
CREAT SERPL-MCNC: 4.91 MG/DL (ref 0.76–1.27)
DEPRECATED RDW RBC AUTO: 38.5 FL (ref 37–54)
EGFRCR SERPLBLD CKD-EPI 2021: 15.3 ML/MIN/1.73
EGFRCR SERPLBLD CKD-EPI 2021: 17.3 ML/MIN/1.73
EGFRCR SERPLBLD CKD-EPI 2021: 20.7 ML/MIN/1.73
EOSINOPHIL # BLD AUTO: 0 10*3/MM3 (ref 0–0.4)
EOSINOPHIL NFR BLD AUTO: 0 % (ref 0.3–6.2)
ERYTHROCYTE [DISTWIDTH] IN BLOOD BY AUTOMATED COUNT: 12.4 % (ref 12.3–15.4)
FENTANYL UR-MCNC: NEGATIVE NG/ML
GAS FLOW AIRWAY: 2 LPM
GEN 5 2HR TROPONIN T REFLEX: 41 NG/L
GLOBULIN UR ELPH-MCNC: 3.3 GM/DL
GLUCOSE BLDC GLUCOMTR-MCNC: 147 MG/DL (ref 70–130)
GLUCOSE BLDC GLUCOMTR-MCNC: 166 MG/DL (ref 70–130)
GLUCOSE BLDC GLUCOMTR-MCNC: 225 MG/DL (ref 70–130)
GLUCOSE BLDC GLUCOMTR-MCNC: 252 MG/DL (ref 70–130)
GLUCOSE BLDC GLUCOMTR-MCNC: 255 MG/DL (ref 70–130)
GLUCOSE SERPL-MCNC: 140 MG/DL (ref 65–99)
GLUCOSE SERPL-MCNC: 203 MG/DL (ref 65–99)
GLUCOSE SERPL-MCNC: 209 MG/DL (ref 65–99)
HCO3 BLDA-SCNC: 26.7 MMOL/L (ref 20–26)
HCO3 BLDV-SCNC: 31.8 MMOL/L (ref 22–28)
HCT VFR BLD AUTO: 42.6 % (ref 37.5–51)
HCT VFR BLD CALC: 40.9 % (ref 38–51)
HGB BLD-MCNC: 14.8 G/DL (ref 13–17.7)
HGB BLDA-MCNC: 13.3 G/DL (ref 14–18)
HGB BLDA-MCNC: 14.3 G/DL (ref 14–18)
IMM GRANULOCYTES # BLD AUTO: 0.08 10*3/MM3 (ref 0–0.05)
IMM GRANULOCYTES NFR BLD AUTO: 0.4 % (ref 0–0.5)
INHALED O2 CONCENTRATION: 21 %
INHALED O2 CONCENTRATION: 28 %
LYMPHOCYTES # BLD AUTO: 1.39 10*3/MM3 (ref 0.7–3.1)
LYMPHOCYTES NFR BLD AUTO: 7.1 % (ref 19.6–45.3)
Lab: ABNORMAL
MAGNESIUM SERPL-MCNC: 2.1 MG/DL (ref 1.6–2.6)
MCH RBC QN AUTO: 29.7 PG (ref 26.6–33)
MCHC RBC AUTO-ENTMCNC: 34.7 G/DL (ref 31.5–35.7)
MCV RBC AUTO: 85.4 FL (ref 79–97)
METHADONE UR QL SCN: NEGATIVE
METHGB BLD QL: 0 % (ref 0–3)
METHGB BLD QL: 0.4 % (ref 0–3)
MODALITY: ABNORMAL
MODALITY: ABNORMAL
MONOCYTES # BLD AUTO: 1.36 10*3/MM3 (ref 0.1–0.9)
MONOCYTES NFR BLD AUTO: 7 % (ref 5–12)
NEUTROPHILS NFR BLD AUTO: 16.65 10*3/MM3 (ref 1.7–7)
NEUTROPHILS NFR BLD AUTO: 85.4 % (ref 42.7–76)
NOTIFIED BY: ABNORMAL
NOTIFIED BY: ABNORMAL
NOTIFIED WHO: ABNORMAL
NOTIFIED WHO: ABNORMAL
NRBC BLD AUTO-RTO: 0 /100 WBC (ref 0–0.2)
OPIATES UR QL: NEGATIVE
OXYCODONE UR QL SCN: NEGATIVE
OXYHGB MFR BLDV: 52.3 % (ref 45–75)
OXYHGB MFR BLDV: 94.9 % (ref 94–99)
PCO2 BLDA: 24.8 MM HG (ref 35–45)
PCO2 BLDV: 39.3 MM HG (ref 41–51)
PCO2 TEMP ADJ BLD: ABNORMAL MM[HG]
PCP UR QL SCN: NEGATIVE
PH BLDA: 7.64 PH UNITS (ref 7.35–7.45)
PH BLDV: 7.52 PH UNITS (ref 7.32–7.42)
PH, TEMP CORRECTED: ABNORMAL
PLATELET # BLD AUTO: 340 10*3/MM3 (ref 140–450)
PMV BLD AUTO: 9.3 FL (ref 6–12)
PO2 BLDA: 70.4 MM HG (ref 83–108)
PO2 BLDV: 28.1 MM HG (ref 27–53)
PO2 TEMP ADJ BLD: ABNORMAL MM[HG]
POTASSIUM SERPL-SCNC: 3.3 MMOL/L (ref 3.5–5.2)
POTASSIUM SERPL-SCNC: 3.4 MMOL/L (ref 3.5–5.2)
POTASSIUM SERPL-SCNC: 3.8 MMOL/L (ref 3.5–5.2)
PROT SERPL-MCNC: 7.2 G/DL (ref 6–8.5)
QT INTERVAL: 352 MS
QTC INTERVAL: 435 MS
RBC # BLD AUTO: 4.99 10*6/MM3 (ref 4.14–5.8)
SAO2 % BLDCOA: 95.5 % (ref 94–99)
SAO2 % BLDCOV: 53.2 % (ref 45–75)
SODIUM SERPL-SCNC: 132 MMOL/L (ref 136–145)
SODIUM SERPL-SCNC: 132 MMOL/L (ref 136–145)
SODIUM SERPL-SCNC: 133 MMOL/L (ref 136–145)
TRICYCLICS UR QL SCN: NEGATIVE
TROPONIN T DELTA: -2 NG/L
TROPONIN T SERPL HS-MCNC: 43 NG/L
VENTILATOR MODE: ABNORMAL
VENTILATOR MODE: ABNORMAL
WBC NRBC COR # BLD AUTO: 19.5 10*3/MM3 (ref 3.4–10.8)

## 2024-07-06 PROCEDURE — 82375 ASSAY CARBOXYHB QUANT: CPT

## 2024-07-06 PROCEDURE — 82820 HEMOGLOBIN-OXYGEN AFFINITY: CPT

## 2024-07-06 PROCEDURE — 80053 COMPREHEN METABOLIC PANEL: CPT | Performed by: INTERNAL MEDICINE

## 2024-07-06 PROCEDURE — 25810000003 LACTATED RINGERS PER 1000 ML: Performed by: INTERNAL MEDICINE

## 2024-07-06 PROCEDURE — 93010 ELECTROCARDIOGRAM REPORT: CPT | Performed by: INTERNAL MEDICINE

## 2024-07-06 PROCEDURE — 82805 BLOOD GASES W/O2 SATURATION: CPT

## 2024-07-06 PROCEDURE — 74018 RADEX ABDOMEN 1 VIEW: CPT

## 2024-07-06 PROCEDURE — 99232 SBSQ HOSP IP/OBS MODERATE 35: CPT | Performed by: INTERNAL MEDICINE

## 2024-07-06 PROCEDURE — 25010000002 ONDANSETRON PER 1 MG: Performed by: INTERNAL MEDICINE

## 2024-07-06 PROCEDURE — 74018 RADEX ABDOMEN 1 VIEW: CPT | Performed by: RADIOLOGY

## 2024-07-06 PROCEDURE — 84484 ASSAY OF TROPONIN QUANT: CPT | Performed by: INTERNAL MEDICINE

## 2024-07-06 PROCEDURE — 25010000002 SODIUM CHLORIDE 0.9 % WITH KCL 20 MEQ 20-0.9 MEQ/L-% SOLUTION: Performed by: INTERNAL MEDICINE

## 2024-07-06 PROCEDURE — 83735 ASSAY OF MAGNESIUM: CPT | Performed by: INTERNAL MEDICINE

## 2024-07-06 PROCEDURE — 25810000003 SODIUM CHLORIDE 0.9 % SOLUTION: Performed by: INTERNAL MEDICINE

## 2024-07-06 PROCEDURE — 93005 ELECTROCARDIOGRAM TRACING: CPT | Performed by: HOSPITALIST

## 2024-07-06 PROCEDURE — 36600 WITHDRAWAL OF ARTERIAL BLOOD: CPT

## 2024-07-06 PROCEDURE — 82948 REAGENT STRIP/BLOOD GLUCOSE: CPT

## 2024-07-06 PROCEDURE — 80307 DRUG TEST PRSMV CHEM ANLYZR: CPT | Performed by: INTERNAL MEDICINE

## 2024-07-06 PROCEDURE — 93005 ELECTROCARDIOGRAM TRACING: CPT | Performed by: INTERNAL MEDICINE

## 2024-07-06 PROCEDURE — 63710000001 INSULIN LISPRO (HUMAN) PER 5 UNITS: Performed by: INTERNAL MEDICINE

## 2024-07-06 PROCEDURE — 83050 HGB METHEMOGLOBIN QUAN: CPT

## 2024-07-06 PROCEDURE — 82009 KETONE BODYS QUAL: CPT | Performed by: INTERNAL MEDICINE

## 2024-07-06 PROCEDURE — 85025 COMPLETE CBC W/AUTO DIFF WBC: CPT | Performed by: INTERNAL MEDICINE

## 2024-07-06 PROCEDURE — 25010000002 PROCHLORPERAZINE 10 MG/2ML SOLUTION: Performed by: HOSPITALIST

## 2024-07-06 PROCEDURE — 63710000001 INSULIN GLARGINE PER 5 UNITS: Performed by: INTERNAL MEDICINE

## 2024-07-06 PROCEDURE — 25010000002 HEPARIN (PORCINE) PER 1000 UNITS: Performed by: INTERNAL MEDICINE

## 2024-07-06 RX ORDER — SODIUM CHLORIDE, SODIUM LACTATE, POTASSIUM CHLORIDE, CALCIUM CHLORIDE 600; 310; 30; 20 MG/100ML; MG/100ML; MG/100ML; MG/100ML
250 INJECTION, SOLUTION INTRAVENOUS CONTINUOUS
Status: DISCONTINUED | OUTPATIENT
Start: 2024-07-06 | End: 2024-07-06

## 2024-07-06 RX ORDER — HYDROXYZINE HYDROCHLORIDE 25 MG/1
25 TABLET, FILM COATED ORAL 3 TIMES DAILY PRN
Status: DISCONTINUED | OUTPATIENT
Start: 2024-07-06 | End: 2024-07-09 | Stop reason: HOSPADM

## 2024-07-06 RX ORDER — PROCHLORPERAZINE EDISYLATE 5 MG/ML
2.5 INJECTION INTRAMUSCULAR; INTRAVENOUS EVERY 6 HOURS PRN
Status: DISCONTINUED | OUTPATIENT
Start: 2024-07-06 | End: 2024-07-09 | Stop reason: HOSPADM

## 2024-07-06 RX ORDER — SODIUM CHLORIDE, SODIUM LACTATE, POTASSIUM CHLORIDE, CALCIUM CHLORIDE 600; 310; 30; 20 MG/100ML; MG/100ML; MG/100ML; MG/100ML
125 INJECTION, SOLUTION INTRAVENOUS CONTINUOUS
Status: DISCONTINUED | OUTPATIENT
Start: 2024-07-06 | End: 2024-07-06

## 2024-07-06 RX ORDER — SODIUM CHLORIDE AND POTASSIUM CHLORIDE 150; 900 MG/100ML; MG/100ML
125 INJECTION, SOLUTION INTRAVENOUS CONTINUOUS
Status: DISCONTINUED | OUTPATIENT
Start: 2024-07-06 | End: 2024-07-09 | Stop reason: HOSPADM

## 2024-07-06 RX ADMIN — ONDANSETRON 4 MG: 2 INJECTION INTRAMUSCULAR; INTRAVENOUS at 15:27

## 2024-07-06 RX ADMIN — Medication 10 ML: at 21:21

## 2024-07-06 RX ADMIN — SODIUM CHLORIDE, POTASSIUM CHLORIDE, SODIUM LACTATE AND CALCIUM CHLORIDE 100 ML/HR: 600; 310; 30; 20 INJECTION, SOLUTION INTRAVENOUS at 02:13

## 2024-07-06 RX ADMIN — SODIUM CHLORIDE, POTASSIUM CHLORIDE, SODIUM LACTATE AND CALCIUM CHLORIDE 250 ML/HR: 600; 310; 30; 20 INJECTION, SOLUTION INTRAVENOUS at 12:48

## 2024-07-06 RX ADMIN — PROCHLORPERAZINE EDISYLATE 2.5 MG: 5 INJECTION INTRAMUSCULAR; INTRAVENOUS at 23:44

## 2024-07-06 RX ADMIN — INSULIN LISPRO 2 UNITS: 100 INJECTION, SOLUTION INTRAVENOUS; SUBCUTANEOUS at 21:25

## 2024-07-06 RX ADMIN — POTASSIUM CHLORIDE AND SODIUM CHLORIDE 125 ML/HR: 900; 150 INJECTION, SOLUTION INTRAVENOUS at 15:26

## 2024-07-06 RX ADMIN — OFLOXACIN 50000 UNITS: 300 TABLET, COATED ORAL at 09:01

## 2024-07-06 RX ADMIN — INSULIN LISPRO 6 UNITS: 100 INJECTION, SOLUTION INTRAVENOUS; SUBCUTANEOUS at 11:58

## 2024-07-06 RX ADMIN — SODIUM CHLORIDE 500 ML: 9 INJECTION, SOLUTION INTRAVENOUS at 14:07

## 2024-07-06 RX ADMIN — INSULIN LISPRO 4 UNITS: 100 INJECTION, SOLUTION INTRAVENOUS; SUBCUTANEOUS at 09:01

## 2024-07-06 RX ADMIN — HEPARIN SODIUM 5000 UNITS: 5000 INJECTION INTRAVENOUS; SUBCUTANEOUS at 14:08

## 2024-07-06 RX ADMIN — INSULIN GLARGINE 20 UNITS: 100 INJECTION, SOLUTION SUBCUTANEOUS at 09:01

## 2024-07-06 RX ADMIN — Medication 10 ML: at 09:02

## 2024-07-06 RX ADMIN — ONDANSETRON 4 MG: 2 INJECTION INTRAMUSCULAR; INTRAVENOUS at 02:13

## 2024-07-06 RX ADMIN — ONDANSETRON 4 MG: 2 INJECTION INTRAMUSCULAR; INTRAVENOUS at 09:04

## 2024-07-06 RX ADMIN — HYDROXYZINE HYDROCHLORIDE 25 MG: 25 TABLET ORAL at 16:48

## 2024-07-06 RX ADMIN — HEPARIN SODIUM 5000 UNITS: 5000 INJECTION INTRAVENOUS; SUBCUTANEOUS at 21:21

## 2024-07-06 RX ADMIN — POTASSIUM CHLORIDE AND SODIUM CHLORIDE 125 ML/HR: 900; 150 INJECTION, SOLUTION INTRAVENOUS at 23:27

## 2024-07-06 RX ADMIN — ONDANSETRON 4 MG: 2 INJECTION INTRAMUSCULAR; INTRAVENOUS at 21:39

## 2024-07-06 RX ADMIN — SODIUM CHLORIDE 1000 ML: 9 INJECTION, SOLUTION INTRAVENOUS at 15:26

## 2024-07-06 NOTE — PROGRESS NOTES
[facility] HOSPITALIST PROGRESS NOTE    Subjective     History:   Oliver Osborn is a 29 y.o. male admitted on 7/5/2024 secondary to MINA (acute kidney injury)     Procedures: None    CC: Follow up MINA    Patient seen and examined with ISAIAH Maki. Awake and alert. Appears anxious and hyperventilating during my encounter. No reported CP during my encounter but reported later in the afternoon. No reported dyspnea. Reports nausea with episodes of vomiting. No acute events overnight per RN.     History taken from: patient, chart, and RN.      Objective     Vital Signs  Temp:  [97.6 °F (36.4 °C)-99.1 °F (37.3 °C)] 97.8 °F (36.6 °C)  Heart Rate:  [67-99] 67  Resp:  [10-18] 10  BP: (118-165)/(70-99) 156/98    Intake/Output Summary (Last 24 hours) at 7/6/2024 1703  Last data filed at 7/6/2024 1600  Gross per 24 hour   Intake 480 ml   Output 425 ml   Net 55 ml         Physical Exam:  General:    Awake, alert, appears anxious, acutely ill appearing   Heart:      Normal S1 and S2. Regular rate and rhythm. No significant murmur, rubs or gallops appreciated.   Lungs:     Appears tachypneic. Lungs clear to auscultation B/L. No wheezes, rales or rhonchi.   Abdomen:   Soft and nontender. No guarding, rebound tenderness or  organomegaly noted. Bowel sounds present x 4.   Extremities:  No clubbing, cyanosis or edema noted. Moves UE and LE equally B/L.     Results Review:    Results from last 7 days   Lab Units 07/06/24  0101 07/05/24  1240   WBC 10*3/mm3 19.50* 24.10*   HEMOGLOBIN g/dL 14.8 16.6   PLATELETS 10*3/mm3 340 428     Results from last 7 days   Lab Units 07/06/24  1419 07/06/24  0101 07/05/24  1240   SODIUM mmol/L 132* 132* 130*   POTASSIUM mmol/L 3.3* 3.8 4.5   CHLORIDE mmol/L 84* 86* 78*   CO2 mmol/L 25.8 24.5 20.1*   BUN mg/dL 50* 51* 55*   CREATININE mg/dL 4.43* 4.91* 6.55*   CALCIUM mg/dL 9.4 9.2 10.3   GLUCOSE mg/dL 203* 209* 335*     Results from last 7 days   Lab Units 07/06/24  0101 07/05/24  1240    BILIRUBIN mg/dL 0.4 0.7   ALK PHOS U/L 70 91   AST (SGOT) U/L 25 25   ALT (SGPT) U/L 11 13     Results from last 7 days   Lab Units 07/06/24  1515   MAGNESIUM mg/dL 2.1         Results from last 7 days   Lab Units 07/05/24  1240   CK TOTAL U/L 705*       Imaging Results (Last 24 Hours)       ** No results found for the last 24 hours. **              Medications:  cholecalciferol, 50,000 Units, Oral, Weekly  heparin (porcine), 5,000 Units, Subcutaneous, Q8H  insulin glargine, 20 Units, Subcutaneous, Daily  insulin lispro, 2-9 Units, Subcutaneous, 4x Daily AC & at Bedtime  senna-docusate sodium, 2 tablet, Oral, BID  sodium chloride, 10 mL, Intravenous, Q12H      sodium chloride 0.9 % with KCl 20 mEq, 125 mL/hr, Last Rate: 125 mL/hr (07/06/24 1526)            Assessment & Plan   MINA on CKD IIIa: Possibly 2/2 nausea and vomiting with recent decreased PO intake. No evidence of obstruction on imaging. Cr improved today. Cont IVF's per nephrology. Monitor UOP and repeat labs in the AM. Nephrology input appreciated.     Anion gap metabolic acidosis: Respiratory alkalosis noted. Lactic acid initially elevated but normalized on repeat. Trace acetone initially noted with concern for starvation ketosis. Negative acetone on repeat. MINA possibly contributing. Improving. Cont IVF's. Cont to monitor.     DM, II insulin dependent with hyperglycemia: HgbA1c 8.8. Increase basal insulin. Cont SSI with Accuchecks.     Nausea and vomiting: Concern for cannabinoid hyperemesis syndrome. Recent dose increase in Ozempic possibly contributing as well. CT abd/pelvis reveals moderate to large stool burden. Repeat KUB in the setting of persistent symptoms. Supportive treatment.     SIRS: Possibly 2/2 above. No obvious source of infection on workup. Cont to monitor off antibiotics. Follow cultures and repeat labs in the AM.     Hypokalemia: Replacement ordered per nephrology. Mg is >2.     DVT PPX: SQ heparin    Discussed with   Afshin.    Transfer to PCU for closer monitoring.     Disposition Likely home when medically stable.     Samuel Desir,   07/06/24  17:03 EDT

## 2024-07-06 NOTE — CONSULTS
Nephrology Consultation Note    Referring Provider: Dr. Desir  Reason for Consultation: Acute renal failure    Chief complaint vomiting    Subjective .     History of present illness: Patient has been recently started on Ozempic.  He also took some marijuana within the last week.  He says he does poorly whenever he takes it.  He has had nausea and vomiting for 2 days and thus presented to the emergency room.  The last creatinine that we have on file from December is 2.19 mg/dL.  Prior to that in October 2023 his creatinine was 1.25 mg/dL.  Patient does have proteinuria and has been worked up for systemic illnesses like lupus and vasculitis and myeloma and hypocomplementemic nephritis in November 2023.  All the serologies were negative.  The working diagnosis was diabetic nephropathy.  Patient says he has seen a kidney doctor before but cannot tell me when he saw him last and it does not appear that he has been seeing 1 on a consistent basis.  He cannot tell me the name.    Patient denied any fever or chills.  He had some cramping abdominal pain but no chest pain.  He denied any shortness of breath or cough or fever.    In the emergency room when the patient initially came his creatinine was 6.5 mg/dL and he was oliguric.  He says now that he has made good quantities of urine this morning.  At his initial presentation the patient had an anion gap of 32 but his anion gap is now 22.  His acetones were trace in the ER.  His blood gases actually showed a respiratory alkalosis.    The patient was very stable when I initially started examining him but then through the course of the interview he started hyperventilating and got diaphoretic and sweaty he needed help and was going to pass out at which point I called the nurses.  They noted that he was a little hypoxemic so they put him on oxygen which had been weaned off earlier in the morning and they subsequently were  able to check his blood pressure and it was okay.  The patient was retching and maybe that is what triggered this episode.  I stood at the bedside for about 10 minutes to make sure that he was stable before I left.    Patient denied any chest pain or syncope or headache or diarrhea or hematemesis or melena or hematuria or skin rash    History  Past Medical History:   Diagnosis Date    Diabetes mellitus     Tobacco abuse    ,   Past Surgical History:   Procedure Laterality Date    ENDOSCOPY N/A 6/1/2022    Procedure: ESOPHAGOGASTRODUODENOSCOPY WITH ANESTHESIA;  Surgeon: Keon Quezada MD;  Location: [facility];  Service: Gastroenterology;  Laterality: N/A;   ,   Family History   Problem Relation Age of Onset    Heart disease Mother     Diabetes Mother     Kidney disease Mother     Heart disease Father     Diabetes Father     Heart disease Maternal Grandmother     Diabetes Maternal Grandmother     Heart disease Maternal Grandfather     Diabetes Maternal Grandfather     Heart disease Paternal Grandmother     Diabetes Paternal Grandmother     Heart disease Paternal Grandfather     Diabetes Paternal Grandfather    ,   Social History     Tobacco Use    Smoking status: Every Day     Current packs/day: 2.00     Average packs/day: 2.0 packs/day for 15.0 years (30.0 ttl pk-yrs)     Types: Cigarettes     Passive exposure: Never    Smokeless tobacco: Never   Vaping Use    Vaping status: Former    Substances: Nicotine, Flavoring    Devices: Disposable    Passive vaping exposure: Yes   Substance Use Topics    Alcohol use: Never    Drug use: Yes     Frequency: 7.0 times per week     Types: Marijuana    and Allergies:  Patient has no known allergies.      Vital Signs   Temp:  [97.6 °F (36.4 °C)-99.5 °F (37.5 °C)] 99.1 °F (37.3 °C)  Heart Rate:  [] 79  Resp:  [16-18] 18  BP: (110-173)/(70-99) 165/88    Physical Exam:     General Appearance:  Alert and oriented and ambulatory   Head:    Normocephalic, without obvious  abnormality   Eyes:            Conjunctivae and sclerae normal, no icterus, no pallor   Ears:    Ears appear intact    Throat:   oral mucosa moist   Neck:   No JVD       Lungs:     Clear to auscultation,respirations regular    Heart:    Regular rhythm and normal rate, normal S1 and S2       Abdomen:     Normal bowel sounds, no tenderness   Rectal:     Deferred   Extremities: No edema   Pulses:   Pulses palpable            Neurologic: No gross motor deficits     Results Review:   I reviewed the patient's new clinical results.      Lab Results (last 24 hours)       Procedure Component Value Units Date/Time    POC Glucose Once [[id number]]  (Abnormal) Collected: 07/06/24 1157    Specimen: Blood Updated: 07/06/24 1203     Glucose 252 mg/dL     POC Glucose Once [[id number]]  (Abnormal) Collected: 07/06/24 1051    Specimen: Blood Updated: 07/06/24 1057     Glucose 255 mg/dL     POC Glucose Once [[id number]]  (Abnormal) Collected: 07/06/24 0722    Specimen: Blood Updated: 07/06/24 0728     Glucose 225 mg/dL     Comprehensive Metabolic Panel [[id number]]  (Abnormal) Collected: 07/06/24 0101    Specimen: Blood Updated: 07/06/24 0218     Glucose 209 mg/dL      BUN 51 mg/dL      Creatinine 4.91 mg/dL      Sodium 132 mmol/L      Potassium 3.8 mmol/L      Chloride 86 mmol/L      CO2 24.5 mmol/L      Calcium 9.2 mg/dL      Total Protein 7.2 g/dL      Albumin 3.9 g/dL      ALT (SGPT) 11 U/L      AST (SGOT) 25 U/L      Alkaline Phosphatase 70 U/L      Total Bilirubin 0.4 mg/dL      Globulin 3.3 gm/dL      A/G Ratio 1.2 g/dL      BUN/Creatinine Ratio 10.4     Anion Gap 21.5 mmol/L      eGFR 15.3 mL/min/1.73     Narrative:      GFR Normal >60  Chronic Kidney Disease <60  Kidney Failure <15      CBC & Differential [[id number]]  (Abnormal) Collected: 07/06/24 0101    Specimen: Blood Updated: 07/06/24 0140    Narrative:      The following orders were created for panel order CBC & Differential.  Procedure                                Abnormality         Status                     ---------                               -----------         ------                     CBC Auto Differential[[id number]]        Abnormal            Final result                 Please view results for these tests on the individual orders.    CBC Auto Differential [[id number]]  (Abnormal) Collected: 07/06/24 0101    Specimen: Blood Updated: 07/06/24 0140     WBC 19.50 10*3/mm3      RBC 4.99 10*6/mm3      Hemoglobin 14.8 g/dL      Hematocrit 42.6 %      MCV 85.4 fL      MCH 29.7 pg      MCHC 34.7 g/dL      RDW 12.4 %      RDW-SD 38.5 fl      MPV 9.3 fL      Platelets 340 10*3/mm3      Neutrophil % 85.4 %      Lymphocyte % 7.1 %      Monocyte % 7.0 %      Eosinophil % 0.0 %      Basophil % 0.1 %      Immature Grans % 0.4 %      Neutrophils, Absolute 16.65 10*3/mm3      Lymphocytes, Absolute 1.39 10*3/mm3      Monocytes, Absolute 1.36 10*3/mm3      Eosinophils, Absolute 0.00 10*3/mm3      Basophils, Absolute 0.02 10*3/mm3      Immature Grans, Absolute 0.08 10*3/mm3      nRBC 0.0 /100 WBC     Urine Drug Screen - Urine, Clean Catch [[id number]]  (Abnormal) Collected: 07/05/24 2120    Specimen: Urine, Clean Catch Updated: 07/05/24 2144     THC, Screen, Urine Positive     Phencyclidine (PCP), Urine Negative     Cocaine Screen, Urine Negative     Methamphetamine, Ur Negative     Opiate Screen Negative     Amphetamine Screen, Urine Negative     Benzodiazepine Screen, Urine Negative     Tricyclic Antidepressants Screen Negative     Methadone Screen, Urine Negative     Barbiturates Screen, Urine Negative     Oxycodone Screen, Urine Negative     Buprenorphine, Screen, Urine Negative    Narrative:      Cutoff For Drugs Screened:    Amphetamines               500 ng/ml  Barbiturates               200 ng/ml  Benzodiazepines            150 ng/ml  Cocaine                    150 ng/ml  Methadone                  200 ng/ml  Opiates                    100 ng/ml  Phencyclidine               25  ng/ml  THC                         50 ng/ml  Methamphetamine            500 ng/ml  Tricyclic Antidepressants  300 ng/ml  Oxycodone                  100 ng/ml  Buprenorphine               10 ng/ml    The normal value for all drugs tested is negative. This report includes unconfirmed screening results, with the cutoff values listed, to be used for medical treatment purposes only.  Unconfirmed results must not be used for non-medical purposes such as employment or legal testing.  Clinical consideration should be applied to any drug of abuse test, particularly when unconfirmed results are used.      Urinalysis With Microscopic If Indicated (No Culture) - Urine, Clean Catch [[id number]]  (Abnormal) Collected: 07/05/24 2120    Specimen: Urine, Clean Catch Updated: 07/05/24 2144     Color, UA Yellow     Appearance, UA Clear     pH, UA <=5.0     Specific Gravity, UA 1.018     Glucose,  mg/dL (1+)     Ketones, UA 15 mg/dL (1+)     Bilirubin, UA Negative     Blood, UA Moderate (2+)     Protein, UA >=300 mg/dL (3+)     Leuk Esterase, UA Negative     Nitrite, UA Negative     Urobilinogen, UA 0.2 E.U./dL    Urinalysis, Microscopic Only - Urine, Clean Catch [[id number]]  (Abnormal) Collected: 07/05/24 2120    Specimen: Urine, Clean Catch Updated: 07/05/24 2144     RBC, UA 11-20 /HPF      WBC, UA 3-5 /HPF      Bacteria, UA None Seen /HPF      Squamous Epithelial Cells, UA 0-2 /HPF      Hyaline Casts, UA 3-6 /LPF      Methodology Automated Microscopy    Fentanyl, Urine - Urine, Clean Catch [[id number]]  (Normal) Collected: 07/05/24 2120    Specimen: Urine, Clean Catch Updated: 07/05/24 2143     Fentanyl, Urine Negative    Narrative:      Negative Threshold:      Fentanyl 5 ng/mL     The normal value for the drug tested is negative. This report includes final unconfirmed screening results to be used for medical treatment purposes only. Unconfirmed results must not be used for non-medical purposes such as employment or legal  testing. Clinical consideration should be applied to any drug of abuse test, particularly when unconfirmed results are used.           POC Glucose Once [[id number]]  (Abnormal) Collected: 07/05/24 1954    Specimen: Blood Updated: 07/05/24 2000     Glucose 186 mg/dL     STAT Lactic Acid, Reflex [[id number]]  (Normal) Collected: 07/05/24 1749    Specimen: Blood Updated: 07/05/24 1848     Lactate 2.0 mmol/L     POC Glucose Once [[id number]]  (Abnormal) Collected: 07/05/24 1733    Specimen: Blood Updated: 07/05/24 1740     Glucose 258 mg/dL     CK [[id number]]  (Abnormal) Collected: 07/05/24 1240    Specimen: Blood from Arm, Right Updated: 07/05/24 1605     Creatine Kinase 705 U/L     Ethanol [[id number]] Collected: 07/05/24 1240    Specimen: Blood from Arm, Right Updated: 07/05/24 1515     Ethanol <10 mg/dL      Ethanol % <0.010 %     Narrative:      >/= 80.0 legally intoxicated    Lactic Acid, Plasma [[id number]]  (Abnormal) Collected: 07/05/24 1349    Specimen: Blood Updated: 07/05/24 1448     Lactate 2.8 mmol/L     POC Glucose Once [[id number]]  (Abnormal) Collected: 07/05/24 1439    Specimen: Blood Updated: 07/05/24 1445     Glucose 259 mg/dL     Acetone [[id number]]  (Abnormal) Collected: 07/05/24 1406    Specimen: Blood from Arm, Left Updated: 07/05/24 1428     Acetone Trace    Blood Culture - Blood, Hand, Left [[id number]] Collected: 07/05/24 1349    Specimen: Blood from Hand, Left Updated: 07/05/24 1400    Blood Culture - Blood, Arm, Left [[id number]] Collected: 07/05/24 1349    Specimen: Blood from Arm, Left Updated: 07/05/24 1400    Hemoglobin A1c [[id number]]  (Abnormal) Collected: 07/05/24 1240    Specimen: Blood from Arm, Right Updated: 07/05/24 1348     Hemoglobin A1C 8.80 %     Narrative:      Hemoglobin A1C Ranges:    Increased Risk for Diabetes  5.7% to 6.4%  Diabetes                     >= 6.5%  Diabetic Goal                < 7.0%    C-reactive Protein [[id number]]  (Abnormal) Collected: 07/05/24 0643     Specimen: Blood from Arm, Right Updated: 07/05/24 1346     C-Reactive Protein 0.86 mg/dL     Blood Gas, Arterial With Co-Ox [[id number]]  (Abnormal) Collected: 07/05/24 1340    Specimen: Arterial Blood Updated: 07/05/24 1340     Site Left Brachial     Butch's Test N/A     pH, Arterial 7.425 pH units      pCO2, Arterial 32.3 mm Hg      pO2, Arterial 79.8 mm Hg      Comment: 84 Value below reference range        HCO3, Arterial 21.2 mmol/L      Base Excess, Arterial -2.3 mmol/L      O2 Saturation, Arterial 95.5 %      Hemoglobin, Blood Gas 15.2 g/dL      Hematocrit, Blood Gas 46.5 %      Oxyhemoglobin 94.2 %      Methemoglobin 0.50 %      Carboxyhemoglobin 0.9 %      A-a DO2 25.9 mmHg      CO2 Content 22.1 mmol/L      Barometric Pressure for Blood Gas 724 mmHg      Modality Room Air     FIO2 21 %      Ventilator Mode NA     Collected by [id number]     Comment: Meter: [id number]     :  [id number]        pH, Temp Corrected --     pCO2, Temperature Corrected --     pO2, Temperature Corrected --    Comprehensive Metabolic Panel [[id number]]  (Abnormal) Collected: 07/05/24 1240    Specimen: Blood from Arm, Right Updated: 07/05/24 1321     Glucose 335 mg/dL      BUN 55 mg/dL      Creatinine 6.55 mg/dL      Sodium 130 mmol/L      Potassium 4.5 mmol/L      Chloride 78 mmol/L      CO2 20.1 mmol/L      Calcium 10.3 mg/dL      Total Protein 8.9 g/dL      Albumin 5.0 g/dL      ALT (SGPT) 13 U/L      AST (SGOT) 25 U/L      Alkaline Phosphatase 91 U/L      Total Bilirubin 0.7 mg/dL      Globulin 3.9 gm/dL      A/G Ratio 1.3 g/dL      BUN/Creatinine Ratio 8.4     Anion Gap 31.9 mmol/L      eGFR 10.8 mL/min/1.73      Comment: <15 Indicative of kidney failure       Narrative:      GFR Normal >60  Chronic Kidney Disease <60  Kidney Failure <15      Lipase [[id number]]  (Normal) Collected: 07/05/24 1240    Specimen: Blood from Arm, Right Updated: 07/05/24 1321     Lipase 23 U/L     [place] Draw [[id number]] Collected: 07/05/24 1240     Specimen: Blood from Arm, Right Updated: 07/05/24 1301    Narrative:      The following orders were created for panel order [place] Draw.  Procedure                               Abnormality         Status                     ---------                               -----------         ------                     Green Top (Gel)[[id number]]                                  Final result               Lavender Top[[id number]]                                     Final result               Gold Top - SST[[id number]]                                   Final result               Light Blue Top[[id number]]                                   Final result                 Please view results for these tests on the individual orders.    Green Top (Gel) [[id number]] Collected: 07/05/24 1240    Specimen: Blood from Arm, Right Updated: 07/05/24 1301     Extra Tube Hold for add-ons.     Comment: Auto resulted.       Lavender Top [[id number]] Collected: 07/05/24 1240    Specimen: Blood from Arm, Right Updated: 07/05/24 1301     Extra Tube hold for add-on     Comment: Auto resulted       Gold Top - SST [[id number]] Collected: 07/05/24 1240    Specimen: Blood from Arm, Right Updated: 07/05/24 1301     Extra Tube Hold for add-ons.     Comment: Auto resulted.       Light Blue Top [[id number]] Collected: 07/05/24 1240    Specimen: Blood from Arm, Right Updated: 07/05/24 1301     Extra Tube Hold for add-ons.     Comment: Auto resulted       CBC & Differential [[id number]]  (Abnormal) Collected: 07/05/24 1240    Specimen: Blood from Arm, Right Updated: 07/05/24 1248    Narrative:      The following orders were created for panel order CBC & Differential.  Procedure                               Abnormality         Status                     ---------                               -----------         ------                     CBC Auto Differential[[id number]]        Abnormal            Final result                 Please view results for these tests  on the individual orders.    CBC Auto Differential [[id number]]  (Abnormal) Collected: 07/05/24 1240    Specimen: Blood from Arm, Right Updated: 07/05/24 1248     WBC 24.10 10*3/mm3      RBC 5.69 10*6/mm3      Hemoglobin 16.6 g/dL      Hematocrit 46.9 %      MCV 82.4 fL      MCH 29.2 pg      MCHC 35.4 g/dL      RDW 12.6 %      RDW-SD 37.7 fl      MPV 9.1 fL      Platelets 428 10*3/mm3      Neutrophil % 87.0 %      Lymphocyte % 6.1 %      Monocyte % 5.9 %      Eosinophil % 0.1 %      Basophil % 0.2 %      Immature Grans % 0.7 %      Neutrophils, Absolute 20.97 10*3/mm3      Lymphocytes, Absolute 1.46 10*3/mm3      Monocytes, Absolute 1.43 10*3/mm3      Eosinophils, Absolute 0.03 10*3/mm3      Basophils, Absolute 0.04 10*3/mm3      Immature Grans, Absolute 0.17 10*3/mm3      nRBC 0.0 /100 WBC             Imaging Results (Last 24 Hours)       Procedure Component Value Units Date/Time    XR Chest 1 View [[id number]] Collected: 07/05/24 1429     Updated: 07/05/24 1431    Narrative:      XR CHEST 1 VW-     CLINICAL INDICATION: vomiting; N17.9-Acute kidney failure, unspecified;  N18.9-Chronic kidney disease, unspecified        COMPARISON: 12/11/2023     TECHNIQUE: Single frontal view of the chest.     FINDINGS:     LUNGS: Lungs are adequately aerated.      HEART AND MEDIASTINUM: Heart and mediastinal contours are unremarkable        SKELETON: Bony and soft tissue structures are unremarkable.             Impression:      No radiographic evidence of acute cardiac or pulmonary disease.           This report was finalized on 7/5/2024 2:29 PM by Dr. Vishnu Almazan MD.       CT Abdomen Pelvis Without Contrast [[id number]] Collected: 07/05/24 1416     Updated: 07/05/24 1425    Narrative:      EXAM: CT ABDOMEN PELVIS WO CONTRAST-         TECHNIQUE: Multiple axial CT images were obtained from lung bases  through pubic symphysis WITHOUT administration of IV contrast.  Reformatted images in the coronal and/or sagittal plane(s)  were  generated from the axial data set to facilitate diagnostic accuracy  and/or surgical planning.  Oral Contrast:NONE.     Radiation dose reduction techniques were utilized per ALARA protocol.  Automated exposure control was initiated through either or [employer] or  [employer] software packages by  protocol.    DOSE:     Clinical information epigastric abd pain; N17.9-Acute kidney failure,  unspecified; N18.9-Chronic kidney disease, unspecified      Comparison 12/11/2023     FINDINGS:     Lower thorax: Clear. No effusions.     Abdomen:     Liver: Homogeneous. No focal hepatic mass or ductal dilatation.     Gallbladder: No dilation or stone identified.     Pancreas: Unremarkable. No mass or ductal dilatation.     Spleen: Homogeneous. No splenomegaly.     Adrenals: No mass.     Kidneys/ureters: No mass. No obstructive uropathy.  No evidence of  urolithiasis.     GI tract: Non-dilated. No definite wall thickening.. Moderate to large  stool burden     MESENTERY: No free fluid, walled off fluid collections, mesenteric  stranding, or enlarged lymph nodes        Vasculature: No evidence of aneurysm.     Abdominal wall: No focal hernia or mass.        Bladder: No focal mass or significant wall thickening     Reproductive: Unremarkable as visualized     Bones: Grade 1 anterolisthesis L5 on S1       Impression:         1. Moderate to large stool burden     2. Other findings as above                          This report was finalized on 7/5/2024 2:23 PM by Dr. Vishnu Almazan MD.                         Assessment and Plan:    1.  Acute renal failure on CKD stage IIIa  2.  Type 1 diabetes with renal involvement poor control with ketoacidosis  3.  Anion gap metabolic acidosis  4.  Vomiting with dehydration  5.  Respiratory alkalosis  6.  Tetrahydrocannabinol drug screen positive    The creatinine is improving.  The anion gap is also improved.  The last set of labs were done around 1 AM 7 to repeat not only his basic  panel but also do a venous gas and recheck as stones.  If the anion gap is worse or the acetone comes back positive we may need to consider an insulin drip and this was discussed with Dr. Desir.    When he was hyperventilating and diaphoretic I had turned his fluids wide open but number to leave them at 250 mL/h for 4 hours and then go to 125 mL/h    Further decisions to be made based on labs.    Extensive lab data review.  Spoke with the nurses.  Spoke with the primary team.          Jeramy Pepe MD  07/06/24  12:28 EDT

## 2024-07-06 NOTE — PLAN OF CARE
Problem: Adjustment to Illness (Sepsis/Septic Shock)  Goal: Optimal Coping  Outcome: Ongoing, Not Progressing  Intervention: Optimize Psychosocial Adjustment to Illness  Recent Flowsheet Documentation  Taken 7/6/2024 1518 by Judith Gilmore RN  Supportive Measures: active listening utilized  Family/Support System Care:   self-care encouraged   support provided     Problem: Bleeding (Sepsis/Septic Shock)  Goal: Absence of Bleeding  Outcome: Ongoing, Not Progressing     Problem: Glycemic Control Impaired (Sepsis/Septic Shock)  Goal: Blood Glucose Level Within Desired Range  Outcome: Ongoing, Not Progressing  Intervention: Optimize Glycemic Control  Recent Flowsheet Documentation  Taken 7/6/2024 1518 by Judith Gilmore RN  Glycemic Management: blood glucose monitored     Problem: Infection Progression (Sepsis/Septic Shock)  Goal: Absence of Infection Signs and Symptoms  Outcome: Ongoing, Not Progressing  Intervention: Initiate Sepsis Management  Recent Flowsheet Documentation  Taken 7/6/2024 1518 by Judith Gilmore RN  Infection Prevention: single patient room provided  Intervention: Promote Recovery  Recent Flowsheet Documentation  Taken 7/6/2024 1518 by Judith Gilmore RN  Sleep/Rest Enhancement:   consistent schedule promoted   natural light exposure provided   regular sleep/rest pattern promoted     Problem: Nutrition Impaired (Sepsis/Septic Shock)  Goal: Optimal Nutrition Intake  Outcome: Ongoing, Not Progressing     Problem: Adult Inpatient Plan of Care  Goal: Plan of Care Review  Outcome: Ongoing, Not Progressing  Flowsheets (Taken 7/5/2024 1746 by Mary Alonzo, RN)  Progress: no change  Plan of Care Reviewed With: patient  Goal: Patient-Specific Goal (Individualized)  Outcome: Ongoing, Not Progressing  Goal: Absence of Hospital-Acquired Illness or Injury  Outcome: Ongoing, Not Progressing  Intervention: Identify and Manage Fall Risk  Recent Flowsheet Documentation  Taken 7/6/2024 1518 by Judith Gilmore RN  Safety  Promotion/Fall Prevention: safety round/check completed  Intervention: Prevent Skin Injury  Recent Flowsheet Documentation  Taken 7/6/2024 1518 by Judith Gilmore RN  Skin Protection: adhesive use limited  Intervention: Prevent and Manage VTE (Venous Thromboembolism) Risk  Recent Flowsheet Documentation  Taken 7/6/2024 1518 by Jduith Gilmore RN  VTE Prevention/Management: (heparin SQ) other (see comments)  Range of Motion: active ROM (range of motion) encouraged  Intervention: Prevent Infection  Recent Flowsheet Documentation  Taken 7/6/2024 1518 by Judith Gilmore RN  Infection Prevention: single patient room provided  Goal: Optimal Comfort and Wellbeing  Outcome: Ongoing, Not Progressing  Intervention: Provide Person-Centered Care  Recent Flowsheet Documentation  Taken 7/6/2024 1518 by Judith Gilmore RN  Trust Relationship/Rapport:   care explained   choices provided   thoughts/feelings acknowledged  Goal: Readiness for Transition of Care  Outcome: Ongoing, Not Progressing     Problem: Electrolyte Imbalance  Goal: Electrolyte Balance  Outcome: Ongoing, Not Progressing   Goal Outcome Evaluation:                 Patient transferred to PCU this shift. A&O. Patient stable on 2 L/min via nasal cannula at this time. NSR noted on monitor. Patient c/o nausea & intermittent dry heaving. PRN zofran administered per order. Patient has no further questions or concerns at this time. Bed is low & locked. Call light within reach. Plan of care ongoing.

## 2024-07-06 NOTE — PLAN OF CARE
Goal Outcome Evaluation:              Outcome Evaluation: Pt resting in bed at this time. No s/s of distress noted. Complained of n/v, PRN medications given per MAR. Pt ambulates independently. Will continue POC.

## 2024-07-07 LAB
A-A DO2: 43.2 MMHG (ref 0–300)
ALBUMIN SERPL-MCNC: 3.7 G/DL (ref 3.5–5.2)
ALBUMIN/GLOB SERPL: 1.2 G/DL
ALP SERPL-CCNC: 64 U/L (ref 39–117)
ALT SERPL W P-5'-P-CCNC: 13 U/L (ref 1–41)
AMPHET+METHAMPHET UR QL: NEGATIVE
AMPHETAMINES UR QL: NEGATIVE
ANION GAP SERPL CALCULATED.3IONS-SCNC: 14.3 MMOL/L (ref 5–15)
ANION GAP SERPL CALCULATED.3IONS-SCNC: 14.5 MMOL/L (ref 5–15)
ANION GAP SERPL CALCULATED.3IONS-SCNC: 15.7 MMOL/L (ref 5–15)
ARTERIAL PATENCY WRIST A: POSITIVE
AST SERPL-CCNC: 21 U/L (ref 1–40)
ATMOSPHERIC PRESS: 727 MMHG
ATMOSPHERIC PRESS: 728 MMHG
BARBITURATES UR QL SCN: NEGATIVE
BASE EXCESS BLDA CALC-SCNC: -2.8 MMOL/L (ref 0–2)
BASE EXCESS BLDV CALC-SCNC: 1.4 MMOL/L (ref 0–2)
BASOPHILS # BLD AUTO: 0.01 10*3/MM3 (ref 0–0.2)
BASOPHILS # BLD AUTO: 0.02 10*3/MM3 (ref 0–0.2)
BASOPHILS NFR BLD AUTO: 0.1 % (ref 0–1.5)
BASOPHILS NFR BLD AUTO: 0.2 % (ref 0–1.5)
BDY SITE: ABNORMAL
BDY SITE: ABNORMAL
BENZODIAZ UR QL SCN: NEGATIVE
BILIRUB SERPL-MCNC: 0.5 MG/DL (ref 0–1.2)
BUN SERPL-MCNC: 27 MG/DL (ref 6–20)
BUN SERPL-MCNC: 32 MG/DL (ref 6–20)
BUN SERPL-MCNC: 40 MG/DL (ref 6–20)
BUN/CREAT SERPL: 11.1 (ref 7–25)
BUN/CREAT SERPL: 11.8 (ref 7–25)
BUN/CREAT SERPL: 11.8 (ref 7–25)
BUPRENORPHINE SERPL-MCNC: NEGATIVE NG/ML
CALCIUM SPEC-SCNC: 8.6 MG/DL (ref 8.6–10.5)
CALCIUM SPEC-SCNC: 8.6 MG/DL (ref 8.6–10.5)
CALCIUM SPEC-SCNC: 8.9 MG/DL (ref 8.6–10.5)
CANNABINOIDS SERPL QL: POSITIVE
CHLORIDE SERPL-SCNC: 95 MMOL/L (ref 98–107)
CHLORIDE SERPL-SCNC: 96 MMOL/L (ref 98–107)
CHLORIDE SERPL-SCNC: 98 MMOL/L (ref 98–107)
CO2 BLDA-SCNC: 21.4 MMOL/L (ref 22–33)
CO2 BLDA-SCNC: 26.2 MMOL/L (ref 22–33)
CO2 SERPL-SCNC: 19.3 MMOL/L (ref 22–29)
CO2 SERPL-SCNC: 21.7 MMOL/L (ref 22–29)
CO2 SERPL-SCNC: 25.5 MMOL/L (ref 22–29)
COCAINE UR QL: NEGATIVE
COHGB MFR BLD: 1.3 % (ref 0–5)
COHGB MFR BLD: 1.5 % (ref 0–5)
CREAT SERPL-MCNC: 2.44 MG/DL (ref 0.76–1.27)
CREAT SERPL-MCNC: 2.71 MG/DL (ref 0.76–1.27)
CREAT SERPL-MCNC: 3.4 MG/DL (ref 0.76–1.27)
DEPRECATED RDW RBC AUTO: 39 FL (ref 37–54)
DEPRECATED RDW RBC AUTO: 39.1 FL (ref 37–54)
EGFRCR SERPLBLD CKD-EPI 2021: 23.8 ML/MIN/1.73
EGFRCR SERPLBLD CKD-EPI 2021: 31.2 ML/MIN/1.73
EGFRCR SERPLBLD CKD-EPI 2021: 35.4 ML/MIN/1.73
EOSINOPHIL # BLD AUTO: 0 10*3/MM3 (ref 0–0.4)
EOSINOPHIL # BLD AUTO: 0.03 10*3/MM3 (ref 0–0.4)
EOSINOPHIL NFR BLD AUTO: 0 % (ref 0.3–6.2)
EOSINOPHIL NFR BLD AUTO: 0.2 % (ref 0.3–6.2)
ERYTHROCYTE [DISTWIDTH] IN BLOOD BY AUTOMATED COUNT: 12.1 % (ref 12.3–15.4)
ERYTHROCYTE [DISTWIDTH] IN BLOOD BY AUTOMATED COUNT: 12.3 % (ref 12.3–15.4)
FENTANYL UR-MCNC: NEGATIVE NG/ML
GLOBULIN UR ELPH-MCNC: 3.2 GM/DL
GLUCOSE BLDC GLUCOMTR-MCNC: 133 MG/DL (ref 70–130)
GLUCOSE BLDC GLUCOMTR-MCNC: 134 MG/DL (ref 70–130)
GLUCOSE BLDC GLUCOMTR-MCNC: 150 MG/DL (ref 70–130)
GLUCOSE BLDC GLUCOMTR-MCNC: 169 MG/DL (ref 70–130)
GLUCOSE BLDC GLUCOMTR-MCNC: 179 MG/DL (ref 70–130)
GLUCOSE SERPL-MCNC: 136 MG/DL (ref 65–99)
GLUCOSE SERPL-MCNC: 173 MG/DL (ref 65–99)
GLUCOSE SERPL-MCNC: 178 MG/DL (ref 65–99)
HCO3 BLDA-SCNC: 20.5 MMOL/L (ref 20–26)
HCO3 BLDV-SCNC: 25.1 MMOL/L (ref 22–28)
HCT VFR BLD AUTO: 37.5 % (ref 37.5–51)
HCT VFR BLD AUTO: 38.4 % (ref 37.5–51)
HCT VFR BLD CALC: 39.8 % (ref 38–51)
HGB BLD-MCNC: 12.5 G/DL (ref 13–17.7)
HGB BLD-MCNC: 13 G/DL (ref 13–17.7)
HGB BLDA-MCNC: 12.6 G/DL (ref 14–18)
HGB BLDA-MCNC: 13 G/DL (ref 14–18)
IMM GRANULOCYTES # BLD AUTO: 0.05 10*3/MM3 (ref 0–0.05)
IMM GRANULOCYTES # BLD AUTO: 0.06 10*3/MM3 (ref 0–0.05)
IMM GRANULOCYTES NFR BLD AUTO: 0.4 % (ref 0–0.5)
IMM GRANULOCYTES NFR BLD AUTO: 0.5 % (ref 0–0.5)
INHALED O2 CONCENTRATION: 21 %
INHALED O2 CONCENTRATION: 21 %
LYMPHOCYTES # BLD AUTO: 1.14 10*3/MM3 (ref 0.7–3.1)
LYMPHOCYTES # BLD AUTO: 2.63 10*3/MM3 (ref 0.7–3.1)
LYMPHOCYTES NFR BLD AUTO: 21.8 % (ref 19.6–45.3)
LYMPHOCYTES NFR BLD AUTO: 8.6 % (ref 19.6–45.3)
Lab: ABNORMAL
Lab: ABNORMAL
MAGNESIUM SERPL-MCNC: 2 MG/DL (ref 1.6–2.6)
MAGNESIUM SERPL-MCNC: 2.1 MG/DL (ref 1.6–2.6)
MCH RBC QN AUTO: 29 PG (ref 26.6–33)
MCH RBC QN AUTO: 29.5 PG (ref 26.6–33)
MCHC RBC AUTO-ENTMCNC: 33.3 G/DL (ref 31.5–35.7)
MCHC RBC AUTO-ENTMCNC: 33.9 G/DL (ref 31.5–35.7)
MCV RBC AUTO: 87 FL (ref 79–97)
MCV RBC AUTO: 87.3 FL (ref 79–97)
METHADONE UR QL SCN: NEGATIVE
METHGB BLD QL: 0.2 % (ref 0–3)
METHGB BLD QL: 0.4 % (ref 0–3)
MODALITY: ABNORMAL
MODALITY: ABNORMAL
MONOCYTES # BLD AUTO: 1.02 10*3/MM3 (ref 0.1–0.9)
MONOCYTES # BLD AUTO: 1.12 10*3/MM3 (ref 0.1–0.9)
MONOCYTES NFR BLD AUTO: 8.5 % (ref 5–12)
MONOCYTES NFR BLD AUTO: 8.5 % (ref 5–12)
NEUTROPHILS NFR BLD AUTO: 10.86 10*3/MM3 (ref 1.7–7)
NEUTROPHILS NFR BLD AUTO: 68.9 % (ref 42.7–76)
NEUTROPHILS NFR BLD AUTO: 8.3 10*3/MM3 (ref 1.7–7)
NEUTROPHILS NFR BLD AUTO: 82.3 % (ref 42.7–76)
NRBC BLD AUTO-RTO: 0 /100 WBC (ref 0–0.2)
NRBC BLD AUTO-RTO: 0 /100 WBC (ref 0–0.2)
OPIATES UR QL: NEGATIVE
OXYCODONE UR QL SCN: NEGATIVE
OXYHGB MFR BLDV: 82.2 % (ref 45–75)
OXYHGB MFR BLDV: 92.3 % (ref 94–99)
PCO2 BLDA: 30.5 MM HG (ref 35–45)
PCO2 BLDV: 35.8 MM HG (ref 41–51)
PCO2 TEMP ADJ BLD: ABNORMAL MM[HG]
PCP UR QL SCN: NEGATIVE
PH BLDA: 7.43 PH UNITS (ref 7.35–7.45)
PH BLDV: 7.45 PH UNITS (ref 7.32–7.42)
PH, TEMP CORRECTED: ABNORMAL
PLATELET # BLD AUTO: 301 10*3/MM3 (ref 140–450)
PLATELET # BLD AUTO: 303 10*3/MM3 (ref 140–450)
PMV BLD AUTO: 9.3 FL (ref 6–12)
PMV BLD AUTO: 9.4 FL (ref 6–12)
PO2 BLDA: 65.2 MM HG (ref 83–108)
PO2 BLDV: 47.6 MM HG (ref 27–53)
PO2 TEMP ADJ BLD: ABNORMAL MM[HG]
POTASSIUM SERPL-SCNC: 3.6 MMOL/L (ref 3.5–5.2)
POTASSIUM SERPL-SCNC: 4.1 MMOL/L (ref 3.5–5.2)
PROT SERPL-MCNC: 6.9 G/DL (ref 6–8.5)
QT INTERVAL: 400 MS
QT INTERVAL: 406 MS
QTC INTERVAL: 467 MS
QTC INTERVAL: 477 MS
RBC # BLD AUTO: 4.31 10*6/MM3 (ref 4.14–5.8)
RBC # BLD AUTO: 4.4 10*6/MM3 (ref 4.14–5.8)
SAO2 % BLDCOA: 93.7 % (ref 94–99)
SAO2 % BLDCOV: 83.8 % (ref 45–75)
SODIUM SERPL-SCNC: 132 MMOL/L (ref 136–145)
SODIUM SERPL-SCNC: 133 MMOL/L (ref 136–145)
SODIUM SERPL-SCNC: 135 MMOL/L (ref 136–145)
TRICYCLICS UR QL SCN: NEGATIVE
TROPONIN T SERPL HS-MCNC: 24 NG/L
VENTILATOR MODE: ABNORMAL
WBC NRBC COR # BLD AUTO: 12.05 10*3/MM3 (ref 3.4–10.8)
WBC NRBC COR # BLD AUTO: 13.19 10*3/MM3 (ref 3.4–10.8)

## 2024-07-07 PROCEDURE — 82948 REAGENT STRIP/BLOOD GLUCOSE: CPT

## 2024-07-07 PROCEDURE — 25010000002 PROCHLORPERAZINE 10 MG/2ML SOLUTION: Performed by: HOSPITALIST

## 2024-07-07 PROCEDURE — 82820 HEMOGLOBIN-OXYGEN AFFINITY: CPT

## 2024-07-07 PROCEDURE — 25010000002 ACETAZOLAMIDE PER 500 MG: Performed by: INTERNAL MEDICINE

## 2024-07-07 PROCEDURE — 63710000001 PROMETHAZINE PER 25 MG: Performed by: HOSPITALIST

## 2024-07-07 PROCEDURE — 93005 ELECTROCARDIOGRAM TRACING: CPT | Performed by: INTERNAL MEDICINE

## 2024-07-07 PROCEDURE — 25010000002 HEPARIN (PORCINE) PER 1000 UNITS: Performed by: INTERNAL MEDICINE

## 2024-07-07 PROCEDURE — 93010 ELECTROCARDIOGRAM REPORT: CPT | Performed by: INTERNAL MEDICINE

## 2024-07-07 PROCEDURE — 80307 DRUG TEST PRSMV CHEM ANLYZR: CPT

## 2024-07-07 PROCEDURE — 85025 COMPLETE CBC W/AUTO DIFF WBC: CPT | Performed by: INTERNAL MEDICINE

## 2024-07-07 PROCEDURE — 25010000002 SODIUM CHLORIDE 0.9 % WITH KCL 20 MEQ 20-0.9 MEQ/L-% SOLUTION: Performed by: INTERNAL MEDICINE

## 2024-07-07 PROCEDURE — 83735 ASSAY OF MAGNESIUM: CPT

## 2024-07-07 PROCEDURE — 99232 SBSQ HOSP IP/OBS MODERATE 35: CPT | Performed by: INTERNAL MEDICINE

## 2024-07-07 PROCEDURE — 82375 ASSAY CARBOXYHB QUANT: CPT

## 2024-07-07 PROCEDURE — 63710000001 INSULIN GLARGINE PER 5 UNITS: Performed by: INTERNAL MEDICINE

## 2024-07-07 PROCEDURE — 63710000001 INSULIN LISPRO (HUMAN) PER 5 UNITS: Performed by: INTERNAL MEDICINE

## 2024-07-07 PROCEDURE — 83735 ASSAY OF MAGNESIUM: CPT | Performed by: INTERNAL MEDICINE

## 2024-07-07 PROCEDURE — 80053 COMPREHEN METABOLIC PANEL: CPT | Performed by: INTERNAL MEDICINE

## 2024-07-07 PROCEDURE — 83050 HGB METHEMOGLOBIN QUAN: CPT

## 2024-07-07 PROCEDURE — 84132 ASSAY OF SERUM POTASSIUM: CPT | Performed by: HOSPITALIST

## 2024-07-07 PROCEDURE — 25010000002 POTASSIUM CHLORIDE 10 MEQ/100ML SOLUTION: Performed by: HOSPITALIST

## 2024-07-07 PROCEDURE — 85025 COMPLETE CBC W/AUTO DIFF WBC: CPT

## 2024-07-07 PROCEDURE — 25010000002 ONDANSETRON PER 1 MG: Performed by: INTERNAL MEDICINE

## 2024-07-07 PROCEDURE — 84484 ASSAY OF TROPONIN QUANT: CPT | Performed by: INTERNAL MEDICINE

## 2024-07-07 PROCEDURE — 36600 WITHDRAWAL OF ARTERIAL BLOOD: CPT

## 2024-07-07 PROCEDURE — 82805 BLOOD GASES W/O2 SATURATION: CPT

## 2024-07-07 PROCEDURE — 25810000003 SODIUM CHLORIDE 0.9 % SOLUTION: Performed by: INTERNAL MEDICINE

## 2024-07-07 RX ORDER — SODIUM CHLORIDE 9 MG/ML
40 INJECTION, SOLUTION INTRAVENOUS AS NEEDED
Status: DISCONTINUED | OUTPATIENT
Start: 2024-07-07 | End: 2024-07-09 | Stop reason: HOSPADM

## 2024-07-07 RX ORDER — SODIUM CHLORIDE 0.9 % (FLUSH) 0.9 %
10 SYRINGE (ML) INJECTION EVERY 12 HOURS SCHEDULED
Status: DISCONTINUED | OUTPATIENT
Start: 2024-07-07 | End: 2024-07-09 | Stop reason: HOSPADM

## 2024-07-07 RX ORDER — ALUMINA, MAGNESIA, AND SIMETHICONE 2400; 2400; 240 MG/30ML; MG/30ML; MG/30ML
15 SUSPENSION ORAL EVERY 6 HOURS PRN
Status: DISCONTINUED | OUTPATIENT
Start: 2024-07-07 | End: 2024-07-09 | Stop reason: HOSPADM

## 2024-07-07 RX ORDER — PROMETHAZINE HYDROCHLORIDE 25 MG/1
6.25 TABLET ORAL ONCE
Status: COMPLETED | OUTPATIENT
Start: 2024-07-07 | End: 2024-07-07

## 2024-07-07 RX ORDER — SODIUM CHLORIDE 0.9 % (FLUSH) 0.9 %
10 SYRINGE (ML) INJECTION AS NEEDED
Status: DISCONTINUED | OUTPATIENT
Start: 2024-07-07 | End: 2024-07-09 | Stop reason: HOSPADM

## 2024-07-07 RX ORDER — PROMETHAZINE HYDROCHLORIDE 12.5 MG/1
6.25 SUPPOSITORY RECTAL ONCE
Status: COMPLETED | OUTPATIENT
Start: 2024-07-07 | End: 2024-07-07

## 2024-07-07 RX ORDER — POTASSIUM CHLORIDE 7.45 MG/ML
10 INJECTION INTRAVENOUS
Qty: 400 ML | Refills: 0 | Status: COMPLETED | OUTPATIENT
Start: 2024-07-07 | End: 2024-07-07

## 2024-07-07 RX ORDER — NITROGLYCERIN 0.4 MG/1
0.4 TABLET SUBLINGUAL
Status: DISCONTINUED | OUTPATIENT
Start: 2024-07-07 | End: 2024-07-09 | Stop reason: HOSPADM

## 2024-07-07 RX ORDER — POTASSIUM CHLORIDE 20 MEQ/1
40 TABLET, EXTENDED RELEASE ORAL EVERY 4 HOURS
Status: DISCONTINUED | OUTPATIENT
Start: 2024-07-07 | End: 2024-07-07

## 2024-07-07 RX ADMIN — HEPARIN SODIUM 5000 UNITS: 5000 INJECTION INTRAVENOUS; SUBCUTANEOUS at 13:54

## 2024-07-07 RX ADMIN — ONDANSETRON 4 MG: 2 INJECTION INTRAMUSCULAR; INTRAVENOUS at 14:12

## 2024-07-07 RX ADMIN — Medication 10 ML: at 22:36

## 2024-07-07 RX ADMIN — ALUMINUM HYDROXIDE, MAGNESIUM HYDROXIDE, DIMETHICONE 15 ML: 400; 400; 40 SUSPENSION ORAL at 13:54

## 2024-07-07 RX ADMIN — POTASSIUM CHLORIDE AND SODIUM CHLORIDE 125 ML/HR: 900; 150 INJECTION, SOLUTION INTRAVENOUS at 08:34

## 2024-07-07 RX ADMIN — Medication 10 ML: at 01:49

## 2024-07-07 RX ADMIN — POTASSIUM CHLORIDE 10 MEQ: 7.46 INJECTION, SOLUTION INTRAVENOUS at 03:33

## 2024-07-07 RX ADMIN — POTASSIUM CHLORIDE 10 MEQ: 7.46 INJECTION, SOLUTION INTRAVENOUS at 04:30

## 2024-07-07 RX ADMIN — Medication 10 ML: at 08:37

## 2024-07-07 RX ADMIN — ANTACID TABLETS 2 TABLET: 500 TABLET, CHEWABLE ORAL at 22:36

## 2024-07-07 RX ADMIN — INSULIN LISPRO 2 UNITS: 100 INJECTION, SOLUTION INTRAVENOUS; SUBCUTANEOUS at 11:32

## 2024-07-07 RX ADMIN — PROMETHAZINE HYDROCHLORIDE 6.25 MG: 25 TABLET ORAL at 02:57

## 2024-07-07 RX ADMIN — POTASSIUM CHLORIDE AND SODIUM CHLORIDE 125 ML/HR: 900; 150 INJECTION, SOLUTION INTRAVENOUS at 16:27

## 2024-07-07 RX ADMIN — Medication 10 ML: at 08:36

## 2024-07-07 RX ADMIN — HEPARIN SODIUM 5000 UNITS: 5000 INJECTION INTRAVENOUS; SUBCUTANEOUS at 05:35

## 2024-07-07 RX ADMIN — HEPARIN SODIUM 5000 UNITS: 5000 INJECTION INTRAVENOUS; SUBCUTANEOUS at 21:29

## 2024-07-07 RX ADMIN — POTASSIUM CHLORIDE 10 MEQ: 7.46 INJECTION, SOLUTION INTRAVENOUS at 05:39

## 2024-07-07 RX ADMIN — INSULIN GLARGINE 20 UNITS: 100 INJECTION, SOLUTION SUBCUTANEOUS at 08:36

## 2024-07-07 RX ADMIN — SODIUM CHLORIDE 500 ML: 9 INJECTION, SOLUTION INTRAVENOUS at 01:49

## 2024-07-07 RX ADMIN — ONDANSETRON 4 MG: 2 INJECTION INTRAMUSCULAR; INTRAVENOUS at 08:34

## 2024-07-07 RX ADMIN — INSULIN LISPRO 2 UNITS: 100 INJECTION, SOLUTION INTRAVENOUS; SUBCUTANEOUS at 08:36

## 2024-07-07 RX ADMIN — ACETAZOLAMIDE 125 MG: 500 INJECTION, POWDER, LYOPHILIZED, FOR SOLUTION INTRAVENOUS at 01:49

## 2024-07-07 RX ADMIN — DOCUSATE SODIUM 50 MG AND SENNOSIDES 8.6 MG 2 TABLET: 8.6; 5 TABLET, FILM COATED ORAL at 10:04

## 2024-07-07 RX ADMIN — PROCHLORPERAZINE EDISYLATE 2.5 MG: 5 INJECTION INTRAMUSCULAR; INTRAVENOUS at 10:57

## 2024-07-07 RX ADMIN — ONDANSETRON 4 MG: 2 INJECTION INTRAMUSCULAR; INTRAVENOUS at 21:26

## 2024-07-07 RX ADMIN — POTASSIUM CHLORIDE 10 MEQ: 7.46 INJECTION, SOLUTION INTRAVENOUS at 02:30

## 2024-07-07 NOTE — PLAN OF CARE
Goal Outcome Evaluation:  Plan of Care Reviewed With: patient           Outcome Evaluation: patient was transfered from PCU took a shower as soon as she got to the floor vss will continue plan of care

## 2024-07-07 NOTE — PROGRESS NOTES
Nephrology Progress Note    Interval History:     Patient Complaints: He is calmer today.  Not hyperventilating.  Not in distress.  Had to give him multiple boluses last evening and also gave him Diamox along with potassium supplementation.        Vital Signs  Temp:  [97.8 °F (36.6 °C)-98.6 °F (37 °C)] 98.6 °F (37 °C)  Heart Rate:  [62-96] 71  Resp:  [10-20] 18  BP: (124-167)/() 148/91    Physical Exam:    General:           No distress      HEENT: No pallor               Neck: No JVD       Lungs:   Clear   Heart:  regular,  no rub       Abdomen:   Normal bowel sounds, soft non-tender       Extremities: No edema               Neurologic: Cranial nerves grossly intact, moves all extremities.        Results Review:    I reviewed the patient's new clinical results.    Lab Results (last 24 hours)       Procedure Component Value Units Date/Time    Potassium [[id number]]  (Normal) Collected: 07/07/24 1136    Specimen: Blood Updated: 07/07/24 1217     Potassium 4.1 mmol/L      Comment: Slight hemolysis detected by analyzer. Result may be falsely elevated.       Basic Metabolic Panel [[id number]]  (Abnormal) Collected: 07/07/24 1136    Specimen: Blood Updated: 07/07/24 1217     Glucose 178 mg/dL      BUN 32 mg/dL      Creatinine 2.71 mg/dL      Sodium 132 mmol/L      Potassium 4.1 mmol/L      Comment: Slight hemolysis detected by analyzer. Result may be falsely elevated.        Chloride 96 mmol/L      CO2 21.7 mmol/L      Calcium 8.6 mg/dL      BUN/Creatinine Ratio 11.8     Anion Gap 14.3 mmol/L      eGFR 31.2 mL/min/1.73     Narrative:      GFR Normal >60  Chronic Kidney Disease <60  Kidney Failure <15      Blood Gas, Venous With Co-Ox [[id number]]  (Abnormal) Collected: 07/07/24 1144    Specimen: Venous Blood Updated: 07/07/24 1146     Site Lab     pH, Venous 7.454 pH Units      pCO2, Venous 35.8 mm Hg      Comment: 84 Value below reference range        pO2,  Venous 47.6 mm Hg      HCO3, Venous 25.1 mmol/L      Base Excess, Venous 1.4 mmol/L      O2 Saturation, Venous 83.8 %      Hemoglobin, Blood Gas 12.6 g/dL      Comment: 84 Value below reference range        CO2 Content 26.2 mmol/L      Barometric Pressure for Blood Gas 728 mmHg      Modality Room Air     FIO2 21 %      Collected by [id number]     Comment: Meter: [id number]     :  [id number]        Oxyhemoglobin Venous 82.2 %      Comment: 83 Value above reference range        Carboxyhemoglobin Venous 1.5 %      Methemoglobin Venous 0.4 %     POC Glucose Once [[id number]]  (Abnormal) Collected: 07/07/24 1106    Specimen: Blood Updated: 07/07/24 1113     Glucose 179 mg/dL     POC Glucose Once [[id number]]  (Abnormal) Collected: 07/07/24 0648    Specimen: Blood Updated: 07/07/24 0654     Glucose 169 mg/dL     POC Glucose Once [[id number]]  (Abnormal) Collected: 07/06/24 2120    Specimen: Blood Updated: 07/07/24 0636     Glucose 150 mg/dL     Magnesium [[id number]]  (Normal) Collected: 07/07/24 0021    Specimen: Blood Updated: 07/07/24 0124     Magnesium 2.0 mg/dL     Basic Metabolic Panel [[id number]]  (Abnormal) Collected: 07/07/24 0021    Specimen: Blood Updated: 07/07/24 0124     Glucose 173 mg/dL      BUN 40 mg/dL      Creatinine 3.40 mg/dL      Sodium 135 mmol/L      Potassium 3.6 mmol/L      Chloride 95 mmol/L      CO2 25.5 mmol/L      Calcium 8.6 mg/dL      BUN/Creatinine Ratio 11.8     Anion Gap 14.5 mmol/L      eGFR 23.8 mL/min/1.73     Narrative:      GFR Normal >60  Chronic Kidney Disease <60  Kidney Failure <15      CBC & Differential [[id number]]  (Abnormal) Collected: 07/07/24 0021    Specimen: Blood Updated: 07/07/24 0112    Narrative:      The following orders were created for panel order CBC & Differential.  Procedure                               Abnormality         Status                     ---------                               -----------         ------                     CBC Auto  Differential[[id number]]        Abnormal            Final result                 Please view results for these tests on the individual orders.    CBC Auto Differential [[id number]]  (Abnormal) Collected: 07/07/24 0021    Specimen: Blood Updated: 07/07/24 0112     WBC 13.19 10*3/mm3      RBC 4.31 10*6/mm3      Hemoglobin 12.5 g/dL      Hematocrit 37.5 %      MCV 87.0 fL      MCH 29.0 pg      MCHC 33.3 g/dL      RDW 12.3 %      RDW-SD 39.1 fl      MPV 9.4 fL      Platelets 301 10*3/mm3      Neutrophil % 82.3 %      Lymphocyte % 8.6 %      Monocyte % 8.5 %      Eosinophil % 0.0 %      Basophil % 0.1 %      Immature Grans % 0.5 %      Neutrophils, Absolute 10.86 10*3/mm3      Lymphocytes, Absolute 1.14 10*3/mm3      Monocytes, Absolute 1.12 10*3/mm3      Eosinophils, Absolute 0.00 10*3/mm3      Basophils, Absolute 0.01 10*3/mm3      Immature Grans, Absolute 0.06 10*3/mm3      nRBC 0.0 /100 WBC     Blood Gas, Arterial With Co-Ox [[id number]]  (Abnormal) Collected: 07/06/24 2016    Specimen: Arterial Blood Updated: 07/06/24 2021     Site Left Brachial     Butch's Test N/A     pH, Arterial 7.641 pH units      Comment: 86 Value above critical limit        pCO2, Arterial 24.8 mm Hg      Comment: 85 Value below critical limit        pO2, Arterial 70.4 mm Hg      Comment: 84 Value below reference range        HCO3, Arterial 26.7 mmol/L      Comment: 83 Value above reference range        Base Excess, Arterial 6.8 mmol/L      O2 Saturation, Arterial 95.5 %      Hemoglobin, Blood Gas 13.3 g/dL      Comment: 84 Value below reference range        Hematocrit, Blood Gas 40.9 %      Oxyhemoglobin 94.9 %      Methemoglobin 0.00 %      Carboxyhemoglobin 0.6 %      A-a DO2 44.4 mmHg      CO2 Content 27.5 mmol/L      Barometric Pressure for Blood Gas 727 mmHg      Modality Room Air     FIO2 21 %      Ventilator Mode NA     Notified Who DR BAUM     Notified By [id number]     Notified Time 07/06/2024 20:20     Collected by [id number]     Comment:  Meter: [id number]     :  [id number]        pH, Temp Corrected --     pCO2, Temperature Corrected --     pO2, Temperature Corrected --    High Sensitivity Troponin T 2Hr [[id number]]  (Abnormal) Collected: 07/06/24 1819    Specimen: Blood Updated: 07/06/24 1848     HS Troponin T 41 ng/L      Troponin T Delta -2 ng/L     Narrative:      High Sensitive Troponin T Reference Range:  <14.0 ng/L- Negative Female for AMI  <22.0 ng/L- Negative Male for AMI  >=14 - Abnormal Female indicating possible myocardial injury.  >=22 - Abnormal Male indicating possible myocardial injury.   Clinicians would have to utilize clinical acumen, EKG, Troponin, and serial changes to determine if it is an Acute Myocardial Infarction or myocardial injury due to an underlying chronic condition.         Fentanyl, Urine - Urine, Clean Catch [[id number]]  (Normal) Collected: 07/06/24 1757    Specimen: Urine, Clean Catch Updated: 07/06/24 1819     Fentanyl, Urine Negative    Narrative:      Negative Threshold:      Fentanyl 5 ng/mL     The normal value for the drug tested is negative. This report includes final unconfirmed screening results to be used for medical treatment purposes only. Unconfirmed results must not be used for non-medical purposes such as employment or legal testing. Clinical consideration should be applied to any drug of abuse test, particularly when unconfirmed results are used.           Urine Drug Screen - Urine, Clean Catch [[id number]]  (Abnormal) Collected: 07/06/24 1757    Specimen: Urine, Clean Catch Updated: 07/06/24 1817     THC, Screen, Urine Positive     Phencyclidine (PCP), Urine Negative     Cocaine Screen, Urine Negative     Methamphetamine, Ur Negative     Opiate Screen Negative     Amphetamine Screen, Urine Negative     Benzodiazepine Screen, Urine Negative     Tricyclic Antidepressants Screen Negative     Methadone Screen, Urine Negative     Barbiturates Screen, Urine Negative     Oxycodone Screen, Urine  Negative     Buprenorphine, Screen, Urine Negative    Narrative:      Cutoff For Drugs Screened:    Amphetamines               500 ng/ml  Barbiturates               200 ng/ml  Benzodiazepines            150 ng/ml  Cocaine                    150 ng/ml  Methadone                  200 ng/ml  Opiates                    100 ng/ml  Phencyclidine               25 ng/ml  THC                         50 ng/ml  Methamphetamine            500 ng/ml  Tricyclic Antidepressants  300 ng/ml  Oxycodone                  100 ng/ml  Buprenorphine               10 ng/ml    The normal value for all drugs tested is negative. This report includes unconfirmed screening results, with the cutoff values listed, to be used for medical treatment purposes only.  Unconfirmed results must not be used for non-medical purposes such as employment or legal testing.  Clinical consideration should be applied to any drug of abuse test, particularly when unconfirmed results are used.      POC Glucose Once [[id number]]  (Abnormal) Collected: 07/06/24 1806    Specimen: Blood Updated: 07/06/24 1812     Glucose 166 mg/dL     Basic Metabolic Panel [[id number]]  (Abnormal) Collected: 07/06/24 1651    Specimen: Blood Updated: 07/06/24 1804     Glucose 140 mg/dL      BUN 46 mg/dL      Creatinine 3.82 mg/dL      Sodium 133 mmol/L      Potassium 3.4 mmol/L      Comment: Slight hemolysis detected by analyzer. Result may be falsely elevated.        Chloride 89 mmol/L      CO2 25.0 mmol/L      Calcium 9.1 mg/dL      BUN/Creatinine Ratio 12.0     Anion Gap 19.0 mmol/L      eGFR 20.7 mL/min/1.73     Narrative:      GFR Normal >60  Chronic Kidney Disease <60  Kidney Failure <15      High Sensitivity Troponin T [[id number]]  (Abnormal) Collected: 07/06/24 1651    Specimen: Blood Updated: 07/06/24 1804     HS Troponin T 43 ng/L     Narrative:      High Sensitive Troponin T Reference Range:  <14.0 ng/L- Negative Female for AMI  <22.0 ng/L- Negative Male for AMI  >=14 - Abnormal  Female indicating possible myocardial injury.  >=22 - Abnormal Male indicating possible myocardial injury.   Clinicians would have to utilize clinical acumen, EKG, Troponin, and serial changes to determine if it is an Acute Myocardial Infarction or myocardial injury due to an underlying chronic condition.         POC Glucose Once [[id number]]  (Abnormal) Collected: 07/06/24 1622    Specimen: Blood Updated: 07/06/24 1629     Glucose 147 mg/dL     Magnesium [[id number]]  (Normal) Collected: 07/06/24 1515    Specimen: Blood Updated: 07/06/24 1617     Magnesium 2.1 mg/dL     Acetone [[id number]]  (Normal) Collected: 07/06/24 1322    Specimen: Blood Updated: 07/06/24 1458     Acetone Negative    Basic Metabolic Panel [[id number]]  (Abnormal) Collected: 07/06/24 1419    Specimen: Blood Updated: 07/06/24 1458     Glucose 203 mg/dL      BUN 50 mg/dL      Creatinine 4.43 mg/dL      Sodium 132 mmol/L      Potassium 3.3 mmol/L      Comment: Slight hemolysis detected by analyzer. Result may be falsely elevated.        Chloride 84 mmol/L      CO2 25.8 mmol/L      Calcium 9.4 mg/dL      BUN/Creatinine Ratio 11.3     Anion Gap 22.2 mmol/L      eGFR 17.3 mL/min/1.73     Narrative:      GFR Normal >60  Chronic Kidney Disease <60  Kidney Failure <15      Blood Culture - Blood, Arm, Left [[id number]]  (Normal) Collected: 07/05/24 1349    Specimen: Blood from Arm, Left Updated: 07/06/24 1415     Blood Culture No growth at 24 hours    Blood Culture - Blood, Hand, Left [[id number]]  (Normal) Collected: 07/05/24 1349    Specimen: Blood from Hand, Left Updated: 07/06/24 1415     Blood Culture No growth at 24 hours    Blood Gas, Venous With Co-Ox [[id number]]  (Abnormal) Collected: 07/06/24 1320    Specimen: Venous Blood Updated: 07/06/24 1326     Site Lab     pH, Venous 7.516 pH Units      Comment: 86 Value above critical limit        pCO2, Venous 39.3 mm Hg      Comment: 84 Value below reference range        pO2, Venous 28.1 mm Hg      HCO3,  Venous 31.8 mmol/L      Comment: 83 Value above reference range        Base Excess, Venous 8.2 mmol/L      O2 Saturation, Venous 53.2 %      Hemoglobin, Blood Gas 14.3 g/dL      CO2 Content 33.0 mmol/L      Barometric Pressure for Blood Gas 727 mmHg      Modality Nasal Cannula     FIO2 28 %      Flow Rate 2.0 lpm      Ventilator Mode NA     Notified Who DR HUTTON     Notified By [id number]     Notified Time 07/06/2024 13:26     Collected by [id number]     Comment: Meter: [id number]     :  [id number]        Oxyhemoglobin Venous 52.3 %      Carboxyhemoglobin Venous 1.3 %      Methemoglobin Venous 0.4 %             Imaging Results (Last 24 Hours)       Procedure Component Value Units Date/Time    XR Abdomen KUB [[id number]] Collected: 07/06/24 1929     Updated: 07/06/24 1932    Narrative:      PROCEDURE: X-ray examination of the abdomen performed on July 6, 2024. 2  films. Single view.     HISTORY: Nausea and vomiting.     COMPARISON: None.     FINDINGS:     Nonobstructive bowel gas pattern.  Slight dextroconvex curve at the mid lumbar spine.  No dilated large or small bowel segments  No pneumatosis. No free air  No mass or calcification  No acute foreign body.       Impression:         1.  Nonobstructive bowel gas pattern.  2.  Mild degenerative disc disease.  3.  No pneumatosis.  4.  No free air.     This report was finalized on 7/6/2024 7:30 PM by Maico Miller MD.               Assessment and Plan:    1.  Acute renal failure on CKD stage IIIa baseline creatinine about 1.2 mg/dL  2.  Type 1 diabetes with renal involvement with poor control  3.  Resolved metabolic acidosis and ketoacidosis and resolved metabolic alkalosis  4.  Respiratory alkalosis  5.  Cannabis hyperemesis syndrome  6.  Hypokalemia    It appears patient has had frequent hospitalizations with similar presentation.    Counseled him to stay away from cannabis as he had actually developed life-threatening alkalemia yesterday requiring administration  of Diamox    All labs reviewed.    Continue normal saline with Kcl    Discussed with Dr. Thang Pepe MD  07/07/24  12:43 EDT

## 2024-07-07 NOTE — PLAN OF CARE
Problem: Adult Inpatient Plan of Care  Goal: Plan of Care Review  Outcome: Ongoing, Progressing  Goal: Patient-Specific Goal (Individualized)  Outcome: Ongoing, Progressing  Goal: Absence of Hospital-Acquired Illness or Injury  Outcome: Ongoing, Progressing  Intervention: Identify and Manage Fall Risk  Recent Flowsheet Documentation  Taken 7/7/2024 1300 by Deny Babin RN  Safety Promotion/Fall Prevention: safety round/check completed  Taken 7/7/2024 1100 by Deny Babin RN  Safety Promotion/Fall Prevention: safety round/check completed  Taken 7/7/2024 0900 by Deny Babin RN  Safety Promotion/Fall Prevention: safety round/check completed  Taken 7/7/2024 0700 by Deny Babin RN  Safety Promotion/Fall Prevention: safety round/check completed  Goal: Optimal Comfort and Wellbeing  Outcome: Ongoing, Progressing  Intervention: Provide Person-Centered Care  Recent Flowsheet Documentation  Taken 7/7/2024 0800 by Deny Babin RN  Trust Relationship/Rapport:   care explained   reassurance provided  Goal: Readiness for Transition of Care  Outcome: Ongoing, Progressing   Goal Outcome Evaluation:

## 2024-07-07 NOTE — PROGRESS NOTES
[facility] HOSPITALIST PROGRESS NOTE    Subjective     History:   Oliver Osborn is a 29 y.o. male admitted on 7/5/2024 secondary to MINA (acute kidney injury)     Procedures: None    CC: Follow up MINA    Patient seen and examined with ISAIAH Barclay. Sleeping upon my arrival but easily awakens. Appears to be resting more comfortably today. Continues to report episodes of nausea and vomiting. Indigestion reported. Pt previously refusing bowel regimen and discussed CT findings of moderate to large stool burden.  No acute events overnight per RN.     History taken from: patient, chart, and RN.      Objective     Vital Signs  Temp:  [97.8 °F (36.6 °C)-98.6 °F (37 °C)] 98.6 °F (37 °C)  Heart Rate:  [61-96] 64  Resp:  [10-20] 18  BP: (124-167)/() 141/87    Intake/Output Summary (Last 24 hours) at 7/7/2024 1313  Last data filed at 7/7/2024 1200  Gross per 24 hour   Intake 2686 ml   Output 425 ml   Net 2261 ml         Physical Exam:  General:    Awake, alert, appears to be resting more comfortably today, acutely ill appearing   Heart:      Normal S1 and S2. Regular rate and rhythm. No significant murmur, rubs or gallops appreciated.   Lungs:     Respirations appear regular, even and unlabored.  Lungs clear to auscultation B/L. No wheezes, rales or rhonchi.   Abdomen:   Soft and nontender. No guarding, rebound tenderness or  organomegaly noted. Bowel sounds present x 4.   Extremities:  No clubbing, cyanosis or edema noted. Moves UE and LE equally B/L.     Results Review:    Results from last 7 days   Lab Units 07/07/24  0021 07/06/24  0101 07/05/24  1240   WBC 10*3/mm3 13.19* 19.50* 24.10*   HEMOGLOBIN g/dL 12.5* 14.8 16.6   PLATELETS 10*3/mm3 301 340 428     Results from last 7 days   Lab Units 07/07/24  1136 07/07/24  0021 07/06/24  1651 07/06/24  1419 07/06/24  0101 07/05/24  1240   SODIUM mmol/L 132* 135* 133* 132* 132* 130*   POTASSIUM mmol/L 4.1  4.1 3.6 3.4* 3.3* 3.8 4.5   CHLORIDE mmol/L 96* 95* 89*  84* 86* 78*   CO2 mmol/L 21.7* 25.5 25.0 25.8 24.5 20.1*   BUN mg/dL 32* 40* 46* 50* 51* 55*   CREATININE mg/dL 2.71* 3.40* 3.82* 4.43* 4.91* 6.55*   CALCIUM mg/dL 8.6 8.6 9.1 9.4 9.2 10.3   GLUCOSE mg/dL 178* 173* 140* 203* 209* 335*     Results from last 7 days   Lab Units 07/06/24  0101 07/05/24  1240   BILIRUBIN mg/dL 0.4 0.7   ALK PHOS U/L 70 91   AST (SGOT) U/L 25 25   ALT (SGPT) U/L 11 13     Results from last 7 days   Lab Units 07/07/24  0021 07/06/24  1515   MAGNESIUM mg/dL 2.0 2.1         Results from last 7 days   Lab Units 07/06/24  1819 07/06/24  1651 07/05/24  1240   CK TOTAL U/L  --   --  705*   HSTROP T ng/L 41* 43*  --        Imaging Results (Last 24 Hours)       Procedure Component Value Units Date/Time    XR Abdomen KUB [[id number]] Collected: 07/06/24 1929     Updated: 07/06/24 1932    Narrative:      PROCEDURE: X-ray examination of the abdomen performed on July 6, 2024. 2  films. Single view.     HISTORY: Nausea and vomiting.     COMPARISON: None.     FINDINGS:     Nonobstructive bowel gas pattern.  Slight dextroconvex curve at the mid lumbar spine.  No dilated large or small bowel segments  No pneumatosis. No free air  No mass or calcification  No acute foreign body.       Impression:         1.  Nonobstructive bowel gas pattern.  2.  Mild degenerative disc disease.  3.  No pneumatosis.  4.  No free air.     This report was finalized on 7/6/2024 7:30 PM by Maico Miller MD.                 Medications:  cholecalciferol, 50,000 Units, Oral, Weekly  heparin (porcine), 5,000 Units, Subcutaneous, Q8H  insulin glargine, 20 Units, Subcutaneous, Daily  insulin lispro, 2-9 Units, Subcutaneous, 4x Daily AC & at Bedtime  senna-docusate sodium, 2 tablet, Oral, BID  sodium chloride, 10 mL, Intravenous, Q12H  sodium chloride, 10 mL, Intravenous, Q12H      sodium chloride 0.9 % with KCl 20 mEq, 125 mL/hr, Last Rate: 125 mL/hr (07/07/24 0834)            Assessment & Plan   MINA on CKD IIIa: Possibly 2/2 nausea  and vomiting with recent decreased PO intake. No evidence of obstruction on imaging. Cr improving. Cont IVF's per nephrology. Monitor UOP and repeat labs in the AM. Nephrology input appreciated.     Anion gap metabolic acidosis: Respiratory alkalosis noted. Lactic acid initially elevated but normalized on repeat. Trace acetone initially noted with concern for starvation ketosis. Negative acetone on repeat. MINA possibly contributing. S/P Diamox overnight.  Improving. Cont IVF's. Cont to monitor.     DM, II insulin dependent with hyperglycemia: HgbA1c 8.8. Improved with IVF's and increasing basal insulin. Cont SSI with Accuchecks.     Nausea and vomiting: Concern for cannabinoid hyperemesis syndrome. Recent dose increase in Ozempic possibly contributing as well. CT abd/pelvis reveals moderate to large stool burden. Bowel regimen ordered. Advance diet as tolerated. Supportive treatment.     SIRS: Possibly 2/2 above. No obvious source of infection on workup. Leukocytosis improved today. Cont to monitor off antibiotics. Follow cultures and repeat labs in the AM.     Hypokalemia: Improved with supplementation. Mg is 2.     DVT PPX: SQ heparin    Discussed with Dr. Pepe.     Transfer to med/surg.     Disposition Likely home when medically stable.     Samuel Desir,   07/07/24  13:13 EDT

## 2024-07-07 NOTE — PLAN OF CARE
Goal Outcome Evaluation:  Plan of Care Reviewed With: patient        Progress: no change  Outcome Evaluation: Pt resting in bed; continues to have nausea and vomiting; PRN medications given with temporary relief per patient; no additional distress noted and no other needs requested; POC ongoing.

## 2024-07-08 LAB
ALBUMIN SERPL-MCNC: 3.6 G/DL (ref 3.5–5.2)
ALBUMIN/GLOB SERPL: 1.2 G/DL
ALP SERPL-CCNC: 60 U/L (ref 39–117)
ALT SERPL W P-5'-P-CCNC: 12 U/L (ref 1–41)
ANION GAP SERPL CALCULATED.3IONS-SCNC: 13.9 MMOL/L (ref 5–15)
AST SERPL-CCNC: 19 U/L (ref 1–40)
BASOPHILS # BLD AUTO: 0.02 10*3/MM3 (ref 0–0.2)
BASOPHILS NFR BLD AUTO: 0.2 % (ref 0–1.5)
BILIRUB SERPL-MCNC: 0.5 MG/DL (ref 0–1.2)
BUN SERPL-MCNC: 27 MG/DL (ref 6–20)
BUN/CREAT SERPL: 11.5 (ref 7–25)
CALCIUM SPEC-SCNC: 8.7 MG/DL (ref 8.6–10.5)
CHLORIDE SERPL-SCNC: 100 MMOL/L (ref 98–107)
CO2 SERPL-SCNC: 20.1 MMOL/L (ref 22–29)
CREAT SERPL-MCNC: 2.34 MG/DL (ref 0.76–1.27)
DEPRECATED RDW RBC AUTO: 38.9 FL (ref 37–54)
EGFRCR SERPLBLD CKD-EPI 2021: 37.2 ML/MIN/1.73
EOSINOPHIL # BLD AUTO: 0.03 10*3/MM3 (ref 0–0.4)
EOSINOPHIL NFR BLD AUTO: 0.3 % (ref 0.3–6.2)
ERYTHROCYTE [DISTWIDTH] IN BLOOD BY AUTOMATED COUNT: 12.1 % (ref 12.3–15.4)
GEN 5 2HR TROPONIN T REFLEX: 27 NG/L
GLOBULIN UR ELPH-MCNC: 2.9 GM/DL
GLUCOSE BLDC GLUCOMTR-MCNC: 127 MG/DL (ref 70–130)
GLUCOSE BLDC GLUCOMTR-MCNC: 163 MG/DL (ref 70–130)
GLUCOSE BLDC GLUCOMTR-MCNC: 168 MG/DL (ref 70–130)
GLUCOSE BLDC GLUCOMTR-MCNC: 180 MG/DL (ref 70–130)
GLUCOSE SERPL-MCNC: 158 MG/DL (ref 65–99)
HCT VFR BLD AUTO: 37.4 % (ref 37.5–51)
HGB BLD-MCNC: 12.6 G/DL (ref 13–17.7)
IMM GRANULOCYTES # BLD AUTO: 0.04 10*3/MM3 (ref 0–0.05)
IMM GRANULOCYTES NFR BLD AUTO: 0.4 % (ref 0–0.5)
LYMPHOCYTES # BLD AUTO: 1.99 10*3/MM3 (ref 0.7–3.1)
LYMPHOCYTES NFR BLD AUTO: 19.6 % (ref 19.6–45.3)
MAGNESIUM SERPL-MCNC: 1.9 MG/DL (ref 1.6–2.6)
MCH RBC QN AUTO: 29.9 PG (ref 26.6–33)
MCHC RBC AUTO-ENTMCNC: 33.7 G/DL (ref 31.5–35.7)
MCV RBC AUTO: 88.6 FL (ref 79–97)
MONOCYTES # BLD AUTO: 0.89 10*3/MM3 (ref 0.1–0.9)
MONOCYTES NFR BLD AUTO: 8.8 % (ref 5–12)
NEUTROPHILS NFR BLD AUTO: 7.2 10*3/MM3 (ref 1.7–7)
NEUTROPHILS NFR BLD AUTO: 70.7 % (ref 42.7–76)
NRBC BLD AUTO-RTO: 0 /100 WBC (ref 0–0.2)
PLATELET # BLD AUTO: 289 10*3/MM3 (ref 140–450)
PMV BLD AUTO: 9.3 FL (ref 6–12)
POTASSIUM SERPL-SCNC: 3.9 MMOL/L (ref 3.5–5.2)
PROT SERPL-MCNC: 6.5 G/DL (ref 6–8.5)
QT INTERVAL: 416 MS
QT INTERVAL: 428 MS
QTC INTERVAL: 436 MS
QTC INTERVAL: 445 MS
RBC # BLD AUTO: 4.22 10*6/MM3 (ref 4.14–5.8)
SODIUM SERPL-SCNC: 134 MMOL/L (ref 136–145)
TROPONIN T DELTA: 3 NG/L
WBC NRBC COR # BLD AUTO: 10.17 10*3/MM3 (ref 3.4–10.8)

## 2024-07-08 PROCEDURE — 82948 REAGENT STRIP/BLOOD GLUCOSE: CPT

## 2024-07-08 PROCEDURE — 63710000001 INSULIN LISPRO (HUMAN) PER 5 UNITS: Performed by: INTERNAL MEDICINE

## 2024-07-08 PROCEDURE — 83735 ASSAY OF MAGNESIUM: CPT | Performed by: INTERNAL MEDICINE

## 2024-07-08 PROCEDURE — 93005 ELECTROCARDIOGRAM TRACING: CPT | Performed by: INTERNAL MEDICINE

## 2024-07-08 PROCEDURE — 25010000002 SODIUM CHLORIDE 0.9 % WITH KCL 20 MEQ 20-0.9 MEQ/L-% SOLUTION: Performed by: INTERNAL MEDICINE

## 2024-07-08 PROCEDURE — 25010000002 HEPARIN (PORCINE) PER 1000 UNITS: Performed by: INTERNAL MEDICINE

## 2024-07-08 PROCEDURE — 93010 ELECTROCARDIOGRAM REPORT: CPT | Performed by: INTERNAL MEDICINE

## 2024-07-08 PROCEDURE — 63710000001 INSULIN GLARGINE PER 5 UNITS: Performed by: INTERNAL MEDICINE

## 2024-07-08 PROCEDURE — 25010000002 ONDANSETRON PER 1 MG: Performed by: INTERNAL MEDICINE

## 2024-07-08 PROCEDURE — 84484 ASSAY OF TROPONIN QUANT: CPT | Performed by: INTERNAL MEDICINE

## 2024-07-08 PROCEDURE — 80053 COMPREHEN METABOLIC PANEL: CPT | Performed by: INTERNAL MEDICINE

## 2024-07-08 PROCEDURE — 25010000002 PROCHLORPERAZINE 10 MG/2ML SOLUTION: Performed by: INTERNAL MEDICINE

## 2024-07-08 PROCEDURE — 85025 COMPLETE CBC W/AUTO DIFF WBC: CPT | Performed by: INTERNAL MEDICINE

## 2024-07-08 PROCEDURE — 99232 SBSQ HOSP IP/OBS MODERATE 35: CPT | Performed by: INTERNAL MEDICINE

## 2024-07-08 RX ADMIN — Medication 10 ML: at 20:54

## 2024-07-08 RX ADMIN — PROCHLORPERAZINE EDISYLATE 2.5 MG: 5 INJECTION INTRAMUSCULAR; INTRAVENOUS at 02:33

## 2024-07-08 RX ADMIN — POTASSIUM CHLORIDE AND SODIUM CHLORIDE 125 ML/HR: 900; 150 INJECTION, SOLUTION INTRAVENOUS at 23:34

## 2024-07-08 RX ADMIN — HEPARIN SODIUM 5000 UNITS: 5000 INJECTION INTRAVENOUS; SUBCUTANEOUS at 21:36

## 2024-07-08 RX ADMIN — HEPARIN SODIUM 5000 UNITS: 5000 INJECTION INTRAVENOUS; SUBCUTANEOUS at 06:28

## 2024-07-08 RX ADMIN — HEPARIN SODIUM 5000 UNITS: 5000 INJECTION INTRAVENOUS; SUBCUTANEOUS at 15:40

## 2024-07-08 RX ADMIN — INSULIN LISPRO 4 UNITS: 100 INJECTION, SOLUTION INTRAVENOUS; SUBCUTANEOUS at 11:50

## 2024-07-08 RX ADMIN — HYDROXYZINE HYDROCHLORIDE 25 MG: 25 TABLET ORAL at 00:04

## 2024-07-08 RX ADMIN — ONDANSETRON 4 MG: 2 INJECTION INTRAMUSCULAR; INTRAVENOUS at 03:59

## 2024-07-08 RX ADMIN — INSULIN LISPRO 2 UNITS: 100 INJECTION, SOLUTION INTRAVENOUS; SUBCUTANEOUS at 17:39

## 2024-07-08 RX ADMIN — POTASSIUM CHLORIDE AND SODIUM CHLORIDE 125 ML/HR: 900; 150 INJECTION, SOLUTION INTRAVENOUS at 02:33

## 2024-07-08 RX ADMIN — ALUMINUM HYDROXIDE, MAGNESIUM HYDROXIDE, DIMETHICONE 15 ML: 400; 400; 40 SUSPENSION ORAL at 01:09

## 2024-07-08 RX ADMIN — INSULIN GLARGINE 20 UNITS: 100 INJECTION, SOLUTION SUBCUTANEOUS at 08:24

## 2024-07-08 RX ADMIN — INSULIN LISPRO 2 UNITS: 100 INJECTION, SOLUTION INTRAVENOUS; SUBCUTANEOUS at 08:23

## 2024-07-08 RX ADMIN — ALUMINUM HYDROXIDE, MAGNESIUM HYDROXIDE, DIMETHICONE 15 ML: 400; 400; 40 SUSPENSION ORAL at 08:23

## 2024-07-08 RX ADMIN — PROCHLORPERAZINE EDISYLATE 2.5 MG: 5 INJECTION INTRAMUSCULAR; INTRAVENOUS at 08:23

## 2024-07-08 RX ADMIN — POTASSIUM CHLORIDE AND SODIUM CHLORIDE 125 ML/HR: 900; 150 INJECTION, SOLUTION INTRAVENOUS at 12:55

## 2024-07-08 NOTE — PLAN OF CARE
Goal Outcome Evaluation:  Plan of Care Reviewed With: patient           Outcome Evaluation: patient has been in the shower several times today diet changed to regular vss no acute changes will continue with plan of care

## 2024-07-08 NOTE — PROGRESS NOTES
Nephrology Progress Note      Subjective     07/08/2024 patient is feeling much better wants to go home denies any shortness of breath blood pressures towards higher side no nausea vomiting diarrhea no chest pain    Objective       Vital signs :     Vitals:    07/07/24 2100 07/07/24 2300 07/07/24 2321 07/08/24 0300   BP: (!) 180/102 173/86 159/97 156/92   BP Location: Left arm Left arm Left arm Left arm   Patient Position: Sitting Lying Lying Lying   Pulse: 67 77 69 66   Resp: 18 18  18   Temp: 99.2 °F (37.3 °C) 98.3 °F (36.8 °C)  98 °F (36.7 °C)   TempSrc: Oral Oral  Oral   SpO2: 97% 94%  97%   Weight:       Height:            Intake/Output                         07/06/24 0701 - 07/07/24 0700 07/07/24 0701 - 07/08/24 0700     9080-5197 5091-0688 Total 4719-9754 5326-1575 Total                 Intake    P.O.  --  800 800  813  120 933    I.V.  1073  -- 1073  1000  -- 1000    Total Intake 1212 444 9387 8937 890 7969       Output    Urine  425  -- 425  --  300 300    Total Output 425 -- 425 -- 300 300           07/08/2024 examined and reviewed    Physical Exam:    General Appearance : alert, pleasant, appears stated age, cooperative   Head  :  normocephalic, without obvious abnormality and atraumatic  Eyes  :  conjunctivae and sclerae normal, no icterus, no pallor and PERRLA  Neck  :  no adenopathy, suppple, no carotid bruit, no JVD   Lungs : clear to auscultation, respirations regular  Heart :  regular rhythm & normal rate, normal S1, S2 and no murmur, no rub  Abdomen : normal bowel sounds, no masses, no hepatomegaly, no splenomegaly, soft non-tender and no guarding  Extremities : moves extremities well, no edema, no cyanosis and no redness  Skin :  no bleeding, bruising or rash  Neurologic :   orientated to person, place, time and situation, Grossly no focal deficits      Laboratory Data :       CBC and coagulation:  Results from last 7 days   Lab Units 07/08/24  0039 07/07/24  2151 07/07/24  0021 07/06/24  0101  07/05/24  1749 07/05/24  1349 07/05/24  1240   LACTATE mmol/L  --   --   --   --  2.0 2.8*  --    CRP mg/dL  --   --   --   --   --   --  0.86*   WBC 10*3/mm3 10.17 12.05* 13.19*   < >  --   --  24.10*   HEMOGLOBIN g/dL 12.6* 13.0 12.5*   < >  --   --  16.6   HEMATOCRIT % 37.4* 38.4 37.5   < >  --   --  46.9   MCV fL 88.6 87.3 87.0   < >  --   --  82.4   MCHC g/dL 33.7 33.9 33.3   < >  --   --  35.4   PLATELETS 10*3/mm3 289 303 301   < >  --   --  428    < > = values in this interval not displayed.     Acid/base balance:  Results from last 7 days   Lab Units 07/07/24 2154 07/06/24  2016 07/05/24  1340   PH, ARTERIAL pH units 7.435 7.641* 7.425   PO2 ART mm Hg 65.2* 70.4* 79.8*   PCO2, ARTERIAL mm Hg 30.5* 24.8* 32.3*   HCO3 ART mmol/L 20.5 26.7* 21.2     Renal and electrolytes:    Results from last 7 days   Lab Units 07/08/24  0039 07/07/24  2150 07/07/24  1136 07/07/24  0021 07/06/24  1651   SODIUM mmol/L 134* 133* 132* 135* 133*   POTASSIUM mmol/L 3.9 4.1 4.1  4.1 3.6 3.4*   MAGNESIUM mg/dL 1.9 2.1  --  2.0  --    CHLORIDE mmol/L 100 98 96* 95* 89*   CO2 mmol/L 20.1* 19.3* 21.7* 25.5 25.0   BUN mg/dL 27* 27* 32* 40* 46*   CREATININE mg/dL 2.34* 2.44* 2.71* 3.40* 3.82*   CALCIUM mg/dL 8.7 8.9 8.6 8.6 9.1     Estimated Creatinine Clearance: 48.8 mL/min (A) (by C-G formula based on SCr of 2.34 mg/dL (H)).     Liver and pancreatic function:  Results from last 7 days   Lab Units 07/08/24  0039 07/07/24  2150 07/06/24  0101 07/05/24  1240   ALBUMIN g/dL 3.6 3.7 3.9 5.0   BILIRUBIN mg/dL 0.5 0.5 0.4 0.7   ALK PHOS U/L 60 64 70 91   AST (SGOT) U/L 19 21 25 25   ALT (SGPT) U/L 12 13 11 13   LIPASE U/L  --   --   --  23       Albumin Albumin   Date Value Ref Range Status   07/08/2024 3.6 3.5 - 5.2 g/dL Final   07/07/2024 3.7 3.5 - 5.2 g/dL Final   07/06/2024 3.9 3.5 - 5.2 g/dL Final   07/05/2024 5.0 3.5 - 5.2 g/dL Final      Magnesium Magnesium   Date Value Ref Range Status   07/08/2024 1.9 1.6 - 2.6 mg/dL Final  "  07/07/2024 2.1 1.6 - 2.6 mg/dL Final   07/07/2024 2.0 1.6 - 2.6 mg/dL Final   07/06/2024 2.1 1.6 - 2.6 mg/dL Final          PTH               No results found for: \"PTH\"    Cardiac:      Liver and pancreatic function:  Results from last 7 days   Lab Units 07/08/24  0039 07/07/24  2150 07/06/24  0101 07/05/24  1240   ALBUMIN g/dL 3.6 3.7 3.9 5.0   BILIRUBIN mg/dL 0.5 0.5 0.4 0.7   ALK PHOS U/L 60 64 70 91   AST (SGOT) U/L 19 21 25 25   ALT (SGPT) U/L 12 13 11 13   LIPASE U/L  --   --   --  23       XR Abdomen KUB    Result Date: 7/6/2024   1.  Nonobstructive bowel gas pattern. 2.  Mild degenerative disc disease. 3.  No pneumatosis. 4.  No free air.  This report was finalized on 7/6/2024 7:30 PM by Maico Miller MD.      XR Chest 1 View    Result Date: 7/5/2024  No radiographic evidence of acute cardiac or pulmonary disease.    This report was finalized on 7/5/2024 2:29 PM by Dr. Vishnu Almazan MD.      CT Abdomen Pelvis Without Contrast    Result Date: 7/5/2024   1. Moderate to large stool burden  2. Other findings as above         This report was finalized on 7/5/2024 2:23 PM by Dr. Vishnu Almazan MD.        Medications :     cholecalciferol, 50,000 Units, Oral, Weekly  heparin (porcine), 5,000 Units, Subcutaneous, Q8H  insulin glargine, 20 Units, Subcutaneous, Daily  insulin lispro, 2-9 Units, Subcutaneous, 4x Daily AC & at Bedtime  senna-docusate sodium, 2 tablet, Oral, BID  sodium chloride, 10 mL, Intravenous, Q12H  sodium chloride, 10 mL, Intravenous, Q12H      sodium chloride 0.9 % with KCl 20 mEq, 125 mL/hr, Last Rate: 125 mL/hr (07/08/24 0233)          Assessment & Plan     07/08/2024 reviewed and updated    1.  Acute renal failure on CKD stage IIIa  2.  Type 1 diabetes with renal involvement poor control with ketoacidosis  3.  Anion gap metabolic acidosis  4.  Vomiting with dehydration  5.  Respiratory alkalosis  6.  Tetrahydrocannabinol drug screen positive       07/08/2024   Dr Pepe  the creatinine is " improving.  The anion gap is also improved.  The last set of labs were done around 1 AM 7 to repeat not only his basic panel but also do a venous gas and recheck as stones.  If the anion gap is worse or the acetone comes back positive we may need to consider an insulin drip and this was discussed with Dr. Desir.  When he was hyperventilating and diaphoretic I had turned his fluids wide open but number to leave them at 250 mL/h for 4 hours and then go to 125 mL/h  Further decisions to be made based on labs.  Extensive lab data review.  Spoke with the nurses.  Spoke with the primary team.       07/08/2024  Patient's creatinine is 2.34 from 2.3 from 2.7 from 3.40 , had proteinuria of 600 mg 10/20/2020 CT scan did not show any obstruction so getting better most likely patient had acute kidney injury secondary to intravascular depletion with vomiting dehydration and hyperglycemia and is getting better with hydration  Patient's pH is 7.43 so much better  Potassium is normal we will continue normal saline at 125 cc/h with potassium    Prognosis guarded    Please avoid nephrotoxic medication and pharmacy to adjust the medication according to the GFR    Plan of care was discussed with the patient, understood verbalized agreed      S Per Marrero MD  07/08/24  07:20 EDT

## 2024-07-08 NOTE — PLAN OF CARE
Goal Outcome Evaluation:  Plan of Care Reviewed With: patient        Progress: no change  Outcome Evaluation: Beginging of shift pt had complaint of nausea and vomiting with a syncope episode after Zofran IV given, Pt c/o severe chest pain, rocking back in forth in bed, pt eyes rolled into back of head, Hard to arouse via sternal rub. Rapid response called stat EKG for pain, Vitals, drug screen, Lab work, and ABG drawn. Vitals stable, no new orders. After PRN anxiety, TUMS and maalox medication given Pt condition improved.  Pt continues to be nauseous with intermittent vomiting PRN medications given per orders. New IV. Pt resting in bed at this time. Will continue POC

## 2024-07-08 NOTE — PROGRESS NOTES
[facility] HOSPITALIST PROGRESS NOTE     Patient Identification:  Name:  Oliver Osborn  Age:  29 y.o.  Sex:  male  :  1995  MRN:  [id number]  Visit Number:  [id number]  ROOM: [address] [place] [address]     Primary Care Provider:  Geri Belcher APRN    Length of stay in inpatient status:  3    Subjective     Chief Compliant:    Chief Complaint   Patient presents with    Vomiting       History of Presenting Illness:    Patient denies any complaints. He is hopeful to go home soon.       ROS:  Otherwise 10 point ROS negative other than documented above in HPI.     Objective     Current Hospital Meds:cholecalciferol, 50,000 Units, Oral, Weekly  heparin (porcine), 5,000 Units, Subcutaneous, Q8H  insulin glargine, 20 Units, Subcutaneous, Daily  insulin lispro, 2-9 Units, Subcutaneous, 4x Daily AC & at Bedtime  senna-docusate sodium, 2 tablet, Oral, BID  sodium chloride, 10 mL, Intravenous, Q12H  sodium chloride, 10 mL, Intravenous, Q12H    sodium chloride 0.9 % with KCl 20 mEq, 125 mL/hr, Last Rate: 125 mL/hr (24 1255)        Current Antimicrobial Therapy:  Anti-Infectives (From admission, onward)      Ordered     Dose/Rate Route Frequency Start Stop    24 1332  piperacillin-tazobactam (ZOSYN) IVPB 3.375 g IVPB in 100 mL NS (VTB)        Ordering Provider: Louis Valentine PA-C    3.375 g  over 30 Minutes Intravenous Once 24 1348 24 1420          Current Diuretic Therapy:  Diuretics (From admission, onward)      Ordered     Dose/Rate Route Frequency Start Stop    24 0026  acetaZOLAMIDE (DIAMOX) 125 mg in sodium chloride 0.9 % 50 mL IVPB        Ordering Provider: Jeramy Pepe MD    125 mg  200 mL/hr over 15 Minutes Intravenous Once 24 0115 24 0204          ----------------------------------------------------------------------------------------------------------------------  Vital Signs:  Temp:  [97.4 °F (36.3 °C)-99.2 °F (37.3 °C)] 98 °F (36.7 °C)  Heart Rate:   [66-78] 78  Resp:  [18] 18  BP: (146-180)/() 146/89  SpO2:  [94 %-100 %] 99 %  on  Flow (L/min):  [2] 2;   Device (Oxygen Therapy): nasal cannula  Body mass index is 33.06 kg/m².    Wt Readings from Last 3 Encounters:   07/07/24 92.9 kg (204 lb 12.9 oz)   12/11/23 90.7 kg (200 lb)   09/30/23 80 kg (176 lb 5.9 oz)     Intake & Output (last 3 days)         07/05 0701 07/06 0700 07/06 0701 07/07 0700 07/07 0701 07/08 0700 07/08 0701 07/09 0700    P.O. 480 800 933     I.V. (mL/kg)  1073 (11.6) 1000 (10.8) 2187 (23.5)    Total Intake(mL/kg) 480 (5.3) 1873 (20.3) 1933 (20.8) 2187 (23.5)    Urine (mL/kg/hr)  425 (0.2) 300 (0.1) 1100 (1)    Stool   0     Total Output      Net +480 +1448 +1633 +1087            Urine Unmeasured Occurrence 4 x 1 x 2 x     Stool Unmeasured Occurrence   1 x     Emesis Unmeasured Occurrence   2 x           Diet: Diabetic, Regular/House; Consistent Carbohydrate; Fluid Consistency: Thin (IDDSI 0)  ----------------------------------------------------------------------------------------------------------------------  Physical exam:  Constitutional:  Well-developed and well-nourished.  No respiratory distress.      HENT:  Head:  Normocephalic and atraumatic.  Mouth:  Moist mucous membranes.    Eyes:  Conjunctivae and EOM are normal. No scleral icterus.    Neck:  Neck supple.  No JVD present.    Cardiovascular:  Normal rate, regular rhythm and normal heart sounds with no murmur.  Pulmonary/Chest:  No respiratory distress, no wheezes, no crackles, with normal breath sounds and good air movement.  Abdominal:  Soft.  Bowel sounds are normal.  No distension and no tenderness.   Musculoskeletal:  No edema, no tenderness, and no deformity.  No red or swollen joints anywhere.    Neurological:  Alert and oriented to person, place, and time.  No cranial nerve deficit.  No tongue deviation.  No facial droop.  No slurred speech.   Skin:  Skin is warm and dry. No rash noted. No pallor.    Peripheral vascular:  Pulses in all 4 extremities with no clubbing, no cyanosis, no edema.  ----------------------------------------------------------------------------------------------------------------------  Tele:    ----------------------------------------------------------------------------------------------------------------------  Results from last 7 days   Lab Units 07/08/24  0039 07/07/24 2151 07/07/24  0021 07/06/24  0101 07/05/24  1749 07/05/24  1349 07/05/24  1240   CRP mg/dL  --   --   --   --   --   --  0.86*   LACTATE mmol/L  --   --   --   --  2.0 2.8*  --    WBC 10*3/mm3 10.17 12.05* 13.19*   < >  --   --  24.10*   HEMOGLOBIN g/dL 12.6* 13.0 12.5*   < >  --   --  16.6   HEMATOCRIT % 37.4* 38.4 37.5   < >  --   --  46.9   MCV fL 88.6 87.3 87.0   < >  --   --  82.4   MCHC g/dL 33.7 33.9 33.3   < >  --   --  35.4   PLATELETS 10*3/mm3 289 303 301   < >  --   --  428    < > = values in this interval not displayed.     Results from last 7 days   Lab Units 07/07/24 2154   PH, ARTERIAL pH units 7.435   PO2 ART mm Hg 65.2*   PCO2, ARTERIAL mm Hg 30.5*   HCO3 ART mmol/L 20.5     Results from last 7 days   Lab Units 07/08/24  0039 07/07/24 2150 07/07/24  1136 07/07/24  0021 07/06/24  1419 07/06/24  0101   SODIUM mmol/L 134* 133* 132* 135*   < > 132*   POTASSIUM mmol/L 3.9 4.1 4.1  4.1 3.6   < > 3.8   MAGNESIUM mg/dL 1.9 2.1  --  2.0   < >  --    CHLORIDE mmol/L 100 98 96* 95*   < > 86*   CO2 mmol/L 20.1* 19.3* 21.7* 25.5   < > 24.5   BUN mg/dL 27* 27* 32* 40*   < > 51*   CREATININE mg/dL 2.34* 2.44* 2.71* 3.40*   < > 4.91*   CALCIUM mg/dL 8.7 8.9 8.6 8.6   < > 9.2   GLUCOSE mg/dL 158* 136* 178* 173*   < > 209*   ALBUMIN g/dL 3.6 3.7  --   --   --  3.9   BILIRUBIN mg/dL 0.5 0.5  --   --   --  0.4   ALK PHOS U/L 60 64  --   --   --  70   AST (SGOT) U/L 19 21  --   --   --  25   ALT (SGPT) U/L 12 13  --   --   --  11    < > = values in this interval not displayed.   Estimated Creatinine Clearance: 48.8  "mL/min (A) (by C-G formula based on SCr of 2.34 mg/dL (H)).  No results found for: \"AMMONIA\"  Results from last 7 days   Lab Units 07/08/24  0039 07/07/24  2150 07/06/24  1819 07/06/24  1651 07/05/24  1240   CK TOTAL U/L  --   --   --   --  705*   HSTROP T ng/L 27* 24* 41*   < >  --     < > = values in this interval not displayed.             Glucose   Date/Time Value Ref Range Status   07/08/2024 1723 180 (H) 70 - 130 mg/dL Final   07/08/2024 1137 163 (H) 70 - 130 mg/dL Final   07/08/2024 0737 168 (H) 70 - 130 mg/dL Final   07/07/2024 2025 133 (H) 70 - 130 mg/dL Final   07/07/2024 1631 134 (H) 70 - 130 mg/dL Final   07/07/2024 1106 179 (H) 70 - 130 mg/dL Final   07/07/2024 0648 169 (H) 70 - 130 mg/dL Final   07/06/2024 2120 150 (H) 70 - 130 mg/dL Final     Lab Results   Component Value Date    TSH 2.850 05/30/2022     No results found for: \"PREGTESTUR\", \"PREGSERUM\", \"HCG\", \"HCGQUANT\"  Pain Management Panel  More data exists         Latest Ref Rng & Units 7/7/2024 7/6/2024   Pain Management Panel   Amphetamine, Urine Qual Negative Negative  Negative    Barbiturates Screen, Urine Negative Negative  Negative    Benzodiazepine Screen, Urine Negative Negative  Negative    Buprenorphine, Screen, Urine Negative Negative  Negative    Cocaine Screen, Urine Negative Negative  Negative    Fentanyl, Urine Negative Negative  Negative    Methadone Screen , Urine Negative Negative  Negative    Methamphetamine, Ur Negative Negative  Negative      Brief Urine Lab Results  (Last result in the past 365 days)        Color   Clarity   Blood   Leuk Est   Nitrite   Protein   CREAT   Urine HCG        07/05/24 2120 Yellow   Clear   Moderate (2+)   Negative   Negative   >=300 mg/dL (3+)                 Blood Culture   Date Value Ref Range Status   07/05/2024 No growth at 3 days  Preliminary   07/05/2024 No growth at 3 days  Preliminary     No results found for: \"URINECX\"  No results found for: \"WOUNDCX\"  No results found for: \"STOOLCX\"  No " "results found for: \"RESPCX\"  No results found for: \"AFBCX\"  Results from last 7 days   Lab Units 07/05/24  1749 07/05/24  1349 07/05/24  1240   LACTATE mmol/L 2.0 2.8*  --    CRP mg/dL  --   --  0.86*       I have personally looked at the labs and they are summarized above.  ----------------------------------------------------------------------------------------------------------------------  Detailed radiology reports for the last 24 hours:    Imaging Results (Last 24 Hours)       ** No results found for the last 24 hours. 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 Continue NS w/ postassium as per their recs. Cr starting to plateau at 2.3. Repeat in AM.   - Recommended to stop all cannabis use and hold ozempic at discharge until he sees his PCP.   - Repeat labs in AM.     #DM II  - Continue SSI and basal insulin. Reasonably controlled.     #SIRS  - Suspect reactive.   - No clear source. Monitor off abx.     Code status: Full     Dispo: Possibly d/c in AM.     Harvinder Barraza MD  [facility] Hospitalist  07/08/24  18:41 EDT  "

## 2024-07-08 NOTE — PROGRESS NOTES
[facility] Medicine Services  CROSS COVER NOTE    Responded to rapid response called overhead at approximately 2140.  Rapid was called due to patient complaining of chest pain and having syncopal episodes.  When I arrived to patient's bedside he was retching into an emesis bag.  Complaining of chest pressure.  No radiation.  Patient did have a couple brief syncopal episodes while I was at bedside.  No focal neurological deficits.  Patient would regain consciousness and then immediately began retching into emesis bag again.  EKG obtained was at bedside, no acute ischemic changes.  Troponin and ABG ordered, unremarkable.  Suspect syncope is vasovagal secondary to vomiting.  Put patient on telemetry for closer monitoring.  No significant findings on exam.  Breath sounds normal.  Heart regular rate and rhythm.  Pupils equal round reactive to light.  Patient was flushed and diaphoretic.  Will continue to closely monitor.       Dnaiela Arshad PA-C  Hospitalist Service -- [facility]   Pager: [phone]    07/08/24  01:57 EDT

## 2024-07-09 ENCOUNTER — READMISSION MANAGEMENT (OUTPATIENT)
Dept: CALL CENTER | Facility: HOSPITAL | Age: 29
End: 2024-07-09
Payer: MEDICAID

## 2024-07-09 VITALS
WEIGHT: 204.81 LBS | SYSTOLIC BLOOD PRESSURE: 156 MMHG | DIASTOLIC BLOOD PRESSURE: 99 MMHG | OXYGEN SATURATION: 97 % | HEIGHT: 66 IN | TEMPERATURE: 98.3 F | BODY MASS INDEX: 32.92 KG/M2 | RESPIRATION RATE: 20 BRPM | HEART RATE: 69 BPM

## 2024-07-09 LAB
ALBUMIN SERPL-MCNC: 3.6 G/DL (ref 3.5–5.2)
ALBUMIN/GLOB SERPL: 1.1 G/DL
ALP SERPL-CCNC: 61 U/L (ref 39–117)
ALT SERPL W P-5'-P-CCNC: <5 U/L (ref 1–41)
ANION GAP SERPL CALCULATED.3IONS-SCNC: 10.7 MMOL/L (ref 5–15)
AST SERPL-CCNC: 13 U/L (ref 1–40)
BILIRUB SERPL-MCNC: 0.3 MG/DL (ref 0–1.2)
BUN SERPL-MCNC: 20 MG/DL (ref 6–20)
BUN/CREAT SERPL: 10.1 (ref 7–25)
CALCIUM SPEC-SCNC: 9.1 MG/DL (ref 8.6–10.5)
CHLORIDE SERPL-SCNC: 102 MMOL/L (ref 98–107)
CO2 SERPL-SCNC: 20.3 MMOL/L (ref 22–29)
CREAT SERPL-MCNC: 1.98 MG/DL (ref 0.76–1.27)
EGFRCR SERPLBLD CKD-EPI 2021: 45.5 ML/MIN/1.73
GLOBULIN UR ELPH-MCNC: 3.4 GM/DL
GLUCOSE BLDC GLUCOMTR-MCNC: 141 MG/DL (ref 70–130)
GLUCOSE SERPL-MCNC: 139 MG/DL (ref 65–99)
MAGNESIUM SERPL-MCNC: 2.6 MG/DL (ref 1.6–2.6)
POTASSIUM SERPL-SCNC: 4.3 MMOL/L (ref 3.5–5.2)
PROT SERPL-MCNC: 7 G/DL (ref 6–8.5)
SODIUM SERPL-SCNC: 133 MMOL/L (ref 136–145)

## 2024-07-09 PROCEDURE — 25010000002 PROCHLORPERAZINE 10 MG/2ML SOLUTION: Performed by: INTERNAL MEDICINE

## 2024-07-09 PROCEDURE — 82948 REAGENT STRIP/BLOOD GLUCOSE: CPT

## 2024-07-09 PROCEDURE — 25010000002 HEPARIN (PORCINE) PER 1000 UNITS: Performed by: INTERNAL MEDICINE

## 2024-07-09 PROCEDURE — 80053 COMPREHEN METABOLIC PANEL: CPT | Performed by: INTERNAL MEDICINE

## 2024-07-09 PROCEDURE — 99239 HOSP IP/OBS DSCHRG MGMT >30: CPT | Performed by: INTERNAL MEDICINE

## 2024-07-09 PROCEDURE — 25010000002 ONDANSETRON PER 1 MG: Performed by: INTERNAL MEDICINE

## 2024-07-09 PROCEDURE — 63710000001 INSULIN GLARGINE PER 5 UNITS: Performed by: INTERNAL MEDICINE

## 2024-07-09 PROCEDURE — 83735 ASSAY OF MAGNESIUM: CPT | Performed by: INTERNAL MEDICINE

## 2024-07-09 RX ORDER — SODIUM BICARBONATE 650 MG/1
650 TABLET ORAL 2 TIMES DAILY
Qty: 60 TABLET | Refills: 0 | Status: SHIPPED | OUTPATIENT
Start: 2024-07-09 | End: 2024-07-09 | Stop reason: HOSPADM

## 2024-07-09 RX ORDER — ONDANSETRON 4 MG/1
4 TABLET, FILM COATED ORAL EVERY 8 HOURS PRN
Qty: 15 TABLET | Refills: 0 | Status: SHIPPED | OUTPATIENT
Start: 2024-07-09 | End: 2024-07-14

## 2024-07-09 RX ADMIN — PROCHLORPERAZINE EDISYLATE 2.5 MG: 5 INJECTION INTRAMUSCULAR; INTRAVENOUS at 03:45

## 2024-07-09 RX ADMIN — DOCUSATE SODIUM 50 MG AND SENNOSIDES 8.6 MG 2 TABLET: 8.6; 5 TABLET, FILM COATED ORAL at 08:29

## 2024-07-09 RX ADMIN — HEPARIN SODIUM 5000 UNITS: 5000 INJECTION INTRAVENOUS; SUBCUTANEOUS at 05:41

## 2024-07-09 RX ADMIN — INSULIN GLARGINE 20 UNITS: 100 INJECTION, SOLUTION SUBCUTANEOUS at 08:29

## 2024-07-09 RX ADMIN — ONDANSETRON 4 MG: 2 INJECTION INTRAMUSCULAR; INTRAVENOUS at 04:20

## 2024-07-09 NOTE — OUTREACH NOTE
Prep Survey      Flowsheet Row Responses   [facility] facility patient discharged from? [facility]   Is LACE score < 7 ? No   Eligibility Readm Mgmt   Discharge diagnosis MINA (acute kidney injury)   Does the patient have one of the following disease processes/diagnoses(primary or secondary)? Other   Prep survey completed? Yes            Cheryl CLARK - Registered Nurse

## 2024-07-09 NOTE — DISCHARGE INSTRUCTIONS
Please take medications as prescribed. Please go to follow-up appointments as recommended. Please seek medical attention if you have worsening of any symptoms.     Patient admitted to [facility] from 7/5 to 7/9. Recommend patient be excused from work for medical reasons until at least 7/12.

## 2024-07-09 NOTE — DISCHARGE SUMMARY
[facility] HOSPITALISTS DISCHARGE SUMMARY    Patient Identification:  Name:  Oliver Osborn  Age:  29 y.o.  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 male presented on  to [facility] complaining of N/V.  Please see the admitting history and physical for further details.        Mr. Osborn is our 30 yo M with hx of CKD IIIa, DM II, cannabinoid hyperemesis syndrome leading to episodes of prerenal MINA, tobacco use who presents with persistent N/V after cannaboid use. He also notes recently starting ozempic. Patient found to have significant MINA, HAGMA that has consistently improved with IVF. Patient now tolerating PO intake without issue requesting discharge. Nephrology has been following and agrees with discharge today with f/u in clinic in 3 weeks. Will send PRN zofran for the next few days. Would have considered PO bicarb but given alkalosis while inpatient and serum bicarb greater than 20, will hold at discharge. Strongly recommended to stop cannaboid use and hold ozempic until he sees his PCP in 1 week.       VITAL SIGNS:  Temp:  [98 °F (36.7 °C)-98.6 °F (37 °C)] 98.3 °F (36.8 °C)  Heart Rate:  [69-78] 69  Resp:  [18-20] 20  BP: (145-168)/(89-99) 156/99  SpO2:  [96 %-99 %] 97 %  on   ;   Device (Oxygen Therapy): room air    Body mass index is 33.06 kg/m².  Wt Readings from Last 3 Encounters:   24 92.9 kg (204 lb 12.9 oz)   23 90.7 kg (200 lb)   23 80 kg (176 lb 5.9 oz)       PHYSICAL EXAM:  Constitutional:  Well-developed and well-nourished.  No respiratory distress.      HENT:  Head:  Normocephalic and  atraumatic.  Mouth:  Moist mucous membranes.    Eyes:  Conjunctivae and EOM are normal.  Pupils are equal, round, and reactive to light.  No scleral icterus.    Cardiovascular:  Normal rate, regular rhythm and normal heart sounds with no murmur.  Pulmonary/Chest:  No respiratory distress, no wheezes, no crackles, with normal breath sounds and good air movement.  Abdominal:  Soft.  Bowel sounds are normal.  No distension and no tenderness.   Musculoskeletal:  No edema, no tenderness, and no deformity.  No red or swollen joints anywhere.    Neurological:  Alert and oriented to person, place, and time.  No gross neurological deficit.   Skin:  Skin is warm and dry. No rash noted. No pallor.   Peripheral vascular:  Strong pulses in all 4 extremities with no clubbing, no cyanosis, no edema.    DISCHARGE DISPOSITION   Stable    DISCHARGE MEDICATIONS:     Discharge Medications        New Medications        Instructions Start Date   ondansetron 4 MG tablet  Commonly known as: Zofran   4 mg, Oral, Every 8 Hours PRN      sodium bicarbonate 650 MG tablet   650 mg, Oral, 2 Times Daily             Continue These Medications        Instructions Start Date   Lantus SoloStar 100 UNIT/ML injection pen  Generic drug: Insulin Glargine   Inject 20 Units under the skin into the appropriate area as directed Every Night.      vitamin D 1.25 MG (74309 UT) capsule capsule  Commonly known as: ERGOCALCIFEROL   50,000 Units, Oral, Weekly, Thursdays             Stop These Medications      Ozempic (0.25 or 0.5 MG/DOSE) 2 MG/3ML solution pen-injector  Generic drug: Semaglutide(0.25 or 0.5MG/DOS)                 Follow-up Information       Geri Belcher APRN Follow up in 1 week(s).    Specialty: Family Medicine  Why: Recommend BMP at visit  Contact information:  [address] [facility] [address] [phone]               Ashley Simon MD Follow up in 3 week(s).    Specialty: Nephrology  Contact information:  [address] [facility]  [place] [address]  [phone]                              TEST  RESULTS PENDING AT DISCHARGE  Pending Labs       Order Current Status    Blood Culture - Blood, Arm, Left Preliminary result    Blood Culture - Blood, Hand, Left Preliminary result             CODE STATUS  Code Status and Medical Interventions:   Ordered at: 07/05/24 1520     Code Status (Patient has no pulse and is not breathing):    CPR (Attempt to Resuscitate)     Medical Interventions (Patient has pulse or is breathing):    Full Support       Harvinder Barraza MD  [facility]  07/09/24  11:05 EDT    Please note that this discharge summary required more than 30 minutes to complete.

## 2024-07-09 NOTE — PAYOR COMM NOTE
"CONTACT: CRYSTAL KAUFFMAN RN  UTILIZATION MANAGEMENT DEPT.  [facility]  [address] [place] [address]  PHONE: [phone]  FAX: [phone]        DISCHARGE NOTIFICATION  DC DATE: 24 TO HOME    REF#[id number]     Oliver Govea (29 y.o. Male)       Date of Birth   1995    Social Security Number       Address   [address] [address]    Home Phone       MRN   [id number]       Congregation   None    Marital Status   Single                            Admission Date   24    Admission Type   Emergency    Admitting Provider   Samuel Desir DO    Attending Provider       Department, Room/Bed   [address], 3351/2S       Discharge Date   2024    Discharge Disposition   Home or Self Care    Discharge Destination   Home                              Attending Provider: (none)   Allergies: No Known Allergies    Isolation: None   Infection: None   Code Status: CPR    Ht: 167.6 cm (66\")   Wt: 92.9 kg (204 lb 12.9 oz)    Admission Cmt: None   Principal Problem: MINA (acute kidney injury) [N17.9]                   Active Insurance as of 2024       Primary Coverage       Payor Plan Insurance Group Employer/Plan Group    [place] [id number]       Payor Plan Address Payor Plan Phone Number Payor Plan Fax Number Effective Dates    [employer] [address] [address] [phone]  2022 - None Entered    [facility] [id number]         Subscriber Name Subscriber Birth Date Member ID       OLIVER GOVEA 1995 [id number]                     Emergency Contacts        (Rel.) Home Phone Work Phone Mobile Phone    ROBINSON GOVEA (Father) [phone] -- --    CAROLYN GOVEA (Mother) [phone] -- --                 Discharge Summary        Harvinder Barraza MD at 24 1105              [facility] HOSPITALISTS DISCHARGE SUMMARY    Patient Identification:  Name:  Oliver Govea  Age:  29 y.o.  Sex:  male  :  " 1995  MRN:  [id number]  Visit Number:  [id number]    Date of Admission: 7/5/2024  Date of Discharge:  7/9/2024    PCP: Geri Belcher APRN    DISCHARGE DIAGNOSIS  #MINA on CKD IIIa  #HAGMA  #Hypokalemia  #Suspected cannabis related hyperemesis syndrome   #Vasovagal syncope   #DM II  #SIRS    CONSULTS   Nephrology     PROCEDURES PERFORMED  None    HOSPITAL COURSE  Patient is a 29 y.o. male presented on 7/5 to [facility] complaining of N/V.  Please see the admitting history and physical for further details.        Mr. Osborn is our 30 yo M with hx of CKD IIIa, DM II, cannabinoid hyperemesis syndrome leading to episodes of prerenal MINA, tobacco use who presents with persistent N/V after cannaboid use. He also notes recently starting ozempic. Patient found to have significant MINA, HAGMA that has consistently improved with IVF. Patient now tolerating PO intake without issue requesting discharge. Nephrology has been following and agrees with discharge today with f/u in clinic in 3 weeks. Will send PRN zofran for the next few days. Would have considered PO bicarb but given alkalosis while inpatient and serum bicarb greater than 20, will hold at discharge. Strongly recommended to stop cannaboid use and hold ozempic until he sees his PCP in 1 week.       VITAL SIGNS:  Temp:  [98 °F (36.7 °C)-98.6 °F (37 °C)] 98.3 °F (36.8 °C)  Heart Rate:  [69-78] 69  Resp:  [18-20] 20  BP: (145-168)/(89-99) 156/99  SpO2:  [96 %-99 %] 97 %  on   ;   Device (Oxygen Therapy): room air    Body mass index is 33.06 kg/m².  Wt Readings from Last 3 Encounters:   07/07/24 92.9 kg (204 lb 12.9 oz)   12/11/23 90.7 kg (200 lb)   09/30/23 80 kg (176 lb 5.9 oz)       PHYSICAL EXAM:  Constitutional:  Well-developed and well-nourished.  No respiratory distress.      HENT:  Head:  Normocephalic and atraumatic.  Mouth:  Moist mucous membranes.    Eyes:  Conjunctivae and EOM are normal.  Pupils are equal, round, and reactive to light.  No  scleral icterus.    Cardiovascular:  Normal rate, regular rhythm and normal heart sounds with no murmur.  Pulmonary/Chest:  No respiratory distress, no wheezes, no crackles, with normal breath sounds and good air movement.  Abdominal:  Soft.  Bowel sounds are normal.  No distension and no tenderness.   Musculoskeletal:  No edema, no tenderness, and no deformity.  No red or swollen joints anywhere.    Neurological:  Alert and oriented to person, place, and time.  No gross neurological deficit.   Skin:  Skin is warm and dry. No rash noted. No pallor.   Peripheral vascular:  Strong pulses in all 4 extremities with no clubbing, no cyanosis, no edema.    DISCHARGE DISPOSITION   Stable    DISCHARGE MEDICATIONS:     Discharge Medications        New Medications        Instructions Start Date   ondansetron 4 MG tablet  Commonly known as: Zofran   4 mg, Oral, Every 8 Hours PRN      sodium bicarbonate 650 MG tablet   650 mg, Oral, 2 Times Daily             Continue These Medications        Instructions Start Date   Lantus SoloStar 100 UNIT/ML injection pen  Generic drug: Insulin Glargine   Inject 20 Units under the skin into the appropriate area as directed Every Night.      vitamin D 1.25 MG (50426 UT) capsule capsule  Commonly known as: ERGOCALCIFEROL   50,000 Units, Oral, Weekly, Thursdays             Stop These Medications      Ozempic (0.25 or 0.5 MG/DOSE) 2 MG/3ML solution pen-injector  Generic drug: Semaglutide(0.25 or 0.5MG/DOS)                 Follow-up Information       Geri Belcher APRN Follow up in 1 week(s).    Specialty: Family Medicine  Why: Recommend BMP at visit  Contact information:  [address] [facility] [address] [phone]               Ashley Simon MD Follow up in 3 week(s).    Specialty: Nephrology  Contact information:  [address] [facility] [address] [phone]                              TEST  RESULTS PENDING AT DISCHARGE  Pending Labs       Order Current Status    Blood  Culture - Blood, Arm, Left Preliminary result    Blood Culture - Blood, Hand, Left Preliminary result             CODE STATUS  Code Status and Medical Interventions:   Ordered at: 07/05/24 1520     Code Status (Patient has no pulse and is not breathing):    CPR (Attempt to Resuscitate)     Medical Interventions (Patient has pulse or is breathing):    Full Support       Divina Barraza MD  [facility] Hospitalist  07/09/24  11:05 EDT    Please note that this discharge summary required more than 30 minutes to complete.      Electronically signed by Divina Barraza MD at 07/09/24 1116       Discharge Order (From admission, onward)       Start     Ordered    07/09/24 1104  Discharge patient  Once        Expected Discharge Date: 07/09/24   Expected Discharge Time: Morning   Discharge Disposition: Home or Self Care   Physician of Record for Attribution - Please select from Treatment Team: DIVINA BARRAZA [[id number]]   Review needed by CMO to determine Physician of Record: No      Question Answer Comment   Physician of Record for Attribution - Please select from Treatment Team DIVINA BARRAZA    Review needed by CMO to determine Physician of Record No        07/09/24 1109

## 2024-07-09 NOTE — PLAN OF CARE
Goal Outcome Evaluation:              Outcome Evaluation: Pt resting in bed, VSS on room air. Pt c/o nausea, PRN medication given per MAR. Pt has showered many times this shift. Pt voices no complaints at this time, will continue with POC.

## 2024-07-09 NOTE — CASE MANAGEMENT/SOCIAL WORK
Discharge Planning Assessment   [facility]     Patient Name: Oliver Osborn  MRN: [id number]  Today's Date: 7/9/2024    Admit Date: 7/5/2024               Discharge Plan       Row Name 07/09/24 1121       Plan    Final Discharge Disposition Code 01 - home or self-care    Final Note Pt is being discharged home on this date. No other needs identified.                    MELISSA Schwab

## 2024-07-09 NOTE — DISCHARGE INSTR - APPOINTMENTS
You have a follow-up appointment scheduled with DR CLARKE on 7-15-24 at 10:30, office can be reached [phone]    You have a follow-up appointment scheduled with CESAR BYRD NP on 7-17-24 at 10:15, office can be reached at [phone]

## 2024-07-10 LAB
BACTERIA SPEC AEROBE CULT: NORMAL
BACTERIA SPEC AEROBE CULT: NORMAL

## 2024-07-30 ENCOUNTER — READMISSION MANAGEMENT (OUTPATIENT)
Dept: CALL CENTER | Facility: HOSPITAL | Age: 29
End: 2024-07-30
Payer: MEDICAID

## 2024-07-30 NOTE — OUTREACH NOTE
Medical Week 3 Survey      Flowsheet Row Responses   [facility] patient discharged from? [facility]   Does the patient have one of the following disease processes/diagnoses(primary or secondary)? Other   Week 3 attempt successful? Yes   Call start time 1642   Call end time 1646   Discharge diagnosis #MINA on CKD IIIa  #HAGMA  #Hypokalemia  #Suspected cannabis related hyperemesis syndrome   #Vasovagal syncope   #DM II  #SIRS   Person spoke with today (if not patient) and relationship Patient   Meds reviewed with patient/caregiver? Yes   Does the patient have all medications ordered at discharge? Yes   Is the patient taking all medications as directed (includes completed medication regime)? No   What is preventing the patient from taking all medications as directed? Desires to consult PCP first  [Patient reports that he did not stop the semaglutide, that he is staying on it.]   Does the patient have a primary care provider?  Yes   Comments regarding PCP Patient reports that he has had PCP follow up and has another appt in place.   Has the patient kept scheduled appointments due by today? Yes  [Patient states that he has also seen DR Simon since discharge.]   Has home health visited the patient within 72 hours of discharge? N/A   Psychosocial issues? No   Did the patient receive a copy of their discharge instructions? Yes   Nursing interventions Reviewed instructions with patient   What is the patient's perception of their health status since discharge? Improving   Is the patient/caregiver able to teach back signs and symptoms related to disease process for when to call PCP? Yes   Is the patient/caregiver able to teach back signs and symptoms related to disease process for when to call 911? Yes   Is the patient/caregiver able to teach back the hierarchy of who to call/visit for symptoms/problems? PCP, Specialist, Home health nurse, Urgent Care, ED, 911 Yes   Week 3 Call Completed? Yes   Graduated Yes   Is the patient  interested in additional calls from an ambulatory ? No   Would this patient benefit from a Referral to [facility] Social Work? No   Call end time [id number]            DONALD BEJARANO - Registered Nurse

## 2024-08-09 ENCOUNTER — APPOINTMENT (OUTPATIENT)
Dept: CT IMAGING | Facility: HOSPITAL | Age: 29
End: 2024-08-09
Payer: MEDICAID

## 2024-08-09 ENCOUNTER — HOSPITAL ENCOUNTER (OUTPATIENT)
Facility: HOSPITAL | Age: 29
Setting detail: OBSERVATION
Discharge: LEFT BEFORE DISCHARGE | End: 2024-08-10
Attending: STUDENT IN AN ORGANIZED HEALTH CARE EDUCATION/TRAINING PROGRAM | Admitting: INTERNAL MEDICINE
Payer: MEDICAID

## 2024-08-09 DIAGNOSIS — N17.9 AKI (ACUTE KIDNEY INJURY): Primary | ICD-10-CM

## 2024-08-09 PROBLEM — E11.9 TYPE 2 DIABETES MELLITUS: Status: ACTIVE | Noted: 2024-08-09

## 2024-08-09 PROBLEM — I95.9 LOW BLOOD PRESSURE: Status: ACTIVE | Noted: 2024-08-09

## 2024-08-09 LAB
A-A DO2: 12.9 MMHG (ref 0–300)
ACETONE BLD QL: ABNORMAL
ALBUMIN SERPL-MCNC: 4.5 G/DL (ref 3.5–5.2)
ALBUMIN/GLOB SERPL: 1.2 G/DL
ALP SERPL-CCNC: 73 U/L (ref 39–117)
ALT SERPL W P-5'-P-CCNC: 14 U/L (ref 1–41)
AMPHET+METHAMPHET UR QL: NEGATIVE
AMPHETAMINES UR QL: NEGATIVE
ANION GAP SERPL CALCULATED.3IONS-SCNC: 16.6 MMOL/L (ref 5–15)
ANION GAP SERPL CALCULATED.3IONS-SCNC: 21.9 MMOL/L (ref 5–15)
ARTERIAL PATENCY WRIST A: POSITIVE
AST SERPL-CCNC: 16 U/L (ref 1–40)
ATMOSPHERIC PRESS: 726 MMHG
BACTERIA UR QL AUTO: ABNORMAL /HPF
BARBITURATES UR QL SCN: NEGATIVE
BASE EXCESS BLDA CALC-SCNC: -2.3 MMOL/L (ref 0–2)
BASOPHILS # BLD AUTO: 0.06 10*3/MM3 (ref 0–0.2)
BASOPHILS NFR BLD AUTO: 0.5 % (ref 0–1.5)
BDY SITE: ABNORMAL
BENZODIAZ UR QL SCN: NEGATIVE
BILIRUB SERPL-MCNC: 0.5 MG/DL (ref 0–1.2)
BILIRUB UR QL STRIP: NEGATIVE
BUN SERPL-MCNC: 45 MG/DL (ref 6–20)
BUN SERPL-MCNC: 53 MG/DL (ref 6–20)
BUN/CREAT SERPL: 16.7 (ref 7–25)
BUN/CREAT SERPL: 17.2 (ref 7–25)
BUPRENORPHINE SERPL-MCNC: NEGATIVE NG/ML
CALCIUM SPEC-SCNC: 8.5 MG/DL (ref 8.6–10.5)
CALCIUM SPEC-SCNC: 9.9 MG/DL (ref 8.6–10.5)
CANNABINOIDS SERPL QL: POSITIVE
CHLORIDE SERPL-SCNC: 100 MMOL/L (ref 98–107)
CHLORIDE SERPL-SCNC: 92 MMOL/L (ref 98–107)
CLARITY UR: CLEAR
CO2 BLDA-SCNC: 23.2 MMOL/L (ref 22–33)
CO2 SERPL-SCNC: 20.1 MMOL/L (ref 22–29)
CO2 SERPL-SCNC: 20.4 MMOL/L (ref 22–29)
COCAINE UR QL: NEGATIVE
COHGB MFR BLD: 1 % (ref 0–5)
COLOR UR: ABNORMAL
CREAT SERPL-MCNC: 2.62 MG/DL (ref 0.76–1.27)
CREAT SERPL-MCNC: 3.17 MG/DL (ref 0.76–1.27)
DEPRECATED RDW RBC AUTO: 38.9 FL (ref 37–54)
EGFRCR SERPLBLD CKD-EPI 2021: 25.8 ML/MIN/1.73
EGFRCR SERPLBLD CKD-EPI 2021: 32.5 ML/MIN/1.73
EOSINOPHIL # BLD AUTO: 0.02 10*3/MM3 (ref 0–0.4)
EOSINOPHIL NFR BLD AUTO: 0.2 % (ref 0.3–6.2)
ERYTHROCYTE [DISTWIDTH] IN BLOOD BY AUTOMATED COUNT: 12.5 % (ref 12.3–15.4)
FENTANYL UR-MCNC: NEGATIVE NG/ML
GLOBULIN UR ELPH-MCNC: 3.9 GM/DL
GLUCOSE SERPL-MCNC: 124 MG/DL (ref 65–99)
GLUCOSE SERPL-MCNC: 186 MG/DL (ref 65–99)
GLUCOSE UR STRIP-MCNC: NEGATIVE MG/DL
HCO3 BLDA-SCNC: 22.1 MMOL/L (ref 20–26)
HCT VFR BLD AUTO: 41.3 % (ref 37.5–51)
HCT VFR BLD CALC: 40 % (ref 38–51)
HGB BLD-MCNC: 14.3 G/DL (ref 13–17.7)
HGB BLDA-MCNC: 13.1 G/DL (ref 14–18)
HGB UR QL STRIP.AUTO: ABNORMAL
HOLD SPECIMEN: NORMAL
HOLD SPECIMEN: NORMAL
HYALINE CASTS UR QL AUTO: ABNORMAL /LPF
IMM GRANULOCYTES # BLD AUTO: 0.05 10*3/MM3 (ref 0–0.05)
IMM GRANULOCYTES NFR BLD AUTO: 0.4 % (ref 0–0.5)
INHALED O2 CONCENTRATION: 21 %
KETONES UR QL STRIP: ABNORMAL
LEUKOCYTE ESTERASE UR QL STRIP.AUTO: NEGATIVE
LIPASE SERPL-CCNC: 48 U/L (ref 13–60)
LYMPHOCYTES # BLD AUTO: 1.18 10*3/MM3 (ref 0.7–3.1)
LYMPHOCYTES NFR BLD AUTO: 10 % (ref 19.6–45.3)
Lab: ABNORMAL
MCH RBC QN AUTO: 29.6 PG (ref 26.6–33)
MCHC RBC AUTO-ENTMCNC: 34.6 G/DL (ref 31.5–35.7)
MCV RBC AUTO: 85.5 FL (ref 79–97)
METHADONE UR QL SCN: NEGATIVE
METHGB BLD QL: 0.3 % (ref 0–3)
MODALITY: ABNORMAL
MONOCYTES # BLD AUTO: 0.69 10*3/MM3 (ref 0.1–0.9)
MONOCYTES NFR BLD AUTO: 5.9 % (ref 5–12)
NEUTROPHILS NFR BLD AUTO: 83 % (ref 42.7–76)
NEUTROPHILS NFR BLD AUTO: 9.79 10*3/MM3 (ref 1.7–7)
NITRITE UR QL STRIP: NEGATIVE
NRBC BLD AUTO-RTO: 0 /100 WBC (ref 0–0.2)
OPIATES UR QL: NEGATIVE
OXYCODONE UR QL SCN: NEGATIVE
OXYHGB MFR BLDV: 95.6 % (ref 94–99)
PCO2 BLDA: 35.8 MM HG (ref 35–45)
PCO2 TEMP ADJ BLD: ABNORMAL MM[HG]
PCP UR QL SCN: NEGATIVE
PH BLDA: 7.4 PH UNITS (ref 7.35–7.45)
PH UR STRIP.AUTO: 5.5 [PH] (ref 5–8)
PH, TEMP CORRECTED: ABNORMAL
PLATELET # BLD AUTO: 381 10*3/MM3 (ref 140–450)
PMV BLD AUTO: 9.4 FL (ref 6–12)
PO2 BLDA: 89.2 MM HG (ref 83–108)
PO2 TEMP ADJ BLD: ABNORMAL MM[HG]
POTASSIUM SERPL-SCNC: 4 MMOL/L (ref 3.5–5.2)
POTASSIUM SERPL-SCNC: 4.4 MMOL/L (ref 3.5–5.2)
PROT SERPL-MCNC: 8.4 G/DL (ref 6–8.5)
PROT UR QL STRIP: ABNORMAL
RBC # BLD AUTO: 4.83 10*6/MM3 (ref 4.14–5.8)
RBC # UR STRIP: ABNORMAL /HPF
REF LAB TEST METHOD: ABNORMAL
SAO2 % BLDCOA: 96.9 % (ref 94–99)
SODIUM SERPL-SCNC: 134 MMOL/L (ref 136–145)
SODIUM SERPL-SCNC: 137 MMOL/L (ref 136–145)
SP GR UR STRIP: 1.02 (ref 1–1.03)
SQUAMOUS #/AREA URNS HPF: ABNORMAL /HPF
TRICYCLICS UR QL SCN: NEGATIVE
UROBILINOGEN UR QL STRIP: ABNORMAL
VENTILATOR MODE: ABNORMAL
WBC # UR STRIP: ABNORMAL /HPF
WBC NRBC COR # BLD AUTO: 11.79 10*3/MM3 (ref 3.4–10.8)
WHOLE BLOOD HOLD COAG: NORMAL
WHOLE BLOOD HOLD SPECIMEN: NORMAL

## 2024-08-09 PROCEDURE — 25010000002 ONDANSETRON PER 1 MG: Performed by: NURSE PRACTITIONER

## 2024-08-09 PROCEDURE — 96375 TX/PRO/DX INJ NEW DRUG ADDON: CPT

## 2024-08-09 PROCEDURE — 81001 URINALYSIS AUTO W/SCOPE: CPT | Performed by: STUDENT IN AN ORGANIZED HEALTH CARE EDUCATION/TRAINING PROGRAM

## 2024-08-09 PROCEDURE — 80053 COMPREHEN METABOLIC PANEL: CPT | Performed by: STUDENT IN AN ORGANIZED HEALTH CARE EDUCATION/TRAINING PROGRAM

## 2024-08-09 PROCEDURE — 96361 HYDRATE IV INFUSION ADD-ON: CPT

## 2024-08-09 PROCEDURE — 25810000003 SODIUM CHLORIDE 0.9 % SOLUTION: Performed by: NURSE PRACTITIONER

## 2024-08-09 PROCEDURE — 99285 EMERGENCY DEPT VISIT HI MDM: CPT

## 2024-08-09 PROCEDURE — 82805 BLOOD GASES W/O2 SATURATION: CPT

## 2024-08-09 PROCEDURE — 80307 DRUG TEST PRSMV CHEM ANLYZR: CPT | Performed by: NURSE PRACTITIONER

## 2024-08-09 PROCEDURE — 36600 WITHDRAWAL OF ARTERIAL BLOOD: CPT

## 2024-08-09 PROCEDURE — 85025 COMPLETE CBC W/AUTO DIFF WBC: CPT | Performed by: STUDENT IN AN ORGANIZED HEALTH CARE EDUCATION/TRAINING PROGRAM

## 2024-08-09 PROCEDURE — 82746 ASSAY OF FOLIC ACID SERUM: CPT | Performed by: INTERNAL MEDICINE

## 2024-08-09 PROCEDURE — 82009 KETONE BODYS QUAL: CPT | Performed by: NURSE PRACTITIONER

## 2024-08-09 PROCEDURE — 82375 ASSAY CARBOXYHB QUANT: CPT

## 2024-08-09 PROCEDURE — 25010000002 PROCHLORPERAZINE 10 MG/2ML SOLUTION: Performed by: NURSE PRACTITIONER

## 2024-08-09 PROCEDURE — G0378 HOSPITAL OBSERVATION PER HR: HCPCS

## 2024-08-09 PROCEDURE — 99223 1ST HOSP IP/OBS HIGH 75: CPT

## 2024-08-09 PROCEDURE — 83690 ASSAY OF LIPASE: CPT | Performed by: STUDENT IN AN ORGANIZED HEALTH CARE EDUCATION/TRAINING PROGRAM

## 2024-08-09 PROCEDURE — 82607 VITAMIN B-12: CPT | Performed by: INTERNAL MEDICINE

## 2024-08-09 PROCEDURE — 74176 CT ABD & PELVIS W/O CONTRAST: CPT

## 2024-08-09 PROCEDURE — 94799 UNLISTED PULMONARY SVC/PX: CPT

## 2024-08-09 PROCEDURE — 96365 THER/PROPH/DIAG IV INF INIT: CPT

## 2024-08-09 PROCEDURE — 83050 HGB METHEMOGLOBIN QUAN: CPT

## 2024-08-09 PROCEDURE — 36415 COLL VENOUS BLD VENIPUNCTURE: CPT

## 2024-08-09 PROCEDURE — 74176 CT ABD & PELVIS W/O CONTRAST: CPT | Performed by: RADIOLOGY

## 2024-08-09 RX ORDER — ONDANSETRON 2 MG/ML
4 INJECTION INTRAMUSCULAR; INTRAVENOUS ONCE
Status: COMPLETED | OUTPATIENT
Start: 2024-08-09 | End: 2024-08-09

## 2024-08-09 RX ORDER — SODIUM CHLORIDE 0.9 % (FLUSH) 0.9 %
10 SYRINGE (ML) INJECTION AS NEEDED
Status: DISCONTINUED | OUTPATIENT
Start: 2024-08-09 | End: 2024-08-10 | Stop reason: HOSPADM

## 2024-08-09 RX ORDER — SCOLOPAMINE TRANSDERMAL SYSTEM 1 MG/1
1 PATCH, EXTENDED RELEASE TRANSDERMAL
Status: DISCONTINUED | OUTPATIENT
Start: 2024-08-09 | End: 2024-08-10 | Stop reason: HOSPADM

## 2024-08-09 RX ORDER — PROCHLORPERAZINE EDISYLATE 5 MG/ML
10 INJECTION INTRAMUSCULAR; INTRAVENOUS EVERY 6 HOURS PRN
Status: DISCONTINUED | OUTPATIENT
Start: 2024-08-09 | End: 2024-08-10 | Stop reason: HOSPADM

## 2024-08-09 RX ORDER — PROCHLORPERAZINE EDISYLATE 5 MG/ML
10 INJECTION INTRAMUSCULAR; INTRAVENOUS ONCE
Status: COMPLETED | OUTPATIENT
Start: 2024-08-09 | End: 2024-08-09

## 2024-08-09 RX ADMIN — SCOPALAMINE 1 PATCH: 1 PATCH, EXTENDED RELEASE TRANSDERMAL at 23:45

## 2024-08-09 RX ADMIN — SODIUM CHLORIDE 1000 ML: 9 INJECTION, SOLUTION INTRAVENOUS at 21:12

## 2024-08-09 RX ADMIN — SODIUM CHLORIDE 1000 ML: 9 INJECTION, SOLUTION INTRAVENOUS at 17:46

## 2024-08-09 RX ADMIN — ONDANSETRON 4 MG: 2 INJECTION INTRAMUSCULAR; INTRAVENOUS at 17:45

## 2024-08-09 RX ADMIN — PROCHLORPERAZINE EDISYLATE 10 MG: 5 INJECTION INTRAMUSCULAR; INTRAVENOUS at 18:03

## 2024-08-09 RX ADMIN — SODIUM CHLORIDE 1000 ML: 9 INJECTION, SOLUTION INTRAVENOUS at 19:13

## 2024-08-09 NOTE — ED NOTES
Patient denies any pain at this time but states he has been vomiting. Patient states that he is positive he is in kidney failure. But denies hx of kidney failure.

## 2024-08-09 NOTE — ED PROVIDER NOTES
Subjective   History of Present Illness  Patient is a 29-year-old male presents today complaining of nausea and vomiting.  Patient reports being off 2 days.  Patient reports he has abdominal pain but only with vomiting.  Patient denies trying any interventions.  Patient denies fever.  Patient denies hematemesis.  Patient denies hematochezia or melena.    Abdominal Pain      Review of Systems   Constitutional: Negative.    HENT: Negative.     Eyes: Negative.    Respiratory: Negative.     Cardiovascular: Negative.    Gastrointestinal: Negative.  Positive for abdominal pain.   Endocrine: Negative.    Genitourinary: Negative.    Musculoskeletal: Negative.    Skin: Negative.    Allergic/Immunologic: Negative.    Neurological: Negative.    Hematological: Negative.    Psychiatric/Behavioral: Negative.         Past Medical History:   Diagnosis Date    Diabetes mellitus     Intractable nausea and vomiting 09/27/2023    Tobacco abuse        No Known Allergies    Past Surgical History:   Procedure Laterality Date    ENDOSCOPY N/A 6/1/2022    Procedure: ESOPHAGOGASTRODUODENOSCOPY WITH ANESTHESIA;  Surgeon: Keon Quezada MD;  Location: [facility];  Service: Gastroenterology;  Laterality: N/A;       Family History   Problem Relation Age of Onset    Heart disease Mother     Diabetes Mother     Kidney disease Mother     Heart disease Father     Diabetes Father     Heart disease Maternal Grandmother     Diabetes Maternal Grandmother     Heart disease Maternal Grandfather     Diabetes Maternal Grandfather     Heart disease Paternal Grandmother     Diabetes Paternal Grandmother     Heart disease Paternal Grandfather     Diabetes Paternal Grandfather        Social History     Socioeconomic History    Marital status: Single   Tobacco Use    Smoking status: Every Day     Current packs/day: 2.00     Average packs/day: 2.0 packs/day for 15.0 years (30.0 ttl pk-yrs)     Types: Cigarettes     Passive exposure: Never    Smokeless  tobacco: Never   Vaping Use    Vaping status: Former    Substances: Nicotine, Flavoring    Devices: Disposable    Passive vaping exposure: Yes   Substance and Sexual Activity    Alcohol use: Never    Drug use: Yes     Frequency: 7.0 times per week     Types: Marijuana    Sexual activity: Defer           Objective   Physical Exam  Vitals and nursing note reviewed.   Constitutional:       Appearance: He is well-developed.   HENT:      Head: Normocephalic.      Right Ear: External ear normal.      Left Ear: External ear normal.   Eyes:      Conjunctiva/sclera: Conjunctivae normal.      Pupils: Pupils are equal, round, and reactive to light.   Cardiovascular:      Rate and Rhythm: Normal rate and regular rhythm.      Heart sounds: Normal heart sounds.   Pulmonary:      Effort: Pulmonary effort is normal.      Breath sounds: Normal breath sounds.   Abdominal:      General: Bowel sounds are normal.      Palpations: Abdomen is soft.   Musculoskeletal:         General: Normal range of motion.      Cervical back: Normal range of motion and neck supple.   Skin:     General: Skin is warm and dry.      Capillary Refill: Capillary refill takes less than 2 seconds.   Neurological:      Mental Status: He is alert and oriented to person, place, and time.   Psychiatric:         Behavior: Behavior normal.         Thought Content: Thought content normal.         Procedures       Results for orders placed or performed during the hospital encounter of 08/09/24   Comprehensive Metabolic Panel    Specimen: Blood   Result Value Ref Range    Glucose 186 (H) 65 - 99 mg/dL    BUN 53 (H) 6 - 20 mg/dL    Creatinine 3.17 (H) 0.76 - 1.27 mg/dL    Sodium 134 (L) 136 - 145 mmol/L    Potassium 4.0 3.5 - 5.2 mmol/L    Chloride 92 (L) 98 - 107 mmol/L    CO2 20.1 (L) 22.0 - 29.0 mmol/L    Calcium 9.9 8.6 - 10.5 mg/dL    Total Protein 8.4 6.0 - 8.5 g/dL    Albumin 4.5 3.5 - 5.2 g/dL    ALT (SGPT) 14 1 - 41 U/L    AST (SGOT) 16 1 - 40 U/L    Alkaline  Phosphatase 73 39 - 117 U/L    Total Bilirubin 0.5 0.0 - 1.2 mg/dL    Globulin 3.9 gm/dL    A/G Ratio 1.2 g/dL    BUN/Creatinine Ratio 16.7 7.0 - 25.0    Anion Gap 21.9 (H) 5.0 - 15.0 mmol/L    eGFR 25.8 (L) >60.0 mL/min/1.73   Lipase    Specimen: Blood   Result Value Ref Range    Lipase 48 13 - 60 U/L   Urinalysis With Microscopic If Indicated (No Culture) - Urine, Clean Catch    Specimen: Urine, Clean Catch   Result Value Ref Range    Color, UA Dark Yellow (A) Yellow, Straw    Appearance, UA Clear Clear    pH, UA 5.5 5.0 - 8.0    Specific Gravity, UA 1.022 1.005 - 1.030    Glucose, UA Negative Negative    Ketones, UA 15 mg/dL (1+) (A) Negative    Bilirubin, UA Negative Negative    Blood, UA Moderate (2+) (A) Negative    Protein, UA >=300 mg/dL (3+) (A) Negative    Leuk Esterase, UA Negative Negative    Nitrite, UA Negative Negative    Urobilinogen, UA 0.2 E.U./dL 0.2 - 1.0 E.U./dL   CBC Auto Differential    Specimen: Blood   Result Value Ref Range    WBC 11.79 (H) 3.40 - 10.80 10*3/mm3    RBC 4.83 4.14 - 5.80 10*6/mm3    Hemoglobin 14.3 13.0 - 17.7 g/dL    Hematocrit 41.3 37.5 - 51.0 %    MCV 85.5 79.0 - 97.0 fL    MCH 29.6 26.6 - 33.0 pg    MCHC 34.6 31.5 - 35.7 g/dL    RDW 12.5 12.3 - 15.4 %    RDW-SD 38.9 37.0 - 54.0 fl    MPV 9.4 6.0 - 12.0 fL    Platelets 381 140 - 450 10*3/mm3    Neutrophil % 83.0 (H) 42.7 - 76.0 %    Lymphocyte % 10.0 (L) 19.6 - 45.3 %    Monocyte % 5.9 5.0 - 12.0 %    Eosinophil % 0.2 (L) 0.3 - 6.2 %    Basophil % 0.5 0.0 - 1.5 %    Immature Grans % 0.4 0.0 - 0.5 %    Neutrophils, Absolute 9.79 (H) 1.70 - 7.00 10*3/mm3    Lymphocytes, Absolute 1.18 0.70 - 3.10 10*3/mm3    Monocytes, Absolute 0.69 0.10 - 0.90 10*3/mm3    Eosinophils, Absolute 0.02 0.00 - 0.40 10*3/mm3    Basophils, Absolute 0.06 0.00 - 0.20 10*3/mm3    Immature Grans, Absolute 0.05 0.00 - 0.05 10*3/mm3    nRBC 0.0 0.0 - 0.2 /100 WBC   Urinalysis, Microscopic Only - Urine, Clean Catch    Specimen: Urine, Clean Catch   Result  Value Ref Range    RBC, UA 6-10 (A) None Seen, 0-2 /HPF    WBC, UA 0-2 None Seen, 0-2 /HPF    Bacteria, UA None Seen None Seen /HPF    Squamous Epithelial Cells, UA 0-2 None Seen, 0-2 /HPF    Hyaline Casts, UA 7-12 None Seen /LPF    Methodology Automated Microscopy    Acetone    Specimen: Blood   Result Value Ref Range    Acetone Small (C) Negative   Blood Gas, Arterial With Co-Ox    Specimen: Arterial Blood   Result Value Ref Range    Site Left Radial     Butch's Test Positive     pH, Arterial 7.398 7.350 - 7.450 pH units    pCO2, Arterial 35.8 35.0 - 45.0 mm Hg    pO2, Arterial 89.2 83.0 - 108.0 mm Hg    HCO3, Arterial 22.1 20.0 - 26.0 mmol/L    Base Excess, Arterial -2.3 (L) 0.0 - 2.0 mmol/L    O2 Saturation, Arterial 96.9 94.0 - 99.0 %    Hemoglobin, Blood Gas 13.1 (L) 14 - 18 g/dL    Hematocrit, Blood Gas 40.0 38.0 - 51.0 %    Oxyhemoglobin 95.6 94 - 99 %    Methemoglobin 0.30 0.00 - 3.00 %    Carboxyhemoglobin 1.0 0 - 5 %    A-a DO2 12.9 0.0 - 300.0 mmHg    CO2 Content 23.2 22 - 33 mmol/L    Barometric Pressure for Blood Gas 726 mmHg    Modality Room Air     FIO2 21 %    Ventilator Mode NA     Collected by [id number]     pH, Temp Corrected      pCO2, Temperature Corrected      pO2, Temperature Corrected     Urine Drug Screen - Urine, Clean Catch    Specimen: Urine, Clean Catch   Result Value Ref Range    THC, Screen, Urine Positive (A) Negative    Phencyclidine (PCP), Urine Negative Negative    Cocaine Screen, Urine Negative Negative    Methamphetamine, Ur Negative Negative    Opiate Screen Negative Negative    Amphetamine Screen, Urine Negative Negative    Benzodiazepine Screen, Urine Negative Negative    Tricyclic Antidepressants Screen Negative Negative    Methadone Screen, Urine Negative Negative    Barbiturates Screen, Urine Negative Negative    Oxycodone Screen, Urine Negative Negative    Buprenorphine, Screen, Urine Negative Negative   Fentanyl, Urine - Urine, Clean Catch    Specimen: Urine, Clean Catch    Result Value Ref Range    Fentanyl, Urine Negative Negative   Basic Metabolic Panel    Specimen: Blood   Result Value Ref Range    Glucose 124 (H) 65 - 99 mg/dL    BUN 45 (H) 6 - 20 mg/dL    Creatinine 2.62 (H) 0.76 - 1.27 mg/dL    Sodium 137 136 - 145 mmol/L    Potassium 4.4 3.5 - 5.2 mmol/L    Chloride 100 98 - 107 mmol/L    CO2 20.4 (L) 22.0 - 29.0 mmol/L    Calcium 8.5 (L) 8.6 - 10.5 mg/dL    BUN/Creatinine Ratio 17.2 7.0 - 25.0    Anion Gap 16.6 (H) 5.0 - 15.0 mmol/L    eGFR 32.5 (L) >60.0 mL/min/1.73   Comprehensive Metabolic Panel    Specimen: Blood   Result Value Ref Range    Glucose 135 (H) 65 - 99 mg/dL    BUN 41 (H) 6 - 20 mg/dL    Creatinine 2.40 (H) 0.76 - 1.27 mg/dL    Sodium 139 136 - 145 mmol/L    Potassium 4.0 3.5 - 5.2 mmol/L    Chloride 96 (L) 98 - 107 mmol/L    CO2 23.4 22.0 - 29.0 mmol/L    Calcium 8.9 8.6 - 10.5 mg/dL    Total Protein 7.1 6.0 - 8.5 g/dL    Albumin 3.9 3.5 - 5.2 g/dL    ALT (SGPT) 10 1 - 41 U/L    AST (SGOT) 13 1 - 40 U/L    Alkaline Phosphatase 60 39 - 117 U/L    Total Bilirubin 0.5 0.0 - 1.2 mg/dL    Globulin 3.2 gm/dL    A/G Ratio 1.2 g/dL    BUN/Creatinine Ratio 17.1 7.0 - 25.0    Anion Gap 19.6 (H) 5.0 - 15.0 mmol/L    eGFR 36.1 (L) >60.0 mL/min/1.73   CBC Auto Differential    Specimen: Blood   Result Value Ref Range    WBC 11.11 (H) 3.40 - 10.80 10*3/mm3    RBC 4.37 4.14 - 5.80 10*6/mm3    Hemoglobin 13.1 13.0 - 17.7 g/dL    Hematocrit 38.4 37.5 - 51.0 %    MCV 87.9 79.0 - 97.0 fL    MCH 30.0 26.6 - 33.0 pg    MCHC 34.1 31.5 - 35.7 g/dL    RDW 12.7 12.3 - 15.4 %    RDW-SD 40.6 37.0 - 54.0 fl    MPV 9.5 6.0 - 12.0 fL    Platelets 367 140 - 450 10*3/mm3    Neutrophil % 76.3 (H) 42.7 - 76.0 %    Lymphocyte % 14.4 (L) 19.6 - 45.3 %    Monocyte % 8.0 5.0 - 12.0 %    Eosinophil % 0.4 0.3 - 6.2 %    Basophil % 0.5 0.0 - 1.5 %    Immature Grans % 0.4 0.0 - 0.5 %    Neutrophils, Absolute 8.48 (H) 1.70 - 7.00 10*3/mm3    Lymphocytes, Absolute 1.60 0.70 - 3.10 10*3/mm3     Monocytes, Absolute 0.89 0.10 - 0.90 10*3/mm3    Eosinophils, Absolute 0.04 0.00 - 0.40 10*3/mm3    Basophils, Absolute 0.06 0.00 - 0.20 10*3/mm3    Immature Grans, Absolute 0.04 0.00 - 0.05 10*3/mm3    nRBC 0.0 0.0 - 0.2 /100 WBC   Folate    Specimen: Blood   Result Value Ref Range    Folate 13.00 4.78 - 24.20 ng/mL   Vitamin B12    Specimen: Blood   Result Value Ref Range    Vitamin B-12 >2,000 (H) 211 - 946 pg/mL   POC Glucose Once    Specimen: Blood   Result Value Ref Range    Glucose 109 70 - 130 mg/dL   POC Glucose Once    Specimen: Blood   Result Value Ref Range    Glucose 159 (H) 70 - 130 mg/dL   POC Glucose Once    Specimen: Blood   Result Value Ref Range    Glucose 125 70 - 130 mg/dL   ECG 12 Lead QT Measurement   Result Value Ref Range    QT Interval 400 ms    QTC Interval 456 ms   Green Top (Gel)   Result Value Ref Range    Extra Tube Hold for add-ons.    Lavender Top   Result Value Ref Range    Extra Tube hold for add-on    Gold Top - SST   Result Value Ref Range    Extra Tube Hold for add-ons.    Light Blue Top   Result Value Ref Range    Extra Tube Hold for add-ons.      CT Abdomen Pelvis Without Contrast   Final Result       1.  Mild constipation involving the right colon and transverse colon   segment.   2.  No bowel seen.   3.  No renal, ureteral, or bladder stone.   4.  Mild chronic right renal cortical volume loss   5.  Bilateral lower pelvic phleboliths.   6.  No free fluid or free air.   7.  A normal appendix is well seen.       This report was finalized on 8/9/2024 8:11 PM by Maico Miller MD.                ED Course  ED Course as of 08/10/24 2136   Fri Aug 09, 2024   2305 Patient accepted to hospitalist service per Dr. Barnes. [KH]      ED Course User Index  [KH] Kaitlin Tuttle, APRN                                             Medical Decision Making  Patient is a 29-year-old male presents today complaining of nausea and vomiting.  Patient reports being off 2 days.  Patient reports  he has abdominal pain but only with vomiting.  Patient denies trying any interventions.  Patient denies fever.  Patient denies hematemesis.  Patient denies hematochezia or melena.      Problems Addressed:  MINA (acute kidney injury): complicated acute illness or injury    Amount and/or Complexity of Data Reviewed  Labs: ordered. Decision-making details documented in ED Course.  Radiology: ordered. Decision-making details documented in ED Course.    Risk  Prescription drug management.  Decision regarding hospitalization.        Final diagnoses:   MINA (acute kidney injury)       ED Disposition  ED Disposition       ED Disposition   Decision to Admit    Condition   --    Comment   Level of Care: Med/Surg [1]   Diagnosis: MINA (acute kidney injury) [922143]                 No follow-up provider specified.       Medication List      No changes were made to your prescriptions during this visit.            Nas Jung P, APRN  08/10/24 1091

## 2024-08-09 NOTE — ED NOTES
Pt resting comfortably on stretcher with NADN. Safety measures remain in place. Bed in low and locked position. IV access intact and patent. VSS. A&Ox4. RR even and unlabored. Pt reports no needs at this time.

## 2024-08-10 VITALS
TEMPERATURE: 98.9 F | DIASTOLIC BLOOD PRESSURE: 69 MMHG | HEIGHT: 66 IN | SYSTOLIC BLOOD PRESSURE: 150 MMHG | RESPIRATION RATE: 20 BRPM | HEART RATE: 81 BPM | BODY MASS INDEX: 32.47 KG/M2 | OXYGEN SATURATION: 98 % | WEIGHT: 202 LBS

## 2024-08-10 LAB
ALBUMIN SERPL-MCNC: 3.9 G/DL (ref 3.5–5.2)
ALBUMIN/GLOB SERPL: 1.2 G/DL
ALP SERPL-CCNC: 60 U/L (ref 39–117)
ALT SERPL W P-5'-P-CCNC: 10 U/L (ref 1–41)
ANION GAP SERPL CALCULATED.3IONS-SCNC: 19.6 MMOL/L (ref 5–15)
AST SERPL-CCNC: 13 U/L (ref 1–40)
BASOPHILS # BLD AUTO: 0.06 10*3/MM3 (ref 0–0.2)
BASOPHILS NFR BLD AUTO: 0.5 % (ref 0–1.5)
BILIRUB SERPL-MCNC: 0.5 MG/DL (ref 0–1.2)
BUN SERPL-MCNC: 41 MG/DL (ref 6–20)
BUN/CREAT SERPL: 17.1 (ref 7–25)
CALCIUM SPEC-SCNC: 8.9 MG/DL (ref 8.6–10.5)
CHLORIDE SERPL-SCNC: 96 MMOL/L (ref 98–107)
CO2 SERPL-SCNC: 23.4 MMOL/L (ref 22–29)
CREAT SERPL-MCNC: 2.4 MG/DL (ref 0.76–1.27)
DEPRECATED RDW RBC AUTO: 40.6 FL (ref 37–54)
EGFRCR SERPLBLD CKD-EPI 2021: 36.1 ML/MIN/1.73
EOSINOPHIL # BLD AUTO: 0.04 10*3/MM3 (ref 0–0.4)
EOSINOPHIL NFR BLD AUTO: 0.4 % (ref 0.3–6.2)
ERYTHROCYTE [DISTWIDTH] IN BLOOD BY AUTOMATED COUNT: 12.7 % (ref 12.3–15.4)
FOLATE SERPL-MCNC: 13 NG/ML (ref 4.78–24.2)
GLOBULIN UR ELPH-MCNC: 3.2 GM/DL
GLUCOSE BLDC GLUCOMTR-MCNC: 109 MG/DL (ref 70–130)
GLUCOSE BLDC GLUCOMTR-MCNC: 125 MG/DL (ref 70–130)
GLUCOSE BLDC GLUCOMTR-MCNC: 159 MG/DL (ref 70–130)
GLUCOSE SERPL-MCNC: 135 MG/DL (ref 65–99)
HCT VFR BLD AUTO: 38.4 % (ref 37.5–51)
HGB BLD-MCNC: 13.1 G/DL (ref 13–17.7)
IMM GRANULOCYTES # BLD AUTO: 0.04 10*3/MM3 (ref 0–0.05)
IMM GRANULOCYTES NFR BLD AUTO: 0.4 % (ref 0–0.5)
LYMPHOCYTES # BLD AUTO: 1.6 10*3/MM3 (ref 0.7–3.1)
LYMPHOCYTES NFR BLD AUTO: 14.4 % (ref 19.6–45.3)
MCH RBC QN AUTO: 30 PG (ref 26.6–33)
MCHC RBC AUTO-ENTMCNC: 34.1 G/DL (ref 31.5–35.7)
MCV RBC AUTO: 87.9 FL (ref 79–97)
MONOCYTES # BLD AUTO: 0.89 10*3/MM3 (ref 0.1–0.9)
MONOCYTES NFR BLD AUTO: 8 % (ref 5–12)
NEUTROPHILS NFR BLD AUTO: 76.3 % (ref 42.7–76)
NEUTROPHILS NFR BLD AUTO: 8.48 10*3/MM3 (ref 1.7–7)
NRBC BLD AUTO-RTO: 0 /100 WBC (ref 0–0.2)
PLATELET # BLD AUTO: 367 10*3/MM3 (ref 140–450)
PMV BLD AUTO: 9.5 FL (ref 6–12)
POTASSIUM SERPL-SCNC: 4 MMOL/L (ref 3.5–5.2)
PROT SERPL-MCNC: 7.1 G/DL (ref 6–8.5)
QT INTERVAL: 400 MS
QTC INTERVAL: 456 MS
RBC # BLD AUTO: 4.37 10*6/MM3 (ref 4.14–5.8)
SODIUM SERPL-SCNC: 139 MMOL/L (ref 136–145)
VIT B12 BLD-MCNC: >2000 PG/ML (ref 211–946)
WBC NRBC COR # BLD AUTO: 11.11 10*3/MM3 (ref 3.4–10.8)

## 2024-08-10 PROCEDURE — 82948 REAGENT STRIP/BLOOD GLUCOSE: CPT

## 2024-08-10 PROCEDURE — 25010000002 THIAMINE HCL 200 MG/2ML SOLUTION 2 ML VIAL: Performed by: INTERNAL MEDICINE

## 2024-08-10 PROCEDURE — 80053 COMPREHEN METABOLIC PANEL: CPT

## 2024-08-10 PROCEDURE — 96365 THER/PROPH/DIAG IV INF INIT: CPT

## 2024-08-10 PROCEDURE — 25810000003 SODIUM CHLORIDE 0.9 % SOLUTION 1,000 ML FLEX CONT: Performed by: INTERNAL MEDICINE

## 2024-08-10 PROCEDURE — G0378 HOSPITAL OBSERVATION PER HR: HCPCS

## 2024-08-10 PROCEDURE — 25810000003 SODIUM CHLORIDE 0.9 % SOLUTION: Performed by: INTERNAL MEDICINE

## 2024-08-10 PROCEDURE — 25010000002 PROCHLORPERAZINE 10 MG/2ML SOLUTION: Performed by: INTERNAL MEDICINE

## 2024-08-10 PROCEDURE — 85025 COMPLETE CBC W/AUTO DIFF WBC: CPT

## 2024-08-10 PROCEDURE — 93005 ELECTROCARDIOGRAM TRACING: CPT | Performed by: INTERNAL MEDICINE

## 2024-08-10 PROCEDURE — 25010000002 ONDANSETRON PER 1 MG: Performed by: INTERNAL MEDICINE

## 2024-08-10 PROCEDURE — 63710000001 INSULIN LISPRO (HUMAN) PER 5 UNITS

## 2024-08-10 PROCEDURE — 96376 TX/PRO/DX INJ SAME DRUG ADON: CPT

## 2024-08-10 RX ORDER — SODIUM CHLORIDE 0.9 % (FLUSH) 0.9 %
10 SYRINGE (ML) INJECTION EVERY 12 HOURS SCHEDULED
Status: DISCONTINUED | OUTPATIENT
Start: 2024-08-10 | End: 2024-08-10 | Stop reason: HOSPADM

## 2024-08-10 RX ORDER — AMOXICILLIN 250 MG
2 CAPSULE ORAL 2 TIMES DAILY PRN
Status: DISCONTINUED | OUTPATIENT
Start: 2024-08-10 | End: 2024-08-10 | Stop reason: HOSPADM

## 2024-08-10 RX ORDER — SODIUM CHLORIDE 9 MG/ML
100 INJECTION, SOLUTION INTRAVENOUS CONTINUOUS
Status: DISCONTINUED | OUTPATIENT
Start: 2024-08-10 | End: 2024-08-10 | Stop reason: HOSPADM

## 2024-08-10 RX ORDER — SODIUM CHLORIDE 9 MG/ML
40 INJECTION, SOLUTION INTRAVENOUS AS NEEDED
Status: DISCONTINUED | OUTPATIENT
Start: 2024-08-10 | End: 2024-08-10 | Stop reason: HOSPADM

## 2024-08-10 RX ORDER — DEXTROSE MONOHYDRATE 25 G/50ML
25 INJECTION, SOLUTION INTRAVENOUS
Status: DISCONTINUED | OUTPATIENT
Start: 2024-08-10 | End: 2024-08-10 | Stop reason: HOSPADM

## 2024-08-10 RX ORDER — BISACODYL 5 MG/1
5 TABLET, DELAYED RELEASE ORAL DAILY PRN
Status: DISCONTINUED | OUTPATIENT
Start: 2024-08-10 | End: 2024-08-10 | Stop reason: HOSPADM

## 2024-08-10 RX ORDER — SODIUM CHLORIDE 0.9 % (FLUSH) 0.9 %
10 SYRINGE (ML) INJECTION AS NEEDED
Status: DISCONTINUED | OUTPATIENT
Start: 2024-08-10 | End: 2024-08-10 | Stop reason: HOSPADM

## 2024-08-10 RX ORDER — POLYETHYLENE GLYCOL 3350 17 G/17G
17 POWDER, FOR SOLUTION ORAL DAILY PRN
Status: DISCONTINUED | OUTPATIENT
Start: 2024-08-10 | End: 2024-08-10 | Stop reason: HOSPADM

## 2024-08-10 RX ORDER — GLUCAGON 1 MG/ML
1 KIT INJECTION
Status: DISCONTINUED | OUTPATIENT
Start: 2024-08-10 | End: 2024-08-10 | Stop reason: HOSPADM

## 2024-08-10 RX ORDER — BISACODYL 10 MG
10 SUPPOSITORY, RECTAL RECTAL DAILY PRN
Status: DISCONTINUED | OUTPATIENT
Start: 2024-08-10 | End: 2024-08-10 | Stop reason: HOSPADM

## 2024-08-10 RX ORDER — ONDANSETRON 2 MG/ML
4 INJECTION INTRAMUSCULAR; INTRAVENOUS EVERY 6 HOURS PRN
Status: DISCONTINUED | OUTPATIENT
Start: 2024-08-10 | End: 2024-08-10

## 2024-08-10 RX ORDER — INSULIN LISPRO 100 [IU]/ML
2-7 INJECTION, SOLUTION INTRAVENOUS; SUBCUTANEOUS
Status: DISCONTINUED | OUTPATIENT
Start: 2024-08-10 | End: 2024-08-10 | Stop reason: HOSPADM

## 2024-08-10 RX ORDER — ONDANSETRON 2 MG/ML
4 INJECTION INTRAMUSCULAR; INTRAVENOUS EVERY 6 HOURS PRN
Status: DISCONTINUED | OUTPATIENT
Start: 2024-08-10 | End: 2024-08-10 | Stop reason: HOSPADM

## 2024-08-10 RX ORDER — DIPHENOXYLATE HYDROCHLORIDE AND ATROPINE SULFATE 2.5; .025 MG/1; MG/1
1 TABLET ORAL DAILY
Status: DISCONTINUED | OUTPATIENT
Start: 2024-08-10 | End: 2024-08-10 | Stop reason: HOSPADM

## 2024-08-10 RX ORDER — NICOTINE POLACRILEX 4 MG
15 LOZENGE BUCCAL
Status: DISCONTINUED | OUTPATIENT
Start: 2024-08-10 | End: 2024-08-10 | Stop reason: HOSPADM

## 2024-08-10 RX ADMIN — Medication 10 ML: at 00:10

## 2024-08-10 RX ADMIN — THIAMINE HYDROCHLORIDE 500 MG: 100 INJECTION, SOLUTION INTRAMUSCULAR; INTRAVENOUS at 08:09

## 2024-08-10 RX ADMIN — INSULIN LISPRO 2 UNITS: 100 INJECTION, SOLUTION INTRAVENOUS; SUBCUTANEOUS at 08:08

## 2024-08-10 RX ADMIN — PROCHLORPERAZINE EDISYLATE 10 MG: 5 INJECTION INTRAMUSCULAR; INTRAVENOUS at 00:08

## 2024-08-10 RX ADMIN — PROCHLORPERAZINE EDISYLATE 10 MG: 5 INJECTION INTRAMUSCULAR; INTRAVENOUS at 08:56

## 2024-08-10 RX ADMIN — ONDANSETRON 4 MG: 2 INJECTION INTRAMUSCULAR; INTRAVENOUS at 09:43

## 2024-08-10 RX ADMIN — SODIUM CHLORIDE 40 ML: 9 INJECTION, SOLUTION INTRAVENOUS at 00:08

## 2024-08-10 RX ADMIN — SODIUM CHLORIDE 100 ML/HR: 9 INJECTION, SOLUTION INTRAVENOUS at 09:14

## 2024-08-10 NOTE — H&P
[facility] Medicine Services  HISTORY & PHYSICAL    Patient Identification:  Name:  Oliver Osborn  Age:  29 y.o.  Sex:  male  :  1995  MRN:  [id number]   Visit Number:  [id number]  Admit Date: 2024   Primary Care Physician:  Geri Belcher APRN     Subjective     Chief complaint:   Chief Complaint   Patient presents with    Abdominal Pain     History of presenting illness:   Patient is a 29 y.o. male with past medical history significant for diabetes that presented to the [facility] emergency department for evaluation of abdominal pain.  The patient states that he has had vomiting and diarrhea for the past 2 days.  The patient states that he has not tried any interventions at home.  He advises the symptoms intensified to the point seeking medical attention in the emergency department.  The patient denies any chest pain.  No tenderness to the abdomen  noted on palpation.  On my examination, patient is alert and oriented x 3.  No acute distress noted.  Patient is in stable condition on admission to the hospital. Upon arrival to the ED, vitals were temperature 97.8, /84, HR 93, Resp 18, SpO2 100%. Labs obtained on arrival: Sodium 135, chloride 92, CO2 20.1, anion gap 21.9, BUN 53, creatinine 3.17, GFR 20.8, glucose 186, WBC 11.79. Patient has been admitted to the medical/surgical unit for further evaluation and treatment.     Present during exam:/A  ---------------------------------------------------------------------------------------------------------------------   Review of Systems   Constitutional: Negative.  Negative for chills, fatigue and fever.   HENT: Negative.  Negative for congestion, sore throat and trouble swallowing.    Respiratory: Negative.  Negative for cough, shortness of breath and wheezing.    Cardiovascular: Negative.  Negative for chest pain, palpitations and leg swelling.   Gastrointestinal:  Positive for vomiting. Negative for abdominal  pain, diarrhea and nausea.   Endocrine: Negative.    Genitourinary: Negative.  Negative for dysuria.   Musculoskeletal: Negative.  Negative for neck pain and neck stiffness.   Skin: Negative.    Allergic/Immunologic: Negative.    Neurological: Negative.  Negative for dizziness, tremors, seizures, syncope, facial asymmetry, speech difficulty, weakness, light-headedness, numbness and headaches.   Hematological: Negative.    Psychiatric/Behavioral: Negative.         Otherwise 10-system ROS reviewed and is negative except as mentioned in the HPI.    ---------------------------------------------------------------------------------------------------------------------   Past Medical History:   Diagnosis Date    Diabetes mellitus     Intractable nausea and vomiting 09/27/2023    Tobacco abuse      Past Surgical History:   Procedure Laterality Date    ENDOSCOPY N/A 6/1/2022    Procedure: ESOPHAGOGASTRODUODENOSCOPY WITH ANESTHESIA;  Surgeon: Keon Quezada MD;  Location: [facility];  Service: Gastroenterology;  Laterality: N/A;     Family History   Problem Relation Age of Onset    Heart disease Mother     Diabetes Mother     Kidney disease Mother     Heart disease Father     Diabetes Father     Heart disease Maternal Grandmother     Diabetes Maternal Grandmother     Heart disease Maternal Grandfather     Diabetes Maternal Grandfather     Heart disease Paternal Grandmother     Diabetes Paternal Grandmother     Heart disease Paternal Grandfather     Diabetes Paternal Grandfather      Social History     Socioeconomic History    Marital status: Single   Tobacco Use    Smoking status: Every Day     Current packs/day: 2.00     Average packs/day: 2.0 packs/day for 15.0 years (30.0 ttl pk-yrs)     Types: Cigarettes     Passive exposure: Never    Smokeless tobacco: Never   Vaping Use    Vaping status: Former    Substances: Nicotine, Flavoring    Devices: Disposable    Passive vaping exposure: Yes   Substance and Sexual Activity     Alcohol use: Never    Drug use: Yes     Frequency: 7.0 times per week     Types: Marijuana    Sexual activity: Defer     ---------------------------------------------------------------------------------------------------------------------   Allergies:  Patient has no known allergies.  ---------------------------------------------------------------------------------------------------------------------   Medications below are reported home medications pulling from within the system; at this time, these medications have not been reconciled unless otherwise specified and are in the verification process for further verifcation as current home medications.    Prior to Admission Medications       Prescriptions Last Dose Informant Patient Reported? Taking?    Insulin Glargine (Lantus SoloStar) 100 UNIT/ML injection pen  Self, Other Yes No    Inject 20 Units under the skin into the appropriate area as directed Every Night.    vitamin D (ERGOCALCIFEROL) 1.25 MG (40626 UT) capsule capsule  Self, Other Yes No    Take 1 capsule by mouth 1 (One) Time Per Week. Thursdays          ---------------------------------------------------------------------------------------------------------------------    Objective     Hospital Scheduled Meds:  Scopolamine, 1 patch, Transdermal, Q72H  sodium chloride, 10 mL, Intravenous, Q12H           Current listed hospital scheduled medications may not yet reflect those currently placed in orders that are signed and held, awaiting patient's arrival to floor/unit.    ---------------------------------------------------------------------------------------------------------------------   Vital Signs:  Temp:  [97.8 °F (36.6 °C)-98.2 °F (36.8 °C)] 98.2 °F (36.8 °C)  Heart Rate:  [] 83  Resp:  [16-18] 16  BP: (101-170)/() 116/80  Mean Arterial Pressure (Non-Invasive) for the past 24 hrs (Last 3 readings):   Noninvasive MAP (mmHg)   08/09/24 2330 84   08/09/24 2315 96   08/09/24 2300 102      SpO2 Percentage    08/09/24 2315 08/09/24 2330 08/10/24 0032   SpO2: 97% 96% 99%     SpO2:  [93 %-100 %] 99 %  on   ;   Device (Oxygen Therapy): room air    Body mass index is 32.62 kg/m².  Wt Readings from Last 3 Encounters:   08/10/24 91.6 kg (202 lb)   07/07/24 92.9 kg (204 lb 12.9 oz)   12/11/23 90.7 kg (200 lb)       ---------------------------------------------------------------------------------------------------------------------   Physical Exam  Vitals reviewed.   Constitutional:       General: He is awake. He is not in acute distress.     Appearance: Normal appearance. He is normal weight. He is not ill-appearing or diaphoretic.   HENT:      Head: Normocephalic and atraumatic.      Nose: Nose normal.      Mouth/Throat:      Mouth: Mucous membranes are moist.      Pharynx: Oropharynx is clear.   Eyes:      Extraocular Movements: Extraocular movements intact.      Pupils: Pupils are equal, round, and reactive to light.   Cardiovascular:      Rate and Rhythm: Normal rate and regular rhythm.      Pulses: Normal pulses.           Dorsalis pedis pulses are 2+ on the right side and 2+ on the left side.      Heart sounds: Normal heart sounds. No murmur heard.     No friction rub.   Pulmonary:      Effort: Pulmonary effort is normal. No accessory muscle usage, respiratory distress or retractions.      Breath sounds: Normal breath sounds. No wheezing, rhonchi or rales.   Abdominal:      General: Bowel sounds are normal. There is no distension.      Palpations: Abdomen is soft.      Tenderness: There is no abdominal tenderness. There is no guarding.   Musculoskeletal:         General: Normal range of motion.      Cervical back: Normal range of motion and neck supple. No rigidity.      Right lower leg: No edema.      Left lower leg: No edema.   Skin:     General: Skin is warm and dry.      Capillary Refill: Capillary refill takes 2 to 3 seconds.   Neurological:      Mental Status: He is alert and oriented to  "person, place, and time. Mental status is at baseline.      Cranial Nerves: No dysarthria or facial asymmetry.      Sensory: Sensation is intact.      Motor: No weakness or tremor.   Psychiatric:         Attention and Perception: Attention normal.         Mood and Affect: Mood normal.         Speech: Speech normal.         Behavior: Behavior normal. Behavior is cooperative.         Thought Content: Thought content normal.         Cognition and Memory: Cognition normal.         Judgment: Judgment normal.          ---------------------------------------------------------------------------------------------------------------------  EKG:    Not ordered in the ED.    ---------------------------------------------------------------------------------------------------------------------           Results from last 7 days   Lab Units 08/09/24  1928   PH, ARTERIAL pH units 7.398   PO2 ART mm Hg 89.2   PCO2, ARTERIAL mm Hg 35.8   HCO3 ART mmol/L 22.1     Results from last 7 days   Lab Units 08/09/24  1724   WBC 10*3/mm3 11.79*   HEMOGLOBIN g/dL 14.3   HEMATOCRIT % 41.3   MCV fL 85.5   MCHC g/dL 34.6   PLATELETS 10*3/mm3 381     Results from last 7 days   Lab Units 08/09/24  2246 08/09/24  1724   SODIUM mmol/L 137 134*   POTASSIUM mmol/L 4.4 4.0   CHLORIDE mmol/L 100 92*   CO2 mmol/L 20.4* 20.1*   BUN mg/dL 45* 53*   CREATININE mg/dL 2.62* 3.17*   CALCIUM mg/dL 8.5* 9.9   GLUCOSE mg/dL 124* 186*   ALBUMIN g/dL  --  4.5   BILIRUBIN mg/dL  --  0.5   ALK PHOS U/L  --  73   AST (SGOT) U/L  --  16   ALT (SGPT) U/L  --  14   Estimated Creatinine Clearance: 43.3 mL/min (A) (by C-G formula based on SCr of 2.62 mg/dL (H)).  No results found for: \"AMMONIA\"    Glucose   Date/Time Value Ref Range Status   08/10/2024 0022 109 70 - 130 mg/dL Final     Lab Results   Component Value Date    HGBA1C 8.80 (H) 07/05/2024     Lab Results   Component Value Date    TSH 2.850 05/30/2022       Microbiology Results (last 10 days)       ** No results " found for the last 240 hours. **           Pain Management Panel  More data exists         Latest Ref Rng & Units 8/9/2024 7/7/2024   Pain Management Panel   Amphetamine, Urine Qual Negative Negative  Negative    Barbiturates Screen, Urine Negative Negative  Negative    Benzodiazepine Screen, Urine Negative Negative  Negative    Buprenorphine, Screen, Urine Negative Negative  Negative    Cocaine Screen, Urine Negative Negative  Negative    Fentanyl, Urine Negative Negative  Negative    Methadone Screen , Urine Negative Negative  Negative    Methamphetamine, Ur Negative Negative  Negative       Details                 I have personally reviewed the above laboratory results.   ---------------------------------------------------------------------------------------------------------------------  Imaging Results (Last 7 Days)       Procedure Component Value Units Date/Time    CT Abdomen Pelvis Without Contrast [[id number]] Collected: 08/09/24 2008     Updated: 08/09/24 2013    Narrative:      PROCEDURE: CT of the abdomen and pelvis performed without IV contrast on  August 9, 2024. The examination was performed with 5 mm axial imaging  and 5 mm sagittal and coronal reconstruction images. Examination was  performed according to as low as reasonably achievable dose protocol.     HISTORY: Nausea and vomiting.     COMPARISON: None.     FINDINGS:     Normal heart size with trace volume of pericardial fluid.  No acute process seen in the liver, spleen, gallbladder, pancreas, and  adrenal glands.  Mild chronic right renal cortical volume loss  No renal, ureter, or bladder stone  A normal appendix is well seen.  No acute process seen in the bladder, prostate gland, and lower pelvic  phleboliths  No free fluid or free air. No abscess or hematoma.       Impression:         1.  Mild constipation involving the right colon and transverse colon  segment.  2.  No bowel seen.  3.  No renal, ureteral, or bladder stone.  4.  Mild chronic  right renal cortical volume loss  5.  Bilateral lower pelvic phleboliths.  6.  No free fluid or free air.  7.  A normal appendix is well seen.     This report was finalized on 8/9/2024 8:11 PM by Maico Miller MD.             I have personally reviewed the above radiology results.     Last Echocardiogram:  Results for orders placed during the hospital encounter of 04/19/23    Adult Transthoracic Echo Complete W/ Cont if Necessary Per Protocol    Interpretation Summary    Left ventricular systolic function is normal. Estimated left ventricular EF = 65%    All left ventricular wall segments contract normally.    Left ventricular diastolic function was normal.    The cardiac chamber dimensions are normal.    All valves appear normal in structure and showed no stenosis or significant regurgitations.    No evidence of pulmonary hypertension is present.    There is no evidence of pericardial effusion.    ---------------------------------------------------------------------------------------------------------------------    Assessment & Plan      ACUTE HOSPITAL PROBLEMS    -Acute kidney injury secondary to GI volume loss, on admisson    CMP and CBC in the a.m.  Patient received 3000 mg in the emergency department  Initial creatinine 3.17 in the emergency department  Initial GFR 25 in th emergency department  Compazine ordered as needed  Will gently hydrate patient  Monitor renal function with a.m. labs      -F/E/N  Replace electrolytes per protocol as necessary. Regular diet.     CHRONIC MEDICAL PROBLEMS    -Diabetes  -Tobacco abuse    Vital signs per hospital policy  Insulin sliding scale  Blood glucose checks before meals and at bedtime  Continue home medication regimen and pharmacy reconciliation  ---------------------------------------------------  DVT Prophylaxis: Sequential compression device  Activity: Bed rest  ---------------------------------------------------  The patient is considered to be a high risk patient  due to: past medical history     OBSERVATION status; however, if further evaluation or treatment plans warrant, status may change.  Based upon current information, I predict patient's care encounter to be less than or equal to 2 midnights     Code Status: Full Code  ---------------------------------------------------  Disposition/Discharge planning: Consult case management for discharge planning.  ---------------------------------------------------  I have discussed the patient's assessment and plan with attending physician Simeon Godfrey MD Carl B Gray, APRN     08/10/24  01:35 EDT    Attending Physician: Simeon Barnes MD

## 2024-08-10 NOTE — PLAN OF CARE
Goal Outcome Evaluation:  Plan of Care Reviewed With: patient        Progress: no change  Outcome Evaluation: Patient arrived from ED. Pt resting in bed. Pt ambulated to bathroom. Pt c/o nausea, vomiting intermittently. PRN medication given per MAR. VSS on RA. No concerns or complaints at this time. Will continue with plan of care.

## 2024-08-10 NOTE — NURSING NOTE
Pt requested to leave AMA. Educated pt on risks to his health for leaving against medical advice. Pt stated that he understood and then proceeded to adamantly requested AMA papers.  Notified. RN explained once again the risks to pt's health for signing out AMA, pt stated he understood, pt signed paper and left.

## 2024-08-10 NOTE — DISCHARGE SUMMARY
"    [facility] HOSPITALISTS DISCHARGE SUMMARY    Patient Identification:  Name:  Oliver Osborn  Age:  29 y.o.  Sex:  male  :  1995  MRN:  [id number]  Visit Number:  [id number]    Date of Admission: 2024  Date of Discharge:  ***    PCP: Geri Belcher APRN    DISCHARGE DIAGNOSIS      CONSULTS       PROCEDURES PERFORMED      HOSPITAL COURSE  Patient is a 29 y.o. male presented on *** to [facility] complaining of ***.  Please see the admitting history and physical for further details.      Patient seen earlier today. He still was noted to have nausea/vomiting and was agreeable to stay until tomorrow for further treatment with possible discharge tomorrow if he continued to improve. Patient abruptly left AMA per nursing staff to \"feed his dogs\".     VITAL SIGNS:  Temp:  [97.8 °F (36.6 °C)-98.9 °F (37.2 °C)] 98.9 °F (37.2 °C)  Heart Rate:  [] 81  Resp:  [16-20] 20  BP: (101-170)/() 150/69  SpO2:  [93 %-100 %] 98 %  on   ;   Device (Oxygen Therapy): room air    Body mass index is 32.62 kg/m².  Wt Readings from Last 3 Encounters:   08/10/24 91.6 kg (202 lb)   24 92.9 kg (204 lb 12.9 oz)   23 90.7 kg (200 lb)       PHYSICAL EXAM:  Constitutional:  Well-developed and well-nourished.  No respiratory distress.      HENT:  Head:  Normocephalic and atraumatic.  Mouth:  Moist mucous membranes.    Eyes:  Conjunctivae and EOM are normal.  Pupils are equal, round, and reactive to light.  No scleral icterus.    Cardiovascular:  Normal rate, regular rhythm and normal heart sounds with no murmur.  Pulmonary/Chest:  No respiratory distress, no wheezes, no crackles, with normal breath sounds and good air movement.  Abdominal:  Soft.  Bowel sounds are normal.  No distension and no tenderness.   Musculoskeletal:  No edema, no tenderness, and no deformity.  No red or swollen joints anywhere.    Neurological:  Alert and oriented to person, place, and time.  No gross neurological " deficit.   Skin:  Skin is warm and dry. No rash noted. No pallor.   Peripheral vascular:  Strong pulses in all 4 extremities with no clubbing, no cyanosis, no edema.    DISCHARGE DISPOSITION   Stable    DISCHARGE MEDICATIONS:     Discharge Medications        ASK your doctor about these medications        Instructions Start Date   Lantus SoloStar 100 UNIT/ML injection pen  Generic drug: Insulin Glargine   Inject 20 Units under the skin into the appropriate area as directed Every Night.                      TEST  RESULTS PENDING AT DISCHARGE       CODE STATUS  Code Status and Medical Interventions: CPR (Attempt to Resuscitate); Full Support   Ordered at: 08/09/24 9304     Level Of Support Discussed With:    Patient     Code Status (Patient has no pulse and is not breathing):    CPR (Attempt to Resuscitate)     Medical Interventions (Patient has pulse or is breathing):    Full Support       Harvinder Barraza MD  [facility]  08/10/24  15:14 EDT    Please note that this discharge summary required more than 30 minutes to complete.

## 2025-03-04 NOTE — TELEPHONE ENCOUNTER
CALLED PATIENT BECAUSE HE MISSED HIS HIDA SCAN.    NO ANSWER NO VOICEMAIL.  
Spoke with patient, he says he is going to call tomorrow to get that rescheduled.   
face

## 2025-05-20 ENCOUNTER — TRANSCRIBE ORDERS (OUTPATIENT)
Dept: ADMINISTRATIVE | Facility: HOSPITAL | Age: 30
End: 2025-05-20
Payer: MEDICAID

## 2025-05-20 DIAGNOSIS — N18.32 CHRONIC KIDNEY DISEASE (CKD) STAGE G3B/A1, MODERATELY DECREASED GLOMERULAR FILTRATION RATE (GFR) BETWEEN 30-44 ML/MIN/1.73 SQUARE METER AND ALBUMINURIA CREATININE RATIO LESS THAN 30 MG/G (CMS/H*: Primary | ICD-10-CM

## 2025-06-17 ENCOUNTER — HOSPITAL ENCOUNTER (OUTPATIENT)
Facility: HOSPITAL | Age: 30
Discharge: HOME | End: 2025-06-17
Admitting: INTERNAL MEDICINE
Payer: MEDICAID

## 2025-06-17 DIAGNOSIS — N18.32 CHRONIC KIDNEY DISEASE (CKD) STAGE G3B/A1, MODERATELY DECREASED GLOMERULAR FILTRATION RATE (GFR) BETWEEN 30-44 ML/MIN/1.73 SQUARE METER AND ALBUMINURIA CREATININE RATIO LESS THAN 30 MG/G (CMS/H*: ICD-10-CM

## 2025-06-17 PROCEDURE — 76775 US EXAM ABDO BACK WALL LIM: CPT

## 2025-06-17 PROCEDURE — 76775 US EXAM ABDO BACK WALL LIM: CPT | Performed by: RADIOLOGY

## 2025-08-21 ENCOUNTER — APPOINTMENT (OUTPATIENT)
Dept: GENERAL RADIOLOGY | Facility: HOSPITAL | Age: 30
End: 2025-08-21
Payer: MEDICAID

## 2025-08-21 ENCOUNTER — APPOINTMENT (OUTPATIENT)
Dept: CT IMAGING | Facility: HOSPITAL | Age: 30
End: 2025-08-21
Payer: MEDICAID

## 2025-08-21 ENCOUNTER — HOSPITAL ENCOUNTER (INPATIENT)
Facility: HOSPITAL | Age: 30
LOS: 1 days | Discharge: LEFT BEFORE DISCHARGE | End: 2025-08-22
Admitting: INTERNAL MEDICINE
Payer: MEDICAID

## 2025-08-22 ENCOUNTER — APPOINTMENT (OUTPATIENT)
Dept: RESPIRATORY THERAPY | Facility: HOSPITAL | Age: 30
End: 2025-08-22
Payer: MEDICAID

## 2025-08-22 ENCOUNTER — APPOINTMENT (OUTPATIENT)
Dept: CT IMAGING | Facility: HOSPITAL | Age: 30
End: 2025-08-22
Payer: MEDICAID

## 2025-08-23 ENCOUNTER — HOSPITAL ENCOUNTER (EMERGENCY)
Facility: HOSPITAL | Age: 30
Discharge: HOME | End: 2025-08-23
Attending: STUDENT IN AN ORGANIZED HEALTH CARE EDUCATION/TRAINING PROGRAM
Payer: MEDICAID

## (undated) DEVICE — THE BITE BLOCK MAXI, LATEX FREE STRAP IS USED TO PROTECT THE ENDOSCOPE INSERTION TUBE FROM BEING BITTEN BY THE PATIENT.

## (undated) DEVICE — Device

## (undated) DEVICE — FRCP BX RADJAW4 NDL 2.8 240CM LG OG BX40

## (undated) DEVICE — Device: Brand: DEFENDO AIR/WATER/SUCTION AND BIOPSY VALVE